# Patient Record
Sex: FEMALE | Race: WHITE | NOT HISPANIC OR LATINO | Employment: OTHER | URBAN - METROPOLITAN AREA
[De-identification: names, ages, dates, MRNs, and addresses within clinical notes are randomized per-mention and may not be internally consistent; named-entity substitution may affect disease eponyms.]

---

## 2017-01-30 ENCOUNTER — OFFICE VISIT (OUTPATIENT)
Dept: PAIN MEDICINE | Facility: CLINIC | Age: 52
End: 2017-01-30

## 2017-01-30 VITALS
TEMPERATURE: 97.3 F | WEIGHT: 167 LBS | OXYGEN SATURATION: 95 % | DIASTOLIC BLOOD PRESSURE: 74 MMHG | BODY MASS INDEX: 28.51 KG/M2 | SYSTOLIC BLOOD PRESSURE: 107 MMHG | HEIGHT: 64 IN | HEART RATE: 79 BPM | RESPIRATION RATE: 16 BRPM

## 2017-01-30 DIAGNOSIS — M93.1 KIENBOCK DISEASE, ADULT: ICD-10-CM

## 2017-01-30 DIAGNOSIS — G89.29 OTHER CHRONIC PAIN: Primary | ICD-10-CM

## 2017-01-30 DIAGNOSIS — M79.602 PAIN IN BOTH UPPER EXTREMITIES: ICD-10-CM

## 2017-01-30 DIAGNOSIS — M79.601 PAIN IN BOTH UPPER EXTREMITIES: ICD-10-CM

## 2017-01-30 DIAGNOSIS — Z79.899 ENCOUNTER FOR LONG-TERM (CURRENT) USE OF HIGH-RISK MEDICATION: ICD-10-CM

## 2017-01-30 PROCEDURE — 99213 OFFICE O/P EST LOW 20 MIN: CPT | Performed by: NURSE PRACTITIONER

## 2017-01-30 RX ORDER — OXYCODONE HYDROCHLORIDE 30 MG/1
30 TABLET, FILM COATED, EXTENDED RELEASE ORAL 2 TIMES DAILY
Qty: 60 TABLET | Refills: 0 | Status: SHIPPED | OUTPATIENT
Start: 2017-01-30 | End: 2017-02-23 | Stop reason: SDUPTHER

## 2017-01-30 RX ORDER — INSULIN GLARGINE 100 [IU]/ML
INJECTION, SOLUTION SUBCUTANEOUS
Refills: 0 | COMMUNITY
Start: 2017-01-25 | End: 2017-05-26

## 2017-01-30 RX ORDER — OXYCODONE AND ACETAMINOPHEN 7.5; 325 MG/1; MG/1
1 TABLET ORAL EVERY 6 HOURS PRN
Qty: 120 TABLET | Refills: 0 | Status: SHIPPED | OUTPATIENT
Start: 2017-01-30 | End: 2017-02-23 | Stop reason: SDUPTHER

## 2017-01-30 NOTE — PROGRESS NOTES
"CHIEF COMPLAINT    Since pt's last visit, she states her diffuse pain has decreased. The pain medicine is helping and she believes she has recovered from her time in the hospital.       Subjective   Crystal Cruz is a 51 y.o. female  who presents to the office for follow-up.She has a history of joint pain, mainly hand pain. Overall, her pain pattern has been fairly stable since her last office visit. At a recent office visit, she was changed to PErcocet 7.5/325 4/day in place of 3/day. This is per Dr. aLw plan of care during the winter-time due to patient's increased pain with cold weather.    Complains of pain in her hands and joints. Today her pain is 4/10VAs. Describes the pain as continuous. Continues with oxycontin 30 mg 2/day and percocet 7.5/325 4/day, Cymbalta 60 mg , gabapentin 300 mg 3/day. Denies any side effects from the regimen, including constipation and somnolence. The regimen helps decrease her pain by 60-70%. ADL's by self.     Has not had many recent \"flare-ups of fibromyalgia.\" Is taking Cymbalta.     Extremity Pain    The pain is present in the neck, back, left wrist, left hand, left foot, right foot, right hand, right wrist, right fingers and left fingers. This is a chronic problem. The current episode started more than 1 year ago. There has been no history of extremity trauma. The problem occurs constantly. The problem has been waxing and waning. The quality of the pain is described as aching, pounding and burning. The pain is at a severity of 7/10. The pain is moderate. Pertinent negatives include no fever or numbness. The symptoms are aggravated by cold (moist/rainy weather). She has tried acetaminophen, heat, movement, NSAIDS, OTC ointments, OTC pain meds, oral narcotics and rest (Oxycontin 30 mg Q12 hours, Oxycodone 7.5/325 3-4/day) for the symptoms. The treatment provided moderate relief.     PEG Assessment   What number best describes your pain on average in the past " "week?4-5  What number best describes how, during the past week, pain has interfered with your enjoyment of life?3  What number best describes how, during the past week, pain has interfered with your general activity?  4    The following portions of the patient's history were reviewed and updated as appropriate: allergies, current medications, past family history, past medical history, past social history, past surgical history and problem list.    Review of Systems   Constitutional: Positive for appetite change (increased). Negative for activity change, chills and fever.   HENT: Negative for ear pain.    Eyes: Negative for pain.   Respiratory: Negative for cough and shortness of breath.    Cardiovascular: Negative for chest pain.   Gastrointestinal: Negative for abdominal pain, constipation, diarrhea, nausea and vomiting.   Genitourinary: Negative for difficulty urinating, dyspareunia and dysuria.   Musculoskeletal:        Hands   Neurological: Positive for dizziness (occ). Negative for weakness, light-headedness, numbness and headaches.   Psychiatric/Behavioral: Negative for agitation, confusion, hallucinations, self-injury, sleep disturbance and suicidal ideas. The patient is nervous/anxious.        Vitals:    01/30/17 1200   BP: 107/74   Pulse: 79   Resp: 16   Temp: 97.3 °F (36.3 °C)   SpO2: 95%   Weight: 167 lb (75.8 kg)   Height: 64\" (162.6 cm)   PainSc:   4   PainLoc: Generalized       Objective   Physical Exam   Constitutional: She is oriented to person, place, and time. She appears well-developed and well-nourished. She is cooperative.   HENT:   Head: Normocephalic and atraumatic.   Nose: Nose normal.   Eyes: Conjunctivae and lids are normal.   Neck: Trachea normal.   Cardiovascular: Normal rate, regular rhythm and normal heart sounds.    Pulmonary/Chest: Effort normal and breath sounds normal.   Musculoskeletal:        Right hand: She exhibits tenderness and deformity.        Left hand: She exhibits " tenderness and deformity.   Arthritic changes bilateral hands and wrists with no acute synovitis    Guarding of bilateral hands.   Neurological: She is alert and oriented to person, place, and time. Gait normal.   Reflex Scores:       Bicep reflexes are 1+ on the right side and 1+ on the left side.       Brachioradialis reflexes are 1+ on the right side and 1+ on the left side.       Patellar reflexes are 1+ on the right side and 1+ on the left side.  Skin: Skin is warm, dry and intact.   Psychiatric: She has a normal mood and affect. Her speech is normal and behavior is normal. Judgment and thought content normal. Cognition and memory are normal.   Nursing note and vitals reviewed.    Assessment/Plan   Crystal was seen today for pain.    Diagnoses and all orders for this visit:    Other chronic pain    Kienbock disease, adult    Pain in both upper extremities    Encounter for long-term (current) use of high-risk medication    Other orders  -     OxyCODONE HCl ER (oxyCONTIN) 30 MG tablet extended-release 12 hour; Take 1 tablet by mouth 2 (Two) Times a Day.  -     oxyCODONE-acetaminophen (PERCOCET) 7.5-325 MG per tablet; Take 1 tablet by mouth Every 6 (Six) Hours As Needed for severe pain (7-10).      --- The urine drug screen confirmation from 8-17-16 has been reviewed and the result is appropriate based on patient history and STEVEN report  --- Refill Oxycontin and Percocet per Dr. Law previously outlined plan of care. Patient appears stable with current regimen. No adverse effects. Regarding continuation of opioids, there is no evidence of aberrant behavior or any red flags.  The patient continues with appropriate response to opioid therapy. ADL's remain intact by self.   --- Follow-up 1 month or sooner if needed.       STEVEN REPORT    As part of the patient's treatment plan, I am prescribing controlled substances. The patient has been made aware of appropriate use of such medications, including potential risk  of somnolence, limited ability to drive and/or work safely, and the potential for dependence or overdose. It has also bee made clear that these medications are for use by this patient only, without concomitant use of alcohol or other substances unless prescribed.     Patient has completed prescribing agreement detailing terms of continued prescribing of controlled substances, including monitoring STEVEN reports, urine drug screening, and pill counts if necessary. The patient is aware that inappropriate use will results in cessation of prescribing such medications.    STEVEN report has been reviewed and scanned into the patient's chart.    Date of last STEVEN : 1-27-17    History and physical exam exhibit continued safe and appropriate use of controlled substances.      EMR Dragon/Transcription disclaimer:   Much of this encounter note is an electronic transcription/translation of spoken language to printed text. The electronic translation of spoken language may permit erroneous, or at times, nonsensical words or phrases to be inadvertently transcribed; Although I have reviewed the note for such errors, some may still exist.

## 2017-02-23 RX ORDER — OXYCODONE AND ACETAMINOPHEN 7.5; 325 MG/1; MG/1
1 TABLET ORAL EVERY 6 HOURS PRN
Qty: 120 TABLET | Refills: 0 | Status: SHIPPED | OUTPATIENT
Start: 2017-02-23 | End: 2017-03-23 | Stop reason: SDUPTHER

## 2017-02-23 RX ORDER — OXYCODONE HYDROCHLORIDE 30 MG/1
30 TABLET, FILM COATED, EXTENDED RELEASE ORAL 2 TIMES DAILY
Qty: 60 TABLET | Refills: 0 | Status: SHIPPED | OUTPATIENT
Start: 2017-02-23 | End: 2017-03-23 | Stop reason: SDUPTHER

## 2017-02-27 ENCOUNTER — OFFICE VISIT (OUTPATIENT)
Dept: PAIN MEDICINE | Facility: CLINIC | Age: 52
End: 2017-02-27

## 2017-02-27 VITALS
BODY MASS INDEX: 28.51 KG/M2 | HEART RATE: 88 BPM | RESPIRATION RATE: 16 BRPM | TEMPERATURE: 97.1 F | SYSTOLIC BLOOD PRESSURE: 130 MMHG | OXYGEN SATURATION: 92 % | HEIGHT: 64 IN | DIASTOLIC BLOOD PRESSURE: 74 MMHG | WEIGHT: 167 LBS

## 2017-02-27 DIAGNOSIS — M79.601 PAIN IN BOTH UPPER EXTREMITIES: ICD-10-CM

## 2017-02-27 DIAGNOSIS — M79.602 PAIN IN BOTH UPPER EXTREMITIES: ICD-10-CM

## 2017-02-27 DIAGNOSIS — G89.4 CHRONIC PAIN SYNDROME: Primary | ICD-10-CM

## 2017-02-27 DIAGNOSIS — M93.1 KIENBOCK DISEASE, ADULT: ICD-10-CM

## 2017-02-27 DIAGNOSIS — Z79.899 ENCOUNTER FOR LONG-TERM (CURRENT) USE OF HIGH-RISK MEDICATION: ICD-10-CM

## 2017-02-27 PROCEDURE — 99213 OFFICE O/P EST LOW 20 MIN: CPT | Performed by: NURSE PRACTITIONER

## 2017-02-27 NOTE — PROGRESS NOTES
CHIEF COMPLAINT  Since 1/30/17 office visit, Pt states bilateral hands pain is basically unchanged,being moderately controlled overall with current medicine regimen    Subjective   Crystal Cruz is a 51 y.o. female  who presents to the office for follow-up.She has a history of hand pain(Keinbock's) and joint pain. Overall, her pain pattern has been fairly unchanged since her last office visit. The abnormal weather patterns do seem to influence her pain.    Since her last office visit, she was diagnosed with Taj's Syndrome. Seen by her eye doctor. Confirmed by another opthamologist.     Complains of pain in her hands and joints. Today her pain is 5/10VAS. Describes the pain as continuous.  Continues with OxyContin 30 mg BID, Percocet 7.5/325 3-4/day, cymbalta 60 mg daily and gabapentin 300 mg TID. Denies any side effects from the regimen. The regimen helps decrease her pain by 40-60%. ADL's by self.     Since her last office visit, she did have a procedure performed. She had a nasal/sinus balloon to open up her sinuses.     Extremity Pain    The pain is present in the neck, back, left wrist, left hand, left foot, right foot, right hand, right wrist, right fingers and left fingers. This is a chronic problem. The current episode started more than 1 year ago. There has been no history of extremity trauma. The problem occurs constantly. The problem has been waxing and waning. The quality of the pain is described as aching, pounding and burning. The pain is at a severity of 5/10. The pain is moderate. Pertinent negatives include no fever or numbness. The symptoms are aggravated by cold (moist/rainy weather). She has tried acetaminophen, heat, movement, NSAIDS, OTC ointments, OTC pain meds, oral narcotics and rest (Oxycontin 30 mg Q12 hours, Oxycodone 7.5/325 3-4/day) for the symptoms. The treatment provided moderate relief.      PEG Assessment   What number best describes your pain on average in the past  "week?6  What number best describes how, during the past week, pain has interfered with your enjoyment of life?5  What number best describes how, during the past week, pain has interfered with your general activity?  5    The following portions of the patient's history were reviewed and updated as appropriate: allergies, current medications, past family history, past medical history, past social history, past surgical history and problem list.    Review of Systems   Constitutional: Positive for appetite change (increased). Negative for activity change, chills and fever.   HENT: Negative for ear pain.    Eyes: Negative for pain.   Respiratory: Negative for apnea, cough and shortness of breath.    Cardiovascular: Negative for chest pain.   Gastrointestinal: Positive for constipation. Negative for abdominal pain, diarrhea, nausea and vomiting.   Genitourinary: Negative for difficulty urinating, dyspareunia and dysuria.   Musculoskeletal:        Hands   Neurological: Negative for dizziness (occ), weakness, light-headedness, numbness and headaches.   Psychiatric/Behavioral: Positive for sleep disturbance. Negative for agitation, confusion, hallucinations, self-injury and suicidal ideas. The patient is nervous/anxious.        Vitals:    02/27/17 1055   BP: 130/74   Pulse: 88   Resp: 16   Temp: 97.1 °F (36.2 °C)   SpO2: 92%   Weight: 167 lb (75.8 kg)   Height: 64\" (162.6 cm)   PainSc: 5  Comment: Bilateral hands pain ranges from 4-7/10   PainLoc: Hand       Objective   Physical Exam   Constitutional: She is oriented to person, place, and time. She appears well-developed and well-nourished. She is cooperative.   HENT:   Head: Normocephalic and atraumatic.   Nose: Nose normal.   Eyes: Conjunctivae and lids are normal.   Neck: Trachea normal.   Cardiovascular: Normal rate, regular rhythm and normal heart sounds.    Pulmonary/Chest: Effort normal and breath sounds normal.   Musculoskeletal:        Right hand: She exhibits " tenderness and deformity.        Left hand: She exhibits tenderness and deformity.   Arthritic changes bilateral hands and wrists with no acute synovitis    Guarding of bilateral hands.   Neurological: She is alert and oriented to person, place, and time. Gait normal.   Reflex Scores:       Bicep reflexes are 1+ on the right side and 1+ on the left side.       Brachioradialis reflexes are 1+ on the right side and 1+ on the left side.  Skin: Skin is warm, dry and intact.   Psychiatric: She has a normal mood and affect. Her speech is normal and behavior is normal. Judgment and thought content normal. Cognition and memory are normal.   Nursing note and vitals reviewed.          Assessment/Plan   Crystal was seen today for hand pain.    Diagnoses and all orders for this visit:    Chronic pain syndrome    Pain in both upper extremities    Kienbock disease, adult    Encounter for long-term (current) use of high-risk medication      --- The urine drug screen confirmation from 8-17-16 has been reviewed and the result is appropriate based on patient history and STEVEN report  --- Refill Oxycontin and Hydrocodone. Patient appears stable with current regimen. No adverse effects. Regarding continuation of opioids, there is no evidence of aberrant behavior or any red flags.  The patient continues with appropriate response to opioid therapy. ADL's remain intact by self.   --- Follow-up 1 month or sooner if needed.         STEVEN REPORT    As part of the patient's treatment plan, I am prescribing controlled substances. The patient has been made aware of appropriate use of such medications, including potential risk of somnolence, limited ability to drive and/or work safely, and the potential for dependence or overdose. It has also bee made clear that these medications are for use by this patient only, without concomitant use of alcohol or other substances unless prescribed.     Patient has completed prescribing agreement detailing  terms of continued prescribing of controlled substances, including monitoring STEVNE reports, urine drug screening, and pill counts if necessary. The patient is aware that inappropriate use will results in cessation of prescribing such medications.    STEVEN report has been reviewed and scanned into the patient's chart.    Date of last STEVEN : 2-23-17    History and physical exam exhibit continued safe and appropriate use of controlled substances.      EMR Dragon/Transcription disclaimer:   Much of this encounter note is an electronic transcription/translation of spoken language to printed text. The electronic translation of spoken language may permit erroneous, or at times, nonsensical words or phrases to be inadvertently transcribed; Although I have reviewed the note for such errors, some may still exist.

## 2017-03-23 RX ORDER — OXYCODONE AND ACETAMINOPHEN 7.5; 325 MG/1; MG/1
1 TABLET ORAL EVERY 6 HOURS PRN
Qty: 120 TABLET | Refills: 0 | Status: SHIPPED | OUTPATIENT
Start: 2017-03-23 | End: 2017-04-27 | Stop reason: SDUPTHER

## 2017-03-23 RX ORDER — OXYCODONE HYDROCHLORIDE 30 MG/1
30 TABLET, FILM COATED, EXTENDED RELEASE ORAL 2 TIMES DAILY
Qty: 60 TABLET | Refills: 0 | Status: SHIPPED | OUTPATIENT
Start: 2017-03-23 | End: 2017-04-27 | Stop reason: SDUPTHER

## 2017-03-27 ENCOUNTER — OFFICE VISIT (OUTPATIENT)
Dept: PAIN MEDICINE | Facility: CLINIC | Age: 52
End: 2017-03-27

## 2017-03-27 VITALS
RESPIRATION RATE: 18 BRPM | DIASTOLIC BLOOD PRESSURE: 78 MMHG | BODY MASS INDEX: 28.85 KG/M2 | HEIGHT: 64 IN | TEMPERATURE: 97.6 F | SYSTOLIC BLOOD PRESSURE: 118 MMHG | OXYGEN SATURATION: 96 % | WEIGHT: 169 LBS | HEART RATE: 92 BPM

## 2017-03-27 DIAGNOSIS — M79.601 PAIN IN BOTH UPPER EXTREMITIES: ICD-10-CM

## 2017-03-27 DIAGNOSIS — M79.602 PAIN IN BOTH UPPER EXTREMITIES: ICD-10-CM

## 2017-03-27 DIAGNOSIS — M92.219 JUVENILE OSTEOCHONDROSIS OF CARPAL LUNATE, UNSPECIFIED LATERALITY: ICD-10-CM

## 2017-03-27 DIAGNOSIS — G89.29 OTHER CHRONIC PAIN: Primary | ICD-10-CM

## 2017-03-27 DIAGNOSIS — Z79.899 ENCOUNTER FOR LONG-TERM (CURRENT) USE OF HIGH-RISK MEDICATION: ICD-10-CM

## 2017-03-27 DIAGNOSIS — M93.1 KIENBOCK DISEASE, ADULT: ICD-10-CM

## 2017-03-27 LAB
POC AMPHETAMINES: NEGATIVE
POC BARBITURATES: NEGATIVE
POC BENZODIAZEPHINES: POSITIVE
POC COCAINE: NEGATIVE
POC METHADONE: NEGATIVE
POC METHAMPHETAMINE SCREEN URINE: NEGATIVE
POC OPIATES: NEGATIVE
POC OXYCODONE: POSITIVE
POC PHENCYCLIDINE: NEGATIVE
POC PROPOXYPHENE: NEGATIVE
POC THC: NEGATIVE
POC TRICYCLIC ANTIDEPRESSANTS: NEGATIVE

## 2017-03-27 PROCEDURE — 99213 OFFICE O/P EST LOW 20 MIN: CPT | Performed by: NURSE PRACTITIONER

## 2017-03-27 PROCEDURE — 80305 DRUG TEST PRSMV DIR OPT OBS: CPT | Performed by: NURSE PRACTITIONER

## 2017-03-27 NOTE — PROGRESS NOTES
CHIEF COMPLAINT  Diffused body pain    Subjective   Crystal Cruz is a 51 y.o. female  who presents to the office for follow-up.She has a history of hand/joint pain (Keinbock's Disease). Overall, her pain pattern has been relatively unchanged since her last office visit.    Complains of pain in her hands and joints. Today her pain is 5/10VAS. Describes the pain as continuous. Continues with OxyContin 30 mg BID and Percocet 7.5/325 3-4/day, Cymbalta 60 mg daily and gabapentin 300 mg TID.  Denies any side effects from the regimen. The regimen helps decrease her pain moderately. ADL's by self.    There was an issue with her pharmacy last month not having an entire 30 day supply of OxyContin. She stretched out the OxyContin until today's appointment.     Extremity Pain    The pain is present in the neck, back, left wrist, left hand, left foot, right foot, right hand, right wrist, right fingers and left fingers. This is a chronic problem. The current episode started more than 1 year ago. There has been no history of extremity trauma. The problem occurs constantly. The problem has been waxing and waning. The quality of the pain is described as aching, pounding and burning. The pain is at a severity of 5/10. The pain is moderate. Pertinent negatives include no fever or numbness. The symptoms are aggravated by cold (moist/rainy weather). She has tried acetaminophen, heat, movement, NSAIDS, OTC ointments, OTC pain meds, oral narcotics and rest (Oxycontin 30 mg Q12 hours, Oxycodone 7.5/325 3-4/day) for the symptoms. The treatment provided moderate relief.      PEG Assessment   What number best describes your pain on average in the past week?6  What number best describes how, during the past week, pain has interfered with your enjoyment of life?8  What number best describes how, during the past week, pain has interfered with your general activity?  8    The following portions of the patient's history were reviewed and  "updated as appropriate: allergies, current medications, past family history, past medical history, past social history, past surgical history and problem list.    Review of Systems   Constitutional: Negative for chills and fever.   Respiratory: Negative for shortness of breath.    Cardiovascular: Negative for chest pain.   Gastrointestinal: Negative for constipation, diarrhea, nausea and vomiting.   Genitourinary: Negative for difficulty urinating, dyspareunia and dysuria.   Musculoskeletal:        Diffused body pain   Neurological: Negative for dizziness, weakness, light-headedness, numbness and headaches.   Psychiatric/Behavioral: Negative for confusion, hallucinations, self-injury, sleep disturbance and suicidal ideas. The patient is not nervous/anxious.        Vitals:    03/27/17 1235   BP: 118/78   Pulse: 92   Resp: 18   Temp: 97.6 °F (36.4 °C)   SpO2: 96%   Weight: 169 lb (76.7 kg)   Height: 64\" (162.6 cm)   PainSc:   5   PainLoc: Hand     Objective   Physical Exam   Constitutional: She is oriented to person, place, and time. She appears well-developed and well-nourished. She is cooperative.   HENT:   Head: Normocephalic and atraumatic.   Nose: Nose normal.   Eyes: Conjunctivae and lids are normal.   Neck: Trachea normal.   Cardiovascular: Normal rate, regular rhythm and normal heart sounds.    Pulmonary/Chest: Effort normal and breath sounds normal.   Musculoskeletal:        Right hand: She exhibits tenderness. She exhibits normal capillary refill.        Left hand: She exhibits tenderness. She exhibits normal capillary refill.   Arthritic changes bilateral hands and wrists with no acute synovitis    Guarding of bilateral hands.   Neurological: She is alert and oriented to person, place, and time. Gait normal.   Reflex Scores:       Bicep reflexes are 1+ on the right side and 1+ on the left side.       Brachioradialis reflexes are 1+ on the right side and 1+ on the left side.  Skin: Skin is warm, dry and " intact.   Psychiatric: She has a normal mood and affect. Her speech is normal and behavior is normal. Judgment and thought content normal. Cognition and memory are normal.   Nursing note and vitals reviewed.      Assessment/Plan   Crystal was seen today for hand pain.    Diagnoses and all orders for this visit:    Other chronic pain  -     Urine Drug Screen Confirmation  -     POC Urine Drug Screen, Triage    Kienbock disease, adult  -     Urine Drug Screen Confirmation  -     POC Urine Drug Screen, Triage    Juvenile osteochondrosis of carpal lunate, unspecified laterality  -     Urine Drug Screen Confirmation  -     POC Urine Drug Screen, Triage    Pain in both upper extremities  -     Urine Drug Screen Confirmation  -     POC Urine Drug Screen, Triage    Encounter for long-term (current) use of high-risk medication  -     Urine Drug Screen Confirmation  -     POC Urine Drug Screen, Triage        --- Routine UDS in office today as part of monitoring requirements for controlled substances.  The specimen was viewed and the immunoassay result reviewed and is +OXY, +BZD(appropriate).  This specimen will be sent to [facility] for confirmation.     --- Refill OxyContin and Percocet. Discussed working on decreasing Percocet back to previous regimen. Will plan to return to previous regimen for next refill. Patient verbalized understanding. Patient appears stable with current regimen. No adverse effects. Regarding continuation of opioids, there is no evidence of aberrant behavior or any red flags.  The patient continues with appropriate response to opioid therapy. ADL's remain intact by self.   --- Follow-up 1 month or sooner if needed.         STEVEN REPORT    As part of the patient's treatment plan, I am prescribing controlled substances. The patient has been made aware of appropriate use of such medications, including potential risk of somnolence, limited ability to drive and/or work safely, and the potential  for dependence or overdose. It has also bee made clear that these medications are for use by this patient only, without concomitant use of alcohol or other substances unless prescribed.     Patient has completed prescribing agreement detailing terms of continued prescribing of controlled substances, including monitoring STEVEN reports, urine drug screening, and pill counts if necessary. The patient is aware that inappropriate use will results in cessation of prescribing such medications.    STEVEN report has been reviewed and scanned into the patient's chart.    Date of last STEVEN : 3-22-17    History and physical exam exhibit continued safe and appropriate use of controlled substances.      EMR Dragon/Transcription disclaimer:   Much of this encounter note is an electronic transcription/translation of spoken language to printed text. The electronic translation of spoken language may permit erroneous, or at times, nonsensical words or phrases to be inadvertently transcribed; Although I have reviewed the note for such errors, some may still exist.

## 2017-04-06 ENCOUNTER — DOCUMENTATION (OUTPATIENT)
Dept: PAIN MEDICINE | Facility: CLINIC | Age: 52
End: 2017-04-06

## 2017-04-19 ENCOUNTER — OFFICE VISIT (OUTPATIENT)
Dept: GASTROENTEROLOGY | Facility: CLINIC | Age: 52
End: 2017-04-19

## 2017-04-19 VITALS
HEIGHT: 64 IN | WEIGHT: 173.2 LBS | OXYGEN SATURATION: 99 % | SYSTOLIC BLOOD PRESSURE: 120 MMHG | HEART RATE: 80 BPM | TEMPERATURE: 97.9 F | DIASTOLIC BLOOD PRESSURE: 78 MMHG | BODY MASS INDEX: 29.57 KG/M2

## 2017-04-19 DIAGNOSIS — K22.70 BARRETT'S ESOPHAGUS WITHOUT DYSPLASIA: ICD-10-CM

## 2017-04-19 DIAGNOSIS — K59.00 CONSTIPATION, UNSPECIFIED CONSTIPATION TYPE: ICD-10-CM

## 2017-04-19 DIAGNOSIS — K21.9 GASTROESOPHAGEAL REFLUX DISEASE WITHOUT ESOPHAGITIS: Primary | ICD-10-CM

## 2017-04-19 PROBLEM — E55.9 VITAMIN D DEFICIENCY: Status: ACTIVE | Noted: 2017-02-14

## 2017-04-19 PROCEDURE — 99213 OFFICE O/P EST LOW 20 MIN: CPT | Performed by: INTERNAL MEDICINE

## 2017-04-19 NOTE — PROGRESS NOTES
PATIENT INFORMATION  Crystal Cruz       - 1965    CHIEF COMPLAINT  Chief Complaint   Patient presents with   • Constipation     HOSPITAL FOLLOW UP   • Diarrhea   • Heartburn       HISTORY OF PRESENT ILLNESS  Constipation   Associated symptoms include back pain and diarrhea.   Diarrhea    Associated symptoms include arthralgias and myalgias.   Heartburn     She is here in follow up for her constipation and GERD. She is on linzess, 2 dulcolax and miralax daily. She achieves a bowel movement every 2-3 days.  GERD is controlled on Prevacid daily. Weight is steadily rising. Appetite is better.        REVIEW OF SYSTEMS  Review of Systems   Gastrointestinal: Positive for constipation and diarrhea.        REFLUX   Musculoskeletal: Positive for arthralgias, back pain and myalgias.   Allergic/Immunologic: Positive for environmental allergies.   Neurological: Positive for seizures.   Psychiatric/Behavioral: The patient is nervous/anxious.         ANXIETY   All other systems reviewed and are negative.        ACTIVE PROBLEMS  Patient Active Problem List    Diagnosis   • Vitamin D deficiency [E55.9]   • Encounter for long-term (current) use of high-risk medication [Z79.899]   • Acquired hypothyroidism [E03.9]   • PRES (posterior reversible encephalopathy syndrome) [I67.83]   • Aftercare [Z51.89]   • Seizure [R56.9]   • Elevated troponin [R79.89]   • Intractable vomiting with nausea [R11.2]   • Arthritis [M19.90]     Overview Note:     Overview:   BACK     • Muscle pain [M79.1]   • Controlled type 2 diabetes mellitus without complication [E11.9]   • Chronic pain [G89.29]   • Benign essential hypertension [I10]   • Acute pancreatitis [K85.90]   • Chronic pain disorder [G89.4]   • Kienbock disease, 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]   • Acute upper respiratory infection [J06.9]   • Anxiety disorder [F41.9]   • Huitron's esophagus [K22.70]   • Knee pain [M25.569]   • Chronic constipation [K59.09]   • Chronic pain syndrome [G89.4]   •  Chronic maxillary sinusitis [J32.0]   • Diabetes mellitus [E11.9]   • Arthropathy of lumbar facet joint [M12.88]   • Fatigue [R53.83]   • Gastroesophageal reflux disease without esophagitis [K21.9]   • Hyperlipidemia [E78.5]   • 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]   • Discharge from nipple [N64.52]   • Pneumonitis [J18.9]   • Systemic sclerosis [M34.9]   • Pain of upper extremity [M79.603]   • Stenosis of carotid artery [I65.29]   • Hypertension [I10]   • Obstruction of carotid artery [I65.29]   • Tobacco use [Z72.0]         PAST MEDICAL HISTORY  Past Medical History:   Diagnosis Date   • Acid reflux    • Anemia    • Anxiety    • Huitron's esophagus    • CAD (coronary artery disease)    • Chronic pain disorder    • CVA (cerebrovascular accident)    • Diabetes mellitus    • History of transfusion    • Hypertension    • Joint pain    • Kienbock's disease    • Low back pain    • Pulmonary fibrosis 2016   • Reflux gastritis    • Scleroderma    • Seizure          SURGICAL HISTORY  Past Surgical History:   Procedure Laterality Date   • CAROTID ENDARTERECTOMY Left     Neurological   • CATARACT EXTRACTION Bilateral    •  SECTION     • CHOLECYSTECTOMY      Laparoscopic   • COLONOSCOPY      Complete   • COLONOSCOPY N/A 2016    Procedure: COLONOSCOPY;  Surgeon: Gaviota Hagen MD;  Location: [facility] OR;  Service:    • ENDOSCOPY N/A 2016    Procedure: ESOPHAGOGASTRODUODENOSCOPY WITH BIOPSIES;  Surgeon: Gaviota Hagen MD;  Location: [facility] OR;  Service:    • TONSILLECTOMY AND ADENOIDECTOMY     • UPPER GASTROINTESTINAL ENDOSCOPY      Diagnostic   • WRIST SURGERY Left          FAMILY HISTORY  Family History   Problem Relation Age of Onset   • Other Mother      Cardiac Disorder   • Migraines Mother    • Heart disease Mother    • Sudden death Mother    • Other Father      Cardiac Disorder   • Hypertension Father    • Heart disease Father    • Cancer Father    • Hypertension Sister    • Cancer Sister   "  • Migraines Daughter    • Cancer Other      Uncle   • Migraines Maternal Aunt    • Stroke Maternal Grandmother    • Stroke Maternal Grandfather          SOCIAL HISTORY  Social History     Occupational History   • Not on file.     Social History Main Topics   • Smoking status: Current Every Day Smoker     Packs/day: 1.00   • Smokeless tobacco: Not on file   • Alcohol use No   • Drug use: No   • Sexual activity: Defer      Comment: EXERCISE - RARELY         CURRENT MEDICATIONS    Current Outpatient Prescriptions:   •  albuterol (PROVENTIL HFA;VENTOLIN HFA) 108 (90 BASE) MCG/ACT inhaler, Inhale 2 puffs Daily As Needed for shortness of air., Disp: 1 inhaler, Rfl: 0  •  ALPRAZolam (XANAX) 2 MG tablet, Take 2 mg by mouth 3 (Three) Times a Day As Needed for anxiety., Disp: , Rfl:   •  aspirin 81 MG EC tablet, Take 1 tablet by mouth daily., Disp: 30 tablet, Rfl: 6  •  docusate sodium (COLACE) 100 MG capsule, Take 1 capsule by mouth 2 (Two) Times a Day., Disp: 60 capsule, Rfl: 0  •  DULoxetine (CYMBALTA) 60 MG capsule, Take 1 capsule by mouth Daily., Disp: 30 capsule, Rfl: 0  •  DYMISTA 137-50 MCG/ACT suspension, INHALE 1 SPRAY IN EACH NOSTRIL BID, Disp: , Rfl: 3  •  gabapentin (NEURONTIN) 300 MG capsule, Take 600 mg by mouth 3 (Three) Times a Day., Disp: , Rfl:   •  Hydrocodone-Acetaminophen (DOLACET PO), Take  by mouth 2 (Two) Times a Day., Disp: , Rfl:   •  Insulin Syringe 31G X 5/16\" 1 ML misc, Use twice daily for shots, Disp: 100 each, Rfl: 3  •  LACTOBACILLUS EXTRA STRENGTH capsule, Take 1 capsule by mouth Daily., Disp: 30 each, Rfl: 6  •  lansoprazole (PREVACID SOLUTAB) 30 MG disintegrating tablet, Take 30 mg by mouth Daily. Takes morning and night, Disp: , Rfl:   •  LANTUS 100 UNIT/ML injection, INJECT 20 UNITS INTO THE SKIN D FOR 90 DAYS, Disp: , Rfl: 0  •  levETIRAcetam (KEPPRA) 500 MG tablet, Take 1 tablet by mouth Every 12 (Twelve) Hours., Disp: 60 tablet, Rfl: 0  •  Linaclotide (LINZESS) 145 MCG capsule, Take " "145 mcg by mouth Daily., Disp: 30 capsule, Rfl: 2  •  magnesium hydroxide (MILK OF MAGNESIA) 400 MG/5ML suspension, Take 15 mL by mouth Daily As Needed for constipation., Disp: 473 mL, Rfl: 0  •  metFORMIN XR (GLUCOPHAGE-XR) 500 MG 24 hr tablet, Take 1,000 mg by mouth., Disp: , Rfl:   •  ondansetron ODT (ZOFRAN-ODT) 4 MG disintegrating tablet, Take 1 tablet by mouth 4 (four) times a day as needed for nausea or vomiting for up to 15 doses., Disp: 15 tablet, Rfl: 0  •  OxyCODONE HCl ER (oxyCONTIN) 30 MG tablet extended-release 12 hour, Take 1 tablet by mouth 2 (Two) Times a Day., Disp: 60 tablet, Rfl: 0  •  oxyCODONE-acetaminophen (PERCOCET) 7.5-325 MG per tablet, Take 1 tablet by mouth Every 6 (Six) Hours As Needed for Severe Pain (7-10)., Disp: 120 tablet, Rfl: 0  •  polyethylene glycol (MIRALAX) powder, Take by mouth daily. Mix 1 capful (17gm) in 8 ounces of water, juice, or tea., Disp: , Rfl:   •  PRENATAL 28-0.8 MG tablet, Take one po daily, Disp: 30 each, Rfl: 11  •  raNITIdine (ZANTAC) 150 MG tablet, , Disp: , Rfl:   •  SAXagliptin-MetFORMIN ER (KOMBIGLYZE XR) 5-1000 MG tablet sustained-release 24 hour, Take 1,000 mg by mouth 2 (Two) Times a Day., Disp: , Rfl:   •  traZODone (DESYREL) 100 MG tablet, , Disp: , Rfl:   •  valsartan (DIOVAN) 80 MG tablet, Take 1 tablet by mouth Daily., Disp: 30 tablet, Rfl: 0    ALLERGIES  Atorvastatin; Levofloxacin; Nsaids; and Victoza  [liraglutide]    VITALS  Vitals:    04/19/17 1359   BP: 120/78   Pulse: 80   Temp: 97.9 °F (36.6 °C)   TempSrc: Oral   SpO2: 99%   Weight: 173 lb 3.2 oz (78.6 kg)   Height: 64\" (162.6 cm)       LAST RESULTS   Office Visit on 03/27/2017   Component Date Value Ref Range Status   • Methamphetaine Screen, Urine 03/27/2017 Negative  Negative Final   • POC Amphetamines 03/27/2017 Negative  Negative Final   • Barbiturates Screen 03/27/2017 Negative  Negative Final   • Benzodiazepine Screen 03/27/2017 Positive  Negative Final   • Cocaine Screen 03/27/2017 " Negative  Negative Final   • Methadone Screen 03/27/2017 Negative  Negative Final   • Opiate Screen 03/27/2017 Negative  Negative Final   • Oxycodone, Screen 03/27/2017 Positive  Negative Final   • Phencyclidine (PCP) Screen 03/27/2017 Negative  Negative Final   • Propoxyphene Screen 03/27/2017 Negative  Negative Final   • THC, Screen 03/27/2017 Negative  Negative Final   • Tricyclic Antidepressants Screen 03/27/2017 Negative  Negative Final     No results found.    PHYSICAL EXAM  Physical Exam   Constitutional: She is oriented to person, place, and time. She appears well-developed and well-nourished. No distress.   HENT:   Head: Normocephalic and atraumatic.   Mouth/Throat: Oropharynx is clear and moist.   Eyes: EOM are normal. Pupils are equal, round, and reactive to light.   Neck: Normal range of motion. No tracheal deviation present.   Cardiovascular: Normal rate, regular rhythm, normal heart sounds and intact distal pulses.  Exam reveals no gallop and no friction rub.    No murmur heard.  Pulmonary/Chest: Effort normal and breath sounds normal. No stridor. No respiratory distress. She has no wheezes. She has no rales. She exhibits no tenderness.   Abdominal: Soft. Bowel sounds are normal. She exhibits no distension. There is no tenderness. There is no rebound and no guarding.   Musculoskeletal: She exhibits no edema.   Lymphadenopathy:     She has no cervical adenopathy.   Neurological: She is alert and oriented to person, place, and time.   Skin: Skin is warm. She is not diaphoretic.   Psychiatric: She has a normal mood and affect. Her behavior is normal. Judgment and thought content normal.   Nursing note and vitals reviewed.      ASSESSMENT  Diagnoses and all orders for this visit:    Gastroesophageal reflux disease without esophagitis    Constipation, unspecified constipation type    Huitron's esophagus without dysplasia          PLAN  No Follow-up on file.      Continue on current bowel regimen,      Antireflux measures and dietary modifications reviewed. Low acid diet reviewed. Keep head of bed elevated. Stop eating/drinking at least 3 hours prior to bedtime. Eliminate caffeine and carbonated beverages.  Weight loss encouraged if BMI over 25.          5-6 small, low fiber, low fat meals daily.  Antireflux measures discussed.

## 2017-04-27 ENCOUNTER — OFFICE VISIT (OUTPATIENT)
Dept: PAIN MEDICINE | Facility: CLINIC | Age: 52
End: 2017-04-27

## 2017-04-27 VITALS
WEIGHT: 175 LBS | SYSTOLIC BLOOD PRESSURE: 120 MMHG | RESPIRATION RATE: 18 BRPM | TEMPERATURE: 98.2 F | HEART RATE: 79 BPM | BODY MASS INDEX: 29.88 KG/M2 | DIASTOLIC BLOOD PRESSURE: 84 MMHG | HEIGHT: 64 IN | OXYGEN SATURATION: 94 %

## 2017-04-27 DIAGNOSIS — Z79.899 ENCOUNTER FOR LONG-TERM (CURRENT) USE OF HIGH-RISK MEDICATION: ICD-10-CM

## 2017-04-27 DIAGNOSIS — M79.601 PAIN IN BOTH UPPER EXTREMITIES: ICD-10-CM

## 2017-04-27 DIAGNOSIS — M79.602 PAIN IN BOTH UPPER EXTREMITIES: ICD-10-CM

## 2017-04-27 DIAGNOSIS — M93.1 KIENBOCK DISEASE, ADULT: ICD-10-CM

## 2017-04-27 DIAGNOSIS — G89.29 OTHER CHRONIC PAIN: Primary | ICD-10-CM

## 2017-04-27 PROCEDURE — 99213 OFFICE O/P EST LOW 20 MIN: CPT | Performed by: NURSE PRACTITIONER

## 2017-04-27 RX ORDER — OXYCODONE AND ACETAMINOPHEN 7.5; 325 MG/1; MG/1
1 TABLET ORAL EVERY 8 HOURS PRN
Qty: 90 TABLET | Refills: 0 | Status: SHIPPED | OUTPATIENT
Start: 2017-04-27 | End: 2017-05-25 | Stop reason: SDUPTHER

## 2017-04-27 RX ORDER — OXYCODONE HYDROCHLORIDE 30 MG/1
30 TABLET, FILM COATED, EXTENDED RELEASE ORAL 2 TIMES DAILY
Qty: 60 TABLET | Refills: 0 | Status: SHIPPED | OUTPATIENT
Start: 2017-04-27 | End: 2017-05-25 | Stop reason: SDUPTHER

## 2017-04-27 RX ORDER — INSULIN GLARGINE 100 [IU]/ML
16 INJECTION, SOLUTION SUBCUTANEOUS
COMMUNITY
Start: 2017-04-24 | End: 2020-02-10 | Stop reason: HOSPADM

## 2017-04-27 NOTE — PROGRESS NOTES
CHIEF COMPLAINT  Follow-up for hand pain. Ms. rCuz states her hand pain has gotten better since her last appt.    Subjective   Crystal Cruz is a 51 y.o. female  who presents to the office for follow-up.She has a history of hand pain/OA. Overall, her hand pain has been fairly unchanged since her last office visit.     Complains of pain in her hands and upper extremities. Today her pain is 4/10VAS. Describes the pain as continuous.  Continues with OxyContin 30 mg BID and Percocet 7.5/325 3-4/day, Cymbalta 60 mg and gabapentin 300 mg 2 TID. Denies any side effects from the regimen regularly. However, she can have some constipation but does take OTC options with positive results. The regimen helps decrease her pain by 50%. ADL's by self.     Extremity Pain    The pain is present in the neck, back, left wrist, left hand, left foot, right foot, right hand, right wrist, right fingers and left fingers. This is a chronic problem. The current episode started more than 1 year ago. There has been no history of extremity trauma. The problem occurs constantly. The problem has been waxing and waning. The quality of the pain is described as aching, pounding and burning. The pain is at a severity of 4/10. The pain is moderate. Pertinent negatives include no fever or numbness. The symptoms are aggravated by cold (moist/rainy weather). She has tried acetaminophen, heat, movement, NSAIDS, OTC ointments, OTC pain meds, oral narcotics and rest (Oxycontin 30 mg Q12 hours, Oxycodone 7.5/325 3-4/day) for the symptoms. The treatment provided moderate relief.      PEG Assessment   What number best describes your pain on average in the past week?4  What number best describes how, during the past week, pain has interfered with your enjoyment of life?4  What number best describes how, during the past week, pain has interfered with your general activity?  5    The following portions of the patient's history were reviewed and updated as  "appropriate: allergies, current medications, past family history, past medical history, past social history, past surgical history and problem list.    Review of Systems   Constitutional: Negative for fatigue and fever.   HENT: Negative for congestion.    Eyes: Negative for visual disturbance.   Respiratory: Positive for cough. Negative for shortness of breath and wheezing.    Cardiovascular: Negative.    Gastrointestinal: Negative for constipation and diarrhea.   Genitourinary: Negative for difficulty urinating.   Musculoskeletal: Positive for arthralgias.   Neurological: Negative for weakness and numbness.   Psychiatric/Behavioral: Negative for sleep disturbance and suicidal ideas. The patient is nervous/anxious.        Vitals:    04/27/17 1308   BP: 120/84   Pulse: 79   Resp: 18   Temp: 98.2 °F (36.8 °C)   SpO2: 94%   Weight: 175 lb (79.4 kg)   Height: 64\" (162.6 cm)   PainSc:   4     Objective   Physical Exam   Constitutional: She is oriented to person, place, and time. She appears well-developed and well-nourished. She is cooperative.   HENT:   Head: Normocephalic and atraumatic.   Nose: Nose normal.   Eyes: Conjunctivae and lids are normal.   Neck: Trachea normal.   Cardiovascular: Normal rate, regular rhythm and normal heart sounds.    Pulmonary/Chest: Effort normal and breath sounds normal.   Musculoskeletal:        Right hand: She exhibits decreased range of motion and tenderness. She exhibits normal capillary refill.        Left hand: She exhibits decreased range of motion and tenderness. She exhibits normal capillary refill.   Arthritic changes bilateral hands and wrists with no acute synovitis    Guarding of bilateral hands.   Neurological: She is alert and oriented to person, place, and time. Gait normal.   Reflex Scores:       Bicep reflexes are 1+ on the right side and 1+ on the left side.       Brachioradialis reflexes are 1+ on the right side and 1+ on the left side.  Skin: Skin is warm, dry and " intact.   Psychiatric: She has a normal mood and affect. Her speech is normal and behavior is normal. Judgment and thought content normal. Cognition and memory are normal.   Nursing note and vitals reviewed.      Assessment/Plan   Crystal was seen today for hand pain.    Diagnoses and all orders for this visit:    Other chronic pain    Kienbock disease, adult    Pain in both upper extremities    Encounter for long-term (current) use of high-risk medication    Other orders  -     OxyCODONE HCl ER (oxyCONTIN) 30 MG tablet extended-release 12 hour; Take 1 tablet by mouth 2 (Two) Times a Day.  -     oxyCODONE-acetaminophen (PERCOCET) 7.5-325 MG per tablet; Take 1 tablet by mouth Every 8 (Eight) Hours As Needed for Severe Pain (7-10).      --- The urine drug screen confirmation from 3-27-17 has been reviewed and the result is appropriate based on patient history and STEVEN report  --- Refill Oxycontin and Percocet. Patient appears stable with current regimen. No adverse effects. Regarding continuation of opioids, there is no evidence of aberrant behavior or any red flags.  The patient continues with appropriate response to opioid therapy. ADL's remain intact by self.   --- Follow-up 1 month or sooner if needed.         STEVEN REPORT    As part of the patient's treatment plan, I am prescribing controlled substances. The patient has been made aware of appropriate use of such medications, including potential risk of somnolence, limited ability to drive and/or work safely, and the potential for dependence or overdose. It has also bee made clear that these medications are for use by this patient only, without concomitant use of alcohol or other substances unless prescribed.     Patient has completed prescribing agreement detailing terms of continued prescribing of controlled substances, including monitoring STEVEN reports, urine drug screening, and pill counts if necessary. The patient is aware that inappropriate use will  results in cessation of prescribing such medications.    STEVEN report has been reviewed and scanned into the patient's chart.    Date of last STEVEN : 4-24-17    History and physical exam exhibit continued safe and appropriate use of controlled substances.      EMR Dragon/Transcription disclaimer:   Much of this encounter note is an electronic transcription/translation of spoken language to printed text. The electronic translation of spoken language may permit erroneous, or at times, nonsensical words or phrases to be inadvertently transcribed; Although I have reviewed the note for such errors, some may still exist.

## 2017-05-16 ENCOUNTER — TRANSCRIBE ORDERS (OUTPATIENT)
Dept: ADMINISTRATIVE | Facility: HOSPITAL | Age: 52
End: 2017-05-16

## 2017-05-16 DIAGNOSIS — Z13.9 SCREENING: Primary | ICD-10-CM

## 2017-05-25 RX ORDER — OXYCODONE HYDROCHLORIDE 30 MG/1
30 TABLET, FILM COATED, EXTENDED RELEASE ORAL 2 TIMES DAILY
Qty: 60 TABLET | Refills: 0 | Status: SHIPPED | OUTPATIENT
Start: 2017-05-25 | End: 2017-06-21 | Stop reason: SDUPTHER

## 2017-05-25 RX ORDER — OXYCODONE AND ACETAMINOPHEN 7.5; 325 MG/1; MG/1
1 TABLET ORAL EVERY 8 HOURS PRN
Qty: 90 TABLET | Refills: 0 | Status: SHIPPED | OUTPATIENT
Start: 2017-05-25 | End: 2017-06-21 | Stop reason: SDUPTHER

## 2017-05-26 ENCOUNTER — OFFICE VISIT (OUTPATIENT)
Dept: PAIN MEDICINE | Facility: CLINIC | Age: 52
End: 2017-05-26

## 2017-05-26 VITALS
BODY MASS INDEX: 30.66 KG/M2 | DIASTOLIC BLOOD PRESSURE: 68 MMHG | RESPIRATION RATE: 18 BRPM | SYSTOLIC BLOOD PRESSURE: 109 MMHG | WEIGHT: 179.6 LBS | TEMPERATURE: 98.1 F | OXYGEN SATURATION: 95 % | HEART RATE: 79 BPM | HEIGHT: 64 IN

## 2017-05-26 DIAGNOSIS — M79.601 PAIN IN BOTH UPPER EXTREMITIES: ICD-10-CM

## 2017-05-26 DIAGNOSIS — Z79.899 ENCOUNTER FOR LONG-TERM (CURRENT) USE OF HIGH-RISK MEDICATION: ICD-10-CM

## 2017-05-26 DIAGNOSIS — M79.602 PAIN IN BOTH UPPER EXTREMITIES: ICD-10-CM

## 2017-05-26 DIAGNOSIS — M93.1 KIENBOCK DISEASE, ADULT: ICD-10-CM

## 2017-05-26 DIAGNOSIS — G89.29 OTHER CHRONIC PAIN: Primary | ICD-10-CM

## 2017-05-26 PROCEDURE — 99213 OFFICE O/P EST LOW 20 MIN: CPT | Performed by: NURSE PRACTITIONER

## 2017-05-26 RX ORDER — GABAPENTIN 600 MG/1
600 TABLET ORAL 3 TIMES DAILY
Refills: 0 | COMMUNITY
Start: 2017-04-27 | End: 2017-06-21 | Stop reason: SDUPTHER

## 2017-06-05 ENCOUNTER — APPOINTMENT (OUTPATIENT)
Dept: MAMMOGRAPHY | Facility: HOSPITAL | Age: 52
End: 2017-06-05

## 2017-06-21 ENCOUNTER — HOSPITAL ENCOUNTER (OUTPATIENT)
Dept: GENERAL RADIOLOGY | Facility: HOSPITAL | Age: 52
Discharge: HOME | End: 2017-06-21
Admitting: NURSE PRACTITIONER

## 2017-06-21 ENCOUNTER — OFFICE VISIT (OUTPATIENT)
Dept: PAIN MEDICINE | Facility: CLINIC | Age: 52
End: 2017-06-21

## 2017-06-21 VITALS
HEIGHT: 64 IN | BODY MASS INDEX: 31.58 KG/M2 | OXYGEN SATURATION: 93 % | HEART RATE: 81 BPM | SYSTOLIC BLOOD PRESSURE: 150 MMHG | TEMPERATURE: 99.1 F | DIASTOLIC BLOOD PRESSURE: 86 MMHG | RESPIRATION RATE: 16 BRPM | WEIGHT: 185 LBS

## 2017-06-21 DIAGNOSIS — G89.29 OTHER CHRONIC PAIN: Primary | ICD-10-CM

## 2017-06-21 DIAGNOSIS — M79.602 PAIN IN BOTH UPPER EXTREMITIES: ICD-10-CM

## 2017-06-21 DIAGNOSIS — M25.562 ACUTE PAIN OF LEFT KNEE: ICD-10-CM

## 2017-06-21 DIAGNOSIS — M79.601 PAIN IN BOTH UPPER EXTREMITIES: ICD-10-CM

## 2017-06-21 DIAGNOSIS — Z79.899 ENCOUNTER FOR LONG-TERM (CURRENT) USE OF HIGH-RISK MEDICATION: ICD-10-CM

## 2017-06-21 DIAGNOSIS — M93.1 KIENBOCK DISEASE, ADULT: ICD-10-CM

## 2017-06-21 PROCEDURE — 96372 THER/PROPH/DIAG INJ SC/IM: CPT | Performed by: NURSE PRACTITIONER

## 2017-06-21 PROCEDURE — 99214 OFFICE O/P EST MOD 30 MIN: CPT | Performed by: NURSE PRACTITIONER

## 2017-06-21 PROCEDURE — 73562 X-RAY EXAM OF KNEE 3: CPT

## 2017-06-21 RX ORDER — GABAPENTIN 600 MG/1
600 TABLET ORAL 3 TIMES DAILY
Qty: 90 TABLET | Refills: 1 | Status: SHIPPED | OUTPATIENT
Start: 2017-06-21 | End: 2017-08-14 | Stop reason: SDUPTHER

## 2017-06-21 RX ORDER — OXYCODONE HYDROCHLORIDE 30 MG/1
30 TABLET, FILM COATED, EXTENDED RELEASE ORAL 2 TIMES DAILY
Qty: 60 TABLET | Refills: 0 | Status: SHIPPED | OUTPATIENT
Start: 2017-06-21 | End: 2017-07-13 | Stop reason: SDUPTHER

## 2017-06-21 RX ORDER — OXYCODONE AND ACETAMINOPHEN 7.5; 325 MG/1; MG/1
1 TABLET ORAL EVERY 8 HOURS PRN
Qty: 90 TABLET | Refills: 0 | Status: SHIPPED | OUTPATIENT
Start: 2017-06-21 | End: 2017-07-13 | Stop reason: SDUPTHER

## 2017-06-21 NOTE — PROGRESS NOTES
CHIEF COMPLAINT  Pt states R hand pain has significantly increased over the last week; Pt has increased skin breakdown due to getting hands wet.    Subjective   Crystal Cruz is a 51 y.o. female  who presents to the office for follow-up.She has a history of chronic pain of bilateral upper extremities. Hand pain worse, also new left knee pain.      Continues with OxyContin 30 mg BID and Percocet 7.5/325 3/day, Cymbalta 60 mg and gabapentin 300 mg 1 TID. Denies any side effects from the regimen.  The regimen helps decrease her pain by 50%. ADL's by self.    Reporting left knee pain, worse over the previous 3 weeks. Aggravated by pressure and kneeling.  Feels like burning, sharp pain.      She has had a 10 pound weight gain over the past two months. Attributes this to overeating trail mix.      Extremity Pain    The pain is present in the neck, back, left wrist, left hand, left foot, right foot, right hand, right wrist, right fingers and left fingers. This is a chronic problem. The current episode started more than 1 year ago. There has been no history of extremity trauma. The problem occurs constantly. The problem has been waxing and waning. The quality of the pain is described as aching, pounding and burning. The pain is at a severity of 6/10. The pain is moderate. Pertinent negatives include no fever or numbness. The symptoms are aggravated by cold (moist/rainy weather). She has tried acetaminophen, heat, movement, NSAIDS, OTC ointments, OTC pain meds, oral narcotics and rest (Oxycontin 30 mg Q12 hours, Oxycodone 7.5/325 3/day) for the symptoms. The treatment provided moderate relief.      PEG Assessment   What number best describes your pain on average in the past week?6  What number best describes how, during the past week, pain has interfered with your enjoyment of life?7  What number best describes how, during the past week, pain has interfered with your general activity?  7    The following portions of the  "patient's history were reviewed and updated as appropriate: allergies, current medications, past family history, past medical history, past social history, past surgical history and problem list.    Review of Systems   Constitutional: Negative for activity change, appetite change, fatigue and fever.   HENT: Negative for congestion.    Eyes: Negative for visual disturbance.   Respiratory: Negative for apnea, cough, chest tightness, shortness of breath and wheezing.    Cardiovascular: Negative.  Negative for chest pain.   Gastrointestinal: Negative for constipation, diarrhea and nausea.   Genitourinary: Negative for difficulty urinating.   Musculoskeletal: Positive for myalgias. Negative for arthralgias.   Neurological: Negative for dizziness, weakness, light-headedness and numbness.   Psychiatric/Behavioral: Positive for sleep disturbance. Negative for suicidal ideas. The patient is not nervous/anxious.        Vitals:    06/21/17 1249   BP: 150/86   Pulse: 81   Resp: 16   Temp: 99.1 °F (37.3 °C)   SpO2: 93%   Weight: 185 lb (83.9 kg)   Height: 64\" (162.6 cm)   PainSc: 6  Comment: R hand pain ranges from 3-7/10   PainLoc: Hand     Objective   Physical Exam   Constitutional: She is oriented to person, place, and time. She appears well-developed and well-nourished. She is cooperative.   HENT:   Head: Normocephalic and atraumatic.   Nose: Nose normal.   Eyes: Conjunctivae and lids are normal.   Neck: Trachea normal.   Cardiovascular: Normal rate.    Pulmonary/Chest: Effort normal.   Musculoskeletal:        Left knee: She exhibits normal range of motion, no swelling, no effusion and no erythema. Tenderness found. Lateral joint line tenderness noted.        Right hand: She exhibits decreased range of motion and tenderness. She exhibits normal capillary refill.        Left hand: She exhibits decreased range of motion and tenderness. She exhibits normal capillary refill.   Arthritic changes bilateral hands and wrists with no " acute synovitis     Neurological: She is alert and oriented to person, place, and time. Gait normal.   Skin: Skin is warm, dry and intact.   Psychiatric: She has a normal mood and affect. Her speech is normal and behavior is normal. Judgment and thought content normal. Cognition and memory are normal.   Nursing note and vitals reviewed.    Assessment/Plan   Crystal was seen today for hand pain.    Diagnoses and all orders for this visit:    Other chronic pain  -     ketorolac (TORADOL) injection 30 mg; Inject 1 mL into the shoulder, thigh, or buttocks Every 6 (Six) Hours As Needed for Moderate Pain (4-6).    Pain in both upper extremities    Kienbock disease, adult    Encounter for long-term (current) use of high-risk medication    Acute pain of left knee  -     XR Knee 3 View Left; Future  -     ketorolac (TORADOL) injection 30 mg; Inject 1 mL into the shoulder, thigh, or buttocks Every 6 (Six) Hours As Needed for Moderate Pain (4-6).    Other orders  -     OxyCODONE HCl ER (oxyCONTIN) 30 MG tablet extended-release 12 hour; Take 1 tablet by mouth 2 (Two) Times a Day.  -     oxyCODONE-acetaminophen (PERCOCET) 7.5-325 MG per tablet; Take 1 tablet by mouth Every 8 (Eight) Hours As Needed for Severe Pain (7-10).  -     gabapentin (NEURONTIN) 600 MG tablet; Take 1 tablet by mouth 3 (Three) Times a Day.      --- xray left knee  --- The urine drug screen confirmation from 3-27-17 has been reviewed and the result is appropriate based on patient history and STEVEN report  --- Refill OxyContin and Percocet. Patient appears stable with current regimen. No adverse effects. Regarding continuation of opioids, there is no evidence of aberrant behavior or any red flags. The patient continues with appropriate response to opioid therapy. ADL's remain intact by self.   --- Toradol 30 mg IM   --- discussed weight gain an portion sizes   --- Follow-up 1 month          STEVEN REPORT  As part of the patient's treatment plan, I am  prescribing controlled substances. The patient has been made aware of appropriate use of such medications, including potential risk of somnolence, limited ability to drive and/or work safely, and the potential for dependence or overdose. It has also bee made clear that these medications are for use by this patient only, without concomitant use of alcohol or other substances unless prescribed.     Patient has completed prescribing agreement detailing terms of continued prescribing of controlled substances, including monitoring STEVEN reports, urine drug screening, and pill counts if necessary. The patient is aware that inappropriate use will results in cessation of prescribing such medications.    STEVEN report has been reviewed and scanned into the patient's chart.    Date of last STEVEN : 6-    History and physical exam exhibit continued safe and appropriate use of controlled substances.    EMR Dragon/Transcription disclaimer:   Much of this encounter note is an electronic transcription/translation of spoken language to printed text. The electronic translation of spoken language may permit erroneous, or at times, nonsensical words or phrases to be inadvertently transcribed; Although I have reviewed the note for such errors, some may still exist.

## 2017-06-27 ENCOUNTER — TELEPHONE (OUTPATIENT)
Dept: PAIN MEDICINE | Facility: CLINIC | Age: 52
End: 2017-06-27

## 2017-06-27 NOTE — TELEPHONE ENCOUNTER
3 views of the left knee, bilateral and sunrise views, show no evidence  of fracture, dislocation or of a suprapatellar effusion.

## 2017-07-13 RX ORDER — OXYCODONE HYDROCHLORIDE 30 MG/1
30 TABLET, FILM COATED, EXTENDED RELEASE ORAL 2 TIMES DAILY
Qty: 60 TABLET | Refills: 0 | Status: SHIPPED | OUTPATIENT
Start: 2017-07-13 | End: 2017-07-20 | Stop reason: SDUPTHER

## 2017-07-13 RX ORDER — OXYCODONE AND ACETAMINOPHEN 7.5; 325 MG/1; MG/1
1 TABLET ORAL EVERY 8 HOURS PRN
Qty: 90 TABLET | Refills: 0 | Status: SHIPPED | OUTPATIENT
Start: 2017-07-13 | End: 2017-07-20 | Stop reason: SDUPTHER

## 2017-07-20 ENCOUNTER — OFFICE VISIT (OUTPATIENT)
Dept: PAIN MEDICINE | Facility: CLINIC | Age: 52
End: 2017-07-20

## 2017-07-20 VITALS
OXYGEN SATURATION: 95 % | HEART RATE: 81 BPM | WEIGHT: 189 LBS | TEMPERATURE: 97.6 F | BODY MASS INDEX: 32.27 KG/M2 | DIASTOLIC BLOOD PRESSURE: 79 MMHG | RESPIRATION RATE: 18 BRPM | HEIGHT: 64 IN | SYSTOLIC BLOOD PRESSURE: 131 MMHG

## 2017-07-20 DIAGNOSIS — G89.29 OTHER CHRONIC PAIN: Primary | ICD-10-CM

## 2017-07-20 DIAGNOSIS — M93.1 KIENBOCK DISEASE, ADULT: ICD-10-CM

## 2017-07-20 DIAGNOSIS — M79.601 PAIN IN BOTH UPPER EXTREMITIES: ICD-10-CM

## 2017-07-20 DIAGNOSIS — Z79.899 ENCOUNTER FOR LONG-TERM (CURRENT) USE OF HIGH-RISK MEDICATION: ICD-10-CM

## 2017-07-20 DIAGNOSIS — M79.602 PAIN IN BOTH UPPER EXTREMITIES: ICD-10-CM

## 2017-07-20 PROCEDURE — 99213 OFFICE O/P EST LOW 20 MIN: CPT | Performed by: NURSE PRACTITIONER

## 2017-07-20 RX ORDER — OXYCODONE HYDROCHLORIDE 30 MG/1
30 TABLET, FILM COATED, EXTENDED RELEASE ORAL 2 TIMES DAILY
Qty: 60 TABLET | Refills: 0 | Status: SHIPPED | OUTPATIENT
Start: 2017-07-20 | End: 2017-08-08 | Stop reason: SDUPTHER

## 2017-07-20 RX ORDER — OXYCODONE AND ACETAMINOPHEN 7.5; 325 MG/1; MG/1
1 TABLET ORAL EVERY 8 HOURS PRN
Qty: 90 TABLET | Refills: 0 | Status: SHIPPED | OUTPATIENT
Start: 2017-07-20 | End: 2017-08-08 | Stop reason: SDUPTHER

## 2017-07-20 NOTE — PROGRESS NOTES
CHIEF COMPLAINT  Hand pain has decreased since last visit.    Subjective   Crystal Cruz is a 51 y.o. female  who presents to the office for follow-up.She has a history of bilateral hand pain.  Pain improved today. Reporting left knee pain last visit which has also improved.      Continues with OxyContin 30 mg BID and Percocet 7.5/325 3/day, Cymbalta 60 mg and gabapentin 300 mg 1 TID. Denies any side effects from the regimen.  The regimen helps decrease her pain by 50%. ADL's by self.    Extremity Pain    The pain is present in the neck, back, left wrist, left hand, left foot, right foot, right hand, right wrist, right fingers and left fingers. This is a chronic problem. The current episode started more than 1 year ago. There has been no history of extremity trauma. The problem occurs constantly. The problem has been waxing and waning. The quality of the pain is described as aching, pounding and burning. The pain is at a severity of 4/10. The pain is moderate. Pertinent negatives include no fever or numbness. The symptoms are aggravated by cold (moist/rainy weather). She has tried acetaminophen, heat, movement, NSAIDS, OTC ointments, OTC pain meds, oral narcotics and rest (Oxycontin 30 mg Q12 hours, Oxycodone 7.5/325 3/day) for the symptoms. The treatment provided moderate relief.      PEG Assessment   What number best describes your pain on average in the past week?4  What number best describes how, during the past week, pain has interfered with your enjoyment of life?4  What number best describes how, during the past week, pain has interfered with your general activity?  4    The following portions of the patient's history were reviewed and updated as appropriate: allergies, current medications, past family history, past medical history, past social history, past surgical history and problem list.    Review of Systems   Constitutional: Negative for chills and fever.   Respiratory: Positive for shortness of  "breath.    Cardiovascular: Negative for chest pain.   Gastrointestinal: Negative for constipation, diarrhea, nausea and vomiting.   Genitourinary: Negative for difficulty urinating, dyspareunia and dysuria.   Musculoskeletal:        Hand pain   Neurological: Negative for dizziness, weakness, light-headedness, numbness and headaches.   Psychiatric/Behavioral: Negative for confusion, hallucinations, self-injury, sleep disturbance and suicidal ideas. The patient is nervous/anxious.      Vitals:    07/20/17 1334   BP: 131/79   Pulse: 81   Resp: 18   Temp: 97.6 °F (36.4 °C)   SpO2: 95%   Weight: 189 lb (85.7 kg)   Height: 64\" (162.6 cm)   PainSc:   4   PainLoc: Hand         Objective   Physical Exam   Constitutional: She is oriented to person, place, and time. She appears well-developed and well-nourished. She is cooperative.   HENT:   Head: Normocephalic and atraumatic.   Nose: Nose normal.   Eyes: Conjunctivae and lids are normal.   Neck: Trachea normal.   Cardiovascular: Normal rate.    Pulses:       Radial pulses are 2+ on the right side, and 2+ on the left side.   Pulmonary/Chest: Effort normal.   Musculoskeletal:        Left knee: She exhibits normal range of motion, no swelling, no effusion and no erythema. Tenderness found. Lateral joint line tenderness noted.        Right hand: She exhibits decreased range of motion and tenderness. She exhibits normal capillary refill.        Left hand: She exhibits decreased range of motion and tenderness. She exhibits normal capillary refill.   Arthritic changes bilateral hands and wrists with no acute synovitis     Neurological: She is alert and oriented to person, place, and time. Gait normal.   Skin: Skin is warm, dry and intact.   Psychiatric: She has a normal mood and affect. Her speech is normal and behavior is normal. Judgment and thought content normal. Cognition and memory are normal.   Nursing note and vitals reviewed.      Assessment/Plan   Crystal was seen today for " hand pain.    Diagnoses and all orders for this visit:    Other chronic pain    Kienbock disease, adult    Pain in both upper extremities    Encounter for long-term (current) use of high-risk medication    Other orders  -     OxyCODONE HCl ER (oxyCONTIN) 30 MG tablet extended-release 12 hour; Take 1 tablet by mouth 2 (Two) Times a Day.  -     oxyCODONE-acetaminophen (PERCOCET) 7.5-325 MG per tablet; Take 1 tablet by mouth Every 8 (Eight) Hours As Needed for Severe Pain .      --- The urine drug screen confirmation from 3-27-17 has been reviewed and the result is appropriate based on patient history and STEVEN report  --- Refill OxyContin and Percocet. Patient appears stable with current regimen. No adverse effects. Regarding continuation of opioids, there is no evidence of aberrant behavior or any red flags. The patient continues with appropriate response to opioid therapy. ADL's remain intact by self.   --- Follow-up 1 month          STEVEN REPORT    As part of the patient's treatment plan, I am prescribing controlled substances. The patient has been made aware of appropriate use of such medications, including potential risk of somnolence, limited ability to drive and/or work safely, and the potential for dependence or overdose. It has also bee made clear that these medications are for use by this patient only, without concomitant use of alcohol or other substances unless prescribed.     Patient has completed prescribing agreement detailing terms of continued prescribing of controlled substances, including monitoring STEVEN reports, urine drug screening, and pill counts if necessary. The patient is aware that inappropriate use will results in cessation of prescribing such medications.    STEVEN report has been reviewed and scanned into the patient's chart.    Date of last STEVEN : 7/19/2017    History and physical exam exhibit continued safe and appropriate use of controlled substances.      EMR  Dragon/Transcription disclaimer:   Much of this encounter note is an electronic transcription/translation of spoken language to printed text. The electronic translation of spoken language may permit erroneous, or at times, nonsensical words or phrases to be inadvertently transcribed; Although I have reviewed the note for such errors, some may still exist.

## 2017-07-26 ENCOUNTER — TRANSCRIBE ORDERS (OUTPATIENT)
Dept: ADMINISTRATIVE | Facility: HOSPITAL | Age: 52
End: 2017-07-26

## 2017-07-26 DIAGNOSIS — J32.9 CHRONIC SINUSITIS, UNSPECIFIED LOCATION: Primary | ICD-10-CM

## 2017-07-28 ENCOUNTER — TELEPHONE (OUTPATIENT)
Dept: GASTROENTEROLOGY | Facility: CLINIC | Age: 52
End: 2017-07-28

## 2017-07-31 ENCOUNTER — HOSPITAL ENCOUNTER (OUTPATIENT)
Dept: CT IMAGING | Facility: HOSPITAL | Age: 52
Discharge: HOME | End: 2017-07-31
Admitting: OTOLARYNGOLOGY

## 2017-07-31 DIAGNOSIS — J32.9 CHRONIC SINUSITIS, UNSPECIFIED LOCATION: ICD-10-CM

## 2017-07-31 PROCEDURE — 70486 CT MAXILLOFACIAL W/O DYE: CPT

## 2017-08-07 ENCOUNTER — TELEPHONE (OUTPATIENT)
Dept: PAIN MEDICINE | Facility: CLINIC | Age: 52
End: 2017-08-07

## 2017-08-07 NOTE — TELEPHONE ENCOUNTER
Medication Refill Request    Date of phone call: 17    Medication being requested: Oxycodone-apap 7.5 sig: q8hrs  Qty: 90    Date of last visit: 17    Date of last refill: 17    STEVEN up to date?: 17    Next Follow up?: 17 w/ DB    Any new pertinent information? (i.e, new medication allergies, new use of medications, change in patient's health or condition, non-compliance or inconsistency with prescribing agreement?): patient rescheduled 17 appointment due to having sinus surgery on 8/15/17. Patient states her  can  her script on 17 while they will be in [facility]    Medication Refill Request    Date of phone call: 17    Medication being requested: Oxycontin 30 mg ER 12 hour sixday  Qty: 60    Date of last visit: 17    Date of last refill: 17    STEVEN up to date?: 17    Next Follow up?: 17 w/DB    Any new pertinent information? (i.e, new medication allergies, new use of medications, change in patient's health or condition, non-compliance or inconsistency with prescribing agreement?):  patient rescheduled 17 appointment due to having sinus surgery on 8/15/17.  Patient states her  can  her script on 17 while they will be in [facility]

## 2017-08-07 NOTE — TELEPHONE ENCOUNTER
Patient called and states that she was seen by her ENT and she is scheduled to have surgery on 8/15/17 at [facility] By Dr. Snow. They need the clearance to provide patient with a temporary supply of pain medications after surgery.

## 2017-08-08 RX ORDER — OXYCODONE AND ACETAMINOPHEN 7.5; 325 MG/1; MG/1
1 TABLET ORAL EVERY 8 HOURS PRN
Qty: 21 TABLET | Refills: 0 | Status: SHIPPED | OUTPATIENT
Start: 2017-08-08 | End: 2017-08-23

## 2017-08-08 RX ORDER — OXYCODONE HYDROCHLORIDE 30 MG/1
30 TABLET, FILM COATED, EXTENDED RELEASE ORAL 2 TIMES DAILY
Qty: 14 TABLET | Refills: 0 | Status: SHIPPED | OUTPATIENT
Start: 2017-08-08 | End: 2017-08-23 | Stop reason: SDUPTHER

## 2017-08-08 NOTE — TELEPHONE ENCOUNTER
Medication Refill Request     Date of phone call: 17     Medication being requested: Oxycodone-apap 7.5 sig: q8hrs  Qty: 90     Date of last visit: 17     Date of last refill: 17     STEVEN up to date?: 17     Next Follow up?: 17 w/ DB     Any new pertinent information? (i.e, new medication allergies, new use of medications, change in patient's health or condition, non-compliance or inconsistency with prescribing agreement?): patient rescheduled 17 appointment due to having sinus surgery on 8/15/17. Patient states her  can  her script on 17 while they will be in [facility]     Medication Refill Request     Date of phone call: 17     Medication being requested: Oxycontin 30 mg ER 12 hour sixday  Qty: 60     Date of last visit: 17     Date of last refill: 17     STEVEN up to date?: 17     Next Follow up?: 17 w/DB     Any new pertinent information? (i.e, new medication allergies, new use of medications, change in patient's health or condition, non-compliance or inconsistency with prescribing agreement?):  patient rescheduled 17 appointment due to having sinus surgery on 8/15/17.  Patient states her  can  her script on 17 while they will be in [facility]

## 2017-08-10 ENCOUNTER — TELEPHONE (OUTPATIENT)
Dept: PAIN MEDICINE | Facility: CLINIC | Age: 52
End: 2017-08-10

## 2017-08-10 NOTE — TELEPHONE ENCOUNTER
This is fine. She should be advised not to take concurrently with her current breakthrough medication. She can continue the long acting (OxyContin) but should not take breakthrough medication from surgeon concurrently with her Percocet.

## 2017-08-10 NOTE — TELEPHONE ENCOUNTER
Patient called and states that she was seen by her ENT and she is scheduled to have surgery on 8/15/17 at [facility] By Dr. Snow. They need the clearance to provide patient with a temporary supply of pain medications after surgery.

## 2017-08-15 RX ORDER — GABAPENTIN 600 MG/1
TABLET ORAL
Qty: 90 TABLET | Refills: 2 | OUTPATIENT
Start: 2017-08-15 | End: 2017-11-10 | Stop reason: SDUPTHER

## 2017-08-23 ENCOUNTER — OFFICE VISIT (OUTPATIENT)
Dept: PAIN MEDICINE | Facility: CLINIC | Age: 52
End: 2017-08-23

## 2017-08-23 VITALS
HEIGHT: 64 IN | WEIGHT: 188.8 LBS | OXYGEN SATURATION: 94 % | DIASTOLIC BLOOD PRESSURE: 99 MMHG | TEMPERATURE: 98.5 F | BODY MASS INDEX: 32.23 KG/M2 | HEART RATE: 95 BPM | RESPIRATION RATE: 16 BRPM | SYSTOLIC BLOOD PRESSURE: 155 MMHG

## 2017-08-23 DIAGNOSIS — G89.29 OTHER CHRONIC PAIN: ICD-10-CM

## 2017-08-23 DIAGNOSIS — M93.1 KIENBOCK DISEASE, ADULT: ICD-10-CM

## 2017-08-23 DIAGNOSIS — Z79.899 ENCOUNTER FOR LONG-TERM (CURRENT) USE OF HIGH-RISK MEDICATION: ICD-10-CM

## 2017-08-23 DIAGNOSIS — M34.9 SYSTEMIC SCLEROSIS (HCC): Primary | ICD-10-CM

## 2017-08-23 PROCEDURE — 99213 OFFICE O/P EST LOW 20 MIN: CPT | Performed by: ANESTHESIOLOGY

## 2017-08-23 RX ORDER — PROMETHAZINE HYDROCHLORIDE 25 MG/1
TABLET ORAL
Refills: 0 | Status: ON HOLD | COMMUNITY
Start: 2017-08-15 | End: 2020-02-10

## 2017-08-23 RX ORDER — POLYETHYLENE GLYCOL 3350 17 G/17G
POWDER, FOR SOLUTION ORAL
Refills: 0 | COMMUNITY
Start: 2017-08-12 | End: 2017-11-14

## 2017-08-23 RX ORDER — PRENATAL VIT/IRON FUM/FOLIC AC 27MG-0.8MG
TABLET ORAL
Refills: 0 | COMMUNITY
Start: 2017-08-14 | End: 2019-09-12

## 2017-08-23 RX ORDER — AMLODIPINE BESYLATE 5 MG/1
TABLET ORAL
Refills: 0 | COMMUNITY
Start: 2017-08-14 | End: 2018-06-08

## 2017-08-23 RX ORDER — CEFUROXIME AXETIL 500 MG/1
TABLET ORAL
Refills: 0 | COMMUNITY
Start: 2017-08-15 | End: 2018-04-10

## 2017-08-23 RX ORDER — OXYCODONE HYDROCHLORIDE 30 MG/1
30 TABLET, FILM COATED, EXTENDED RELEASE ORAL EVERY 12 HOURS SCHEDULED
Qty: 60 TABLET | Refills: 0 | Status: SHIPPED | OUTPATIENT
Start: 2017-08-23 | End: 2017-09-20 | Stop reason: SDUPTHER

## 2017-08-23 NOTE — PROGRESS NOTES
CHIEF COMPLAINT    Since last visit, pt states her fibromyalgia pain has increased a little, as well as wrist pain on her right hand. Pt just had nasal surgery. She states they scraped a bunch of polyps out.     Subjective   Crystal Cruz is a 51 y.o. female  who presents to the office for follow-up.She has a history of Kienbock Disease, systemic sclerosis.      Extremity Pain    The pain is present in the neck, back, left wrist, left hand, left foot, right foot, right hand, right wrist, right fingers and left fingers. This is a chronic problem. The current episode started more than 1 year ago. There has been no history of extremity trauma. The problem occurs constantly. The problem has been gradually worsening. The quality of the pain is described as aching, pounding and burning. The pain is moderate. Pertinent negatives include no fever or numbness. The symptoms are aggravated by cold (moist/rainy weather). She has tried acetaminophen, heat, movement, NSAIDS, OTC ointments, OTC pain meds, oral narcotics and rest (Oxycontin 30 mg Q12 hours, Oxycodone 7.5/325 3/day) for the symptoms. The treatment provided moderate relief. kienbock, systemic sclerosis        PEG Assessment   What number best describes your pain on average in the past week?8  What number best describes how, during the past week, pain has interfered with your enjoyment of life?9  What number best describes how, during the past week, pain has interfered with your general activity?  9      The following portions of the patient's history were reviewed and updated as appropriate: allergies, current medications, past family history, past medical history, past social history, past surgical history and problem list.    -------    The following portions of the patient's history were reviewed and updated as appropriate: allergies, current medications, past family history, past medical history, past social history, past surgical history and problem  "list.    Allergies   Allergen Reactions   • Atorvastatin    • Levofloxacin    • Nsaids Nausea Only   • Victoza  [Liraglutide]          Current Outpatient Prescriptions:   •  ALPRAZolam (XANAX) 2 MG tablet, Take 2 mg by mouth 3 (Three) Times a Day As Needed for anxiety., Disp: , Rfl:   •  amLODIPine (NORVASC) 5 MG tablet, , Disp: , Rfl: 0  •  aspirin 81 MG EC tablet, Take 1 tablet by mouth daily., Disp: 30 tablet, Rfl: 6  •  cefuroxime (CEFTIN) 500 MG tablet, take 1 tablet by mouth twice a day for 10 days, Disp: , Rfl: 0  •  docusate sodium (COLACE) 100 MG capsule, Take 1 capsule by mouth 2 (Two) Times a Day., Disp: 60 capsule, Rfl: 0  •  DULoxetine (CYMBALTA) 60 MG capsule, Take 1 capsule by mouth Daily., Disp: 30 capsule, Rfl: 0  •  gabapentin (NEURONTIN) 600 MG tablet, take 1 tablet by mouth three times a day, Disp: 90 tablet, Rfl: 2  •  insulin glargine (LANTUS) 100 UNIT/ML injection, INJECT 20 UNITS INTO THE SKIN DAILY FOR 90 DAYS, Disp: , Rfl:   •  Insulin Syringe 31G X 5/16\" 1 ML misc, Use twice daily for shots, Disp: 100 each, Rfl: 3  •  LACTOBACILLUS EXTRA STRENGTH capsule, Take 1 capsule by mouth Daily., Disp: 30 each, Rfl: 6  •  lansoprazole (PREVACID SOLUTAB) 30 MG disintegrating tablet, Take 30 mg by mouth Daily. Takes morning and night, Disp: , Rfl:   •  levETIRAcetam (KEPPRA) 500 MG tablet, Take 1 tablet by mouth Every 12 (Twelve) Hours., Disp: 60 tablet, Rfl: 0  •  Linaclotide (LINZESS) 145 MCG capsule, Take 145 mcg by mouth Daily., Disp: 30 capsule, Rfl: 2  •  metFORMIN XR (GLUCOPHAGE-XR) 500 MG 24 hr tablet, Take 1,000 mg by mouth., Disp: , Rfl:   •  OxyCODONE HCl ER (oxyCONTIN) 30 MG tablet extended-release 12 hour, Take 1 tablet by mouth Every 12 (Twelve) Hours., Disp: 60 tablet, Rfl: 0  •  polyethylene glycol (MIRALAX) powder, , Disp: , Rfl: 0  •  PRENATAL 28-0.8 MG tablet, Take one po daily, Disp: 30 each, Rfl: 11  •  Prenatal Vit-Fe Fumarate-FA (PRENATAL VITAMIN 27-0.8) 27-0.8 MG tablet tablet, " "take 1 tablet by mouth once daily, Disp: , Rfl: 0  •  PROAIR  (90 Base) MCG/ACT inhaler, , Disp: , Rfl: 0  •  promethazine (PHENERGAN) 25 MG tablet, take 1 tablet by mouth every 4 hours if needed for nausea, Disp: , Rfl: 0  •  traZODone (DESYREL) 100 MG tablet, , Disp: , Rfl:   •  valsartan (DIOVAN) 80 MG tablet, Take 1 tablet by mouth Daily., Disp: 30 tablet, Rfl: 0  •  oxyCODONE-acetaminophen (PERCOCET) 7.5-325 MG per tablet, Take 1 tablet by mouth Every 6 (Six) Hours As Needed for Severe Pain ., Disp: 120 tablet, Rfl: 0    Current Outpatient Prescriptions on File Prior to Visit   Medication Sig Dispense Refill   • ALPRAZolam (XANAX) 2 MG tablet Take 2 mg by mouth 3 (Three) Times a Day As Needed for anxiety.     • aspirin 81 MG EC tablet Take 1 tablet by mouth daily. 30 tablet 6   • docusate sodium (COLACE) 100 MG capsule Take 1 capsule by mouth 2 (Two) Times a Day. 60 capsule 0   • DULoxetine (CYMBALTA) 60 MG capsule Take 1 capsule by mouth Daily. 30 capsule 0   • gabapentin (NEURONTIN) 600 MG tablet take 1 tablet by mouth three times a day 90 tablet 2   • insulin glargine (LANTUS) 100 UNIT/ML injection INJECT 20 UNITS INTO THE SKIN DAILY FOR 90 DAYS     • Insulin Syringe 31G X 5/16\" 1 ML misc Use twice daily for shots 100 each 3   • LACTOBACILLUS EXTRA STRENGTH capsule Take 1 capsule by mouth Daily. 30 each 6   • lansoprazole (PREVACID SOLUTAB) 30 MG disintegrating tablet Take 30 mg by mouth Daily. Takes morning and night     • levETIRAcetam (KEPPRA) 500 MG tablet Take 1 tablet by mouth Every 12 (Twelve) Hours. 60 tablet 0   • Linaclotide (LINZESS) 145 MCG capsule Take 145 mcg by mouth Daily. 30 capsule 2   • metFORMIN XR (GLUCOPHAGE-XR) 500 MG 24 hr tablet Take 1,000 mg by mouth.     • PRENATAL 28-0.8 MG tablet Take one po daily 30 each 11   • traZODone (DESYREL) 100 MG tablet      • valsartan (DIOVAN) 80 MG tablet Take 1 tablet by mouth Daily. 30 tablet 0     No current facility-administered medications " on file prior to visit.        Patient Active Problem List   Diagnosis   • Acute upper respiratory infection   • Anxiety disorder   • Huitron's esophagus   • Knee pain   • Chronic constipation   • Chronic maxillary sinusitis   • Diabetes mellitus   • Arthropathy of lumbar facet joint   • Fatigue   • Gastroesophageal reflux disease without esophagitis   • Hyperlipidemia   • Juvenile osteochondrosis of carpal lunate   • Discharge from nipple   • Pneumonitis   • Systemic sclerosis   • Pain of upper extremity   • Kienbock disease, adult   • Acute pancreatitis   • Benign essential hypertension   • Chronic pain   • Intractable vomiting with nausea   • Seizure   • Elevated troponin   • PRES (posterior reversible encephalopathy syndrome)   • Aftercare   • Acquired hypothyroidism   • Encounter for long-term (current) use of high-risk medication   • Arthritis   • Stenosis of carotid artery   • Hypertension   • Muscle pain   • Obstruction of carotid artery   • Tobacco use   • Controlled type 2 diabetes mellitus without complication   • Vitamin D deficiency       Past Medical History:   Diagnosis Date   • Acid reflux    • Anemia    • Anxiety    • Huitron's esophagus    • CAD (coronary artery disease)    • Chronic pain disorder    • CVA (cerebrovascular accident)    • Diabetes mellitus    • History of transfusion    • Hypertension    • Joint pain    • Kienbock's disease    • Low back pain    • Pulmonary fibrosis 2016   • Reflux gastritis    • Scleroderma    • Seizure        Past Surgical History:   Procedure Laterality Date   • CAROTID ENDARTERECTOMY Left     Neurological   • CATARACT EXTRACTION Bilateral    •  SECTION     • CHOLECYSTECTOMY      Laparoscopic   • COLONOSCOPY      Complete   • COLONOSCOPY N/A 2016    Procedure: COLONOSCOPY;  Surgeon: Gaviota Hagen MD;  Location: [facility];  Service:    • ENDOSCOPY N/A 2016    Procedure: ESOPHAGOGASTRODUODENOSCOPY WITH BIOPSIES;  Surgeon: Gaviota Haegn MD;   Location: [facility] OR;  Service:    • TONSILLECTOMY AND ADENOIDECTOMY     • UPPER GASTROINTESTINAL ENDOSCOPY      Diagnostic   • WRIST SURGERY Left        Family History   Problem Relation Age of Onset   • Other Mother      Cardiac Disorder   • Migraines Mother    • Heart disease Mother    • Sudden death Mother    • Other Father      Cardiac Disorder   • Hypertension Father    • Heart disease Father    • Cancer Father    • Hypertension Sister    • Cancer Sister    • Migraines Daughter    • Cancer Other      Uncle   • Migraines Maternal Aunt    • Stroke Maternal Grandmother    • Stroke Maternal Grandfather        Social History     Social History   • Marital status:      Spouse name: N/A   • Number of children: N/A   • Years of education: N/A     Occupational History   • Not on file.     Social History Main Topics   • Smoking status: Current Every Day Smoker     Packs/day: 1.00   • Smokeless tobacco: Not on file   • Alcohol use No   • Drug use: No   • Sexual activity: Defer      Comment: EXERCISE - RARELY     Other Topics Concern   • Not on file     Social History Narrative       -------        Review of Systems   Constitutional: Negative for chills and fever.   HENT: Positive for postnasal drip (some drainage and blood from surgery). Negative for nosebleeds.    Respiratory: Positive for shortness of breath.    Cardiovascular: Negative for chest pain.   Gastrointestinal: Positive for constipation (occ) and diarrhea (occ). Negative for nausea and vomiting.   Genitourinary: Negative for difficulty urinating, dyspareunia and dysuria.   Musculoskeletal:        Hand pain   Skin: Negative for rash and wound.   Allergic/Immunologic: Negative for immunocompromised state.   Neurological: Positive for headaches. Negative for dizziness, weakness, light-headedness and numbness.   Hematological: Does not bruise/bleed easily.   Psychiatric/Behavioral: Negative for agitation, confusion, hallucinations, self-injury, sleep  "disturbance and suicidal ideas. The patient is nervous/anxious.        Vitals:    08/23/17 1310   BP: 155/99   Pulse: 95   Resp: 16   Temp: 98.5 °F (36.9 °C)   SpO2: 94%   Weight: 188 lb 12.8 oz (85.6 kg)   Height: 64\" (162.6 cm)   PainSc:   5   PainLoc: Hand         Objective   Physical Exam   Constitutional: She is oriented to person, place, and time. She appears well-developed and well-nourished. She is cooperative.   HENT:   Head: Normocephalic and atraumatic.   Eyes: Conjunctivae and lids are normal. Pupils are equal, round, and reactive to light.   Neck: Trachea normal.   Cardiovascular:   Pulses:       Radial pulses are 2+ on the right side, and 2+ on the left side.   Pulmonary/Chest: Effort normal.   Musculoskeletal:        Left knee: She exhibits normal range of motion, no swelling, no effusion and no erythema. Tenderness found. Lateral joint line tenderness noted.        Right hand: She exhibits decreased range of motion and tenderness. She exhibits normal capillary refill.        Left hand: She exhibits decreased range of motion and tenderness. She exhibits normal capillary refill.   Arthritic changes bilateral hands and wrists with no acute synovitis     Neurological: She is alert and oriented to person, place, and time. Gait normal.   Skin: Skin is intact.   Sclerosing changes patricia noted in hands, some in face   Psychiatric: Her speech is normal. Cognition and memory are normal.   Nursing note and vitals reviewed.          Assessment/Plan   Crystal was seen today for pain.    Diagnoses and all orders for this visit:    Systemic sclerosis    Kienbock disease, adult    Other chronic pain    Encounter for long-term (current) use of high-risk medication    Other orders  -     Discontinue: tapentadol (NUCYNTA) 75 MG tablet; Take 1 tablet by mouth Every 6 (Six) Hours As Needed for Severe Pain .  -     OxyCODONE HCl ER (oxyCONTIN) 30 MG tablet extended-release 12 hour; Take 1 tablet by mouth Every 12 (Twelve) " Hours.  -     Discontinue: oxyCODONE-acetaminophen (PERCOCET) 7.5-325 MG per tablet; Take 1 tablet by mouth Every 6 (Six) Hours As Needed for Severe Pain .        --- Follow-up 1 month    -- we discussed a trial of Nucynta and Discontinue Percocet.  Long discussion about potential options, as she does not feel that Percocet is lasting long enough, as it helps with BTP but only for 2 hours.      The concern with Nucynta is that it could potentially decrease seizure threshold.  Whether this would be clinically significant or not is unknown.      Addendum:  She will continue Percocet use for short-acting / BTP at this time,  And we will attempt a dose modulation to see if she might be better able to manage flareups.    Patient appears stable with current regimen. No adverse effects. Regarding continuation of opioids, there is no evidence of aberrant behavior or any red flags.  The patient continues with moderate appropriate response to opioid therapy. ADL's remain intact by self.     she does have a significant history of sclerotic disease and demonstrates moderate improvement with multimodal therapy including opioid therapy, which allows her to engage in ADLs and family activities. The continuation of opioid therapy is therefore not contraindicated. We will however respect the March 2016 CDC Guidelines and will plan to avoid overexposure to opioids and avoid dose escalation.  Unfortunately, her pain therapy is experiencing lapses, and a modulation of the therapy is not unreasonable.               STEVEN REPORT    As part of the patient's treatment plan, I am prescribing controlled substances. The patient has been made aware of appropriate use of such medications, including potential risk of somnolence, limited ability to drive and/or work safely, and the potential for dependence or overdose. It has also bee made clear that these medications are for use by this patient only, without concomitant use of alcohol or other  substances unless prescribed.     Patient has completed prescribing agreement detailing terms of continued prescribing of controlled substances, including monitoring STEVEN reports, urine drug screening, and pill counts if necessary. The patient is aware that inappropriate use will results in cessation of prescribing such medications.    STEVEN report has been reviewed and scanned into the patient's chart.    Date of last STEVEN : as above    History and physical exam exhibit continued safe and appropriate use of controlled substances.      EMR Dragon/Transcription disclaimer:   Much of this encounter note is an electronic transcription/translation of spoken language to printed text. The electronic translation of spoken language may permit erroneous, or at times, nonsensical words or phrases to be inadvertently transcribed; Although I have reviewed the note for such errors, some may still exist.

## 2017-08-24 ENCOUNTER — TELEPHONE (OUTPATIENT)
Dept: PAIN MEDICINE | Facility: CLINIC | Age: 52
End: 2017-08-24

## 2017-08-24 RX ORDER — OXYCODONE AND ACETAMINOPHEN 7.5; 325 MG/1; MG/1
1 TABLET ORAL EVERY 6 HOURS PRN
Qty: 120 TABLET | Refills: 0 | Status: SHIPPED | OUTPATIENT
Start: 2017-08-24 | End: 2017-08-25 | Stop reason: SDUPTHER

## 2017-08-24 NOTE — TELEPHONE ENCOUNTER
Patient called and LM on Vm stating that she did some research on the Nucynta and she states that she wont be able to take that for many reasons (which she did not state). She is requesting to go back on the Percocet q6hrs.

## 2017-08-24 NOTE — TELEPHONE ENCOUNTER
Dr. Law, please see above message. I am unsure why she was switched from the Percocet to Nucynta, please let me know how you would like to respond to this message. Thanks.

## 2017-08-25 RX ORDER — OXYCODONE AND ACETAMINOPHEN 7.5; 325 MG/1; MG/1
1 TABLET ORAL EVERY 6 HOURS PRN
Qty: 120 TABLET | Refills: 0 | Status: SHIPPED | OUTPATIENT
Start: 2017-08-25 | End: 2017-09-20 | Stop reason: SDUPTHER

## 2017-09-18 ENCOUNTER — TRANSCRIBE ORDERS (OUTPATIENT)
Dept: ADMINISTRATIVE | Facility: HOSPITAL | Age: 52
End: 2017-09-18

## 2017-09-18 DIAGNOSIS — Z12.31 SCREENING MAMMOGRAM, ENCOUNTER FOR: Primary | ICD-10-CM

## 2017-09-20 ENCOUNTER — OFFICE VISIT (OUTPATIENT)
Dept: PAIN MEDICINE | Facility: CLINIC | Age: 52
End: 2017-09-20

## 2017-09-20 VITALS
WEIGHT: 183 LBS | BODY MASS INDEX: 31.24 KG/M2 | HEIGHT: 64 IN | RESPIRATION RATE: 16 BRPM | HEART RATE: 84 BPM | DIASTOLIC BLOOD PRESSURE: 84 MMHG | SYSTOLIC BLOOD PRESSURE: 135 MMHG | OXYGEN SATURATION: 95 % | TEMPERATURE: 98 F

## 2017-09-20 DIAGNOSIS — M93.1 KIENBOCK DISEASE, ADULT: ICD-10-CM

## 2017-09-20 DIAGNOSIS — Z79.899 ENCOUNTER FOR LONG-TERM (CURRENT) USE OF HIGH-RISK MEDICATION: ICD-10-CM

## 2017-09-20 DIAGNOSIS — M47.816 ARTHROPATHY OF LUMBAR FACET JOINT: ICD-10-CM

## 2017-09-20 DIAGNOSIS — G89.29 OTHER CHRONIC PAIN: Primary | ICD-10-CM

## 2017-09-20 PROCEDURE — 99214 OFFICE O/P EST MOD 30 MIN: CPT | Performed by: ANESTHESIOLOGY

## 2017-09-20 RX ORDER — OXYCODONE HYDROCHLORIDE 30 MG/1
30 TABLET, FILM COATED, EXTENDED RELEASE ORAL EVERY 12 HOURS SCHEDULED
Qty: 60 TABLET | Refills: 0 | Status: SHIPPED | OUTPATIENT
Start: 2017-09-20 | End: 2017-10-18 | Stop reason: SDUPTHER

## 2017-09-20 RX ORDER — OXYCODONE AND ACETAMINOPHEN 7.5; 325 MG/1; MG/1
1 TABLET ORAL EVERY 6 HOURS PRN
Qty: 120 TABLET | Refills: 0 | Status: SHIPPED | OUTPATIENT
Start: 2017-09-20 | End: 2017-10-18 | Stop reason: SDUPTHER

## 2017-09-27 ENCOUNTER — APPOINTMENT (OUTPATIENT)
Dept: MAMMOGRAPHY | Facility: HOSPITAL | Age: 52
End: 2017-09-27

## 2017-10-11 ENCOUNTER — APPOINTMENT (OUTPATIENT)
Dept: MAMMOGRAPHY | Facility: HOSPITAL | Age: 52
End: 2017-10-11

## 2017-10-18 ENCOUNTER — OFFICE VISIT (OUTPATIENT)
Dept: PAIN MEDICINE | Facility: CLINIC | Age: 52
End: 2017-10-18

## 2017-10-18 VITALS
OXYGEN SATURATION: 95 % | RESPIRATION RATE: 16 BRPM | DIASTOLIC BLOOD PRESSURE: 65 MMHG | TEMPERATURE: 98.2 F | BODY MASS INDEX: 31.76 KG/M2 | HEIGHT: 64 IN | SYSTOLIC BLOOD PRESSURE: 117 MMHG | WEIGHT: 186 LBS | HEART RATE: 86 BPM

## 2017-10-18 DIAGNOSIS — M93.1 KIENBOCK DISEASE, ADULT: ICD-10-CM

## 2017-10-18 DIAGNOSIS — M79.601 PAIN IN BOTH UPPER EXTREMITIES: ICD-10-CM

## 2017-10-18 DIAGNOSIS — M79.602 PAIN IN BOTH UPPER EXTREMITIES: ICD-10-CM

## 2017-10-18 DIAGNOSIS — G89.29 OTHER CHRONIC PAIN: Primary | ICD-10-CM

## 2017-10-18 DIAGNOSIS — Z79.899 ENCOUNTER FOR LONG-TERM (CURRENT) USE OF HIGH-RISK MEDICATION: ICD-10-CM

## 2017-10-18 PROCEDURE — 80305 DRUG TEST PRSMV DIR OPT OBS: CPT | Performed by: NURSE PRACTITIONER

## 2017-10-18 PROCEDURE — 99213 OFFICE O/P EST LOW 20 MIN: CPT | Performed by: NURSE PRACTITIONER

## 2017-10-18 RX ORDER — OXYCODONE HYDROCHLORIDE 30 MG/1
30 TABLET, FILM COATED, EXTENDED RELEASE ORAL EVERY 12 HOURS SCHEDULED
Qty: 60 TABLET | Refills: 0 | Status: SHIPPED | OUTPATIENT
Start: 2017-10-18 | End: 2017-11-14 | Stop reason: SDUPTHER

## 2017-10-18 RX ORDER — OXYCODONE AND ACETAMINOPHEN 7.5; 325 MG/1; MG/1
1 TABLET ORAL EVERY 6 HOURS PRN
Qty: 120 TABLET | Refills: 0 | Status: SHIPPED | OUTPATIENT
Start: 2017-10-18 | End: 2017-11-14 | Stop reason: SDUPTHER

## 2017-10-18 NOTE — PROGRESS NOTES
"CHIEF COMPLAINT  Since 9/20/17 office visit, Pt states L knee is less painful.  However, Bilat. Hands,wrists and leg pain bilat. Have slightly increased due to cold weather but better controlled with medication.    Subjective   Crystal Cruz is a 51 y.o. female  who presents to the office for follow-up.She has a history of chronic hand pain/OA and chronic joint pain/OA. Her pain level is generally unchanged, though she admits \"i feel winter is coming.\"    At her last office visit, Percocet was increased to 4/day by Dr. Law.    Complains of pain in her hands and joints. Today her pain is 5/10VAS. Describes the pain as continuous and slightly worse due to weather. Continues with OxyContin 30 mg BID and Percocet 7.5/325 3-4/day, Cymbalta 60 mg and gabapentin 600 mg TID. Denies any side effects from the regimen.  The regimen helps decrease her pain by 50%. ADL's by self.    Extremity Pain    The pain is present in the neck, back, left wrist, left hand, left foot, right foot, right hand, right wrist, right fingers and left fingers. This is a chronic problem. The current episode started more than 1 year ago. There has been no history of extremity trauma. The problem occurs constantly. The quality of the pain is described as aching, pounding and burning. The pain is at a severity of 5/10. The pain is moderate. Pertinent negatives include no fever or numbness. The symptoms are aggravated by cold (moist/rainy weather). She has tried acetaminophen, heat, movement, NSAIDS, OTC ointments, OTC pain meds, oral narcotics and rest (Oxycontin 30 mg Q12 hours, Oxycodone 7.5/325 3/day) for the symptoms. The treatment provided moderate relief. mitchell, systemic sclerosis      PEG Assessment   What number best describes your pain on average in the past week?6  What number best describes how, during the past week, pain has interfered with your enjoyment of life?6  What number best describes how, during the past week, pain has " "interfered with your general activity?  7    The following portions of the patient's history were reviewed and updated as appropriate: allergies, current medications, past family history, past medical history, past social history, past surgical history and problem list.    Review of Systems   Constitutional: Negative for activity change, appetite change, chills and fever.   HENT: Negative for nosebleeds and postnasal drip (some drainage and blood from surgery).    Respiratory: Negative for apnea, chest tightness and shortness of breath.    Cardiovascular: Negative for chest pain.   Gastrointestinal: Negative for constipation (occ), diarrhea (occ), nausea and vomiting.   Genitourinary: Negative for difficulty urinating, dyspareunia and dysuria.   Musculoskeletal:        Hand pain   Skin: Negative for rash and wound.   Allergic/Immunologic: Negative for immunocompromised state.   Neurological: Negative for dizziness, weakness, light-headedness, numbness and headaches.   Hematological: Does not bruise/bleed easily.   Psychiatric/Behavioral: Positive for sleep disturbance. Negative for agitation, confusion, hallucinations, self-injury and suicidal ideas. The patient is nervous/anxious.        Vitals:    10/18/17 1311   BP: 117/65   Pulse: 86   Resp: 16   Temp: 98.2 °F (36.8 °C)   SpO2: 95%   Weight: 186 lb (84.4 kg)   Height: 64\" (162.6 cm)   PainSc: 5  Comment: generalized pain ranges from 2-8/10   PainLoc: Generalized     Objective   Physical Exam   Constitutional: She is oriented to person, place, and time. She appears well-developed and well-nourished. She is cooperative.   HENT:   Head: Normocephalic and atraumatic.   Nose: Nose normal.   Eyes: Conjunctivae and lids are normal.   Neck: Trachea normal.   Cardiovascular: Normal rate.    Pulmonary/Chest: Effort normal.   Musculoskeletal:        Left knee: She exhibits normal range of motion, no swelling, no effusion and no erythema. Tenderness found. Lateral joint " line tenderness noted.        Right hand: She exhibits decreased range of motion and tenderness. She exhibits normal capillary refill.        Left hand: She exhibits decreased range of motion and tenderness. She exhibits normal capillary refill.   Arthritic changes bilateral hands and wrists with no acute synovitis    Cap refill  < 3 seconds bilaterally     Neurological: She is alert and oriented to person, place, and time. Gait normal.   Skin: Skin is warm, dry and intact.   Psychiatric: She has a normal mood and affect. Her speech is normal and behavior is normal. Judgment and thought content normal. Cognition and memory are normal.   Nursing note and vitals reviewed.    Assessment/Plan   Crystal was seen today for pain.    Diagnoses and all orders for this visit:    Other chronic pain  -     Urine Drug Screen Confirmation - Urine, Urine, Clean Catch  -     POC Urine Drug Screen, Triage    Kienbock disease, adult  -     Urine Drug Screen Confirmation - Urine, Urine, Clean Catch  -     POC Urine Drug Screen, Triage    Pain in both upper extremities  -     Urine Drug Screen Confirmation - Urine, Urine, Clean Catch  -     POC Urine Drug Screen, Triage    Encounter for long-term (current) use of high-risk medication  -     Urine Drug Screen Confirmation - Urine, Urine, Clean Catch  -     POC Urine Drug Screen, Triage    Other orders  -     oxyCODONE-acetaminophen (PERCOCET) 7.5-325 MG per tablet; Take 1 tablet by mouth Every 6 (Six) Hours As Needed for Severe Pain .  -     OxyCODONE HCl ER (oxyCONTIN) 30 MG tablet extended-release 12 hour; Take 1 tablet by mouth Every 12 (Twelve) Hours.      --- Routine UDS in office today as part of monitoring requirements for controlled substances.  The specimen was viewed and the immunoassay result reviewed and is +OXY, +BZD(Appropriate).  This specimen will be sent to [facility] laboratory for confirmation.     --- refill oxycontin and oxycodone. Patient appears stable with current  regimen. No adverse effects. Regarding continuation of opioids, there is no evidence of aberrant behavior or any red flags.  The patient continues with appropriate response to opioid therapy. ADL's remain intact by self.   --- Reviewed hyperanalgesia.  --- Follow-up 1 months or sooner if needed.       STEVEN REPORT    As part of the patient's treatment plan, I am prescribing controlled substances. The patient has been made aware of appropriate use of such medications, including potential risk of somnolence, limited ability to drive and/or work safely, and the potential for dependence or overdose. It has also bee made clear that these medications are for use by this patient only, without concomitant use of alcohol or other substances unless prescribed.     Patient has completed prescribing agreement detailing terms of continued prescribing of controlled substances, including monitoring STEVEN reports, urine drug screening, and pill counts if necessary. The patient is aware that inappropriate use will results in cessation of prescribing such medications.    STEVEN report has been reviewed and scanned into the patient's chart.    Date of last STEVEN : 10-17-17    History and physical exam exhibit continued safe and appropriate use of controlled substances.      EMR Dragon/Transcription disclaimer:   Much of this encounter note is an electronic transcription/translation of spoken language to printed text. The electronic translation of spoken language may permit erroneous, or at times, nonsensical words or phrases to be inadvertently transcribed; Although I have reviewed the note for such errors, some may still exist.

## 2017-11-10 RX ORDER — GABAPENTIN 600 MG/1
TABLET ORAL
Qty: 90 TABLET | Refills: 3 | OUTPATIENT
Start: 2017-11-10 | End: 2017-11-14 | Stop reason: SDUPTHER

## 2017-11-14 ENCOUNTER — OFFICE VISIT (OUTPATIENT)
Dept: PAIN MEDICINE | Facility: CLINIC | Age: 52
End: 2017-11-14

## 2017-11-14 VITALS
HEART RATE: 80 BPM | RESPIRATION RATE: 16 BRPM | OXYGEN SATURATION: 95 % | BODY MASS INDEX: 31.48 KG/M2 | HEIGHT: 64 IN | SYSTOLIC BLOOD PRESSURE: 136 MMHG | DIASTOLIC BLOOD PRESSURE: 80 MMHG | TEMPERATURE: 97.9 F | WEIGHT: 184.4 LBS

## 2017-11-14 DIAGNOSIS — G89.29 OTHER CHRONIC PAIN: Primary | ICD-10-CM

## 2017-11-14 DIAGNOSIS — Z79.899 ENCOUNTER FOR LONG-TERM (CURRENT) USE OF HIGH-RISK MEDICATION: ICD-10-CM

## 2017-11-14 DIAGNOSIS — M79.601 PAIN IN BOTH UPPER EXTREMITIES: ICD-10-CM

## 2017-11-14 DIAGNOSIS — M79.602 PAIN IN BOTH UPPER EXTREMITIES: ICD-10-CM

## 2017-11-14 DIAGNOSIS — M93.1 KIENBOCK DISEASE, ADULT: ICD-10-CM

## 2017-11-14 DIAGNOSIS — M19.90 ARTHRITIS: ICD-10-CM

## 2017-11-14 PROCEDURE — 99213 OFFICE O/P EST LOW 20 MIN: CPT | Performed by: NURSE PRACTITIONER

## 2017-11-14 RX ORDER — OXYCODONE HYDROCHLORIDE 30 MG/1
30 TABLET, FILM COATED, EXTENDED RELEASE ORAL EVERY 12 HOURS SCHEDULED
Qty: 60 TABLET | Refills: 0 | Status: SHIPPED | OUTPATIENT
Start: 2017-11-14 | End: 2017-12-13 | Stop reason: SDUPTHER

## 2017-11-14 RX ORDER — GABAPENTIN 600 MG/1
600 TABLET ORAL 3 TIMES DAILY
Qty: 270 TABLET | Refills: 1 | Status: SHIPPED | OUTPATIENT
Start: 2017-11-14 | End: 2018-06-03 | Stop reason: SDUPTHER

## 2017-11-14 RX ORDER — OXYCODONE AND ACETAMINOPHEN 7.5; 325 MG/1; MG/1
1 TABLET ORAL EVERY 6 HOURS PRN
Qty: 120 TABLET | Refills: 0 | Status: SHIPPED | OUTPATIENT
Start: 2017-11-14 | End: 2017-12-13 | Stop reason: SDUPTHER

## 2017-11-14 RX ORDER — FUROSEMIDE 20 MG/1
20 TABLET ORAL DAILY PRN
Refills: 0 | Status: ON HOLD | COMMUNITY
Start: 2017-11-01 | End: 2020-02-10

## 2017-11-14 NOTE — PROGRESS NOTES
CHIEF COMPLAINT    F/U joint and fibromyalgia pain. Pt states pain is unchanged.     Subjective   Crystal Cruz is a 51 y.o. female  who presents to the office for follow-up.She has a history of chronic joint pain and fibromyalgia, which is unchanged since last office visit.    Complains of pain in her hands, joints. Today her pain is 3/10VAS. Describes the pain as continuous and unchanged. Continues with OxyContin 30 mg BID and Percocet 7.5/325 3-4/day, Cymbalta 60 mg and gabapentin 600 mg TID. Denies any side effects from the regimen.  No longer having to take anything for constipation. Stopped Linzess.  The regimen helps decrease her pain by 50%. ADL's by self.    No recent hospitalizations.     Extremity Pain    The pain is present in the neck, back, left wrist, left hand, left foot, right foot, right hand, right wrist, right fingers and left fingers. This is a chronic problem. The current episode started more than 1 year ago. There has been no history of extremity trauma. The problem occurs constantly. Progression since onset: unchanged since last office visit. The quality of the pain is described as aching, pounding and burning. The pain is at a severity of 3/10. The pain is moderate. Pertinent negatives include no fever or numbness. The symptoms are aggravated by cold (moist/rainy weather). She has tried acetaminophen, heat, movement, NSAIDS, OTC ointments, OTC pain meds, oral narcotics and rest (Oxycontin 30 mg Q12 hours, Oxycodone 7.5/325 3/day) for the symptoms. The treatment provided moderate relief. mitchell, systemic sclerosis      PEG Assessment   What number best describes your pain on average in the past week?3  What number best describes how, during the past week, pain has interfered with your enjoyment of life?3  What number best describes how, during the past week, pain has interfered with your general activity?  3    The following portions of the patient's history were reviewed and updated as  "appropriate: allergies, current medications, past family history, past medical history, past social history, past surgical history and problem list.    Review of Systems   Constitutional: Negative for activity change, appetite change, chills and fever.   HENT: Negative for nosebleeds and postnasal drip.    Respiratory: Negative for apnea, chest tightness and shortness of breath.    Cardiovascular: Negative for chest pain.   Gastrointestinal: Negative for constipation (occ), diarrhea (occ), nausea and vomiting.   Genitourinary: Negative for difficulty urinating, dyspareunia and dysuria.   Musculoskeletal: Positive for arthralgias.        Hand pain   Skin: Negative for rash and wound.   Allergic/Immunologic: Negative for immunocompromised state.   Neurological: Negative for dizziness, weakness, light-headedness, numbness and headaches.   Hematological: Does not bruise/bleed easily.   Psychiatric/Behavioral: Positive for sleep disturbance. Negative for agitation, confusion, hallucinations, self-injury and suicidal ideas. The patient is nervous/anxious.        Vitals:    11/14/17 1300   BP: 136/80   Pulse: 80   Resp: 16   Temp: 97.9 °F (36.6 °C)   SpO2: 95%   Weight: 184 lb 6.4 oz (83.6 kg)   Height: 64\" (162.6 cm)   PainSc:   3   PainLoc: Generalized     Objective   Physical Exam   Constitutional: She is oriented to person, place, and time. She appears well-developed and well-nourished. She is cooperative.   HENT:   Head: Normocephalic and atraumatic.   Nose: Nose normal.   Eyes: Conjunctivae and lids are normal.   Neck: Trachea normal.   Cardiovascular: Normal rate.    Pulmonary/Chest: Effort normal.   Musculoskeletal:        Right hand: She exhibits decreased range of motion and tenderness. She exhibits normal capillary refill.        Left hand: She exhibits decreased range of motion and tenderness. She exhibits normal capillary refill.   Arthritic changes bilateral hands and wrists with no acute synovitis    Cap " refill  < 3 seconds bilaterally     Neurological: She is alert and oriented to person, place, and time. Gait normal.   Skin: Skin is warm, dry and intact.   Psychiatric: She has a normal mood and affect. Her speech is normal and behavior is normal. Judgment and thought content normal. Cognition and memory are normal.   Nursing note and vitals reviewed.      Assessment/Plan   Crystal was seen today for pain.    Diagnoses and all orders for this visit:    Other chronic pain    Kienbock disease, adult    Arthritis    Pain in both upper extremities    Encounter for long-term (current) use of high-risk medication    Other orders  -     gabapentin (NEURONTIN) 600 MG tablet; Take 1 tablet by mouth 3 (Three) Times a Day.      --- The urine drug screen confirmation from 10-18-17 has been reviewed and the result is appropriate based on patient history and STEVEN report  --- Refill OxyContin and Percocet. Patient appears stable with current regimen. No adverse effects. Regarding continuation of opioids, there is no evidence of aberrant behavior or any red flags.  The patient continues with appropriate response to opioid therapy. ADL's remain intact by self.   --- Patient requests 90 day supply of gabapentin.  --- Follow-up 1 month or sooner if needed.       STEVEN REPORT    As part of the patient's treatment plan, I am prescribing controlled substances. The patient has been made aware of appropriate use of such medications, including potential risk of somnolence, limited ability to drive and/or work safely, and the potential for dependence or overdose. It has also bee made clear that these medications are for use by this patient only, without concomitant use of alcohol or other substances unless prescribed.     Patient has completed prescribing agreement detailing terms of continued prescribing of controlled substances, including monitoring STEVEN reports, urine drug screening, and pill counts if necessary. The patient is aware  that inappropriate use will results in cessation of prescribing such medications.    STEVEN report has been reviewed and scanned into the patient's chart.    Date of last STEVEN : 11-13-17    History and physical exam exhibit continued safe and appropriate use of controlled substances.      EMR Dragon/Transcription disclaimer:   Much of this encounter note is an electronic transcription/translation of spoken language to printed text. The electronic translation of spoken language may permit erroneous, or at times, nonsensical words or phrases to be inadvertently transcribed; Although I have reviewed the note for such errors, some may still exist.

## 2017-12-13 ENCOUNTER — OFFICE VISIT (OUTPATIENT)
Dept: PAIN MEDICINE | Facility: CLINIC | Age: 52
End: 2017-12-13

## 2017-12-13 VITALS
RESPIRATION RATE: 16 BRPM | BODY MASS INDEX: 30.56 KG/M2 | OXYGEN SATURATION: 95 % | TEMPERATURE: 98.4 F | SYSTOLIC BLOOD PRESSURE: 150 MMHG | HEART RATE: 100 BPM | WEIGHT: 179 LBS | HEIGHT: 64 IN | DIASTOLIC BLOOD PRESSURE: 86 MMHG

## 2017-12-13 DIAGNOSIS — G89.29 OTHER CHRONIC PAIN: Primary | ICD-10-CM

## 2017-12-13 DIAGNOSIS — M93.1 KIENBOCK DISEASE, ADULT: ICD-10-CM

## 2017-12-13 DIAGNOSIS — Z79.899 ENCOUNTER FOR LONG-TERM (CURRENT) USE OF HIGH-RISK MEDICATION: ICD-10-CM

## 2017-12-13 DIAGNOSIS — M47.816 ARTHROPATHY OF LUMBAR FACET JOINT: ICD-10-CM

## 2017-12-13 PROCEDURE — 99213 OFFICE O/P EST LOW 20 MIN: CPT | Performed by: ANESTHESIOLOGY

## 2017-12-13 RX ORDER — OMEPRAZOLE 40 MG/1
CAPSULE, DELAYED RELEASE ORAL
Refills: 0 | COMMUNITY
Start: 2017-12-04 | End: 2018-06-08

## 2017-12-13 RX ORDER — OXYCODONE AND ACETAMINOPHEN 7.5; 325 MG/1; MG/1
1 TABLET ORAL EVERY 6 HOURS PRN
Qty: 120 TABLET | Refills: 0 | Status: SHIPPED | OUTPATIENT
Start: 2017-12-13 | End: 2018-01-09 | Stop reason: SDUPTHER

## 2017-12-13 RX ORDER — OXYCODONE HYDROCHLORIDE 30 MG/1
30 TABLET, FILM COATED, EXTENDED RELEASE ORAL EVERY 12 HOURS SCHEDULED
Qty: 60 TABLET | Refills: 0 | Status: SHIPPED | OUTPATIENT
Start: 2017-12-13 | End: 2018-01-09 | Stop reason: SDUPTHER

## 2017-12-13 NOTE — PROGRESS NOTES
"CHIEF COMPLAINT    F/U generalized joint and fibromyalgia pain. States her pain is unchanged.    Subjective   Crystal Cruz is a 52 y.o. female  who presents to the office for follow-up.She has a history of diffuse pain with painful deformity.      History of Present Illness     PEG Assessment   What number best describes your pain on average in the past week?4-5  What number best describes how, during the past week, pain has interfered with your enjoyment of life?4  What number best describes how, during the past week, pain has interfered with your general activity?  4      The following portions of the patient's history were reviewed and updated as appropriate: allergies, current medications, past family history, past medical history, past social history, past surgical history and problem list.    Review of Systems   Constitutional: Negative for activity change, appetite change, chills and fever.   HENT: Negative for nosebleeds and postnasal drip.    Respiratory: Negative for apnea, chest tightness and shortness of breath.    Cardiovascular: Negative for chest pain.   Gastrointestinal: Negative for constipation (occ), diarrhea (occ), nausea and vomiting.   Genitourinary: Negative for difficulty urinating, dyspareunia and dysuria.   Musculoskeletal: Positive for arthralgias.        Hand pain   Skin: Negative for rash and wound.   Allergic/Immunologic: Negative for immunocompromised state.   Neurological: Negative for dizziness, weakness, light-headedness, numbness and headaches.   Hematological: Does not bruise/bleed easily.   Psychiatric/Behavioral: Positive for sleep disturbance. Negative for agitation, confusion, hallucinations, self-injury and suicidal ideas. The patient is nervous/anxious.            Vitals:    12/13/17 1403   BP: 150/86   Pulse: 100   Resp: 16   Temp: 98.4 °F (36.9 °C)   SpO2: 95%   Weight: 81.2 kg (179 lb)   Height: 162.6 cm (64.02\")   PainSc:   4   PainLoc: Generalized         Objective "   Physical Exam   Constitutional: She is oriented to person, place, and time. She appears well-developed and well-nourished. She is cooperative.   HENT:   Head: Normocephalic and atraumatic.   Eyes: Lids are normal.   Neck: Trachea normal.   Pulmonary/Chest: Effort normal.   Musculoskeletal:        Right hand: She exhibits decreased range of motion and tenderness. She exhibits normal capillary refill.        Left hand: She exhibits decreased range of motion and tenderness. She exhibits normal capillary refill.   Arthritic changes bilateral hands and wrists     Neurological: She is alert and oriented to person, place, and time. Gait normal.   Skin: Skin is warm, dry and intact.   Sclerous appearance is unchanged   Psychiatric: She has a normal mood and affect. Her speech is normal and behavior is normal. Cognition and memory are normal.   Nursing note and vitals reviewed.          Assessment/Plan   Crystal was seen today for pain.    Diagnoses and all orders for this visit:    Other chronic pain    Encounter for long-term (current) use of high-risk medication    Kienbock disease, adult    Arthropathy of lumbar facet joint    Other orders  -     OxyCODONE HCl ER (oxyCONTIN) 30 MG tablet extended-release 12 hour; Take 1 tablet by mouth Every 12 (Twelve) Hours.  -     oxyCODONE-acetaminophen (PERCOCET) 7.5-325 MG per tablet; Take 1 tablet by mouth Every 6 (Six) Hours As Needed for Severe Pain .        --- Follow-up 1 month    Patient appears stable with current regimen. No adverse effects. Regarding continuation of opioids, there is no evidence of aberrant behavior or any red flags.  The patient continues with appropriate response to opioid therapy. ADL's remain intact by self.                STEVEN REPORT    As part of the patient's treatment plan, I am prescribing controlled substances. The patient has been made aware of appropriate use of such medications, including potential risk of somnolence, limited ability to  drive and/or work safely, and the potential for dependence or overdose. It has also bee made clear that these medications are for use by this patient only, without concomitant use of alcohol or other substances unless prescribed.     Patient has completed prescribing agreement detailing terms of continued prescribing of controlled substances, including monitoring STEVEN reports, urine drug screening, and pill counts if necessary. The patient is aware that inappropriate use will results in cessation of prescribing such medications.    STEVEN report has been reviewed and scanned into the patient's chart.    Date of last STEVEN : as above    History and physical exam exhibit continued safe and appropriate use of controlled substances.      EMR Dragon/Transcription disclaimer:   Much of this encounter note is an electronic transcription/translation of spoken language to printed text. The electronic translation of spoken language may permit erroneous, or at times, nonsensical words or phrases to be inadvertently transcribed; Although I have reviewed the note for such errors, some may still exist.

## 2017-12-19 ENCOUNTER — TELEPHONE (OUTPATIENT)
Dept: CARDIOLOGY | Facility: CLINIC | Age: 52
End: 2017-12-19

## 2017-12-19 DIAGNOSIS — R00.2 PALPITATIONS: Primary | ICD-10-CM

## 2017-12-19 PROBLEM — I70.0 ATHEROSCLEROSIS OF AORTA (HCC): Chronic | Status: ACTIVE | Noted: 2017-12-19

## 2017-12-28 ENCOUNTER — HOSPITAL ENCOUNTER (OUTPATIENT)
Dept: CARDIOLOGY | Facility: HOSPITAL | Age: 52
Discharge: HOME | End: 2017-12-28
Attending: INTERNAL MEDICINE | Admitting: INTERNAL MEDICINE

## 2017-12-28 DIAGNOSIS — R00.2 PALPITATIONS: ICD-10-CM

## 2017-12-28 PROCEDURE — 93226 XTRNL ECG REC<48 HR SCAN A/R: CPT

## 2017-12-28 PROCEDURE — 93225 XTRNL ECG REC<48 HRS REC: CPT

## 2017-12-31 PROCEDURE — 93227 XTRNL ECG REC<48 HR R&I: CPT | Performed by: INTERNAL MEDICINE

## 2018-01-03 ENCOUNTER — TELEPHONE (OUTPATIENT)
Dept: PAIN MEDICINE | Facility: CLINIC | Age: 53
End: 2018-01-03

## 2018-01-03 NOTE — TELEPHONE ENCOUNTER
She said that she cancelled because she wanted to see Dr. Law so that he can order more injections for her. She said she didn't know that it would take so long to get an appointment with him and that she was wondering if she can just  the scripts and not be seen since she is scheduled with him in February

## 2018-01-03 NOTE — TELEPHONE ENCOUNTER
Patient called and LM wanting to know if she needs to be seen to get her refills or can she just call it in and pick them up. She she states she has an appointment with Dr. Law in February 2018

## 2018-01-03 NOTE — TELEPHONE ENCOUNTER
I don't understand. What was her plan with cancelling the appointment? How was she going to get a refill? What did she say to this? Let me know. Thanks. sha

## 2018-01-03 NOTE — TELEPHONE ENCOUNTER
She needs to be seen because based on the two opioids and benzo, she is high risk. Why did she cancel her appointment?

## 2018-01-03 NOTE — TELEPHONE ENCOUNTER
Your next available appointment is 1/30/17 and she will be out of medication before then and her appointment with jm is 2/12/18

## 2018-01-09 ENCOUNTER — OFFICE VISIT (OUTPATIENT)
Dept: PAIN MEDICINE | Facility: CLINIC | Age: 53
End: 2018-01-09

## 2018-01-09 VITALS
WEIGHT: 187 LBS | RESPIRATION RATE: 18 BRPM | TEMPERATURE: 98.4 F | BODY MASS INDEX: 31.92 KG/M2 | OXYGEN SATURATION: 96 % | DIASTOLIC BLOOD PRESSURE: 73 MMHG | SYSTOLIC BLOOD PRESSURE: 134 MMHG | HEIGHT: 64 IN | HEART RATE: 78 BPM

## 2018-01-09 DIAGNOSIS — M79.601 PAIN IN BOTH UPPER EXTREMITIES: ICD-10-CM

## 2018-01-09 DIAGNOSIS — M47.816 LUMBAR FACET ARTHROPATHY: ICD-10-CM

## 2018-01-09 DIAGNOSIS — G89.29 OTHER CHRONIC PAIN: Primary | ICD-10-CM

## 2018-01-09 DIAGNOSIS — M92.219 JUVENILE OSTEOCHONDROSIS OF CARPAL LUNATE, UNSPECIFIED LATERALITY: ICD-10-CM

## 2018-01-09 DIAGNOSIS — M79.602 PAIN IN BOTH UPPER EXTREMITIES: ICD-10-CM

## 2018-01-09 DIAGNOSIS — Z79.899 ENCOUNTER FOR LONG-TERM (CURRENT) USE OF HIGH-RISK MEDICATION: ICD-10-CM

## 2018-01-09 DIAGNOSIS — M47.816 ARTHROPATHY OF LUMBAR FACET JOINT: ICD-10-CM

## 2018-01-09 PROCEDURE — 99214 OFFICE O/P EST MOD 30 MIN: CPT | Performed by: NURSE PRACTITIONER

## 2018-01-09 RX ORDER — OXYCODONE HYDROCHLORIDE 30 MG/1
30 TABLET, FILM COATED, EXTENDED RELEASE ORAL EVERY 12 HOURS SCHEDULED
Qty: 60 TABLET | Refills: 0 | Status: SHIPPED | OUTPATIENT
Start: 2018-01-09 | End: 2018-02-12 | Stop reason: SDUPTHER

## 2018-01-09 RX ORDER — OXYCODONE AND ACETAMINOPHEN 7.5; 325 MG/1; MG/1
1 TABLET ORAL EVERY 6 HOURS PRN
Qty: 120 TABLET | Refills: 0 | Status: SHIPPED | OUTPATIENT
Start: 2018-01-09 | End: 2018-02-12 | Stop reason: SDUPTHER

## 2018-01-09 NOTE — PROGRESS NOTES
CHIEF COMPLAINT  Back pain has increased since last visit.    Subjective   Crystal Cruz is a 52 y.o. female  who presents to the office for follow-up.She has a history of increased chronic back pain, as well as BUE pain.    Is having increased low back pain. No known injury. Started in right low back, but now has spread across all of low back.  Shows the pain as being a band across her back. Pain increases with walking and prolonged standing.    Complains of pain in her low back(primary) and hands. Today her pain is 7/10VAS. Describes the pain as continuous and the back pain is worse. Continues with OxyContin 30 mg BID and Percocet 7.5/325 3-4/day, Cymbalta 60 mg and gabapentin 600 mg TID. Denies any side effects from the regimen.  No longer having to take anything for constipation. Stopped Linzess.  The regimen helps decrease her pain by 50%. ADL's by self.    She had previously had lumbar RFL with Dr. Russell with positive results. Had significant relief. Last performed in 2013.     Extremity Pain    The pain is present in the neck, back, left wrist, left hand, left foot, right foot, right hand, right wrist, right fingers and left fingers. This is a chronic problem. The current episode started more than 1 year ago. There has been no history of extremity trauma. The problem occurs constantly. Progression since onset: unchanged since last office visit. The quality of the pain is described as aching, pounding and burning. The pain is at a severity of 3/10. The pain is moderate. Pertinent negatives include no fever or numbness. The symptoms are aggravated by cold (moist/rainy weather). She has tried acetaminophen, heat, movement, NSAIDS, OTC ointments, OTC pain meds, oral narcotics and rest (Oxycontin 30 mg Q12 hours, Oxycodone 7.5/325 3/day) for the symptoms. The treatment provided moderate relief. mitchell, systemic sclerosis   Back Pain   This is a chronic problem. The current episode started 1 to 4 weeks ago.  "The problem occurs constantly. The problem has been gradually worsening since onset. The pain is present in the lumbar spine. The quality of the pain is described as aching. The pain does not radiate. The pain is at a severity of 7/10. The pain is severe. The symptoms are aggravated by bending, position, lying down, standing and twisting. Pertinent negatives include no bladder incontinence, bowel incontinence, chest pain, dysuria, fever, headaches, numbness or weakness. Treatments tried: lumbar FJN/RFL--- significant long-term relief.      PEG Assessment   What number best describes your pain on average in the past week?7  What number best describes how, during the past week, pain has interfered with your enjoyment of life?8  What number best describes how, during the past week, pain has interfered with your general activity?  8    The following portions of the patient's history were reviewed and updated as appropriate: allergies, current medications, past family history, past medical history, past social history, past surgical history and problem list.    Review of Systems   Constitutional: Negative for chills and fever.   Respiratory: Negative for shortness of breath.    Cardiovascular: Negative for chest pain.   Gastrointestinal: Negative for bowel incontinence, constipation, diarrhea, nausea and vomiting.   Genitourinary: Negative for bladder incontinence, difficulty urinating, dyspareunia and dysuria.   Musculoskeletal: Positive for back pain.   Neurological: Negative for dizziness, weakness, light-headedness, numbness and headaches.   Psychiatric/Behavioral: Positive for sleep disturbance. Negative for confusion, hallucinations, self-injury and suicidal ideas. The patient is nervous/anxious.        Vitals:    01/09/18 0932   BP: 134/73   Pulse: 78   Resp: 18   Temp: 98.4 °F (36.9 °C)   SpO2: 96%   Weight: 84.8 kg (187 lb)   Height: 162.6 cm (64\")   PainSc:   7   PainLoc: Back     Objective   Physical Exam "   Constitutional: She is oriented to person, place, and time. She appears well-developed and well-nourished. She is cooperative.   Appears uncomfortable but not acutely distressed   HENT:   Head: Normocephalic and atraumatic.   Eyes: Lids are normal.   Neck: Trachea normal.   Cardiovascular: Normal rate, regular rhythm and normal heart sounds.    Pulmonary/Chest: Effort normal and breath sounds normal. No respiratory distress.   Musculoskeletal:        Lumbar back: She exhibits tenderness and bony tenderness (moderate tenderness to palpation of bilateral L2-L5 facets; +loading manuever).        Right hand: She exhibits decreased range of motion and tenderness. She exhibits normal capillary refill.        Left hand: She exhibits decreased range of motion and tenderness. She exhibits normal capillary refill.   Arthritic changes bilateral hands and wrists  Negative SLR bilaterally   Neurological: She is alert and oriented to person, place, and time. Gait normal.   Reflex Scores:       Patellar reflexes are 1+ on the right side and 1+ on the left side.  Skin: Skin is warm, dry and intact.   Sclerous appearance is unchanged   Psychiatric: She has a normal mood and affect. Her speech is normal and behavior is normal. Cognition and memory are normal.   Nursing note and vitals reviewed.    Assessment/Plan   Crystal was seen today for back pain.    Diagnoses and all orders for this visit:    Other chronic pain    Juvenile osteochondrosis of carpal lunate, unspecified laterality    Pain in both upper extremities    Arthropathy of lumbar facet joint    Encounter for long-term (current) use of high-risk medication    Lumbar facet arthropathy  -     Case Request      --- The urine drug screen confirmation from 10-18-17 has been reviewed and the result is appropriate based on patient history and STEVEN report  --- Refill OxyContin and Percocet. Patient appears stable with current regimen. No adverse effects. Regarding continuation  "of opioids, there is no evidence of aberrant behavior or any red flags.  The patient continues with appropriate response to opioid therapy. ADL's remain intact by self.   --- Bilateral L2-L5 FJN. No blood thinners. Reviewed the procedure at length with the patient.  Included in the review was expectations, complications, risk and benefits.The procedure was described in detail and the risks, benefits and alternatives were discussed with the patient (including but not limited to: bleeding, infection, nerve damage, worsening of pain, inability to perform injection, paralysis, seizures, and death) who agreed to proceed.  Discussed the potential for sedation if warranted/wanted.  Questions were answered and in a way the patient could understand.  Patient verbalized understanding and wishes to proceed.  This intervention will be ordered. Discussed proceeding with RFL in future PRN.  --- Follow-up 1 month or sooner if needed.    -------  Education about Medial Branch Blockade and RF Therapy:    This medial branch blockade (MBB) suggested is intended for diagnostic purposes, with the intent of offering the patient Radiofrequency thermal rhizotomy (RF) if the MBB is diagnostically effective.  The diagnostic blockade is necessary to determine the likelihood that RF therapy could be efficacious in providing long term relief to the patient.    Medial branches are sensory nerve branches that connect to a facet joint and transmit sensations & pain signals from that joint.  Facet is a term for the type of joints found in the spine.  Medial branches are the nerves that go to a facet, and therefore are also sometimes called \"facet joint nerves\" (FJNs).      In a medial branch blockade procedure, xray fluoroscopy is used to verify the locations of the outside of the joint lines which are being targeted.  Under xray guidance, needles are placed to these areas.  Contrast dye is injected to confirm proper placement, with dye flowing " over the joint area, and to ensure that the dye does not flow into unintended areas such as a vein.  When this is confirmed, local anesthetic is injected to block the medial branch at that joint level.      If MBBs are diagnostically successful in blocking pain, then the patient is most likely a great candidate for Radiofrequency of those facet joint nerves.  In the RF procedure, needles are placed to the joint lines in the same fashion, and after testing, the needle tips are heated to thermally treat the nerves, blocking the nerves by in essence damaging the nerves with the heat treatment.       Medically, a successful RF procedure should provide a patient with 50% pain relief or more for at least 6 months.  Clinical experience suggests that successful patients receive relief more in the range of 12 months on average.  We also discussed that a fortunate minority of patients receive therapeutic success from the MBB, and may not require RF ablation.  If a patient receives more than 8 weeks of relief from MBB, then occasional repeat MBB for therapeutic purposes is a very reasonable alternative therapy.  This course of therapy is consistent with our LCDs according to our CMS  in the area, and therefore other insurance providers should follow accordingly.  We will monitor our patients to screen for these therapeutic responders and will offer RF therapy only when necessary.        We discussed that MBB & RF are not without risks.  Guidelines regarding anticoagulant use & neuraxial procedures will be respected.  Patients that are ill or otherwise may be at risk for sepsis will not have their spines accessed by neuraxial injections of any type.  This patient will not be offered these therapies if there is an increased risk.   We discussed that there is a risk of postprocedural pain and also a risk of worsening of clinical picture with these procedures as with any neuraxial procedure.    -------              STEVEN REPORT    As part of the patient's treatment plan, I am prescribing controlled substances. The patient has been made aware of appropriate use of such medications, including potential risk of somnolence, limited ability to drive and/or work safely, and the potential for dependence or overdose. It has also bee made clear that these medications are for use by this patient only, without concomitant use of alcohol or other substances unless prescribed.     Patient has completed prescribing agreement detailing terms of continued prescribing of controlled substances, including monitoring STEVEN reports, urine drug screening, and pill counts if necessary. The patient is aware that inappropriate use will results in cessation of prescribing such medications.    STEVEN report has been reviewed and scanned into the patient's chart.    Date of last STEVEN : 1-8-18    History and physical exam exhibit continued safe and appropriate use of controlled substances.      EMR Dragon/Transcription disclaimer:   Much of this encounter note is an electronic transcription/translation of spoken language to printed text. The electronic translation of spoken language may permit erroneous, or at times, nonsensical words or phrases to be inadvertently transcribed; Although I have reviewed the note for such errors, some may still exist.

## 2018-01-18 ENCOUNTER — OUTSIDE FACILITY SERVICE (OUTPATIENT)
Dept: PAIN MEDICINE | Facility: CLINIC | Age: 53
End: 2018-01-18

## 2018-01-18 ENCOUNTER — DOCUMENTATION (OUTPATIENT)
Dept: PAIN MEDICINE | Facility: CLINIC | Age: 53
End: 2018-01-18

## 2018-01-18 PROCEDURE — 64495 INJ PARAVERT F JNT L/S 3 LEV: CPT | Performed by: ANESTHESIOLOGY

## 2018-01-18 PROCEDURE — 64494 INJ PARAVERT F JNT L/S 2 LEV: CPT | Performed by: ANESTHESIOLOGY

## 2018-01-18 PROCEDURE — 64493 INJ PARAVERT F JNT L/S 1 LEV: CPT | Performed by: ANESTHESIOLOGY

## 2018-01-23 NOTE — PROGRESS NOTES
Bilateral L1-4 Lumbar Medial Branch Blockade  [facility]    PREOPERATIVE DIAGNOSIS:  Lumbar spondylosis without myelopathy    POSTOPERATIVE DIAGNOSIS:  Lumbar spondylosis without myelopathy    PROCEDURE:   Diagnostic Bilateral Lumbar Medial Branch Nerve Blockades, with fluoroscopy:  L1, L2, L3, and L4 nerves (at the L2, L3, L4, L5 transverse processes) to block facet joints L2-3, L3-4, L4-5  1. 20372-89 -- Bilateral Lumbar Facet blocks, 1st Level  2. 66487-85 -- Bilateral Lumbar Facet blocks, 2nd  Level  3. 87566-46 -- Bilateral Lumbar Facet blocks, 3rd Level    PRE-PROCEDURE DISCUSSION WITH PATIENT:    Risks and complications were discussed with the patient prior to starting the procedure and informed consent was obtained.      SURGEON:  Reji Law MD    REASON FOR PROCEDURE:    Diagnostic injection at this level is needed, Tenderness to palpation of these affected joints is noted on exam under fluoroscopy.   and of note, the previous plan was for Bilateral L2-5 MBB, but these were the painful levels on this complete exam under fluoro.    SEDATION:  Versed 6mg & Fentanyl 100 mcg IV  ANESTHETIC:  Marcaine 0.5%  STEROID:  Methylprednisolone (DEPO MEDROL) 80mg/ml  TOTAL VOLUME OF SOLUTION:  8ml    DESCRIPTON OF PROCEDURE:  After obtaining informed consent, IV access was obtained in the preoperative area.   The patient was taken to the operating room.  The patient was placed in the prone position with a pillow under the abdomen. All pressure points were well padded.  EKG, blood pressure, and pulse oximeter were monitored.  The patient was monitored and sedated by the RN under my direction. The lumbosacral area was prepped with Chloraprep and draped in a sterile fashion. Under fluoroscopic guidance the transverse processes of the L2, L3, and L4 vertebrae at the junctions of the superior articular processes were identified on the right. Also identified was the groove between the ala and the  superior articular process of the sacrum on the ipsilateral side.  Skin and subcutaneous tissue were anesthetized with 1% lidocaine above each of these points. A 22-gauge spinal needle was introduced under fluoroscopic guidance at the above junctions. Aspiration was negative for blood and CSF.  After confirming the position of the needle with fluoroscope in all views, 0.25 mL of Omnipaque was injected, and after seeing the proper spread a total of 1 mL of the anesthetic solution noted above was injected at each of these points.  Needles were removed intact from each of the areas.  A similar procedure was repeated to block the same nerves on the contralateral side.   Onset of analgesia was noted.  Vital signs remained stable throughout.      ESTIMATED BLOOD LOSS:  <5 mL  SPECIMENS:  none    COMPLICATIONS:   No complications were noted. and There was no indication of vascular uptake on live injection of contrast dye.    TOLERANCE & DISCHARGE CONDITION:    The patient tolerated the procedure well.  The patient was transported to the recovery area without difficulties.  The patient was discharged to home under the care of family in stable and satisfactory condition.    PLAN OF CARE:  1. The patient was given our standard instruction sheet.  2. We discussed that Lumbar Medial Branch Blockade is a diagnostic procedure in consideration for radiofrequency ablation if two diagnostic procedures prove to be positive for significant benefit.  If sustained relief of 6 to eight weeks is obtained, then an alternative plan could be therapeutic lumbar branch blockades.  3. The patient is asked to keep a pain log each hour for 8 hours after the procedure today.  4. The patient will  Repeat injection 2 wks.  5. The patient will resume all medications as per the medication reconciliation sheet.

## 2018-01-29 ENCOUNTER — APPOINTMENT (OUTPATIENT)
Dept: CARDIOLOGY | Facility: HOSPITAL | Age: 53
End: 2018-01-29
Attending: INTERNAL MEDICINE

## 2018-02-01 ENCOUNTER — OUTSIDE FACILITY SERVICE (OUTPATIENT)
Dept: PAIN MEDICINE | Facility: CLINIC | Age: 53
End: 2018-02-01

## 2018-02-01 ENCOUNTER — DOCUMENTATION (OUTPATIENT)
Dept: PAIN MEDICINE | Facility: CLINIC | Age: 53
End: 2018-02-01

## 2018-02-01 PROCEDURE — 64495 INJ PARAVERT F JNT L/S 3 LEV: CPT | Performed by: ANESTHESIOLOGY

## 2018-02-01 PROCEDURE — 64493 INJ PARAVERT F JNT L/S 1 LEV: CPT | Performed by: ANESTHESIOLOGY

## 2018-02-01 PROCEDURE — 64494 INJ PARAVERT F JNT L/S 2 LEV: CPT | Performed by: ANESTHESIOLOGY

## 2018-02-01 NOTE — PROGRESS NOTES
Bilateral L1-4 Lumbar Medial Branch Blockade  [facility]    PREOPERATIVE DIAGNOSIS:  Lumbar spondylosis without myelopathy    POSTOPERATIVE DIAGNOSIS:  Lumbar spondylosis without myelopathy    PROCEDURE:   Diagnostic Bilateral Lumbar Medial Branch Nerve Blockades, with fluoroscopy:  L1, L2, L3, and L4 nerves (at the L2, L3, L4, L5 transverse processes) to block facet joints L2-3, L3-4, L4-5  1. 06119-08 -- Bilateral Lumbar Facet blocks, 1st Level  2. 71056-86 -- Bilateral Lumbar Facet blocks, 2nd  Level  3. 98577-65 -- Bilateral Lumbar Facet blocks, 3rd Level    PRE-PROCEDURE DISCUSSION WITH PATIENT:    Risks and complications were discussed with the patient prior to starting the procedure and informed consent was obtained.      SURGEON:  Reji Law MD    REASON FOR PROCEDURE:    Diagnostic injection at this level is needed, Previous diagnostic positivity from injection at same location and Bandlike distribution of pain in this area across the lumbar area.    SEDATION:  Versed 8mg & Fentanyl 100 mcg IV  ANESTHETIC:  Marcaine 0.5%  STEROID:  Methylprednisolone (DEPO MEDROL) 80mg/ml  TOTAL VOLUME OF SOLUTION:  8ml    DESCRIPTON OF PROCEDURE:  After obtaining informed consent, IV access was obtained in the preoperative area.   The patient was taken to the operating room.  The patient was placed in the prone position with a pillow under the abdomen. All pressure points were well padded.  EKG, blood pressure, and pulse oximeter were monitored.  The patient was monitored and sedated by the RN under my direction. The lumbosacral area was prepped with Chloraprep and draped in a sterile fashion. Under fluoroscopic guidance the transverse processes of the L2, L3, and L4 vertebrae at the junctions of the superior articular processes were identified on the right. Also identified was the groove between the ala and the superior articular process of the sacrum on the ipsilateral side.  Skin and  subcutaneous tissue were anesthetized with 1% lidocaine above each of these points. A 22-gauge spinal needle was introduced under fluoroscopic guidance at the above junctions. Aspiration was negative for blood and CSF.  After confirming the position of the needle with fluoroscope in all views, 0.25 mL of Omnipaque was injected, and after seeing the proper spread a total of 1 mL of the anesthetic solution noted above was injected at each of these points.  Needles were removed intact from each of the areas.  A similar procedure was repeated to block the same nerves on the contralateral side.   Onset of analgesia was noted.  Vital signs remained stable throughout.      ESTIMATED BLOOD LOSS:  <5 mL  SPECIMENS:  none    COMPLICATIONS:   No complications were noted. and There was no indication of vascular uptake on live injection of contrast dye.    TOLERANCE & DISCHARGE CONDITION:    The patient tolerated the procedure well.  The patient was transported to the recovery area without difficulties.  The patient was discharged to home under the care of family in stable and satisfactory condition.    PLAN OF CARE:  1. The patient was given our standard instruction sheet.  2. We discussed that Lumbar Medial Branch Blockade is a diagnostic procedure in consideration for radiofrequency ablation if two diagnostic procedures prove to be positive for significant benefit.  If sustained relief of 6 to eight weeks is obtained, then an alternative plan could be therapeutic lumbar branch blockades.  3. The patient is asked to keep a pain log each hour for 8 hours after the procedure today.  4. The patient will  Return to clinic 2 wks.  5. The patient will resume all medications as per the medication reconciliation sheet.

## 2018-02-12 ENCOUNTER — OFFICE VISIT (OUTPATIENT)
Dept: PAIN MEDICINE | Facility: CLINIC | Age: 53
End: 2018-02-12

## 2018-02-12 VITALS
HEIGHT: 64 IN | HEART RATE: 81 BPM | OXYGEN SATURATION: 97 % | SYSTOLIC BLOOD PRESSURE: 131 MMHG | RESPIRATION RATE: 16 BRPM | WEIGHT: 174 LBS | DIASTOLIC BLOOD PRESSURE: 78 MMHG | BODY MASS INDEX: 29.71 KG/M2 | TEMPERATURE: 98.4 F

## 2018-02-12 DIAGNOSIS — M93.1 KIENBOCK DISEASE, ADULT: ICD-10-CM

## 2018-02-12 DIAGNOSIS — M79.601 PAIN IN BOTH UPPER EXTREMITIES: ICD-10-CM

## 2018-02-12 DIAGNOSIS — M79.602 PAIN IN BOTH UPPER EXTREMITIES: ICD-10-CM

## 2018-02-12 DIAGNOSIS — G89.29 OTHER CHRONIC PAIN: Primary | ICD-10-CM

## 2018-02-12 DIAGNOSIS — Z79.899 ENCOUNTER FOR LONG-TERM (CURRENT) USE OF HIGH-RISK MEDICATION: ICD-10-CM

## 2018-02-12 DIAGNOSIS — M47.816 ARTHROPATHY OF LUMBAR FACET JOINT: ICD-10-CM

## 2018-02-12 PROCEDURE — 99214 OFFICE O/P EST MOD 30 MIN: CPT | Performed by: NURSE PRACTITIONER

## 2018-02-12 RX ORDER — OXYCODONE AND ACETAMINOPHEN 7.5; 325 MG/1; MG/1
1 TABLET ORAL EVERY 6 HOURS PRN
Qty: 120 TABLET | Refills: 0 | Status: SHIPPED | OUTPATIENT
Start: 2018-02-12 | End: 2018-03-12 | Stop reason: SDUPTHER

## 2018-02-12 RX ORDER — OXYCODONE HYDROCHLORIDE 30 MG/1
30 TABLET, FILM COATED, EXTENDED RELEASE ORAL EVERY 12 HOURS SCHEDULED
Qty: 60 TABLET | Refills: 0 | Status: SHIPPED | OUTPATIENT
Start: 2018-02-12 | End: 2018-03-12 | Stop reason: SDUPTHER

## 2018-02-12 NOTE — PROGRESS NOTES
CHIEF COMPLAINT  Pt continues with at least 50% pain reduction following 2/1/18 Bilat. L1-L4 MBB    Subjective   Crystal Cruz is a 52 y.o. female  who presents to the office for follow-up of procedure.  She completed a bilateral L2-L5 FJN   on  2-1-18 and 1-18-18 performed by Dr. Law for management of low back pain. Patient reports 50% ongoing relief from the procedure.  Has noticed increased activity. The relief is starting to wane. Wants to proceed with RFL.    Complains of pain in her upper extremities and back. Today her pain is 4/10VAS. Describes the pain as continuous and variable.  Continues with OxyContin 30 mg BID and Percocet 7.5/325 3-4/day, Cymbalta 60 mg and gabapentin 600 mg TID. Denies any side effects from the regimen.   The regimen helps decrease her pain by 50%. ADL's by self.    She had previously had lumbar RFL with Dr. Rsusell with positive results. Had significant relief. Last performed in 2013.     Is on Augmentin for ear infection. Last dose is later tonight. Was prescribed by Dr. Celeste Dill PCP at [place].  Extremity Pain    The pain is present in the neck, back, left wrist, left hand, left foot, right foot, right hand, right wrist, right fingers and left fingers. This is a chronic problem. The current episode started more than 1 year ago. There has been no history of extremity trauma. The problem occurs constantly. Progression since onset: unchanged since last office visit. The quality of the pain is described as aching, pounding and burning. The pain is at a severity of 3/10. The pain is moderate. Pertinent negatives include no fever or numbness. The symptoms are aggravated by cold (moist/rainy weather). She has tried acetaminophen, heat, movement, NSAIDS, OTC ointments, OTC pain meds, oral narcotics and rest (Oxycontin 30 mg Q12 hours, Oxycodone 7.5/325 3/day) for the symptoms. The treatment provided moderate relief. mitchell, systemic sclerosis   Back Pain   This is a chronic  problem. The current episode started 1 to 4 weeks ago. The problem occurs constantly. The problem has been waxing and waning (improved sinct last office visit/lumbar MBB) since onset. The pain is present in the lumbar spine. The quality of the pain is described as aching. The pain does not radiate. The pain is at a severity of 4/10 (ranges from 3-6/10VAS). The pain is severe. The symptoms are aggravated by bending, position, lying down, standing and twisting. Pertinent negatives include no bladder incontinence, bowel incontinence, chest pain, dysuria, fever, headaches, numbness or weakness. Treatments tried: lumbar FJN/RFL--- significant long-term relief.      PEG Assessment   What number best describes your pain on average in the past week?4  What number best describes how, during the past week, pain has interfered with your enjoyment of life?5  What number best describes how, during the past week, pain has interfered with your general activity?  4    The following portions of the patient's history were reviewed and updated as appropriate: allergies, current medications, past family history, past medical history, past social history, past surgical history and problem list.    Review of Systems   Constitutional: Negative for activity change, chills and fever.   Respiratory: Negative for shortness of breath.    Cardiovascular: Negative for chest pain.   Gastrointestinal: Negative for bowel incontinence, constipation, diarrhea, nausea and vomiting.   Genitourinary: Negative for bladder incontinence, difficulty urinating, dyspareunia and dysuria.   Musculoskeletal: Positive for back pain.   Neurological: Negative for dizziness, weakness, light-headedness, numbness and headaches.   Psychiatric/Behavioral: Positive for sleep disturbance. Negative for confusion, hallucinations, self-injury and suicidal ideas. The patient is nervous/anxious.        Vitals:    02/12/18 1341   BP: 131/78   Pulse: 81   Resp: 16   Temp: 98.4  "°F (36.9 °C)   SpO2: 97%   Weight: 78.9 kg (174 lb)   Height: 162.6 cm (64.02\")   PainSc: 4  Comment: LBP ranges from 3-6/10   PainLoc: Back     Objective   Physical Exam   Constitutional: She is oriented to person, place, and time. She appears well-developed and well-nourished. She is cooperative.   HENT:   Head: Normocephalic and atraumatic.   Eyes: Lids are normal.   Neck: Trachea normal.   Cardiovascular: Normal rate.    Pulmonary/Chest: Effort normal.   Musculoskeletal:        Lumbar back: She exhibits tenderness and bony tenderness (moderate tenderness to palpation of bilateral L2-L5 facets; +loading manuever).        Right hand: She exhibits decreased range of motion and tenderness. She exhibits normal capillary refill.        Left hand: She exhibits decreased range of motion and tenderness. She exhibits normal capillary refill.   Arthritic changes bilateral hands and wrists  Guarded of BUE's   Neurological: She is alert and oriented to person, place, and time. Gait normal.   Reflex Scores:       Patellar reflexes are 1+ on the right side and 1+ on the left side.  Skin: Skin is warm, dry and intact.   Sclerous appearance is unchanged   Psychiatric: She has a normal mood and affect. Her speech is normal and behavior is normal. Cognition and memory are normal.   Nursing note and vitals reviewed.      Assessment/Plan   Crystal was seen today for back pain.    Diagnoses and all orders for this visit:    Other chronic pain    Arthropathy of lumbar facet joint  -     Case Request    Kienbock disease, adult    Pain in both upper extremities    Encounter for long-term (current) use of high-risk medication    Other orders  -     oxyCODONE-acetaminophen (PERCOCET) 7.5-325 MG per tablet; Take 1 tablet by mouth Every 6 (Six) Hours As Needed for Severe Pain .  -     OxyCODONE HCl ER (oxyCONTIN) 30 MG tablet extended-release 12 hour; Take 1 tablet by mouth Every 12 (Twelve) Hours.      --- The urine drug screen confirmation " from 10-18-17 has been reviewed and the result is appropriate based on patient history and STEVEN report  --- Refill OxyContin and Percocet. Patient appears stable with current regimen. No adverse effects. Regarding continuation of opioids, there is no evidence of aberrant behavior or any red flags.  The patient continues with appropriate response to opioid therapy. ADL's remain intact by self. she does have a significant history of chronic upper extremity and back pain and demonstrates moderate improvement with multimodal therapy including opioid therapy, which allows her to engage in ADLs and family activities. The continuation of opioid therapy is therefore not contraindicated. We will however respect the March 2016 CDC Guidelines and will plan to avoid overexposure to opioids and avoid dose escalation.  --- Bilateral L1-L4 RFL. No blood thinners. Reviewed the procedure at length with the patient.  Included in the review was expectations, complications, risk and benefits.The procedure was described in detail and the risks, benefits and alternatives were discussed with the patient (including but not limited to: bleeding, infection, nerve damage, worsening of pain, inability to perform injection, paralysis, seizures, and death) who agreed to proceed.  Discussed the potential for sedation if warranted/wanted.  Questions were answered and in a way the patient could understand.  Patient verbalized understanding and wishes to proceed.  This intervention will be ordered.  --- Follow-up 1 month or sooner if needed.       STEVEN REPORT    As part of the patient's treatment plan, I am prescribing controlled substances. The patient has been made aware of appropriate use of such medications, including potential risk of somnolence, limited ability to drive and/or work safely, and the potential for dependence or overdose. It has also bee made clear that these medications are for use by this patient only, without concomitant  use of alcohol or other substances unless prescribed.     Patient has completed prescribing agreement detailing terms of continued prescribing of controlled substances, including monitoring STEVEN reports, urine drug screening, and pill counts if necessary. The patient is aware that inappropriate use will results in cessation of prescribing such medications.    STEVEN report has been reviewed and scanned into the patient's chart.    Date of last STEVEN : 2-9-18    History and physical exam exhibit continued safe and appropriate use of controlled substances.       EMR Dragon/Transcription disclaimer:   Much of this encounter note is an electronic transcription/translation of spoken language to printed text. The electronic translation of spoken language may permit erroneous, or at times, nonsensical words or phrases to be inadvertently transcribed; Although I have reviewed the note for such errors, some may still exist.    Pt continues with at least 50% relief following 2/12/18 Bilat. L1-L4

## 2018-02-20 ENCOUNTER — OUTSIDE FACILITY SERVICE (OUTPATIENT)
Dept: PAIN MEDICINE | Facility: CLINIC | Age: 53
End: 2018-02-20

## 2018-02-20 ENCOUNTER — DOCUMENTATION (OUTPATIENT)
Dept: PAIN MEDICINE | Facility: CLINIC | Age: 53
End: 2018-02-20

## 2018-02-20 PROCEDURE — 64636 DESTROY L/S FACET JNT ADDL: CPT | Performed by: ANESTHESIOLOGY

## 2018-02-20 PROCEDURE — 99152 MOD SED SAME PHYS/QHP 5/>YRS: CPT | Performed by: ANESTHESIOLOGY

## 2018-02-20 PROCEDURE — 64635 DESTROY LUMB/SAC FACET JNT: CPT | Performed by: ANESTHESIOLOGY

## 2018-02-20 NOTE — PROGRESS NOTES
Bilateral L1-4 Lumbar Medial Branch RADIOFREQUENCY  [facility]      PREOPERATIVE DIAGNOSIS:  Lumbar spondylosis without myelopathy    POSTOPERATIVE DIAGNOSIS:  Lumbar spondylosis without myelopathy    PROCEDURE:   Diagnostic Bilateral Lumbar Medial Branch Nerve thermal radiofrequency lesioning, with fluoroscopy:  L1, L2, L3, L4 nerves (at the L2, L3, L4, and L5 transverse processes) to thermally treat the innervation to facet joints L2-3, L3-4, L4-5.  1. 41631-33 -- Bilateral L/S facet neuro destr., 1st Level  2. 78754-91 -- Bilateral L/S facet neuro destr., 2nd  Level  3. 92610-26 -- Bilateral  L/S facet neuro destr., 3rd Level    PRE-PROCEDURE DISCUSSION WITH PATIENT:    Risks and complications were discussed with the patient prior to starting the procedure and informed consent was obtained.      SURGEON:  Reji Law MD    REASON FOR PROCEDURE:    The patient complains of pain that seems to have a significant axial component and Previous diagnostic positivity of two Lumbar Medial Branch Blockades at the same levels    SEDATION:  Versed 6mg and Fentanyl 200 mcg IV  TIME OF PROCEDURE:   The intraoperative procedure time after administration of the sedative was 22 minutes.       ANESTHETIC:  Lidocaine 2%  STEROID:  NONE      DESCRIPTON OF PROCEDURE:  After obtaining informed consent, IV access was obtained in the preoperative area.   The patient was taken to the operating room.  The patient was placed in the prone position with a pillow under the abdomen. All pressure points were well padded.  EKG, blood pressure, and pulse oximeter were monitored.  The patient was monitored and sedated by the RN under my direction. The lumbosacral area was prepped with Chloraprep and draped in a sterile fashion.     Under fluoroscopic guidance the transverse processes of the L2, L3, L4, and L5 vertebrae at the junctions of the superior articular processes were identified on the right. Skin and subcutaneous  tissue were anesthetized with 1ml of 1% lidocaine above each of these points. Then, radiofrequency probe needles were advanced in this fluoro view to the above junctions.  Aspiration was negative for blood and CSF.  After confirming the position of the needle with fluoroscope in all views, testing was initiated.  First, sensory testing was started on each needle a 1V and 50Hz and slowly decreased until painful pressure stimulation diminished at 0.5V.  Next, motor testing was confirmed to be negative at 3V and 2Hz for any radicular stimulation.  Then 1mL of the local anesthetic was instilled in each needle.  Two minutes elapsed, and during this time a lateral fluoroscopic view was confirmed again to ensure the needles had not advanced nor retracted.  Then, Radiofrequency Lesioning was initiated for 1.5 minutes at 85 degrees Celsius.  Needles were removed intact from each of the areas.     A similar procedure was repeated to address the same nerves on the contralateral side.   Onset of analgesia was noted.  Vital signs remained stable throughout.      ESTIMATED BLOOD LOSS:  <5 mL  SPECIMENS:  none    COMPLICATIONS:   No complications were noted.    TOLERANCE & DISCHARGE CONDITION:    The patient tolerated the procedure well.  The patient was transported to the recovery area without difficulties.  The patient was discharged to home under the care of family in stable and satisfactory condition.    PLAN OF CARE:  1. The patient was given our standard instruction sheet.  2. The patient will  Return to clinic 3-4 wks.  3. The patient will resume all medications as per the medication reconciliation sheet.

## 2018-03-12 ENCOUNTER — OFFICE VISIT (OUTPATIENT)
Dept: PAIN MEDICINE | Facility: CLINIC | Age: 53
End: 2018-03-12

## 2018-03-12 VITALS
OXYGEN SATURATION: 96 % | BODY MASS INDEX: 30.39 KG/M2 | DIASTOLIC BLOOD PRESSURE: 75 MMHG | SYSTOLIC BLOOD PRESSURE: 149 MMHG | HEIGHT: 64 IN | TEMPERATURE: 98.1 F | HEART RATE: 76 BPM | RESPIRATION RATE: 18 BRPM | WEIGHT: 178 LBS

## 2018-03-12 DIAGNOSIS — M79.602 PAIN IN BOTH UPPER EXTREMITIES: ICD-10-CM

## 2018-03-12 DIAGNOSIS — M93.1 KIENBOCK DISEASE, ADULT: ICD-10-CM

## 2018-03-12 DIAGNOSIS — Z79.899 ENCOUNTER FOR LONG-TERM (CURRENT) USE OF HIGH-RISK MEDICATION: ICD-10-CM

## 2018-03-12 DIAGNOSIS — G89.29 OTHER CHRONIC PAIN: Primary | ICD-10-CM

## 2018-03-12 DIAGNOSIS — M47.816 ARTHROPATHY OF LUMBAR FACET JOINT: ICD-10-CM

## 2018-03-12 DIAGNOSIS — M79.601 PAIN IN BOTH UPPER EXTREMITIES: ICD-10-CM

## 2018-03-12 PROCEDURE — 99213 OFFICE O/P EST LOW 20 MIN: CPT | Performed by: NURSE PRACTITIONER

## 2018-03-12 RX ORDER — OXYCODONE HYDROCHLORIDE 30 MG/1
30 TABLET, FILM COATED, EXTENDED RELEASE ORAL EVERY 12 HOURS SCHEDULED
Qty: 60 TABLET | Refills: 0 | Status: SHIPPED | OUTPATIENT
Start: 2018-03-12 | End: 2018-04-10 | Stop reason: SDUPTHER

## 2018-03-12 RX ORDER — METHYLPREDNISOLONE 4 MG/1
TABLET ORAL
Qty: 21 TABLET | Refills: 0 | Status: SHIPPED | OUTPATIENT
Start: 2018-03-12 | End: 2018-05-09

## 2018-03-12 RX ORDER — OXYCODONE AND ACETAMINOPHEN 7.5; 325 MG/1; MG/1
1 TABLET ORAL EVERY 6 HOURS PRN
Qty: 120 TABLET | Refills: 0 | Status: SHIPPED | OUTPATIENT
Start: 2018-03-12 | End: 2018-04-10 | Stop reason: SDUPTHER

## 2018-04-10 ENCOUNTER — OFFICE VISIT (OUTPATIENT)
Dept: PAIN MEDICINE | Facility: CLINIC | Age: 53
End: 2018-04-10

## 2018-04-10 VITALS
SYSTOLIC BLOOD PRESSURE: 131 MMHG | RESPIRATION RATE: 16 BRPM | HEART RATE: 73 BPM | HEIGHT: 64 IN | TEMPERATURE: 98 F | OXYGEN SATURATION: 95 % | WEIGHT: 180.8 LBS | BODY MASS INDEX: 30.87 KG/M2 | DIASTOLIC BLOOD PRESSURE: 79 MMHG

## 2018-04-10 DIAGNOSIS — G89.29 OTHER CHRONIC PAIN: Primary | ICD-10-CM

## 2018-04-10 DIAGNOSIS — M93.1 KIENBOCK DISEASE, ADULT: ICD-10-CM

## 2018-04-10 DIAGNOSIS — M79.602 PAIN IN BOTH UPPER EXTREMITIES: ICD-10-CM

## 2018-04-10 DIAGNOSIS — M79.601 PAIN IN BOTH UPPER EXTREMITIES: ICD-10-CM

## 2018-04-10 DIAGNOSIS — M47.816 ARTHROPATHY OF LUMBAR FACET JOINT: ICD-10-CM

## 2018-04-10 DIAGNOSIS — Z79.899 ENCOUNTER FOR LONG-TERM (CURRENT) USE OF HIGH-RISK MEDICATION: ICD-10-CM

## 2018-04-10 PROCEDURE — 99214 OFFICE O/P EST MOD 30 MIN: CPT | Performed by: NURSE PRACTITIONER

## 2018-04-10 PROCEDURE — 96372 THER/PROPH/DIAG INJ SC/IM: CPT | Performed by: NURSE PRACTITIONER

## 2018-04-10 RX ORDER — AMOXICILLIN AND CLAVULANATE POTASSIUM 875; 125 MG/1; MG/1
1 TABLET, FILM COATED ORAL
COMMUNITY
Start: 2018-04-03 | End: 2018-04-13

## 2018-04-10 RX ORDER — OXYCODONE HYDROCHLORIDE 30 MG/1
30 TABLET, FILM COATED, EXTENDED RELEASE ORAL EVERY 12 HOURS SCHEDULED
Qty: 60 TABLET | Refills: 0 | Status: SHIPPED | OUTPATIENT
Start: 2018-04-10 | End: 2018-05-09 | Stop reason: SDUPTHER

## 2018-04-10 RX ORDER — OXYCODONE AND ACETAMINOPHEN 7.5; 325 MG/1; MG/1
1 TABLET ORAL EVERY 6 HOURS PRN
Qty: 120 TABLET | Refills: 0 | Status: SHIPPED | OUTPATIENT
Start: 2018-04-10 | End: 2018-05-09 | Stop reason: SDUPTHER

## 2018-04-10 NOTE — PROGRESS NOTES
CHIEF COMPLAINT  F/U back pain. Pt states after receiving the steroids last visit her back pain decreased. But when she was finished, her pain increased again. States she has a new area of pain-right hip area, and this has been bothering her for 2.5 weeks and she only gets relief when in bed or sitting around cushions.     Subjective   Crystal Cruz is a 52 y.o. female  who presents to the office for follow-up.She has a history of chronic joint/hand pain and low back pain.    LUmbar RFL (bilateral L1-L4) 2/20/2018.  She reports no relief.  Pain is significant at this time.      Complains of pain in her back and upper extremities. Today her pain is 7/10VAS. Describes the pain as continuous and reports her back pain is slightly worse since last office visit.   Continues with OxyContin 30 mg BID and Percocet 7.5/325 3-4/day, Cymbalta 60 mg and gabapentin 600 mg TID. Denies any side effects from the regimen.   The regimen helps decrease her pain by 50%. ADL's by self.    Cannot tolerate oral NSAID's due to GI intolerance.      Extremity Pain    The pain is present in the neck, back, left wrist, left hand, left foot, right foot, right hand, right wrist, right fingers and left fingers. This is a chronic problem. The current episode started more than 1 year ago. There has been no history of extremity trauma. The problem occurs constantly. Progression since onset: unchanged since last office visit. The quality of the pain is described as aching, pounding and burning. The pain is at a severity of 7/10. The pain is moderate. Associated symptoms include numbness. Pertinent negatives include no fever. The symptoms are aggravated by cold (moist/rainy weather). She has tried acetaminophen, heat, movement, NSAIDS, OTC ointments, OTC pain meds, oral narcotics and rest (Oxycontin 30 mg Q12 hours, Oxycodone 7.5/325 4/day) for the symptoms. The treatment provided moderate relief. mitchell, systemic sclerosis   Back Pain   This is  a chronic problem. The current episode started 1 to 4 weeks ago. The problem occurs constantly. Progression since onset: per patient- worse since last office visit. The pain is present in the lumbar spine. The quality of the pain is described as aching. The pain does not radiate. The pain is at a severity of 8/10 (ranges from 3-6/10VAS). The pain is severe. The symptoms are aggravated by bending, position, lying down, standing and twisting. Associated symptoms include numbness. Pertinent negatives include no bladder incontinence, bowel incontinence, chest pain, dysuria, fever, headaches or weakness. She has tried analgesics, heat and bed rest (lumbar FJN/RFL--- significant long-term relief) for the symptoms.      PEG Assessment   What number best describes your pain on average in the past week?7  What number best describes how, during the past week, pain has interfered with your enjoyment of life?8  What number best describes how, during the past week, pain has interfered with your general activity?  9    The following portions of the patient's history were reviewed and updated as appropriate: allergies, current medications, past family history, past medical history, past social history, past surgical history and problem list.    Review of Systems   Constitutional: Negative for chills and fever.   Respiratory: Negative for shortness of breath.    Cardiovascular: Negative for chest pain.   Gastrointestinal: Positive for nausea (off and on). Negative for bowel incontinence, constipation, diarrhea and vomiting.   Genitourinary: Negative for bladder incontinence, difficulty urinating, dyspareunia and dysuria.   Musculoskeletal: Positive for back pain.   Neurological: Positive for numbness. Negative for dizziness, weakness, light-headedness and headaches.   Psychiatric/Behavioral: Positive for sleep disturbance. Negative for confusion, hallucinations, self-injury and suicidal ideas. The patient is nervous/anxious.   "    Vitals:    04/10/18 1250   BP: 131/79   Pulse: 73   Resp: 16   Temp: 98 °F (36.7 °C)   SpO2: 95%   Weight: 82 kg (180 lb 12.8 oz)   Height: 162.6 cm (64.02\")   PainSc:   7   PainLoc: Back     Objective   Physical Exam   Constitutional: She is oriented to person, place, and time. She appears well-developed and well-nourished. She is cooperative.   HENT:   Head: Normocephalic and atraumatic.   Eyes: Lids are normal.   Neck: Trachea normal.   Cardiovascular: Normal rate.    Pulmonary/Chest: Effort normal.   Musculoskeletal:        Right hip: She exhibits tenderness.        Lumbar back: She exhibits decreased range of motion, tenderness and bony tenderness.        Right hand: She exhibits decreased range of motion and tenderness. She exhibits normal capillary refill.        Left hand: She exhibits decreased range of motion and tenderness. She exhibits normal capillary refill.   Arthritic changes bilateral hands and wrists   Neurological: She is alert and oriented to person, place, and time. Gait normal.   Reflex Scores:       Patellar reflexes are 1+ on the right side and 1+ on the left side.  Skin: Skin is warm, dry and intact.   Psychiatric: She has a normal mood and affect. Her speech is normal and behavior is normal. Cognition and memory are normal.   Nursing note and vitals reviewed.      Assessment/Plan   Crystal was seen today for back pain.    Diagnoses and all orders for this visit:    Other chronic pain  -     ketorolac (TORADOL) injection 30 mg; Inject 1 mL into the shoulder, thigh, or buttocks 1 (One) Time.    Kienbock disease, adult    Pain in both upper extremities    Arthropathy of lumbar facet joint  -     MRI Lumbar Spine Without Contrast; Future    Encounter for long-term (current) use of high-risk medication    Other orders  -     oxyCODONE-acetaminophen (PERCOCET) 7.5-325 MG per tablet; Take 1 tablet by mouth Every 6 (Six) Hours As Needed for Severe Pain .  -     OxyCODONE HCl ER (oxyCONTIN) 30 MG " tablet extended-release 12 hour; Take 1 tablet by mouth Every 12 (Twelve) Hours.      --- The urine drug screen confirmation from 10-18-17 has been reviewed and the result is appropriate based on patient history and STEVEN report  --- refill OxyContin and Percocet. Patient appears stable with current regimen. No adverse effects. Regarding continuation of opioids, there is no evidence of aberrant behavior or any red flags.  The patient continues with appropriate response to opioid therapy. ADL's remain intact by self.   --- MRI lumbar spine  --- Toradol 30 mg IM   --- Follow-up 1 month         STEVEN REPORT    As part of the patient's treatment plan, I am prescribing controlled substances. The patient has been made aware of appropriate use of such medications, including potential risk of somnolence, limited ability to drive and/or work safely, and the potential for dependence or overdose. It has also bee made clear that these medications are for use by this patient only, without concomitant use of alcohol or other substances unless prescribed.     Patient has completed prescribing agreement detailing terms of continued prescribing of controlled substances, including monitoring STEVEN reports, urine drug screening, and pill counts if necessary. The patient is aware that inappropriate use will results in cessation of prescribing such medications.    STEVEN report has been reviewed and scanned into the patient's chart.    Date of last STEVEN : 4/9/2018    History and physical exam exhibit continued safe and appropriate use of controlled substances.      EMR Dragon/Transcription disclaimer:   Much of this encounter note is an electronic transcription/translation of spoken language to printed text. The electronic translation of spoken language may permit erroneous, or at times, nonsensical words or phrases to be inadvertently transcribed; Although I have reviewed the note for such errors, some may still exist.

## 2018-04-10 NOTE — PATIENT INSTRUCTIONS
Bursitis  Bursitis is inflammation and irritation of a bursa, which is one of the small, fluid-filled sacs that cushion and protect the moving parts of your body. These sacs are located between bones and muscles, muscle attachments, or skin areas next to bones. A bursa protects these structures from the wear and tear that results from frequent movement.  An inflamed bursa causes pain and swelling. Fluid may build up inside the sac. Bursitis is most common near joints, especially the knees, elbows, hips, and shoulders.  What are the causes?  Bursitis can be caused by:  · Injury from:  ¨ A direct blow, like falling on your knee or elbow.  ¨ Overuse of a joint (repetitive stress).  · Infection. This can happen if bacteria gets into a bursa through a cut or scrape near a joint.  · Diseases that cause joint inflammation, such as gout and rheumatoid arthritis.  What increases the risk?  You may be at risk for bursitis if you:  · Have a job or hobby that involves a lot of repetitive stress on your joints.  · Have a condition that weakens your body's defense system (immune system), such as diabetes, cancer, or HIV.  · Lift and reach overhead often.  · Kneel or lean on hard surfaces often.  · Run or walk often.  What are the signs or symptoms?  The most common signs and symptoms of bursitis are:  · Pain that gets worse when you move the affected body part or put weight on it.  · Inflammation.  · Stiffness.  Other signs and symptoms may include:  · Redness.  · Tenderness.  · Warmth.  · Pain that continues after rest.  · Fever and chills. This may occur in bursitis caused by infection.  How is this diagnosed?  Bursitis may be diagnosed by:  · Medical history and physical exam.  · MRI.  · A procedure to drain fluid from the bursa with a needle (aspiration). The fluid may be checked for signs of infection or gout.  · Blood tests to rule out other causes of inflammation.  How is this treated?  Bursitis can usually be treated at  home with rest, ice, compression, and elevation (RICE). For mild bursitis, RICE treatment may be all you need. Other treatments may include:  · Nonsteroidal anti-inflammatory drugs (NSAIDs) to treat pain and inflammation.  · Corticosteroids to fight inflammation. You may have these drugs injected into and around the area of bursitis.  · Aspiration of bursitis fluid to relieve pain and improve movement.  · Antibiotic medicine to treat an infected bursa.  · A splint, brace, or walking aid.  · Physical therapy if you continue to have pain or limited movement.  · Surgery to remove a damaged or infected bursa. This may be needed if you have a very bad case of bursitis or if other treatments have not worked.  Follow these instructions at home:  · Take medicines only as directed by your health care provider.  · If you were prescribed an antibiotic medicine, finish it all even if you start to feel better.  · Rest the affected area as directed by your health care provider.  ¨ Keep the area elevated.  ¨ Avoid activities that make pain worse.  · Apply ice to the injured area:  ¨ Place ice in a plastic bag.  ¨ Place a towel between your skin and the bag.  ¨ Leave the ice on for 20 minutes, 2-3 times a day.  · Use splints, braces, pads, or walking aids as directed by your health care provider.  · Keep all follow-up visits as directed by your health care provider. This is important.  How is this prevented?  · Wear knee pads if you kneel often.  · Wear sturdy running or walking shoes that fit you well.  · Take regular breaks from repetitive activity.  · Warm up by stretching before doing any strenuous activity.  · Maintain a healthy weight or lose weight as recommended by your health care provider. Ask your health care provider if you need help.  · Exercise regularly. Start any new physical activity gradually.  Contact a health care provider if:  · Your bursitis is not responding to treatment or home care.  · You have a  fever.  · You have chills.  This information is not intended to replace advice given to you by your health care provider. Make sure you discuss any questions you have with your health care provider.  Document Released: 12/15/2001 Document Revised: 05/25/2017 Document Reviewed: 03/09/2015  Elsevier Interactive Patient Education © 2017 [employer]

## 2018-04-11 ENCOUNTER — OFFICE VISIT (OUTPATIENT)
Dept: CARDIOLOGY | Facility: CLINIC | Age: 53
End: 2018-04-11

## 2018-04-11 VITALS
HEART RATE: 82 BPM | HEIGHT: 64 IN | DIASTOLIC BLOOD PRESSURE: 80 MMHG | BODY MASS INDEX: 31.41 KG/M2 | SYSTOLIC BLOOD PRESSURE: 120 MMHG | WEIGHT: 184 LBS

## 2018-04-11 DIAGNOSIS — I70.0 ATHEROSCLEROSIS OF AORTA (HCC): Primary | Chronic | ICD-10-CM

## 2018-04-11 DIAGNOSIS — Z98.890 H/O CAROTID ENDARTERECTOMY: ICD-10-CM

## 2018-04-11 DIAGNOSIS — I65.23 OBSTRUCTION OF CAROTID ARTERY ON BOTH SIDES: ICD-10-CM

## 2018-04-11 PROCEDURE — 93000 ELECTROCARDIOGRAM COMPLETE: CPT | Performed by: INTERNAL MEDICINE

## 2018-04-11 PROCEDURE — 99214 OFFICE O/P EST MOD 30 MIN: CPT | Performed by: INTERNAL MEDICINE

## 2018-04-11 NOTE — PROGRESS NOTES
Subjective:     Encounter Date: 4/11/2018      Patient ID: Crystal Cruz is a 52 y.o. female.    Chief Complaint: heart murmur  History of Present Illness      This patient came in today for follow-up of her cardiac status.  They also were concerned about her carotids.    We saw her about a year and a quarter ago.  She had been seen for heart murmur.  Echocardiogram demonstrated aortic sclerosis with minimal aortic insufficiency.  Minimal mitral regurgitation was also seen.  Left upper size and function was normal.    She comes in and states that her main problem is been due to her Huitron's esophagus.  She intermittently will have pain due to that.  At times has difficulty swallowing because of that.    She has a history of cerebrovascular disease with left carotid endarterectomy.  She states that surgery was done at [facility] but she has not seen anybody for routine follow-up.  I find that she had an MRI performed when she was hospitalized at [facility] in 2016 (November) there was felt to indicate an approximately 50% stenosis in the right carotid.  He did recommend subsequent follow-up evaluation-she does not believe that there is been any evaluation on her carotids since.  She denies any neurologic symptoms.    She has not had any cardiac problems.  No angina pectoris.  She does get short of breath at times but there is no change from her baseline.  She cannot do much exercise because of back pain.    The following portions of the patient's history were reviewed and updated as appropriate: allergies, current medications, past family history, past medical history, past social history, past surgical history and problem list.    Past Medical History:   Diagnosis Date   • Acid reflux    • Anemia    • Anxiety    • Atherosclerosis of aorta 12/19/2017   • Huitron's esophagus    • Bursitis    • CAD (coronary artery disease)    • Chronic pain disorder    • CVA (cerebrovascular accident)    • Diabetes mellitus    •  History of transfusion    • Hypertension    • Joint pain    • Kienbock's disease    • Low back pain    • Pulmonary fibrosis 2016   • Reflux gastritis    • Scleroderma    • Seizure        Past Surgical History:   Procedure Laterality Date   • CAROTID ENDARTERECTOMY Left     Neurological   • CATARACT EXTRACTION Bilateral    •  SECTION     • CHOLECYSTECTOMY      Laparoscopic   • COLONOSCOPY      Complete   • COLONOSCOPY N/A 2016    Procedure: COLONOSCOPY;  Surgeon: Gaviota Hagen MD;  Location: [facility] OR;  Service:    • ENDOSCOPY N/A 2016    Procedure: ESOPHAGOGASTRODUODENOSCOPY WITH BIOPSIES;  Surgeon: Gaviota Hagen MD;  Location: [facility] OR;  Service:    • TONSILLECTOMY AND ADENOIDECTOMY     • UPPER GASTROINTESTINAL ENDOSCOPY      Diagnostic   • WRIST SURGERY Left        Social History     Social History   • Marital status:      Spouse name: N/A   • Number of children: N/A   • Years of education: N/A     Occupational History   • Not on file.     Social History Main Topics   • Smoking status: Current Every Day Smoker     Packs/day: 1.00   • Smokeless tobacco: Never Used   • Alcohol use No   • Drug use: No   • Sexual activity: Defer      Comment: EXERCISE - RARELY     Other Topics Concern   • Not on file     Social History Narrative   • No narrative on file       Review of Systems   Constitution: Positive for malaise/fatigue. Negative for chills, decreased appetite, fever and night sweats.   HENT: Negative for ear discharge, ear pain, hearing loss, nosebleeds and sore throat.    Eyes: Negative for blurred vision, double vision and pain.   Cardiovascular: Negative for cyanosis.   Respiratory: Negative for hemoptysis and sputum production.    Endocrine: Negative for cold intolerance and heat intolerance.   Hematologic/Lymphatic: Negative for adenopathy.   Skin: Negative for dry skin, itching, nail changes, rash and suspicious lesions.   Musculoskeletal: Negative for arthritis, gout, muscle  "cramps, muscle weakness, myalgias and neck pain.   Gastrointestinal: Negative for anorexia, bowel incontinence, constipation, diarrhea, dysphagia, hematemesis and jaundice.   Genitourinary: Negative for bladder incontinence, dysuria, flank pain, frequency, hematuria and nocturia.   Neurological: Negative for focal weakness, numbness, paresthesias and seizures.   Psychiatric/Behavioral: Negative for altered mental status, hallucinations, hypervigilance, suicidal ideas and thoughts of violence.   Allergic/Immunologic: Negative for persistent infections.         ECG 12 Lead  Date/Time: 4/11/2018 3:13 PM  Performed by: TIGRE FRENCH III.  Authorized by: TIGRE FRENCH III   Comparison: compared with previous ECG   Similar to previous ECG  Rhythm: sinus rhythm  Rate: normal  Conduction: conduction normal  ST Segments: ST segments normal  T Waves: T waves normal  QRS axis: normal  Other: no other findings  Clinical impression: normal ECG               Objective:     Vitals:    04/11/18 1424   BP: 120/80   Pulse: 82   Weight: 83.5 kg (184 lb)   Height: 162.6 cm (64\")         Physical Exam   Constitutional: She is oriented to person, place, and time. She appears well-developed and well-nourished. No distress.   HENT:   Head: Normocephalic and atraumatic.   Nose: Nose normal.   Mouth/Throat: Oropharynx is clear and moist.   Eyes: Conjunctivae and EOM are normal. Pupils are equal, round, and reactive to light. Right eye exhibits no discharge. Left eye exhibits no discharge.   Neck: Normal range of motion. Neck supple. Carotid bruit is present. No tracheal deviation present. No thyromegaly present.   Patient has bilateral carotid bruits.   Cardiovascular: Normal rate, regular rhythm, S1 normal, S2 normal, normal heart sounds and normal pulses.  Exam reveals no S3.    Pulmonary/Chest: Effort normal and breath sounds normal. No stridor. No respiratory distress. She exhibits no tenderness.   Abdominal: Soft. Bowel sounds are " normal. She exhibits no distension and no mass. There is no tenderness. There is no rebound and no guarding.   Musculoskeletal: Normal range of motion. She exhibits no tenderness or deformity.   Lymphadenopathy:     She has no cervical adenopathy.   Neurological: She is alert and oriented to person, place, and time. She has normal reflexes.   Skin: Skin is warm and dry. No rash noted. She is not diaphoretic. No erythema.   Psychiatric: She has a normal mood and affect. Thought content normal.       Lab Review:             Performed        Assessment:          Diagnosis Plan   1. Atherosclerosis of aorta  US Carotid Bilateral LAG    ECG 12 Lead   2. 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 H/O carotid endarterectomy  US Carotid Bilateral LAG          Plan:       1.  Cerebrovascular disease-we will order carotid Dopplers for evaluation area did I recommended that we get her enrolled with vascular surgery.  2.  Essential hypertension-continue current medical regimen  3.  Patient is uncertain of her lipid levels-she thinks they were recently checked in your office but is not sure the results, we'll call and get those results  4.  Diabetes mellitus  Thank you very much for allowing us to participate in the care of this pleasant patient.  Please don't hesitate to call if I can be of assistance in any way.      Current Outpatient Prescriptions:   •  ALPRAZolam (XANAX) 2 MG tablet, Take 2 mg by mouth 3 (Three) Times a Day As Needed for anxiety., Disp: , Rfl:   •  amLODIPine (NORVASC) 5 MG tablet, , Disp: , Rfl: 0  •  amoxicillin-clavulanate (AUGMENTIN) 875-125 MG per tablet, Take 1 tablet by mouth., Disp: , Rfl:   •  aspirin 81 MG EC tablet, Take 1 tablet by mouth daily., Disp: 30 tablet, Rfl: 6  •  docusate sodium (COLACE) 100 MG capsule, Take 1 capsule by mouth 2 (Two) Times a Day., Disp: 60 capsule, Rfl: 0  •  DULoxetine (CYMBALTA) 60 MG capsule, Take 1 capsule by mouth  "Daily., Disp: 30 capsule, Rfl: 0  •  furosemide (LASIX) 20 MG tablet, , Disp: , Rfl: 0  •  gabapentin (NEURONTIN) 600 MG tablet, Take 1 tablet by mouth 3 (Three) Times a Day., Disp: 270 tablet, Rfl: 1  •  insulin glargine (LANTUS) 100 UNIT/ML injection, INJECT 20 UNITS INTO THE SKIN DAILY FOR 90 DAYS, Disp: , Rfl:   •  Insulin Syringe 31G X 5/16\" 1 ML misc, Use twice daily for shots, Disp: 100 each, Rfl: 3  •  LACTOBACILLUS EXTRA STRENGTH capsule, Take 1 capsule by mouth Daily., Disp: 30 each, Rfl: 6  •  levETIRAcetam (KEPPRA) 500 MG tablet, Take 1 tablet by mouth Every 12 (Twelve) Hours., Disp: 60 tablet, Rfl: 0  •  MethylPREDNISolone (MEDROL, DONELL,) 4 MG tablet, Take as directed on package instructions., Disp: 21 tablet, Rfl: 0  •  omeprazole (priLOSEC) 40 MG capsule, take 1 capsule by mouth at bedtime, Disp: , Rfl: 0  •  OxyCODONE HCl ER (oxyCONTIN) 30 MG tablet extended-release 12 hour, Take 1 tablet by mouth Every 12 (Twelve) Hours., Disp: 60 tablet, Rfl: 0  •  oxyCODONE-acetaminophen (PERCOCET) 7.5-325 MG per tablet, Take 1 tablet by mouth Every 6 (Six) Hours As Needed for Severe Pain ., Disp: 120 tablet, Rfl: 0  •  PRENATAL 28-0.8 MG tablet, Take one po daily, Disp: 30 each, Rfl: 11  •  Prenatal Vit-Fe Fumarate-FA (PRENATAL VITAMIN 27-0.8) 27-0.8 MG tablet tablet, take 1 tablet by mouth once daily, Disp: , Rfl: 0  •  PROAIR  (90 Base) MCG/ACT inhaler, , Disp: , Rfl: 0  •  promethazine (PHENERGAN) 25 MG tablet, take 1 tablet by mouth every 4 hours if needed for nausea, Disp: , Rfl: 0  •  traZODone (DESYREL) 100 MG tablet, , Disp: , Rfl:   •  valsartan (DIOVAN) 80 MG tablet, Take 1 tablet by mouth Daily., Disp: 30 tablet, Rfl: 0         EMR Dragon/Transcription disclaimer:    Much of this encounter note is an electronic transcription/translation of spoken language to printed text. The electronic translation of spoken language may permit erroneous, or at times, nonsensical words or phrases to be " inadvertently transcribed; Although I have reviewed the note for such errors, some may still exist.

## 2018-04-13 ENCOUNTER — APPOINTMENT (OUTPATIENT)
Dept: ULTRASOUND IMAGING | Facility: HOSPITAL | Age: 53
End: 2018-04-13
Attending: INTERNAL MEDICINE

## 2018-04-20 ENCOUNTER — HOSPITAL ENCOUNTER (OUTPATIENT)
Dept: ULTRASOUND IMAGING | Facility: HOSPITAL | Age: 53
Discharge: HOME | End: 2018-04-20
Attending: INTERNAL MEDICINE

## 2018-04-20 ENCOUNTER — HOSPITAL ENCOUNTER (OUTPATIENT)
Dept: MRI IMAGING | Facility: HOSPITAL | Age: 53
Discharge: HOME | End: 2018-04-20
Admitting: NURSE PRACTITIONER

## 2018-04-20 DIAGNOSIS — I65.23 OBSTRUCTION OF CAROTID ARTERY ON BOTH SIDES: ICD-10-CM

## 2018-04-20 DIAGNOSIS — Z98.890 H/O CAROTID ENDARTERECTOMY: ICD-10-CM

## 2018-04-20 DIAGNOSIS — M47.816 ARTHROPATHY OF LUMBAR FACET JOINT: ICD-10-CM

## 2018-04-20 DIAGNOSIS — I70.0 ATHEROSCLEROSIS OF AORTA (HCC): Chronic | ICD-10-CM

## 2018-04-20 PROCEDURE — 72148 MRI LUMBAR SPINE W/O DYE: CPT

## 2018-04-20 PROCEDURE — 93880 EXTRACRANIAL BILAT STUDY: CPT

## 2018-04-24 ENCOUNTER — TELEPHONE (OUTPATIENT)
Dept: PAIN MEDICINE | Facility: CLINIC | Age: 53
End: 2018-04-24

## 2018-04-24 ENCOUNTER — TELEPHONE (OUTPATIENT)
Dept: CARDIOLOGY | Facility: CLINIC | Age: 53
End: 2018-04-24

## 2018-04-24 NOTE — TELEPHONE ENCOUNTER
Pt called and asked for her results of MRI to be relayed to her. Please read and I will get back with her.

## 2018-04-25 DIAGNOSIS — I67.9 CEREBROVASCULAR DISEASE: Primary | ICD-10-CM

## 2018-05-09 ENCOUNTER — OFFICE VISIT (OUTPATIENT)
Dept: PAIN MEDICINE | Facility: CLINIC | Age: 53
End: 2018-05-09

## 2018-05-09 VITALS
RESPIRATION RATE: 18 BRPM | TEMPERATURE: 97.7 F | SYSTOLIC BLOOD PRESSURE: 136 MMHG | HEIGHT: 64 IN | DIASTOLIC BLOOD PRESSURE: 72 MMHG | OXYGEN SATURATION: 98 % | HEART RATE: 79 BPM

## 2018-05-09 DIAGNOSIS — M54.16 LUMBAR RADICULOPATHY: ICD-10-CM

## 2018-05-09 DIAGNOSIS — M79.601 PAIN IN BOTH UPPER EXTREMITIES: ICD-10-CM

## 2018-05-09 DIAGNOSIS — Z79.899 ENCOUNTER FOR LONG-TERM (CURRENT) USE OF HIGH-RISK MEDICATION: ICD-10-CM

## 2018-05-09 DIAGNOSIS — M93.1 KIENBOCK DISEASE, ADULT: ICD-10-CM

## 2018-05-09 DIAGNOSIS — G89.29 OTHER CHRONIC PAIN: Primary | ICD-10-CM

## 2018-05-09 DIAGNOSIS — M47.816 ARTHROPATHY OF LUMBAR FACET JOINT: ICD-10-CM

## 2018-05-09 DIAGNOSIS — M51.36 DDD (DEGENERATIVE DISC DISEASE), LUMBAR: ICD-10-CM

## 2018-05-09 DIAGNOSIS — M79.602 PAIN IN BOTH UPPER EXTREMITIES: ICD-10-CM

## 2018-05-09 LAB
POC AMPHETAMINES: NEGATIVE
POC BARBITURATES: NEGATIVE
POC BENZODIAZEPHINES: NEGATIVE
POC COCAINE: NEGATIVE
POC METHADONE: NEGATIVE
POC METHAMPHETAMINE SCREEN URINE: NEGATIVE
POC OPIATES: NEGATIVE
POC OXYCODONE: POSITIVE
POC PHENCYCLIDINE: NEGATIVE
POC PROPOXYPHENE: NEGATIVE
POC THC: NEGATIVE
POC TRICYCLIC ANTIDEPRESSANTS: NEGATIVE

## 2018-05-09 PROCEDURE — 80305 DRUG TEST PRSMV DIR OPT OBS: CPT | Performed by: NURSE PRACTITIONER

## 2018-05-09 PROCEDURE — 99214 OFFICE O/P EST MOD 30 MIN: CPT | Performed by: NURSE PRACTITIONER

## 2018-05-09 RX ORDER — OXYCODONE AND ACETAMINOPHEN 7.5; 325 MG/1; MG/1
1 TABLET ORAL EVERY 6 HOURS PRN
Qty: 120 TABLET | Refills: 0 | Status: SHIPPED | OUTPATIENT
Start: 2018-05-09 | End: 2018-06-07 | Stop reason: SDUPTHER

## 2018-05-09 RX ORDER — OXYCODONE HYDROCHLORIDE 30 MG/1
30 TABLET, FILM COATED, EXTENDED RELEASE ORAL EVERY 12 HOURS SCHEDULED
Qty: 60 TABLET | Refills: 0 | Status: SHIPPED | OUTPATIENT
Start: 2018-05-09 | End: 2018-06-07 | Stop reason: SDUPTHER

## 2018-05-09 NOTE — PROGRESS NOTES
"CHIEF COMPLAINT  Follow-up for back pain. Ms. Cruz states that in the last few weeks she has had increased low back pain that radiates to her right hip and leg.    Subjective   Crystal Cruz is a 52 y.o. female  who presents to the office for follow-up.She has a history of chronic joint/hand pain and back pain.    Lumbar RFL (bilateral L1-L4) 2/20/2018.  She reports no relief.  Pain is significant today.  MRI updated since last office visit.  She presents in a wheelchair today.  She reports pain across the low back, it radiates down the posterolateral right leg.  She reports that the pain is severe, nothing is aggravating it.  It is improved with nothing.  She states \"my quality of life is in the toilet\".      Complains of pain in her back and upper extremities. Today her pain is 7/10VAS. Describes the pain as continuous and reports her back pain is slightly worse since last office visit.   Continues with OxyContin 30 mg BID and Percocet 7.5/325 3-4/day, Cymbalta 60 mg and gabapentin 600 mg TID. Denies any side effects from the regimen.   The regimen helps decrease her pain by 50%. ADL's by self.    Extremity Pain    The pain is present in the neck, back, left wrist, left hand, left foot, right foot, right hand, right wrist, right fingers and left fingers. This is a chronic problem. The current episode started more than 1 year ago. There has been no history of extremity trauma. The problem occurs constantly. Progression since onset: unchanged since last office visit. The quality of the pain is described as aching, pounding and burning. The pain is at a severity of 8/10. The pain is moderate. Pertinent negatives include no fever or numbness. The symptoms are aggravated by cold (moist/rainy weather). She has tried acetaminophen, heat, movement, NSAIDS, OTC ointments, OTC pain meds, oral narcotics and rest (Oxycontin 30 mg Q12 hours, Oxycodone 7.5/325 4/day) for the symptoms. The treatment provided moderate " relief. mitchell systemic sclerosis   Back Pain   This is a chronic problem. The current episode started more than 1 year ago. The problem occurs constantly. Progression since onset: per patient- worse since last office visit. The pain is present in the lumbar spine. The quality of the pain is described as aching. The pain does not radiate. The pain is at a severity of 8/10 (ranges from 3-6/10VAS). The pain is severe. The symptoms are aggravated by bending, position, lying down, standing and twisting. Associated symptoms include weakness. Pertinent negatives include no bladder incontinence, bowel incontinence, chest pain, dysuria, fever, headaches or numbness. She has tried analgesics, heat and bed rest (lumbar FJN/RFL--- significant long-term relief) for the symptoms.      PEG Assessment   What number best describes your pain on average in the past week?8  What number best describes how, during the past week, pain has interfered with your enjoyment of life?10  What number best describes how, during the past week, pain has interfered with your general activity?  10        The following portions of the patient's history were reviewed and updated as appropriate: allergies, current medications, past family history, past medical history, past social history, past surgical history and problem list.    Review of Systems   Constitutional: Positive for fatigue. Negative for fever.   HENT: Positive for postnasal drip. Negative for congestion.    Eyes: Negative for visual disturbance.   Respiratory: Positive for cough. Negative for shortness of breath and wheezing.    Cardiovascular: Negative.  Negative for chest pain.   Gastrointestinal: Negative for bowel incontinence, constipation and diarrhea.   Genitourinary: Negative for bladder incontinence, difficulty urinating and dysuria.   Musculoskeletal: Positive for back pain.   Neurological: Positive for weakness. Negative for numbness and headaches.   Psychiatric/Behavioral:  "Positive for sleep disturbance. Negative for suicidal ideas. The patient is nervous/anxious.      Vitals:    05/09/18 1230   BP: 136/72   Pulse: 79   Resp: 18   Temp: 97.7 °F (36.5 °C)   SpO2: 98%   Weight: Comment: patient in a wheelchair   Height: 162.6 cm (64\")   PainSc:   8   PainLoc: Back     Objective   Physical Exam   Constitutional: She is oriented to person, place, and time. She appears well-developed and well-nourished. She is cooperative.   HENT:   Head: Normocephalic and atraumatic.   Eyes: Conjunctivae and lids are normal.   Neck: Trachea normal.   Cardiovascular: Normal rate.    Pulmonary/Chest: Effort normal. No respiratory distress.   Musculoskeletal:        Lumbar back: She exhibits decreased range of motion, tenderness, bony tenderness and pain.        Right hand: She exhibits decreased range of motion and tenderness. She exhibits normal capillary refill.        Left hand: She exhibits decreased range of motion and tenderness. She exhibits normal capillary refill.   Arthritic changes bilateral hands and wrists   Neurological: She is alert and oriented to person, place, and time. Gait (wheelchair) abnormal.   Reflex Scores:       Patellar reflexes are 1+ on the right side and 1+ on the left side.  +SLR bilaterally (right > left)   Skin: Skin is warm, dry and intact.   Psychiatric: She has a normal mood and affect. Her speech is normal and behavior is normal. Cognition and memory are normal.   Nursing note and vitals reviewed.      Assessment/Plan   Crystal was seen today for back pain.    Diagnoses and all orders for this visit:    Other chronic pain    Kienbock disease, adult    Pain in both upper extremities    Arthropathy of lumbar facet joint    Encounter for long-term (current) use of high-risk medication    Lumbar radiculopathy  -     Case Request    DDD (degenerative disc disease), lumbar  -     Case Request    Other orders  -     oxyCODONE-acetaminophen (PERCOCET) 7.5-325 MG per tablet; Take " 1 tablet by mouth Every 6 (Six) Hours As Needed for Severe Pain .  -     OxyCODONE HCl ER (oxyCONTIN) 30 MG tablet extended-release 12 hour; Take 1 tablet by mouth Every 12 (Twelve) Hours.      --- Refill Percocet and OxyContin. Patient appears stable with current regimen. No adverse effects. Regarding continuation of opioids, there is no evidence of aberrant behavior or any red flags.  The patient continues with appropriate response to opioid therapy. ADL's remain intact by self.   --- L4/5 LESI X 2, 1-2 weeks apart - first tomorrow with Dr. Law  --- Routine UDS in office today as part of monitoring requirements for controlled substances.  The specimen was viewed and the immunoassay result reviewed and is +OXY.  This specimen will be sent to [facility] laboratory for confirmation.     --- Follow-up 1 month        STEVEN REPORT  As part of the patient's treatment plan, I am prescribing controlled substances. The patient has been made aware of appropriate use of such medications, including potential risk of somnolence, limited ability to drive and/or work safely, and the potential for dependence or overdose. It has also bee made clear that these medications are for use by this patient only, without concomitant use of alcohol or other substances unless prescribed.     Patient has completed prescribing agreement detailing terms of continued prescribing of controlled substances, including monitoring STEVEN reports, urine drug screening, and pill counts if necessary. The patient is aware that inappropriate use will results in cessation of prescribing such medications.    STEVEN report has been reviewed and scanned into the patient's chart.    Date of last STEVEN : 5/8/2018    History and physical exam exhibit continued safe and appropriate use of controlled substances.    EMR Dragon/Transcription disclaimer:   Much of this encounter note is an electronic transcription/translation of spoken language to printed text.  The electronic translation of spoken language may permit erroneous, or at times, nonsensical words or phrases to be inadvertently transcribed; Although I have reviewed the note for such errors, some may still exist.

## 2018-05-10 ENCOUNTER — OUTSIDE FACILITY SERVICE (OUTPATIENT)
Dept: PAIN MEDICINE | Facility: CLINIC | Age: 53
End: 2018-05-10

## 2018-05-10 ENCOUNTER — DOCUMENTATION (OUTPATIENT)
Dept: PAIN MEDICINE | Facility: CLINIC | Age: 53
End: 2018-05-10

## 2018-05-10 PROCEDURE — 62323 NJX INTERLAMINAR LMBR/SAC: CPT | Performed by: ANESTHESIOLOGY

## 2018-05-11 NOTE — PROGRESS NOTES
Lumbar Epidural Steroid Injection  [facility]    PREOPERATIVE DIAGNOSIS:   Lumbar Degenerative Disc Disease and right Lumbar Radiculopathy  POSTOPERATIVE DIAGNOSIS:  Same as preop diagnosis    PROCEDURE:   Lumbar Epidural Steroid Injection, Therapeutic Translaminar Injection, with epidurogram, at  L4/L5 level    PRE-PROCEDURE DISCUSSION WITH PATIENT:    Risks and complications were discussed with the patient prior to starting the procedure and informed consent was obtained.  We discussed various topics including but not limited to bleeding, infection, injury, paralysis, nerve injury, dural puncture, coma, death, worsening of clinical picture, lack of pain relief, and postprocedural soreness.    SURGEON:  Reji aLw MD    REASON FOR PROCEDURE:    Degenerative changes are noted in the area., Tenderness to palpation in the lumbar area and Acute pain flareup is noted & problematic, and the injection is used to attempt to break the flareup.      SEDATION:  Versed 6mg & Fentanyl 50 mcg IV  ANESTHETIC:  Marcaine 0.25%  STEROID:   Methylprednisolone (DEPO MEDROL) 80mg/ml    DESCRIPTON OF PROCEDURE:    After obtaining informed consent, I.V. was started in the preop area.   The patient was taken to the operating room and placed in the prone position.  EKG, blood pressure, and pulse oximeter were monitored throughout, and sedation was provided as needed by the RN under my guidance. All pressure points were well padded.  The lumbar spine area was prepped with Chloraprep and draped in a sterile fashion.  Under fluoroscopic guidance, the above mentioned interlaminar space was identified. Skin and subcutaneous tissues were anesthetized with 1% lidocaine in the middle of the space. A Tuohy needle was introduced through the skin and advanced to this interlaminar space and into the epidural space under fluoroscopic guidance and verified with loss-of-resistance technique to air.  After confirming the position  of the needle with the fluoroscope with all the views, and after aspiration was confirmed negative for blood and CSF, 1.5 mL of Omnipaque was injected.  After seeing appropriate epidurogram with lateral and PA views, a total of 3 cc solution was injected, consisting of 1cc of local anesthetic as above, with normal saline and injectable steroid as above.       ESTIMATED BLOOD LOSS:  <5 mL  SPECIMENS:  None    COMPLICATIONS:     No complications were noted., There was no indication of vascular uptake on live injection of contrast dye. and There was no indication of intrathecal uptake on live injection of contrast dye.    TOLERANCE & DISCHARGE CONDITION:    The patient tolerated the procedure well.  The patient was transported to the recovery area without difficulties.  The patient was discharged to home under the care of family in stable and satisfactory condition.    PLAN OF CARE:  1. The patient was given our standard instruction sheet.  2. The patient will Repeat injection 2 wks  3. The patient will resume all medications as per the medication reconciliation sheet.

## 2018-05-14 ENCOUNTER — RESULTS ENCOUNTER (OUTPATIENT)
Dept: PAIN MEDICINE | Facility: CLINIC | Age: 53
End: 2018-05-14

## 2018-05-14 DIAGNOSIS — M54.16 LUMBAR RADICULOPATHY: ICD-10-CM

## 2018-05-14 DIAGNOSIS — M47.816 ARTHROPATHY OF LUMBAR FACET JOINT: ICD-10-CM

## 2018-05-14 DIAGNOSIS — M79.602 PAIN IN BOTH UPPER EXTREMITIES: ICD-10-CM

## 2018-05-14 DIAGNOSIS — M93.1 KIENBOCK DISEASE, ADULT: ICD-10-CM

## 2018-05-14 DIAGNOSIS — M79.601 PAIN IN BOTH UPPER EXTREMITIES: ICD-10-CM

## 2018-05-14 DIAGNOSIS — M51.36 DDD (DEGENERATIVE DISC DISEASE), LUMBAR: ICD-10-CM

## 2018-05-14 DIAGNOSIS — Z79.899 ENCOUNTER FOR LONG-TERM (CURRENT) USE OF HIGH-RISK MEDICATION: ICD-10-CM

## 2018-05-14 DIAGNOSIS — G89.29 OTHER CHRONIC PAIN: ICD-10-CM

## 2018-05-15 ENCOUNTER — TELEPHONE (OUTPATIENT)
Dept: PAIN MEDICINE | Facility: CLINIC | Age: 53
End: 2018-05-15

## 2018-05-15 NOTE — TELEPHONE ENCOUNTER
Patient LM on nurse line stating that she had an injection on Thursday 5/10/18 and that the injection only lasted until Friday afternoon/Evening. States the pain has returned and is just as bad as before she had the injection and she wasn't sure what to do now.

## 2018-05-22 ENCOUNTER — DOCUMENTATION (OUTPATIENT)
Dept: PAIN MEDICINE | Facility: CLINIC | Age: 53
End: 2018-05-22

## 2018-05-22 ENCOUNTER — OUTSIDE FACILITY SERVICE (OUTPATIENT)
Dept: PAIN MEDICINE | Facility: CLINIC | Age: 53
End: 2018-05-22

## 2018-05-22 PROCEDURE — 62323 NJX INTERLAMINAR LMBR/SAC: CPT | Performed by: ANESTHESIOLOGY

## 2018-05-23 NOTE — PROGRESS NOTES
Lumbar Epidural Steroid Injection  [facility]    PREOPERATIVE DIAGNOSIS:   Lumbar Degenerative Disc Disease and right Lumbar Radiculopathy  POSTOPERATIVE DIAGNOSIS:  Same as preop diagnosis    PROCEDURE:   Lumbar Epidural Steroid Injection, Therapeutic Translaminar Injection, with epidurogram, at  L4/L5 level    PRE-PROCEDURE DISCUSSION WITH PATIENT:    Risks and complications were discussed with the patient prior to starting the procedure and informed consent was obtained.  We discussed various topics including but not limited to bleeding, infection, injury, paralysis, nerve injury, dural puncture, coma, death, worsening of clinical picture, lack of pain relief, and postprocedural soreness.    SURGEON:  Reji Law MD    REASON FOR PROCEDURE:    Radicular pain pattern seems consistent with this dermatome. and Acute pain flareup is noted & problematic, and the injection is used to attempt to break the flareup.      SEDATION:  Versed 4mg & Fentanyl 100 mcg IV  ANESTHETIC:  Marcaine 0.25%  STEROID:   Methylprednisolone (DEPO MEDROL) 80mg/ml    DESCRIPTON OF PROCEDURE:    After obtaining informed consent, I.V. was started in the preop area.   The patient was taken to the operating room and placed in the prone position.  EKG, blood pressure, and pulse oximeter were monitored throughout, and sedation was provided as needed by the RN under my guidance. All pressure points were well padded.  The lumbar spine area was prepped with Chloraprep and draped in a sterile fashion.  Under fluoroscopic guidance, the above mentioned interlaminar space was identified. Skin and subcutaneous tissues were anesthetized with 1% lidocaine in the middle of the space. A Tuohy needle was introduced through the skin and advanced to this interlaminar space and into the epidural space under fluoroscopic guidance and verified with loss-of-resistance technique to air.  After confirming the position of the needle with the  fluoroscope with all the views, and after aspiration was confirmed negative for blood and CSF, 1.5 mL of Omnipaque was injected.  After seeing appropriate epidurogram with lateral and PA views, a total of 3 cc solution was injected, consisting of 1cc of local anesthetic as above, with normal saline and injectable steroid as above.       ESTIMATED BLOOD LOSS:  <5 mL  SPECIMENS:  None    COMPLICATIONS:     No complications were noted., There was no indication of vascular uptake on live injection of contrast dye. and There was no indication of intrathecal uptake on live injection of contrast dye.    TOLERANCE & DISCHARGE CONDITION:    The patient tolerated the procedure well.  The patient was transported to the recovery area without difficulties.  The patient was discharged to home under the care of family in stable and satisfactory condition.    PLAN OF CARE:  1. The patient was given our standard instruction sheet.  2. The patient will Return to clinic 2 wks  3. The patient will resume all medications as per the medication reconciliation sheet.

## 2018-06-04 RX ORDER — GABAPENTIN 600 MG/1
TABLET ORAL
Qty: 90 TABLET | Refills: 3 | Status: SHIPPED | OUTPATIENT
Start: 2018-06-04 | End: 2018-09-23 | Stop reason: SDUPTHER

## 2018-06-07 ENCOUNTER — OFFICE VISIT (OUTPATIENT)
Dept: PAIN MEDICINE | Facility: CLINIC | Age: 53
End: 2018-06-07

## 2018-06-07 VITALS
WEIGHT: 175.2 LBS | BODY MASS INDEX: 29.91 KG/M2 | SYSTOLIC BLOOD PRESSURE: 152 MMHG | TEMPERATURE: 98 F | HEIGHT: 64 IN | RESPIRATION RATE: 18 BRPM | OXYGEN SATURATION: 90 % | HEART RATE: 86 BPM | DIASTOLIC BLOOD PRESSURE: 72 MMHG

## 2018-06-07 DIAGNOSIS — Z79.899 ENCOUNTER FOR LONG-TERM (CURRENT) USE OF HIGH-RISK MEDICATION: ICD-10-CM

## 2018-06-07 DIAGNOSIS — G89.29 CHRONIC BACK PAIN, UNSPECIFIED BACK LOCATION, UNSPECIFIED BACK PAIN LATERALITY: ICD-10-CM

## 2018-06-07 DIAGNOSIS — M54.9 CHRONIC BACK PAIN, UNSPECIFIED BACK LOCATION, UNSPECIFIED BACK PAIN LATERALITY: ICD-10-CM

## 2018-06-07 DIAGNOSIS — M93.1 KIENBOCK DISEASE, ADULT: ICD-10-CM

## 2018-06-07 DIAGNOSIS — M79.602 PAIN IN BOTH UPPER EXTREMITIES: ICD-10-CM

## 2018-06-07 DIAGNOSIS — M79.601 PAIN IN BOTH UPPER EXTREMITIES: ICD-10-CM

## 2018-06-07 DIAGNOSIS — G89.29 OTHER CHRONIC PAIN: Primary | ICD-10-CM

## 2018-06-07 PROCEDURE — 99214 OFFICE O/P EST MOD 30 MIN: CPT | Performed by: NURSE PRACTITIONER

## 2018-06-07 RX ORDER — OXYCODONE HYDROCHLORIDE 30 MG/1
30 TABLET, FILM COATED, EXTENDED RELEASE ORAL EVERY 12 HOURS SCHEDULED
Qty: 60 TABLET | Refills: 0 | Status: SHIPPED | OUTPATIENT
Start: 2018-06-07 | End: 2018-07-09 | Stop reason: SDUPTHER

## 2018-06-07 RX ORDER — OXYCODONE AND ACETAMINOPHEN 7.5; 325 MG/1; MG/1
1 TABLET ORAL EVERY 6 HOURS PRN
Qty: 120 TABLET | Refills: 0 | Status: SHIPPED | OUTPATIENT
Start: 2018-06-07 | End: 2018-07-09 | Stop reason: SDUPTHER

## 2018-06-07 NOTE — PROGRESS NOTES
CHIEF COMPLAINT  Follow-up for back pain.    Subjective   Crystal Cruz is a 52 y.o. female  who presents to the office for follow-up of procedure.  She completed a Lumbar Epidural Steroid Injection @ L4/L5   on  5/10/18 and 5/25/18 performed by Dr Law for management of low back pain. Patient reports no relief from the 1st procedure and 95% ongoing relief from the 2nd procedure.     Lumbar RFL (bilateral L1-L4) 2/20/2018 - No relief.     Complains of pain in her back and upper extremities. Today her pain is 1/10VAS (back pain). Describes the pain as continuous and reports her back pain is slightly worse since last office visit.  Continues with OxyContin 30 mg BID and Percocet 7.5/325 3-4/day, Cymbalta 60 mg and gabapentin 600 mg TID. Denies any side effects from the regimen.   The regimen helps decrease her pain moderately. ADL's by self.    Reporting abdominal pain for the last 5 days. Present in the epigastric area.  It is burning and radiates around to the back. Coffee seems to make it worse.  She reports that she cannot get into the PCP. She went to the ENT on Monday, the called in some sort of medication for GERD, she has not started this.      Extremity Pain    The pain is present in the neck, back, left wrist, left hand, left foot, right foot, right hand, right wrist, right fingers and left fingers. This is a chronic problem. The current episode started more than 1 year ago. There has been no history of extremity trauma. The problem occurs constantly. The problem has been unchanged (unchanged since last office visit). The quality of the pain is described as aching, pounding and burning. Pertinent negatives include no fever or numbness. The symptoms are aggravated by cold (moist/rainy weather). She has tried acetaminophen, heat, movement, NSAIDS, OTC ointments, OTC pain meds, oral narcotics and rest (Oxycontin 30 mg Q12 hours, Oxycodone 7.5/325 4/day) for the symptoms. The treatment provided moderate  relief. mitchell, systemic sclerosis   Back Pain   This is a chronic problem. The current episode started more than 1 year ago. The problem occurs constantly. The problem has been rapidly improving (per patient- worse since last office visit) since onset. The pain is present in the lumbar spine. The quality of the pain is described as aching. The pain does not radiate. The pain is at a severity of 1/10. The symptoms are aggravated by bending, position, lying down, standing and twisting. Associated symptoms include abdominal pain. Pertinent negatives include no bladder incontinence, bowel incontinence, chest pain, dysuria, fever, headaches, numbness or weakness. She has tried analgesics, heat and bed rest (Medication, LESI) for the symptoms. The treatment provided significant relief.      PEG Assessment   What number best describes your pain on average in the past week?2  What number best describes how, during the past week, pain has interfered with your enjoyment of life?1  What number best describes how, during the past week, pain has interfered with your general activity?  1    The following portions of the patient's history were reviewed and updated as appropriate: allergies, current medications, past family history, past medical history, past social history, past surgical history and problem list.    Review of Systems   Constitutional: Negative for fatigue and fever.   HENT: Positive for congestion.    Eyes: Negative for visual disturbance.   Respiratory: Negative for cough, shortness of breath and wheezing.    Cardiovascular: Positive for palpitations. Negative for chest pain and leg swelling.   Gastrointestinal: Positive for abdominal pain and constipation. Negative for bowel incontinence and diarrhea.   Genitourinary: Negative for bladder incontinence, difficulty urinating and dysuria.   Musculoskeletal: Positive for arthralgias, back pain and myalgias.   Neurological: Negative for weakness, numbness and  "headaches.   Psychiatric/Behavioral: Positive for sleep disturbance. Negative for suicidal ideas. The patient is nervous/anxious.        Vitals:    06/07/18 1216   BP: 152/72   Pulse: 86   Resp: 18   Temp: 98 °F (36.7 °C)   SpO2: 90%   Weight: 79.5 kg (175 lb 3.2 oz)   Height: 162.6 cm (64\")   PainSc:   1   PainLoc: Back         Objective   Physical Exam   Constitutional: She is oriented to person, place, and time. She appears well-developed and well-nourished. She is cooperative.   HENT:   Head: Normocephalic and atraumatic.   Eyes: Conjunctivae and lids are normal.   Neck: Trachea normal.   Cardiovascular: Normal rate.    Pulmonary/Chest: Effort normal. No respiratory distress.   Abdominal: There is tenderness in the epigastric area. There is no rigidity, no rebound and no guarding.   Musculoskeletal:        Lumbar back: She exhibits tenderness (minimal). She exhibits no pain.        Right hand: She exhibits decreased range of motion and tenderness. She exhibits normal capillary refill.        Left hand: She exhibits decreased range of motion and tenderness. She exhibits normal capillary refill.   Arthritic changes bilateral hands and wrists   Neurological: She is alert and oriented to person, place, and time. Gait (antalgia) abnormal.   Skin: Skin is warm, dry and intact.   Psychiatric: She has a normal mood and affect. Her speech is normal and behavior is normal. Cognition and memory are normal.   Nursing note and vitals reviewed.    Assessment/Plan   Crystal was seen today for back pain.    Diagnoses and all orders for this visit:    Other chronic pain    Chronic back pain, unspecified back location, unspecified back pain laterality    Kienbock disease, adult    Pain in both upper extremities    Encounter for long-term (current) use of high-risk medication      --- Refill Percocet and Oxycontin. Patient appears stable with current regimen. No adverse effects. Regarding continuation of opioids, there is no " evidence of aberrant behavior or any red flags.  The patient continues with appropriate response to opioid therapy. ADL's remain intact by self.   --- The urine drug screen confirmation from 5/9/18 has been reviewed and the result is appropriate based on patient history and STEVEN report  --- Follow-up 1 month          STEVEN REPORT  As part of the patient's treatment plan, I am prescribing controlled substances. The patient has been made aware of appropriate use of such medications, including potential risk of somnolence, limited ability to drive and/or work safely, and the potential for dependence or overdose. It has also bee made clear that these medications are for use by this patient only, without concomitant use of alcohol or other substances unless prescribed.     Patient has completed prescribing agreement detailing terms of continued prescribing of controlled substances, including monitoring STEVEN reports, urine drug screening, and pill counts if necessary. The patient is aware that inappropriate use will results in cessation of prescribing such medications.    STEVEN report has been reviewed and scanned into the patient's chart.    As the clinician, I personally reviewed the STEVEN from 6/6/2018 while the patient was in the office today.    History and physical exam exhibit continued safe and appropriate use of controlled substances.     EMR Dragon/Transcription disclaimer:   Much of this encounter note is an electronic transcription/translation of spoken language to printed text. The electronic translation of spoken language may permit erroneous, or at times, nonsensical words or phrases to be inadvertently transcribed; Although I have reviewed the note for such errors, some may still exist.

## 2018-06-08 ENCOUNTER — APPOINTMENT (OUTPATIENT)
Dept: CT IMAGING | Facility: HOSPITAL | Age: 53
End: 2018-06-08

## 2018-06-08 ENCOUNTER — HOSPITAL ENCOUNTER (EMERGENCY)
Facility: HOSPITAL | Age: 53
Discharge: HOME | End: 2018-06-09
Attending: EMERGENCY MEDICINE | Admitting: EMERGENCY MEDICINE

## 2018-06-08 DIAGNOSIS — K85.90 ACUTE PANCREATITIS, UNSPECIFIED COMPLICATION STATUS, UNSPECIFIED PANCREATITIS TYPE: Primary | ICD-10-CM

## 2018-06-08 DIAGNOSIS — E87.1 HYPONATREMIA: ICD-10-CM

## 2018-06-08 DIAGNOSIS — R10.13 EPIGASTRIC ABDOMINAL PAIN: ICD-10-CM

## 2018-06-08 LAB
ALBUMIN SERPL-MCNC: 4.3 G/DL (ref 3.5–5.2)
ALBUMIN/GLOB SERPL: 1.3 G/DL
ALP SERPL-CCNC: 58 U/L (ref 40–129)
ALT SERPL W P-5'-P-CCNC: 15 U/L (ref 5–33)
ANION GAP SERPL CALCULATED.3IONS-SCNC: 13.4 MMOL/L
AST SERPL-CCNC: 14 U/L (ref 5–32)
BASOPHILS # BLD AUTO: 0.09 10*3/MM3 (ref 0–0.2)
BASOPHILS NFR BLD AUTO: 0.7 % (ref 0–2)
BILIRUB SERPL-MCNC: 0.2 MG/DL (ref 0.2–1.2)
BILIRUB UR QL STRIP: NEGATIVE
BUN BLD-MCNC: 10 MG/DL (ref 6–20)
BUN/CREAT SERPL: 12.8 (ref 7–25)
CALCIUM SPEC-SCNC: 9.3 MG/DL (ref 8.6–10.5)
CHLORIDE SERPL-SCNC: 92 MMOL/L (ref 98–107)
CLARITY UR: CLEAR
CO2 SERPL-SCNC: 23.6 MMOL/L (ref 22–29)
COLOR UR: YELLOW
CREAT BLD-MCNC: 0.78 MG/DL (ref 0.57–1)
DEPRECATED RDW RBC AUTO: 40 FL (ref 37–54)
EOSINOPHIL # BLD AUTO: 0.39 10*3/MM3 (ref 0.1–0.3)
EOSINOPHIL NFR BLD AUTO: 2.9 % (ref 0–4)
ERYTHROCYTE [DISTWIDTH] IN BLOOD BY AUTOMATED COUNT: 12 % (ref 11.5–14.5)
GFR SERPL CREATININE-BSD FRML MDRD: 77 ML/MIN/1.73
GLOBULIN UR ELPH-MCNC: 3.2 GM/DL
GLUCOSE BLD-MCNC: 134 MG/DL (ref 65–99)
GLUCOSE UR STRIP-MCNC: NEGATIVE MG/DL
HCT VFR BLD AUTO: 44.2 % (ref 37–47)
HGB BLD-MCNC: 15.1 G/DL (ref 12–16)
HGB UR QL STRIP.AUTO: NEGATIVE
IMM GRANULOCYTES # BLD: 0.04 10*3/MM3 (ref 0–0.03)
IMM GRANULOCYTES NFR BLD: 0.3 % (ref 0–0.5)
KETONES UR QL STRIP: NEGATIVE
LEUKOCYTE ESTERASE UR QL STRIP.AUTO: NEGATIVE
LIPASE SERPL-CCNC: 139 U/L (ref 13–60)
LYMPHOCYTES # BLD AUTO: 3.34 10*3/MM3 (ref 0.6–4.8)
LYMPHOCYTES NFR BLD AUTO: 24.7 % (ref 20–45)
MCH RBC QN AUTO: 31.1 PG (ref 27–31)
MCHC RBC AUTO-ENTMCNC: 34.2 G/DL (ref 31–37)
MCV RBC AUTO: 91.1 FL (ref 81–99)
MONOCYTES # BLD AUTO: 0.99 10*3/MM3 (ref 0–1)
MONOCYTES NFR BLD AUTO: 7.3 % (ref 3–8)
NEUTROPHILS # BLD AUTO: 8.68 10*3/MM3 (ref 1.5–8.3)
NEUTROPHILS NFR BLD AUTO: 64.1 % (ref 45–70)
NITRITE UR QL STRIP: NEGATIVE
NRBC BLD MANUAL-RTO: 0 /100 WBC (ref 0–0)
PH UR STRIP.AUTO: 6.5 [PH] (ref 4.5–8)
PLATELET # BLD AUTO: 294 10*3/MM3 (ref 140–500)
PMV BLD AUTO: 10.3 FL (ref 7.4–10.4)
POTASSIUM BLD-SCNC: 4.5 MMOL/L (ref 3.5–5.2)
PROT SERPL-MCNC: 7.5 G/DL (ref 6–8.5)
PROT UR QL STRIP: NEGATIVE
RBC # BLD AUTO: 4.85 10*6/MM3 (ref 4.2–5.4)
SODIUM BLD-SCNC: 129 MMOL/L (ref 136–145)
SP GR UR STRIP: 1.01 (ref 1–1.03)
TROPONIN T SERPL-MCNC: <0.01 NG/ML (ref 0–0.03)
UROBILINOGEN UR QL STRIP: NORMAL
WBC NRBC COR # BLD: 13.53 10*3/MM3 (ref 4.8–10.8)

## 2018-06-08 PROCEDURE — 96361 HYDRATE IV INFUSION ADD-ON: CPT

## 2018-06-08 PROCEDURE — 93010 ELECTROCARDIOGRAM REPORT: CPT | Performed by: INTERNAL MEDICINE

## 2018-06-08 PROCEDURE — 84484 ASSAY OF TROPONIN QUANT: CPT | Performed by: EMERGENCY MEDICINE

## 2018-06-08 PROCEDURE — 93005 ELECTROCARDIOGRAM TRACING: CPT | Performed by: EMERGENCY MEDICINE

## 2018-06-08 PROCEDURE — 85025 COMPLETE CBC W/AUTO DIFF WBC: CPT | Performed by: EMERGENCY MEDICINE

## 2018-06-08 PROCEDURE — 74177 CT ABD & PELVIS W/CONTRAST: CPT

## 2018-06-08 PROCEDURE — 25010000002 HYDROMORPHONE PER 4 MG: Performed by: EMERGENCY MEDICINE

## 2018-06-08 PROCEDURE — 80053 COMPREHEN METABOLIC PANEL: CPT | Performed by: EMERGENCY MEDICINE

## 2018-06-08 PROCEDURE — 99284 EMERGENCY DEPT VISIT MOD MDM: CPT | Performed by: EMERGENCY MEDICINE

## 2018-06-08 PROCEDURE — 83690 ASSAY OF LIPASE: CPT | Performed by: EMERGENCY MEDICINE

## 2018-06-08 PROCEDURE — 81003 URINALYSIS AUTO W/O SCOPE: CPT | Performed by: EMERGENCY MEDICINE

## 2018-06-08 PROCEDURE — 96374 THER/PROPH/DIAG INJ IV PUSH: CPT

## 2018-06-08 PROCEDURE — 99284 EMERGENCY DEPT VISIT MOD MDM: CPT

## 2018-06-08 RX ORDER — HYDROMORPHONE HCL 110MG/55ML
0.5 PATIENT CONTROLLED ANALGESIA SYRINGE INTRAVENOUS ONCE
Status: COMPLETED | OUTPATIENT
Start: 2018-06-08 | End: 2018-06-08

## 2018-06-08 RX ORDER — SODIUM CHLORIDE 0.9 % (FLUSH) 0.9 %
10 SYRINGE (ML) INJECTION AS NEEDED
Status: DISCONTINUED | OUTPATIENT
Start: 2018-06-08 | End: 2018-06-09 | Stop reason: HOSPADM

## 2018-06-08 RX ORDER — SODIUM CHLORIDE 9 MG/ML
125 INJECTION, SOLUTION INTRAVENOUS CONTINUOUS
Status: DISCONTINUED | OUTPATIENT
Start: 2018-06-08 | End: 2018-06-09 | Stop reason: HOSPADM

## 2018-06-08 RX ADMIN — SODIUM CHLORIDE 125 ML/HR: 9 INJECTION, SOLUTION INTRAVENOUS at 23:45

## 2018-06-08 RX ADMIN — SODIUM CHLORIDE 500 ML: 9 INJECTION, SOLUTION INTRAVENOUS at 23:07

## 2018-06-08 RX ADMIN — HYDROMORPHONE HYDROCHLORIDE 0.5 MG: 2 INJECTION INTRAMUSCULAR; INTRAVENOUS; SUBCUTANEOUS at 22:59

## 2018-06-09 VITALS
SYSTOLIC BLOOD PRESSURE: 131 MMHG | DIASTOLIC BLOOD PRESSURE: 75 MMHG | BODY MASS INDEX: 30.83 KG/M2 | HEIGHT: 63 IN | OXYGEN SATURATION: 93 % | WEIGHT: 174 LBS | TEMPERATURE: 98.2 F | RESPIRATION RATE: 24 BRPM | HEART RATE: 92 BPM

## 2018-06-09 PROCEDURE — 96376 TX/PRO/DX INJ SAME DRUG ADON: CPT

## 2018-06-09 PROCEDURE — 25010000002 IOPAMIDOL 61 % SOLUTION: Performed by: EMERGENCY MEDICINE

## 2018-06-09 PROCEDURE — 25010000002 HYDROMORPHONE PER 4 MG

## 2018-06-09 RX ORDER — HYDROMORPHONE HCL 110MG/55ML
PATIENT CONTROLLED ANALGESIA SYRINGE INTRAVENOUS
Status: COMPLETED
Start: 2018-06-09 | End: 2018-06-09

## 2018-06-09 RX ORDER — OMEPRAZOLE 20 MG/1
20 CAPSULE, DELAYED RELEASE ORAL 2 TIMES DAILY
Qty: 60 CAPSULE | Refills: 0 | Status: SHIPPED | OUTPATIENT
Start: 2018-06-09 | End: 2018-07-09 | Stop reason: SDDI

## 2018-06-09 RX ORDER — HYDROMORPHONE HCL 110MG/55ML
0.5 PATIENT CONTROLLED ANALGESIA SYRINGE INTRAVENOUS ONCE
Status: COMPLETED | OUTPATIENT
Start: 2018-06-09 | End: 2018-06-09

## 2018-06-09 RX ADMIN — Medication 0.5 MG: at 00:51

## 2018-06-09 RX ADMIN — IOPAMIDOL 100 ML: 612 INJECTION, SOLUTION INTRAVENOUS at 01:25

## 2018-06-09 RX ADMIN — HYDROMORPHONE HYDROCHLORIDE 0.5 MG: 2 INJECTION INTRAMUSCULAR; INTRAVENOUS; SUBCUTANEOUS at 00:51

## 2018-06-09 NOTE — ED NOTES
"Pt states \"I feel so much better since I was given the pain medication.\"     Halina Ronquillo RN  06/09/18 0256    "

## 2018-06-09 NOTE — ED PROVIDER NOTES
EMERGENCY DEPARTMENT ENCOUNTER      Room Number: 5/05      HPI:    Chief complaint: Abdominal pain    Location: Epigastric and right upper quadrant    Quality/Severity:  Moderately severe    Timing/Duration: 4-5 days, gradually worsening    Modifying Factors: None clearly identified    Associated Symptoms: Denies nausea, denies diminished appetite.  Constipation improved with mag citrate.  No fever, chest pain or shortness of breath reported.    Narrative: Pt is a 52 y.o. female who presents complaining of epigastric and right upper quadrant abdominal pain.  Patient has a history of both pancreatitis and ileus.  Patient reports 12 pound weight loss in 2 months though she is eating normally.    PMD: Celeste Dill MD    REVIEW OF SYSTEMS  Review of Systems  All other systems reviewed are otherwise negative as related chief complaint.  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   • Discharge from nipple 02/05/2016   • Pneumonitis 02/05/2016   • Systemic sclerosis 02/05/2016   • Pain of upper extremity 02/05/2016   • Kienbock disease, 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   • Acute pancreatitis 05/03/2016   • Benign essential hypertension 05/09/2016   • Chronic pain 05/18/2016   • Intractable vomiting with nausea 11/12/2016   • Seizure 11/15/2016   • Elevated troponin 11/15/2016   • PRES (posterior reversible encephalopathy syndrome) 11/17/2016   • Aftercare 11/17/2016   • Acquired hypothyroidism 12/07/2016   • Encounter for long-term  (current) use of high-risk medication 2017   • Arthritis 10/04/2016   • Stenosis of carotid artery 2014   • Hypertension 2014   • Muscle pain 10/04/2016   • Obstruction of carotid artery 2014   • Tobacco use 2014   • Controlled type 2 diabetes mellitus without complication 10/04/2016   • Vitamin D deficiency 2017   • Atherosclerosis of aorta 2017     Resolved Ambulatory Problems     Diagnosis Date Noted   • Tympanites 2016   • Chronic pain syndrome 2016   • Candidiasis of vagina 2016   • Chronic pain disorder 2016     Past Medical History:   Diagnosis Date   • Acid reflux    • Anemia    • Anxiety    • Atherosclerosis of aorta 2017   • Huitron's esophagus    • Bursitis    • CAD (coronary artery disease)    • Chronic pain disorder    • CVA (cerebrovascular accident)    • Diabetes mellitus    • History of transfusion    • Hypertension    • Joint pain    • Kienbock's disease    • Low back pain    • Pulmonary fibrosis    • Reflux gastritis    • Scleroderma    • Seizure        PAST SURGICAL HISTORY  Past Surgical History:   Procedure Laterality Date   • CAROTID ENDARTERECTOMY Left     Neurological   • CATARACT EXTRACTION Bilateral    •  SECTION     • CHOLECYSTECTOMY      Laparoscopic   • COLONOSCOPY      Complete   • COLONOSCOPY N/A 2016    Procedure: COLONOSCOPY;  Surgeon: Gaviota Hagen MD;  Location: [facility] OR;  Service:    • ENDOSCOPY N/A 2016    Procedure: ESOPHAGOGASTRODUODENOSCOPY WITH BIOPSIES;  Surgeon: Gaviota Hagen MD;  Location: [facility] OR;  Service:    • TONSILLECTOMY AND ADENOIDECTOMY     • UPPER GASTROINTESTINAL ENDOSCOPY      Diagnostic   • WRIST SURGERY Left        FAMILY HISTORY  Family History   Problem Relation Age of Onset   • Other Mother         Cardiac Disorder   • Migraines Mother    • Heart disease Mother    • Sudden death Mother    • Other Father         Cardiac Disorder   • Hypertension Father    •  Heart disease Father    • Cancer Father    • Hypertension Sister    • Cancer Sister    • Migraines Daughter    • Cancer Other         Uncle   • Migraines Maternal Aunt    • Stroke Maternal Grandmother    • Stroke Maternal Grandfather        SOCIAL HISTORY  Social History     Social History   • Marital status:      Spouse name: N/A   • Number of children: N/A   • Years of education: N/A     Occupational History   • Not on file.     Social History Main Topics   • Smoking status: Current Every Day Smoker     Packs/day: 1.00   • Smokeless tobacco: Never Used   • Alcohol use No   • Drug use: No   • Sexual activity: Defer      Comment: EXERCISE - RARELY     Other Topics Concern   • Not on file     Social History Narrative   • No narrative on file       ALLERGIES  Atorvastatin; Levofloxacin; Nsaids; Toradol [ketorolac tromethamine]; and Victoza  [liraglutide]    PHYSICAL EXAM  ED Triage Vitals [06/08/18 2204]   Temp Heart Rate Resp BP SpO2   98.2 °F (36.8 °C) 92 24 (!) 181/106 97 %      Temp src Heart Rate Source Patient Position BP Location FiO2 (%)   Oral Monitor Sitting Right arm --       Physical Exam   Constitutional: She is oriented to person, place, and time and well-developed, well-nourished, and in no distress. No distress.   HENT:   Head: Normocephalic.   Mucous membranes moist   Eyes: No scleral icterus.   Neck:   Painless range of motion   Cardiovascular: Normal rate and regular rhythm.    Pulmonary/Chest: Effort normal and breath sounds normal. No respiratory distress.   Abdominal: Soft.   Epigastric and right upper quadrant tenderness without rebound or rigidity   Musculoskeletal: She exhibits no edema or tenderness.   Moves all extremities equally   Neurological: She is alert and oriented to person, place, and time.   Skin: Skin is warm and dry.   Psychiatric: Mood and affect normal.   Nursing note and vitals reviewed.      LAB RESULTS  Results for orders placed or performed during the hospital  encounter of 06/08/18   Comprehensive Metabolic Panel   Result Value Ref Range    Glucose 134 (H) 65 - 99 mg/dL    BUN 10 6 - 20 mg/dL    Creatinine 0.78 0.57 - 1.00 mg/dL    Sodium 129 (L) 136 - 145 mmol/L    Potassium 4.5 3.5 - 5.2 mmol/L    Chloride 92 (L) 98 - 107 mmol/L    CO2 23.6 22.0 - 29.0 mmol/L    Calcium 9.3 8.6 - 10.5 mg/dL    Total Protein 7.5 6.0 - 8.5 g/dL    Albumin 4.30 3.50 - 5.20 g/dL    ALT (SGPT) 15 5 - 33 U/L    AST (SGOT) 14 5 - 32 U/L    Alkaline Phosphatase 58 40 - 129 U/L    Total Bilirubin 0.2 0.2 - 1.2 mg/dL    eGFR Non African Amer 77 >60 mL/min/1.73    Globulin 3.2 gm/dL    A/G Ratio 1.3 g/dL    BUN/Creatinine Ratio 12.8 7.0 - 25.0    Anion Gap 13.4 mmol/L   Lipase   Result Value Ref Range    Lipase 139 (H) 13 - 60 U/L   Urinalysis With / Microscopic If Indicated (No Culture) - Urine, Clean Catch   Result Value Ref Range    Color, UA Yellow Yellow, Straw    Appearance, UA Clear Clear    pH, UA 6.5 4.5 - 8.0    Specific Gravity, UA 1.010 1.003 - 1.030    Glucose, UA Negative Negative    Ketones, UA Negative Negative, 80 mg/dL (3+), >=160 mg/dL (4+)    Bilirubin, UA Negative Negative    Blood, UA Negative Negative    Protein, UA Negative Negative    Leuk Esterase, UA Negative Negative    Nitrite, UA Negative Negative    Urobilinogen, UA 0.2 E.U./dL 0.2 - 1.0 E.U./dL   Troponin   Result Value Ref Range    Troponin T <0.010 0.000 - 0.030 ng/mL   CBC Auto Differential   Result Value Ref Range    WBC 13.53 (H) 4.80 - 10.80 10*3/mm3    RBC 4.85 4.20 - 5.40 10*6/mm3    Hemoglobin 15.1 12.0 - 16.0 g/dL    Hematocrit 44.2 37.0 - 47.0 %    MCV 91.1 81.0 - 99.0 fL    MCH 31.1 (H) 27.0 - 31.0 pg    MCHC 34.2 31.0 - 37.0 g/dL    RDW 12.0 11.5 - 14.5 %    RDW-SD 40.0 37.0 - 54.0 fl    MPV 10.3 7.4 - 10.4 fL    Platelets 294 140 - 500 10*3/mm3    Neutrophil % 64.1 45.0 - 70.0 %    Lymphocyte % 24.7 20.0 - 45.0 %    Monocyte % 7.3 3.0 - 8.0 %    Eosinophil % 2.9 0.0 - 4.0 %    Basophil % 0.7 0.0 - 2.0  %    Immature Grans % 0.3 0.0 - 0.5 %    Neutrophils, Absolute 8.68 (H) 1.50 - 8.30 10*3/mm3    Lymphocytes, Absolute 3.34 0.60 - 4.80 10*3/mm3    Monocytes, Absolute 0.99 0.00 - 1.00 10*3/mm3    Eosinophils, Absolute 0.39 (H) 0.10 - 0.30 10*3/mm3    Basophils, Absolute 0.09 0.00 - 0.20 10*3/mm3    Immature Grans, Absolute 0.04 (H) 0.00 - 0.03 10*3/mm3    nRBC 0.0 0.0 - 0.0 /100 WBC         I ordered the above labs and reviewed the results    RADIOLOGY  RADIOLOGY        Study: CT abdomen and pelvis with IV and oral contrast    Findings: Minimal inflammation near pancreatic head, correlate for early acute pancreatitis.  Otherwise nothing acute    Interpreted contemporaneously with treatment by Dr. Lozano- radiologist, independently viewed by me      I ordered the above radiologic testing and reviewed the results    PROCEDURES  Procedures  EKG           EKG time: 2253  Rhythm/Rate: Sinus, 80  P waves and WA: CHAD within normal limits  QRS, axis: Narrow QRS complex with slow R-wave progression   ST and T waves: No STEMI; QTc within normal limits     Interpreted contemporaneously with treatment by me, independently viewed  Comparison dated 4/11/18 reviewed-no acute changes identified    PROGRESS AND CONSULTS  ED Course as of Jun 09 0243   Sat Jun 09, 2018   0228 Review of electronic medical record reveals that the patient has some possible history of chronic pancreatitis noted by Dr. Hagen in the past.  Patient does admit that her home dose of Percocet does help the pain and that certain foods seem to agitate the discomfort.  She admits that she has been able to hydrate well without any nausea or vomiting prior to arrival in the emergency department tonight.  Given the patient is established with gastroenterology, I will consult with them this evening to discuss disposition planning for this patient.  [RS]   0233 CONSULT        Provider: Dr. Hagen - GI    Discussion: Reviewed patient history, she is female with this  "patient.  We reviewed ED workup and disposition planning.  She is agreeable c treatment and planned disposition.          [RS]   0242 Patient does report that she stopped her Nexium quite a long time ago after she saw some  advertisements for lawsuits related to Nexium causing kidney dysfunction.  She was started on a medication that begins with a \"C\" by her ENT recently.  At my encouragement, she is agreeable to resume omeprazole until follow-up with gastroenterology.  [RS]      ED Course User Index  [RS] Bhaskar Matthews MD           MEDICAL DECISION MAKING  Results were reviewed/discussed with the patient and they were also made aware of online access. Pt also made aware that some labs, such as cultures, will not be resulted during ER visit and follow up with PMD is necessary.     MDM  Number of Diagnoses or Management Options     Amount and/or Complexity of Data Reviewed  Clinical lab tests: ordered and reviewed  Tests in the radiology section of CPT®: ordered and reviewed  Decide to obtain previous medical records or to obtain history from someone other than the patient: yes  Review and summarize past medical records: yes  Independent visualization of images, tracings, or specimens: yes           DIAGNOSIS  Final diagnoses:   Acute pancreatitis, unspecified complication status, unspecified pancreatitis type   Epigastric abdominal pain   Hyponatremia       Latest Documented Vital Signs:  As of 2:43 AM  BP- 132/67 HR- 92 Temp- 98.2 °F (36.8 °C) (Oral) O2 sat- 91%    DISPOSITION  Discharged home in stable condition       Medication List      New Prescriptions    omeprazole 20 MG capsule  Commonly known as:  priLOSEC  Take 1 capsule by mouth 2 (Two) Times a Day.          Follow-up Information     Schedule an appointment as soon as possible for a visit  with Celeste Dill MD.    Specialty:  Family Medicine  Why:  As needed  Contact information:  [address] [place] [id number] [phone]             Gaviota " MD Xiao. Call in 2 days.    Specialty:  Gastroenterology  Contact information:  [address] [facility] [address]  [place] [address] [phone]                        Bhaskar Matthews MD  06/09/18 8603

## 2018-06-09 NOTE — ED NOTES
Pt states that her pain came down to a 5/10 after the dilaudid; after drinking the oral contrast, pt's pain came back up to a 8/10     Halina Ronquillo RN  06/08/18 [id number]

## 2018-06-12 ENCOUNTER — OFFICE VISIT (OUTPATIENT)
Dept: GASTROENTEROLOGY | Facility: CLINIC | Age: 53
End: 2018-06-12

## 2018-06-12 ENCOUNTER — HOSPITAL ENCOUNTER (OUTPATIENT)
Facility: HOSPITAL | Age: 53
Setting detail: OBSERVATION
LOS: 1 days | Discharge: HOME | End: 2018-06-14
Attending: INTERNAL MEDICINE | Admitting: INTERNAL MEDICINE

## 2018-06-12 ENCOUNTER — APPOINTMENT (OUTPATIENT)
Dept: CT IMAGING | Facility: HOSPITAL | Age: 53
End: 2018-06-12

## 2018-06-12 VITALS
WEIGHT: 178.8 LBS | SYSTOLIC BLOOD PRESSURE: 134 MMHG | DIASTOLIC BLOOD PRESSURE: 78 MMHG | HEIGHT: 63 IN | BODY MASS INDEX: 31.68 KG/M2

## 2018-06-12 DIAGNOSIS — K59.09 CHRONIC CONSTIPATION: Primary | ICD-10-CM

## 2018-06-12 DIAGNOSIS — K85.90 ACUTE PANCREATITIS, UNSPECIFIED COMPLICATION STATUS, UNSPECIFIED PANCREATITIS TYPE: ICD-10-CM

## 2018-06-12 PROBLEM — R10.9 ABDOMINAL PAIN: Status: ACTIVE | Noted: 2018-06-12

## 2018-06-12 LAB
ALBUMIN SERPL-MCNC: 4 G/DL (ref 3.5–5.2)
ALBUMIN/GLOB SERPL: 1.5 G/DL
ALP SERPL-CCNC: 50 U/L (ref 40–129)
ALT SERPL W P-5'-P-CCNC: 16 U/L (ref 5–33)
ANION GAP SERPL CALCULATED.3IONS-SCNC: 11.4 MMOL/L
AST SERPL-CCNC: 15 U/L (ref 5–32)
BILIRUB SERPL-MCNC: <0.2 MG/DL (ref 0.2–1.2)
BUN BLD-MCNC: 7 MG/DL (ref 6–20)
BUN/CREAT SERPL: 12.1 (ref 7–25)
CALCIUM SPEC-SCNC: 9.4 MG/DL (ref 8.6–10.5)
CHLORIDE SERPL-SCNC: 92 MMOL/L (ref 98–107)
CO2 SERPL-SCNC: 24.6 MMOL/L (ref 22–29)
CREAT BLD-MCNC: 0.58 MG/DL (ref 0.57–1)
DEPRECATED RDW RBC AUTO: 39.7 FL (ref 37–54)
ERYTHROCYTE [DISTWIDTH] IN BLOOD BY AUTOMATED COUNT: 11.9 % (ref 11.5–14.5)
GFR SERPL CREATININE-BSD FRML MDRD: 108 ML/MIN/1.73
GLOBULIN UR ELPH-MCNC: 2.7 GM/DL
GLUCOSE BLD-MCNC: 74 MG/DL (ref 65–99)
GLUCOSE BLDC GLUCOMTR-MCNC: 76 MG/DL (ref 70–130)
HCT VFR BLD AUTO: 39.1 % (ref 37–47)
HGB BLD-MCNC: 13.5 G/DL (ref 12–16)
LIPASE SERPL-CCNC: 31 U/L (ref 13–60)
MCH RBC QN AUTO: 31.4 PG (ref 27–31)
MCHC RBC AUTO-ENTMCNC: 34.5 G/DL (ref 31–37)
MCV RBC AUTO: 90.9 FL (ref 81–99)
PLATELET # BLD AUTO: 223 10*3/MM3 (ref 140–500)
PMV BLD AUTO: 9.7 FL (ref 7.4–10.4)
POTASSIUM BLD-SCNC: 4.4 MMOL/L (ref 3.5–5.2)
PROT SERPL-MCNC: 6.7 G/DL (ref 6–8.5)
RBC # BLD AUTO: 4.3 10*6/MM3 (ref 4.2–5.4)
SODIUM BLD-SCNC: 128 MMOL/L (ref 136–145)
WBC NRBC COR # BLD: 13.38 10*3/MM3 (ref 4.8–10.8)

## 2018-06-12 PROCEDURE — 99219 PR INITIAL OBSERVATION CARE/DAY 50 MINUTES: CPT | Performed by: INTERNAL MEDICINE

## 2018-06-12 PROCEDURE — 96376 TX/PRO/DX INJ SAME DRUG ADON: CPT

## 2018-06-12 PROCEDURE — 96375 TX/PRO/DX INJ NEW DRUG ADDON: CPT

## 2018-06-12 PROCEDURE — 99214 OFFICE O/P EST MOD 30 MIN: CPT | Performed by: INTERNAL MEDICINE

## 2018-06-12 PROCEDURE — 94799 UNLISTED PULMONARY SVC/PX: CPT

## 2018-06-12 PROCEDURE — 80053 COMPREHEN METABOLIC PANEL: CPT | Performed by: INTERNAL MEDICINE

## 2018-06-12 PROCEDURE — 0 DIATRIZOATE MEGLUMINE & SODIUM PER 1 ML: Performed by: INTERNAL MEDICINE

## 2018-06-12 PROCEDURE — 25010000002 HYDROMORPHONE PER 4 MG: Performed by: INTERNAL MEDICINE

## 2018-06-12 PROCEDURE — 85027 COMPLETE CBC AUTOMATED: CPT | Performed by: INTERNAL MEDICINE

## 2018-06-12 PROCEDURE — G0378 HOSPITAL OBSERVATION PER HR: HCPCS

## 2018-06-12 PROCEDURE — 25010000002 ONDANSETRON PER 1 MG: Performed by: INTERNAL MEDICINE

## 2018-06-12 PROCEDURE — 96372 THER/PROPH/DIAG INJ SC/IM: CPT

## 2018-06-12 PROCEDURE — 0 IOPAMIDOL PER 1 ML: Performed by: INTERNAL MEDICINE

## 2018-06-12 PROCEDURE — G0379 DIRECT REFER HOSPITAL OBSERV: HCPCS

## 2018-06-12 PROCEDURE — 74177 CT ABD & PELVIS W/CONTRAST: CPT

## 2018-06-12 PROCEDURE — 96374 THER/PROPH/DIAG INJ IV PUSH: CPT

## 2018-06-12 PROCEDURE — 96361 HYDRATE IV INFUSION ADD-ON: CPT

## 2018-06-12 PROCEDURE — 25010000002 ENOXAPARIN PER 10 MG: Performed by: INTERNAL MEDICINE

## 2018-06-12 PROCEDURE — 82962 GLUCOSE BLOOD TEST: CPT

## 2018-06-12 PROCEDURE — 83690 ASSAY OF LIPASE: CPT | Performed by: INTERNAL MEDICINE

## 2018-06-12 RX ORDER — ALPRAZOLAM 0.25 MG/1
2 TABLET ORAL 3 TIMES DAILY PRN
Status: DISCONTINUED | OUTPATIENT
Start: 2018-06-12 | End: 2018-06-14 | Stop reason: HOSPADM

## 2018-06-12 RX ORDER — SODIUM CHLORIDE 9 MG/ML
100 INJECTION, SOLUTION INTRAVENOUS CONTINUOUS
Status: DISCONTINUED | OUTPATIENT
Start: 2018-06-12 | End: 2018-06-13

## 2018-06-12 RX ORDER — ALBUTEROL SULFATE 2.5 MG/3ML
2.5 SOLUTION RESPIRATORY (INHALATION) EVERY 6 HOURS PRN
Status: DISCONTINUED | OUTPATIENT
Start: 2018-06-12 | End: 2018-06-14 | Stop reason: HOSPADM

## 2018-06-12 RX ORDER — NALOXONE HCL 0.4 MG/ML
0.4 VIAL (ML) INJECTION
Status: DISCONTINUED | OUTPATIENT
Start: 2018-06-12 | End: 2018-06-12

## 2018-06-12 RX ORDER — ONDANSETRON 2 MG/ML
4 INJECTION INTRAMUSCULAR; INTRAVENOUS EVERY 6 HOURS PRN
Status: DISCONTINUED | OUTPATIENT
Start: 2018-06-12 | End: 2018-06-14 | Stop reason: HOSPADM

## 2018-06-12 RX ORDER — SODIUM CHLORIDE 0.9 % (FLUSH) 0.9 %
1-10 SYRINGE (ML) INJECTION AS NEEDED
Status: DISCONTINUED | OUTPATIENT
Start: 2018-06-12 | End: 2018-06-14 | Stop reason: HOSPADM

## 2018-06-12 RX ORDER — DEXTROSE MONOHYDRATE 25 G/50ML
25 INJECTION, SOLUTION INTRAVENOUS
Status: DISCONTINUED | OUTPATIENT
Start: 2018-06-12 | End: 2018-06-14 | Stop reason: HOSPADM

## 2018-06-12 RX ORDER — LEVETIRACETAM 500 MG/1
500 TABLET ORAL EVERY 12 HOURS SCHEDULED
Status: DISCONTINUED | OUTPATIENT
Start: 2018-06-12 | End: 2018-06-14 | Stop reason: HOSPADM

## 2018-06-12 RX ORDER — NICOTINE POLACRILEX 4 MG
15 LOZENGE BUCCAL
Status: DISCONTINUED | OUTPATIENT
Start: 2018-06-12 | End: 2018-06-14 | Stop reason: HOSPADM

## 2018-06-12 RX ORDER — SODIUM CHLORIDE 9 MG/ML
40 INJECTION, SOLUTION INTRAVENOUS AS NEEDED
Status: DISCONTINUED | OUTPATIENT
Start: 2018-06-12 | End: 2018-06-14 | Stop reason: HOSPADM

## 2018-06-12 RX ORDER — NALOXONE HCL 0.4 MG/ML
0.4 VIAL (ML) INJECTION
Status: DISCONTINUED | OUTPATIENT
Start: 2018-06-12 | End: 2018-06-14 | Stop reason: HOSPADM

## 2018-06-12 RX ADMIN — ENOXAPARIN SODIUM 40 MG: 100 INJECTION SUBCUTANEOUS at 17:41

## 2018-06-12 RX ADMIN — HYDROMORPHONE HYDROCHLORIDE 0.5 MG: 1 INJECTION, SOLUTION INTRAMUSCULAR; INTRAVENOUS; SUBCUTANEOUS at 16:35

## 2018-06-12 RX ADMIN — ALPRAZOLAM 2 MG: 0.25 TABLET ORAL at 15:53

## 2018-06-12 RX ADMIN — IOPAMIDOL 100 ML: 755 INJECTION, SOLUTION INTRAVENOUS at 18:30

## 2018-06-12 RX ADMIN — ALPRAZOLAM 2 MG: 0.25 TABLET ORAL at 23:33

## 2018-06-12 RX ADMIN — SODIUM CHLORIDE 100 ML/HR: 9 INJECTION, SOLUTION INTRAVENOUS at 15:55

## 2018-06-12 RX ADMIN — ONDANSETRON 4 MG: 2 INJECTION, SOLUTION INTRAMUSCULAR; INTRAVENOUS at 15:52

## 2018-06-12 RX ADMIN — HYDROMORPHONE HYDROCHLORIDE 1 MG: 1 INJECTION, SOLUTION INTRAMUSCULAR; INTRAVENOUS; SUBCUTANEOUS at 19:57

## 2018-06-12 RX ADMIN — LEVETIRACETAM 500 MG: 500 TABLET ORAL at 22:07

## 2018-06-12 RX ADMIN — HYDROMORPHONE HYDROCHLORIDE 0.5 MG: 1 INJECTION, SOLUTION INTRAMUSCULAR; INTRAVENOUS; SUBCUTANEOUS at 15:52

## 2018-06-12 RX ADMIN — HYDROMORPHONE HYDROCHLORIDE 1 MG: 1 INJECTION, SOLUTION INTRAMUSCULAR; INTRAVENOUS; SUBCUTANEOUS at 23:29

## 2018-06-12 RX ADMIN — DIATRIZOATE MEGLUMINE AND DIATRIZOATE SODIUM 30 ML: 600; 100 SOLUTION ORAL; RECTAL at 18:30

## 2018-06-12 NOTE — H&P
[facility] HOSPITALIST     Celeste Dill MD    CHIEF COMPLAINT: Abd pain    HISTORY OF PRESENT ILLNESS:    Pt is a 51 yo female PMH chronic pain sees pain management multiple other issues listed below presented to GI office today complaining of increased abd pain. She was see in ER over the weekend and dx with poss early mild pancreatitis but pain was controlled then and she was sent home on clear liquids. She went home but has not tolerated clears and had some continued abd pain and went for GI follow up were admission was requested for continued pain. Currently states her pain in abd is severe. She is nauseated but has not vomited. No fevers or chills.       Past Medical History:   Diagnosis Date   • Acid reflux    • Anemia    • Anxiety    • Atherosclerosis of aorta 2017   • Huitron's esophagus    • Bursitis    • CAD (coronary artery disease)    • Chronic pain disorder    • CVA (cerebrovascular accident)    • DDD (degenerative disc disease), cervical    • Diabetes mellitus    • History of transfusion    • Hypertension    • Joint pain    • Kienbock's disease    • Low back pain    • Pancreatitis    • Pulmonary fibrosis    • Reflux gastritis    • Scleroderma    • Seizure      Past Surgical History:   Procedure Laterality Date   • CAROTID ENDARTERECTOMY Left     Neurological   • CATARACT EXTRACTION Bilateral    •  SECTION     • CHOLECYSTECTOMY      Laparoscopic   • COLONOSCOPY      Complete   • COLONOSCOPY N/A 2016    Procedure: COLONOSCOPY;  Surgeon: Gaviota Hagen MD;  Location: [facility] OR;  Service:    • ENDOSCOPY N/A 2016    Procedure: ESOPHAGOGASTRODUODENOSCOPY WITH BIOPSIES;  Surgeon: Gaviota Hagen MD;  Location: [facility] OR;  Service:    • TONSILLECTOMY AND ADENOIDECTOMY     • UPPER GASTROINTESTINAL ENDOSCOPY      Diagnostic   • WRIST SURGERY Left      Family History   Problem Relation Age of Onset   • Other Mother         Cardiac Disorder   • Migraines Mother    •  Heart disease Mother    • Sudden death Mother    • Other Father         Cardiac Disorder   • Hypertension Father    • Heart disease Father    • Cancer Father    • Hypertension Sister    • Cancer Sister    • Migraines Daughter    • Cancer Other         Uncle   • Migraines Maternal Aunt    • Stroke Maternal Grandmother    • Stroke Maternal Grandfather      Social History   Substance Use Topics   • Smoking status: Current Every Day Smoker     Packs/day: 1.00   • Smokeless tobacco: Never Used   • Alcohol use No     Prescriptions Prior to Admission   Medication Sig Dispense Refill Last Dose   • ALPRAZolam (XANAX) 2 MG tablet Take 2 mg by mouth 3 (Three) Times a Day As Needed for anxiety.   6/12/2018 at 0930   • aspirin 81 MG EC tablet Take 1 tablet by mouth daily. 30 tablet 6 6/12/2018 at 0100   • docusate sodium (COLACE) 100 MG capsule Take 1 capsule by mouth 2 (Two) Times a Day. 60 capsule 0 6/12/2018 at 0930   • DULoxetine (CYMBALTA) 60 MG capsule Take 1 capsule by mouth Daily. 30 capsule 0 6/12/2018 at 0930   • gabapentin (NEURONTIN) 600 MG tablet take 1 tablet by mouth three times a day 90 tablet 3 6/12/2018 at 0930   • insulin glargine (LANTUS) 100 UNIT/ML injection INJECT 35 UNITS INTO THE SKIN DAILY FOR 90 DAYS   6/11/2018 at 2000   • LACTOBACILLUS EXTRA STRENGTH capsule Take 1 capsule by mouth Daily. 30 each 6 6/11/2018 at 2000   • levETIRAcetam (KEPPRA) 500 MG tablet Take 1 tablet by mouth Every 12 (Twelve) Hours. 60 tablet 0 6/12/2018 at 0930   • omeprazole (priLOSEC) 20 MG capsule Take 1 capsule by mouth 2 (Two) Times a Day. 60 capsule 0 6/12/2018 at 0930   • OxyCODONE HCl ER (oxyCONTIN) 30 MG tablet extended-release 12 hour Take 1 tablet by mouth Every 12 (Twelve) Hours. 60 tablet 0 6/11/2018 at 2200   • oxyCODONE-acetaminophen (PERCOCET) 7.5-325 MG per tablet Take 1 tablet by mouth Every 6 (Six) Hours As Needed for Severe Pain . 120 tablet 0 6/12/2018 at 0930   • Prenatal Vit-Fe Fumarate-FA (PRENATAL  "VITAMIN 27-0.8) 27-0.8 MG tablet tablet take 1 tablet by mouth once daily  0 6/12/2018 at 0000   • traZODone (DESYREL) 100 MG tablet    6/12/2018 at 0000   • furosemide (LASIX) 20 MG tablet Take 20 mg by mouth Daily As Needed.  0 More than a month at Unknown time   • Insulin Syringe 31G X 5/16\" 1 ML misc Use twice daily for shots 100 each 3 6/8/2018 at Unknown time   • PROAIR  (90 Base) MCG/ACT inhaler Inhale 2 puffs 2 (Two) Times a Day As Needed.  0 More than a month at Unknown time   • promethazine (PHENERGAN) 25 MG tablet take 1 tablet by mouth every 4 hours if needed for nausea  0 More than a month at Unknown time     Allergies:  Atorvastatin; Levofloxacin; Nsaids; Toradol [ketorolac tromethamine]; and Victoza  [liraglutide]    REVIEW OF SYSTEMS:  Please see the above history of present illness for pertinent positives and negatives.  The remainder of the patient's systems have been reviewed and are negative.     Vital Signs  Temp:  [97.5 °F (36.4 °C)-97.8 °F (36.6 °C)] 97.5 °F (36.4 °C)  Heart Rate:  [62-71] 62  Resp:  [16] 16  BP: (118-134)/(58-78) 118/58    Flowsheet Rows      First Filed Value   Admission Height  162.6 cm (64\") Documented at 06/12/2018 1416   Admission Weight  80.7 kg (178 lb) Documented at 06/12/2018 1416           Physical Exam:  Physical Exam   Constitutional: Patient appears well-developed and well-nourished and in no acute distress   HEENT:   Head: Normocephalic and atraumatic.   Eyes:  Pupils are equal, round, and reactive to light. EOM are intact. Sclera are anicteric and non-injected.  Mouth and Throat: Patient has moist mucous membranes. Oropharynx is clear of any erythema or exudate.     Neck: Neck supple. No JVD present. No thyromegaly present. No lymphadenopathy present.  Cardiovascular: Regular rate, regular rhythm, S1 normal and S2 normal.  Exam reveals no gallop and no friction rub.  No murmur heard.  Pulmonary/Chest: Lungs are clear to auscultation bilaterally. No " respiratory distress. No wheezes. No rhonchi. No rales.   Abdominal: Tender to palpate in the epigastric area  Musculoskeletal: Normal Muscle tone  Extremities: No edema. Pulses are palpable in all 4 extremities.  Neurological: Patient is alert and oriented to person, place, and time. Cranial nerves II-XII are grossly intact with no focal deficits.  Skin: Skin is warm. No rash noted. Nails show no clubbing.  No cyanosis or erythema.     Results Review:    I reviewed the patient's new clinical results.  Lab Results (most recent)     Procedure Component Value Units Date/Time    Lipase [[id number]]  (Normal) Collected:  06/12/18 1550    Specimen:  Blood Updated:  06/12/18 1613     Lipase 31 U/L     Comprehensive Metabolic Panel [[id number]]  (Abnormal) Collected:  06/12/18 1550    Specimen:  Blood Updated:  06/12/18 1613     Glucose 74 mg/dL      BUN 7 mg/dL      Creatinine 0.58 mg/dL      Sodium 128 (L) mmol/L      Potassium 4.4 mmol/L      Chloride 92 (L) mmol/L      CO2 24.6 mmol/L      Calcium 9.4 mg/dL      Total Protein 6.7 g/dL      Albumin 4.00 g/dL      ALT (SGPT) 16 U/L      AST (SGOT) 15 U/L      Alkaline Phosphatase 50 U/L      Total Bilirubin <0.2 (L) mg/dL      eGFR Non African Amer 108 mL/min/1.73      Globulin 2.7 gm/dL      A/G Ratio 1.5 g/dL      BUN/Creatinine Ratio 12.1     Anion Gap 11.4 mmol/L     CBC (No Diff) [[id number]]  (Abnormal) Collected:  06/12/18 1550    Specimen:  Blood Updated:  06/12/18 1555     WBC 13.38 (H) 10*3/mm3      RBC 4.30 10*6/mm3      Hemoglobin 13.5 g/dL      Hematocrit 39.1 %      MCV 90.9 fL      MCH 31.4 (H) pg      MCHC 34.5 g/dL      RDW 11.9 %      RDW-SD 39.7 fl      MPV 9.7 fL      Platelets 223 10*3/mm3           Imaging Results (most recent)     None            ECG/EMG Results (most recent)     None            Assessment/Plan     Abd pain poss d/t pancreatitis  -CT from 6/9/18 reviewed with mild acute pancreatitis of the pancreatic head  -GI consulted  -NPO w/ice  chips  -repeat CMP, lipase and CT abd  -pain control     Type II DM  -SSI q 6 while NPO    Chronic Anxiety  -pt reports hx of withdrawal seizures  -will give xanax and keppra po as long as she can tolerate, if vomits switch to IV    Chronic pain  -sees pain mngmt  -hold po while on IV meds     DVT ppx-Lovenox    I discussed the patients findings and my recommendations with patient.     Eugene Ojeda DO  06/12/18  4:32 PM

## 2018-06-12 NOTE — PROGRESS NOTES
PATIENT INFORMATION  Crystal Cruz       - 1965    CHIEF COMPLAINT  Chief Complaint   Patient presents with   • Constipation   • Diarrhea   • Abdominal Pain       HISTORY OF PRESENT ILLNESS  Constipation   Associated symptoms include abdominal pain, back pain and diarrhea.   Diarrhea    Associated symptoms include abdominal pain, arthralgias, coughing and myalgias.   Abdominal Pain   Associated symptoms include arthralgias, constipation, diarrhea and myalgias.     She is here in follow up from recent visit to ED. She presented with 4-5 day history of epigastric pain, radiating to the back.Pain is described as at times sharp and mostly a dull ache.  Some nausea but no vomiting. She had some constipation and took Mgcitrate which helped.   CT done :    TECHNIQUE:  Axial images were obtained through the abdomen and pelvis following oral  and IV contrast administration. Multiplanar reformats were obtained.  Radiation dose reduction techniques were utilized, including automated  exposure control and exposure modulation based on body size. 1 prior CT  scan and 0 cardiac nuclear medicine scans in the last 12 months.     ABDOMEN FINDINGS:  There is emphysema with fibrosis in the lung bases. Gallbladder  surgically absent. No biliary obstruction. Mild fat stranding noted  adjacent to the head of the pancreas suggests mild acute pancreatitis.  No pancreatic ductal dilatation. No fluid collection. Solid organs are  otherwise normal. No adenopathy or free fluid. There is atherosclerotic  disease. GI tract is normal.     PELVIS FINDINGS:  The GI tract, including the appendix, is within normal limits. Urinary  bladder is normal. Solid pelvic organs are normal. No free fluid.     IMPRESSION:  1. Mild acute pancreatitis involving the pancreatic head. No fluid  collection. Normal pancreas enhancement.  2. No other acute findings.    She is on oxycodone and percocet for chronic pain managed by pain doctor.    She has  been on clears for several days. She is drinking very little. Pain not controlled on her regimen.  She is very tearful in the office today in regards her requesting to be admitted for IV fluids and better pain management.  REVIEW OF SYSTEMS  Review of Systems   Constitutional: Positive for diaphoresis and unexpected weight change.   Eyes: Positive for photophobia.   Respiratory: Positive for cough.    Gastrointestinal: Positive for abdominal distention, abdominal pain, constipation and diarrhea.   Musculoskeletal: Positive for arthralgias, back pain and myalgias.   Neurological: Positive for dizziness, weakness and light-headedness.   Hematological: Bruises/bleeds easily.   Psychiatric/Behavioral: Positive for confusion. The patient is nervous/anxious.    All other systems reviewed and are negative.        ACTIVE PROBLEMS  Patient Active Problem List    Diagnosis   • Abdominal pain [R10.9]   • Atherosclerosis of aorta [I70.0]   • Vitamin D deficiency [E55.9]   • Encounter for long-term (current) use of high-risk medication [Z79.899]   • Acquired hypothyroidism [E03.9]   • PRES (posterior reversible encephalopathy syndrome) [I67.83]   • Aftercare [Z51.89]   • Seizure [R56.9]   • Elevated troponin [R74.8]   • Intractable vomiting with nausea [R11.2]   • Arthritis [M19.90]     Overview Note:     Overview:   BACK     • Muscle pain [M79.1]   • Controlled type 2 diabetes mellitus without complication [E11.9]   • Chronic pain [G89.29]   • Benign essential hypertension [I10]   • Acute pancreatitis [K85.90]   • Kienbock disease, 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]   • Acute upper respiratory infection [J06.9]   • Anxiety disorder [F41.9]   • Huitron's esophagus [K22.70]   • Knee pain [M25.569]   • Chronic constipation [K59.09]   • Chronic maxillary sinusitis [J32.0]   • Diabetes mellitus [E11.9]   • Arthropathy of lumbar facet joint [M12.88]   • Fatigue [R53.83]   • Gastroesophageal reflux disease without esophagitis [K21.9]   • Hyperlipidemia  [E78.5]   • 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]   • Discharge from nipple [N64.52]   • Pneumonitis [J18.9]   • Systemic sclerosis [M34.9]   • Pain of upper extremity [M79.603]   • Stenosis of carotid artery [I65.29]   • Hypertension [I10]   • Obstruction of carotid artery [I65.29]   • Tobacco use [Z72.0]         PAST MEDICAL HISTORY  Past Medical History:   Diagnosis Date   • Acid reflux    • Anemia    • Anxiety    • Atherosclerosis of aorta 2017   • Huitron's esophagus    • Bursitis    • CAD (coronary artery disease)    • Chronic pain disorder    • CVA (cerebrovascular accident)    • DDD (degenerative disc disease), cervical    • Diabetes mellitus    • History of transfusion    • Hypertension    • Joint pain    • Kienbock's disease    • Low back pain    • Pancreatitis    • Pulmonary fibrosis    • Reflux gastritis    • Scleroderma    • Seizure          SURGICAL HISTORY  Past Surgical History:   Procedure Laterality Date   • CAROTID ENDARTERECTOMY Left     Neurological   • CATARACT EXTRACTION Bilateral    •  SECTION     • CHOLECYSTECTOMY      Laparoscopic   • COLONOSCOPY      Complete   • COLONOSCOPY N/A 2016    Procedure: COLONOSCOPY;  Surgeon: Gaviota Hagen MD;  Location: [facility];  Service:    • ENDOSCOPY N/A 2016    Procedure: ESOPHAGOGASTRODUODENOSCOPY WITH BIOPSIES;  Surgeon: Gaviota Hagen MD;  Location: [facility];  Service:    • TONSILLECTOMY AND ADENOIDECTOMY     • UPPER GASTROINTESTINAL ENDOSCOPY      Diagnostic   • WRIST SURGERY Left          FAMILY HISTORY  Family History   Problem Relation Age of Onset   • Other Mother         Cardiac Disorder   • Migraines Mother    • Heart disease Mother    • Sudden death Mother    • Other Father         Cardiac Disorder   • Hypertension Father    • Heart disease Father    • Cancer Father    • Hypertension Sister    • Cancer Sister    • Migraines Daughter    • Cancer Other         Uncle   • Migraines Maternal Aunt    •  "Stroke Maternal Grandmother    • Stroke Maternal Grandfather          SOCIAL HISTORY  Social History     Occupational History   • Not on file.     Social History Main Topics   • Smoking status: Current Every Day Smoker     Packs/day: 1.00   • Smokeless tobacco: Never Used   • Alcohol use No   • Drug use: No   • Sexual activity: Defer      Comment: EXERCISE - RARELY         CURRENT MEDICATIONS    Current Outpatient Prescriptions:   •  ALPRAZolam (XANAX) 2 MG tablet, Take 2 mg by mouth 3 (Three) Times a Day As Needed for anxiety., Disp: , Rfl:   •  aspirin 81 MG EC tablet, Take 1 tablet by mouth daily., Disp: 30 tablet, Rfl: 6  •  docusate sodium (COLACE) 100 MG capsule, Take 1 capsule by mouth 2 (Two) Times a Day., Disp: 60 capsule, Rfl: 0  •  DULoxetine (CYMBALTA) 60 MG capsule, Take 1 capsule by mouth Daily., Disp: 30 capsule, Rfl: 0  •  furosemide (LASIX) 20 MG tablet, Take 20 mg by mouth Daily As Needed., Disp: , Rfl: 0  •  gabapentin (NEURONTIN) 600 MG tablet, take 1 tablet by mouth three times a day, Disp: 90 tablet, Rfl: 3  •  insulin glargine (LANTUS) 100 UNIT/ML injection, INJECT 35 UNITS INTO THE SKIN DAILY FOR 90 DAYS, Disp: , Rfl:   •  Insulin Syringe 31G X 5/16\" 1 ML misc, Use twice daily for shots, Disp: 100 each, Rfl: 3  •  LACTOBACILLUS EXTRA STRENGTH capsule, Take 1 capsule by mouth Daily., Disp: 30 each, Rfl: 6  •  levETIRAcetam (KEPPRA) 500 MG tablet, Take 1 tablet by mouth Every 12 (Twelve) Hours., Disp: 60 tablet, Rfl: 0  •  nicotine (NICODERM CQ) 21 MG/24HR patch, Place 1 patch on the skin Daily., Disp: 30 patch, Rfl: 0  •  omeprazole (priLOSEC) 20 MG capsule, Take 1 capsule by mouth 2 (Two) Times a Day., Disp: 60 capsule, Rfl: 0  •  OxyCODONE HCl ER (oxyCONTIN) 30 MG tablet extended-release 12 hour, Take 1 tablet by mouth Every 12 (Twelve) Hours., Disp: 60 tablet, Rfl: 0  •  oxyCODONE-acetaminophen (PERCOCET) 7.5-325 MG per tablet, Take 1 tablet by mouth Every 6 (Six) Hours As Needed for Severe " "Pain ., Disp: 120 tablet, Rfl: 0  •  Prenatal Vit-Fe Fumarate-FA (PRENATAL VITAMIN 27-0.8) 27-0.8 MG tablet tablet, take 1 tablet by mouth once daily, Disp: , Rfl: 0  •  PROAIR  (90 Base) MCG/ACT inhaler, Inhale 2 puffs 2 (Two) Times a Day As Needed., Disp: , Rfl: 0  •  promethazine (PHENERGAN) 25 MG tablet, take 1 tablet by mouth every 4 hours if needed for nausea, Disp: , Rfl: 0  •  traZODone (DESYREL) 100 MG tablet, , Disp: , Rfl:     ALLERGIES  Atorvastatin; Levofloxacin; Nsaids; Toradol [ketorolac tromethamine]; and Victoza  [liraglutide]    VITALS  Vitals:    06/12/18 1302   BP: 134/78   Weight: 81.1 kg (178 lb 12.8 oz)   Height: 160 cm (62.99\")       LAST RESULTS   Admission on 06/08/2018, Discharged on 06/09/2018   Component Date Value Ref Range Status   • Glucose 06/08/2018 134* 65 - 99 mg/dL Final   • BUN 06/08/2018 10  6 - 20 mg/dL Final   • Creatinine 06/08/2018 0.78  0.57 - 1.00 mg/dL Final   • Sodium 06/08/2018 129* 136 - 145 mmol/L Final   • Potassium 06/08/2018 4.5  3.5 - 5.2 mmol/L Final   • Chloride 06/08/2018 92* 98 - 107 mmol/L Final   • CO2 06/08/2018 23.6  22.0 - 29.0 mmol/L Final   • Calcium 06/08/2018 9.3  8.6 - 10.5 mg/dL Final   • Total Protein 06/08/2018 7.5  6.0 - 8.5 g/dL Final   • Albumin 06/08/2018 4.30  3.50 - 5.20 g/dL Final   • ALT (SGPT) 06/08/2018 15  5 - 33 U/L Final   • AST (SGOT) 06/08/2018 14  5 - 32 U/L Final   • Alkaline Phosphatase 06/08/2018 58  40 - 129 U/L Final   • Total Bilirubin 06/08/2018 0.2  0.2 - 1.2 mg/dL Final   • eGFR Non  Amer 06/08/2018 77  >60 mL/min/1.73 Final   • Globulin 06/08/2018 3.2  gm/dL Final   • A/G Ratio 06/08/2018 1.3  g/dL Final   • BUN/Creatinine Ratio 06/08/2018 12.8  7.0 - 25.0 Final   • Anion Gap 06/08/2018 13.4  mmol/L Final   • Lipase 06/08/2018 139* 13 - 60 U/L Final   • Color, UA 06/08/2018 Yellow  Yellow, Straw Final   • Appearance, UA 06/08/2018 Clear  Clear Final   • pH, UA 06/08/2018 6.5  4.5 - 8.0 Final   • Specific " Gravity, UA 06/08/2018 1.010  1.003 - 1.030 Final   • Glucose, UA 06/08/2018 Negative  Negative Final   • Ketones, UA 06/08/2018 Negative  Negative, 80 mg/dL (3+), >=160 mg/dL (4+) Final   • Bilirubin, UA 06/08/2018 Negative  Negative Final   • Blood, UA 06/08/2018 Negative  Negative Final   • Protein, UA 06/08/2018 Negative  Negative Final   • Leuk Esterase, UA 06/08/2018 Negative  Negative Final   • Nitrite, UA 06/08/2018 Negative  Negative Final   • Urobilinogen, UA 06/08/2018 0.2 E.U./dL  0.2 - 1.0 E.U./dL Final   • Troponin T 06/08/2018 <0.010  0.000 - 0.030 ng/mL Final   • WBC 06/08/2018 13.53* 4.80 - 10.80 10*3/mm3 Final   • RBC 06/08/2018 4.85  4.20 - 5.40 10*6/mm3 Final   • Hemoglobin 06/08/2018 15.1  12.0 - 16.0 g/dL Final   • Hematocrit 06/08/2018 44.2  37.0 - 47.0 % Final   • MCV 06/08/2018 91.1  81.0 - 99.0 fL Final   • MCH 06/08/2018 31.1* 27.0 - 31.0 pg Final   • MCHC 06/08/2018 34.2  31.0 - 37.0 g/dL Final   • RDW 06/08/2018 12.0  11.5 - 14.5 % Final   • RDW-SD 06/08/2018 40.0  37.0 - 54.0 fl Final   • MPV 06/08/2018 10.3  7.4 - 10.4 fL Final   • Platelets 06/08/2018 294  140 - 500 10*3/mm3 Final   • Neutrophil % 06/08/2018 64.1  45.0 - 70.0 % Final   • Lymphocyte % 06/08/2018 24.7  20.0 - 45.0 % Final   • Monocyte % 06/08/2018 7.3  3.0 - 8.0 % Final   • Eosinophil % 06/08/2018 2.9  0.0 - 4.0 % Final   • Basophil % 06/08/2018 0.7  0.0 - 2.0 % Final   • Immature Grans % 06/08/2018 0.3  0.0 - 0.5 % Final   • Neutrophils, Absolute 06/08/2018 8.68* 1.50 - 8.30 10*3/mm3 Final   • Lymphocytes, Absolute 06/08/2018 3.34  0.60 - 4.80 10*3/mm3 Final   • Monocytes, Absolute 06/08/2018 0.99  0.00 - 1.00 10*3/mm3 Final   • Eosinophils, Absolute 06/08/2018 0.39* 0.10 - 0.30 10*3/mm3 Final   • Basophils, Absolute 06/08/2018 0.09  0.00 - 0.20 10*3/mm3 Final   • Immature Grans, Absolute 06/08/2018 0.04* 0.00 - 0.03 10*3/mm3 Final   • nRBC 06/08/2018 0.0  0.0 - 0.0 /100 WBC Final     Ct Abdomen Pelvis With  Contrast    Result Date: 6/13/2018  Narrative: CT ABDOMEN PELVIS  HISTORY: Epigastric pain for several days with history of pancreatitis. Nausea.  COMPARISON: 6/9/2018  TECHNIQUE: Axial images were obtained through the abdomen and pelvis following oral and IV contrast administration. Multiplanar reformats were obtained. Radiation dose reduction techniques were utilized, including automated exposure control and exposure modulation based on body size. 2 prior CT scans and 0 cardiac nuclear medicine scans in the last 12 months.  ABDOMEN FINDINGS: Emphysema and fibrosis again noted in the lung bases. Gallbladder surgically absent. No biliary obstruction. Fat stranding adjacent to the head of the pancreas is slightly improved suggesting improving pancreatitis. Pancreas enhances normally. No fluid collections. Left adrenal gland enlargement is stable since 5/3/2016. Solid organs are otherwise normal. No free fluid or adenopathy. GI tract is normal.  PELVIS FINDINGS: The appendix is normal, as is the remainder of the GI tract. Urinary bladder is normal. Solid pelvic organs are normal. No free fluid.      Impression: 1. Improved but persistent mild pancreatitis involving the head. 2. Otherwise no change.  This report was finalized on 6/13/2018 9:33 AM by Dr. Angel Duffy MD.      Ct Abdomen Pelvis With Contrast    Result Date: 6/9/2018  Narrative: CT ABDOMEN PELVIS  HISTORY: Epigastric pain for the last 6 days. Prior history of pancreatitis and ileus. Leukocytosis.  COMPARISON: 5/3/2016  TECHNIQUE: Axial images were obtained through the abdomen and pelvis following oral and IV contrast administration. Multiplanar reformats were obtained. Radiation dose reduction techniques were utilized, including automated exposure control and exposure modulation based on body size. 1 prior CT scan and 0 cardiac nuclear medicine scans in the last 12 months.  ABDOMEN FINDINGS: There is emphysema with fibrosis in the lung bases.  Gallbladder surgically absent. No biliary obstruction. Mild fat stranding noted adjacent to the head of the pancreas suggests mild acute pancreatitis. No pancreatic ductal dilatation. No fluid collection. Solid organs are otherwise normal. No adenopathy or free fluid. There is atherosclerotic disease. GI tract is normal.  PELVIS FINDINGS: The GI tract, including the appendix, is within normal limits. Urinary bladder is normal. Solid pelvic organs are normal. No free fluid.      Impression: 1. Mild acute pancreatitis involving the pancreatic head. No fluid collection. Normal pancreas enhancement. 2. No other acute findings.  A preliminary report was provided by Dr. Lozano at 0140 hours on 6/9/2018.  This report was finalized on 6/9/2018 7:11 AM by Dr. Angel Duffy MD.        PHYSICAL EXAM  Physical Exam   Constitutional: She is oriented to person, place, and time. She appears well-developed and well-nourished. No distress.   HENT:   Head: Normocephalic and atraumatic.   Mouth/Throat: Oropharynx is clear and moist.   Eyes: EOM are normal. Pupils are equal, round, and reactive to light.   Neck: Normal range of motion. No tracheal deviation present.   Cardiovascular: Normal rate, regular rhythm, normal heart sounds and intact distal pulses.  Exam reveals no gallop and no friction rub.    No murmur heard.  Pulmonary/Chest: Effort normal and breath sounds normal. No stridor. No respiratory distress. She has no wheezes. She has no rales. She exhibits no tenderness.   Abdominal: Soft. Bowel sounds are normal. She exhibits no distension. There is tenderness. There is no rebound and no guarding.   Epigastric and luq pain, no rebound or guarding   Musculoskeletal: She exhibits no edema.   Lymphadenopathy:     She has no cervical adenopathy.   Neurological: She is alert and oriented to person, place, and time.   Skin: Skin is warm. She is not diaphoretic.   Psychiatric: She has a normal mood and affect. Her behavior is  normal. Judgment and thought content normal.   Nursing note and vitals reviewed.      ASSESSMENT  Diagnoses and all orders for this visit:    Chronic constipation    Acute pancreatitis, unspecified complication status, unspecified pancreatitis type          PLAN  No Follow-up on file.    Spoke with Dr. Ojeda the hospitalist on today.  He is agreeable to admit the patient to the hospital.  Instructions were provided to MsYassine Aarti and she is headed down to ER admissions.  We'll follow her in the hospital.

## 2018-06-13 LAB
ALBUMIN SERPL-MCNC: 3.9 G/DL (ref 3.5–5.2)
ALBUMIN/GLOB SERPL: 1.4 G/DL
ALP SERPL-CCNC: 44 U/L (ref 40–129)
ALT SERPL W P-5'-P-CCNC: 16 U/L (ref 5–33)
ANION GAP SERPL CALCULATED.3IONS-SCNC: 11.2 MMOL/L
AST SERPL-CCNC: 21 U/L (ref 5–32)
BILIRUB SERPL-MCNC: 0.2 MG/DL (ref 0.2–1.2)
BUN BLD-MCNC: 4 MG/DL (ref 6–20)
BUN/CREAT SERPL: 7.1 (ref 7–25)
CALCIUM SPEC-SCNC: 9 MG/DL (ref 8.6–10.5)
CHLORIDE SERPL-SCNC: 99 MMOL/L (ref 98–107)
CO2 SERPL-SCNC: 24.8 MMOL/L (ref 22–29)
CREAT BLD-MCNC: 0.56 MG/DL (ref 0.57–1)
DEPRECATED RDW RBC AUTO: 41.3 FL (ref 37–54)
ERYTHROCYTE [DISTWIDTH] IN BLOOD BY AUTOMATED COUNT: 12 % (ref 11.5–14.5)
GFR SERPL CREATININE-BSD FRML MDRD: 113 ML/MIN/1.73
GLOBULIN UR ELPH-MCNC: 2.8 GM/DL
GLUCOSE BLD-MCNC: 107 MG/DL (ref 65–99)
GLUCOSE BLDC GLUCOMTR-MCNC: 145 MG/DL (ref 70–130)
GLUCOSE BLDC GLUCOMTR-MCNC: 155 MG/DL (ref 70–130)
GLUCOSE BLDC GLUCOMTR-MCNC: 155 MG/DL (ref 70–130)
GLUCOSE BLDC GLUCOMTR-MCNC: 182 MG/DL (ref 70–130)
GLUCOSE BLDC GLUCOMTR-MCNC: 46 MG/DL (ref 70–130)
GLUCOSE BLDC GLUCOMTR-MCNC: 52 MG/DL (ref 70–130)
GLUCOSE BLDC GLUCOMTR-MCNC: 56 MG/DL (ref 70–130)
GLUCOSE BLDC GLUCOMTR-MCNC: 95 MG/DL (ref 70–130)
GLUCOSE BLDC GLUCOMTR-MCNC: 97 MG/DL (ref 70–130)
HCT VFR BLD AUTO: 41.3 % (ref 37–47)
HGB BLD-MCNC: 13.9 G/DL (ref 12–16)
MCH RBC QN AUTO: 31.3 PG (ref 27–31)
MCHC RBC AUTO-ENTMCNC: 33.7 G/DL (ref 31–37)
MCV RBC AUTO: 93 FL (ref 81–99)
PLATELET # BLD AUTO: 237 10*3/MM3 (ref 140–500)
PMV BLD AUTO: 9.8 FL (ref 7.4–10.4)
POTASSIUM BLD-SCNC: 4.2 MMOL/L (ref 3.5–5.2)
PROT SERPL-MCNC: 6.7 G/DL (ref 6–8.5)
RBC # BLD AUTO: 4.44 10*6/MM3 (ref 4.2–5.4)
SODIUM BLD-SCNC: 135 MMOL/L (ref 136–145)
WBC NRBC COR # BLD: 9.08 10*3/MM3 (ref 4.8–10.8)

## 2018-06-13 PROCEDURE — 96361 HYDRATE IV INFUSION ADD-ON: CPT

## 2018-06-13 PROCEDURE — 85027 COMPLETE CBC AUTOMATED: CPT | Performed by: INTERNAL MEDICINE

## 2018-06-13 PROCEDURE — 96376 TX/PRO/DX INJ SAME DRUG ADON: CPT

## 2018-06-13 PROCEDURE — 25010000002 HYDROMORPHONE PER 4 MG

## 2018-06-13 PROCEDURE — G0378 HOSPITAL OBSERVATION PER HR: HCPCS

## 2018-06-13 PROCEDURE — 80053 COMPREHEN METABOLIC PANEL: CPT | Performed by: INTERNAL MEDICINE

## 2018-06-13 PROCEDURE — 25010000002 ENOXAPARIN PER 10 MG: Performed by: INTERNAL MEDICINE

## 2018-06-13 PROCEDURE — 96372 THER/PROPH/DIAG INJ SC/IM: CPT

## 2018-06-13 PROCEDURE — 99224 PR SBSQ OBSERVATION CARE/DAY 15 MINUTES: CPT | Performed by: HOSPITALIST

## 2018-06-13 PROCEDURE — 82962 GLUCOSE BLOOD TEST: CPT

## 2018-06-13 PROCEDURE — 25010000002 HYDROMORPHONE PER 4 MG: Performed by: INTERNAL MEDICINE

## 2018-06-13 PROCEDURE — 99213 OFFICE O/P EST LOW 20 MIN: CPT | Performed by: INTERNAL MEDICINE

## 2018-06-13 PROCEDURE — 25010000002 ONDANSETRON PER 1 MG: Performed by: INTERNAL MEDICINE

## 2018-06-13 RX ORDER — DEXTROSE MONOHYDRATE 50 MG/ML
INJECTION, SOLUTION INTRAVENOUS
Status: COMPLETED
Start: 2018-06-13 | End: 2018-06-13

## 2018-06-13 RX ORDER — DEXTROSE MONOHYDRATE 50 MG/ML
100 INJECTION, SOLUTION INTRAVENOUS CONTINUOUS
Status: DISCONTINUED | OUTPATIENT
Start: 2018-06-13 | End: 2018-06-14 | Stop reason: HOSPADM

## 2018-06-13 RX ORDER — NICOTINE 21 MG/24HR
1 PATCH, TRANSDERMAL 24 HOURS TRANSDERMAL EVERY 24 HOURS
Status: DISCONTINUED | OUTPATIENT
Start: 2018-06-13 | End: 2018-06-14 | Stop reason: HOSPADM

## 2018-06-13 RX ADMIN — ALPRAZOLAM 2 MG: 0.25 TABLET ORAL at 09:13

## 2018-06-13 RX ADMIN — ENOXAPARIN SODIUM 40 MG: 100 INJECTION SUBCUTANEOUS at 16:07

## 2018-06-13 RX ADMIN — DEXTROSE MONOHYDRATE 25 G: 25 INJECTION, SOLUTION INTRAVENOUS at 05:18

## 2018-06-13 RX ADMIN — LEVETIRACETAM 500 MG: 500 TABLET ORAL at 09:13

## 2018-06-13 RX ADMIN — HYDROMORPHONE HYDROCHLORIDE 1 MG: 1 INJECTION, SOLUTION INTRAMUSCULAR; INTRAVENOUS; SUBCUTANEOUS at 09:33

## 2018-06-13 RX ADMIN — HYDROMORPHONE HYDROCHLORIDE 1 MG: 1 INJECTION, SOLUTION INTRAMUSCULAR; INTRAVENOUS; SUBCUTANEOUS at 22:31

## 2018-06-13 RX ADMIN — HYDROMORPHONE HYDROCHLORIDE 1 MG: 1 INJECTION, SOLUTION INTRAMUSCULAR; INTRAVENOUS; SUBCUTANEOUS at 06:23

## 2018-06-13 RX ADMIN — LEVETIRACETAM 500 MG: 500 TABLET ORAL at 21:28

## 2018-06-13 RX ADMIN — HYDROMORPHONE HYDROCHLORIDE 1 MG: 1 INJECTION, SOLUTION INTRAMUSCULAR; INTRAVENOUS; SUBCUTANEOUS at 19:18

## 2018-06-13 RX ADMIN — NICOTINE 1 PATCH: 21 PATCH TRANSDERMAL at 19:20

## 2018-06-13 RX ADMIN — DEXTROSE MONOHYDRATE 100 ML/HR: 50 INJECTION, SOLUTION INTRAVENOUS at 16:03

## 2018-06-13 RX ADMIN — ALPRAZOLAM 2 MG: 0.25 TABLET ORAL at 21:28

## 2018-06-13 RX ADMIN — SODIUM CHLORIDE 100 ML/HR: 9 INJECTION, SOLUTION INTRAVENOUS at 02:31

## 2018-06-13 RX ADMIN — HYDROMORPHONE HYDROCHLORIDE 1 MG: 1 INJECTION, SOLUTION INTRAMUSCULAR; INTRAVENOUS; SUBCUTANEOUS at 12:52

## 2018-06-13 RX ADMIN — HYDROMORPHONE HYDROCHLORIDE 1 MG: 1 INJECTION, SOLUTION INTRAMUSCULAR; INTRAVENOUS; SUBCUTANEOUS at 02:30

## 2018-06-13 RX ADMIN — ONDANSETRON 4 MG: 2 INJECTION, SOLUTION INTRAMUSCULAR; INTRAVENOUS at 05:38

## 2018-06-13 RX ADMIN — DEXTROSE MONOHYDRATE 25 G: 25 INJECTION, SOLUTION INTRAVENOUS at 00:36

## 2018-06-13 RX ADMIN — HYDROMORPHONE HYDROCHLORIDE 1 MG: 1 INJECTION, SOLUTION INTRAMUSCULAR; INTRAVENOUS; SUBCUTANEOUS at 16:01

## 2018-06-13 RX ADMIN — DEXTROSE MONOHYDRATE 100 ML/HR: 50 INJECTION, SOLUTION INTRAVENOUS at 06:00

## 2018-06-13 NOTE — PLAN OF CARE
Problem: Patient Care Overview  Goal: Plan of Care Review  Outcome: Ongoing (interventions implemented as appropriate)   06/13/18 1536   Coping/Psychosocial   Plan of Care Reviewed With patient   OTHER   Outcome Summary diet advanced to clears/denies nausea,continues with some abdominal cramping , small liquid green stools,   Plan of Care Review   Progress improving     Goal: Individualization and Mutuality  Outcome: Ongoing (interventions implemented as appropriate)    Goal: Discharge Needs Assessment  Outcome: Ongoing (interventions implemented as appropriate)      Problem: Fall Risk (Adult)  Goal: Identify Related Risk Factors and Signs and Symptoms  Outcome: Outcome(s) achieved Date Met: 06/13/18    Goal: Absence of Fall  Outcome: Ongoing (interventions implemented as appropriate)      Problem: Skin Injury Risk (Adult)  Goal: Identify Related Risk Factors and Signs and Symptoms  Outcome: Outcome(s) achieved Date Met: 06/13/18    Goal: Skin Health and Integrity  Outcome: Ongoing (interventions implemented as appropriate)      Problem: Pancreatitis, Acute/Chronic (Adult)  Goal: Signs and Symptoms of Listed Potential Problems Will be Absent, Minimized or Managed (Pancreatitis, Acute/Chronic)  Outcome: Ongoing (interventions implemented as appropriate)      Problem: Pain, Acute (Adult)  Goal: Identify Related Risk Factors and Signs and Symptoms  Outcome: Outcome(s) achieved Date Met: 06/13/18    Goal: Acceptable Pain Control/Comfort Level  Outcome: Ongoing (interventions implemented as appropriate)

## 2018-06-13 NOTE — NURSING NOTE
Discharge Planning Assessment  [facility]     Patient Name: Crystal Cruz  MRN: [id number]  Today's Date: 6/13/2018    Admit Date: 6/12/2018          Discharge Needs Assessment     Row Name 06/13/18 0945       Living Environment    Lives With spouse    Name(s) of Who Lives With Patient Juan Cruz    Current Living Arrangements home/apartment/condo    Primary Care Provided by spouse/significant other    Provides Primary Care For no one, unable/limited ability to care for self    Family Caregiver if Needed spouse    Quality of Family Relationships supportive;involved;helpful    Able to Return to Prior Arrangements yes       Resource/Environmental Concerns    Resource/Environmental Concerns none       Transition Planning    Patient/Family Anticipates Transition to home    Patient/Family Anticipated Services at Transition none    Transportation Anticipated family or friend will provide       Discharge Needs Assessment    Readmission Within the Last 30 Days no previous admission in last 30 days    Concerns to be Addressed no discharge needs identified    Equipment Currently Used at Home walker, standard;glucometer    Anticipated Changes Related to Illness none    Equipment Needed After Discharge none            Discharge Plan     Row Name 06/13/18 0958       Plan    Plan Home when stable    Patient/Family in Agreement with Plan yes    Plan Comments Spoke with Mrs Cruz at bedside.  She and her  live in an apartment in [place].  Facesheet verified.  She has not had home health in the past.  Her equipment is a glucometer and a walker that she states she doesn't use. She is independent with her ADL's but her  does all of the driving and caring for the household.  [facility] [place].  She states that she has good insurance for her prescriptions.   She is on disability and receives $720 per month after her deductions.  They have section 8 housing and their rent is $128/month now.   She says the finances are ok but her  does not work or have any income.  She does not have an advanced directive and has no interest in such.  Plan is home when stable.  Will continue to follow        Destination     No service coordination in this encounter.      Durable Medical Equipment     No service coordination in this encounter.      Dialysis/Infusion     No service coordination in this encounter.      Home Medical Care     No service coordination in this encounter.      Social Care     No service coordination in this encounter.                Demographic Summary     Row Name 06/13/18 0944       General Information    Arrived From home    Referral Source admission list    Reason for Consult discharge planning    Preferred Language English     Used During This Interaction no       Contact Information    Permission Granted to Share Info With             Functional Status    No documentation.           Psychosocial    No documentation.           Abuse/Neglect    No documentation.           Legal    No documentation.           Substance Abuse    No documentation.           Patient Forms    No documentation.         Leonor Szymanski RN

## 2018-06-13 NOTE — PROGRESS NOTES
"Hospitalist Team      Patient Care Team:  Celeste Dill MD as PCP - General  Celeste Dill MD as PCP - Family Medicine  MELVI Cook as PCP - Claims Attributed  Adam Rangel III, MD as Consulting Physician (Otolaryngology)        Chief Complaint:  Abdominal pain/ Acute pancreatitis    Subjective    Interval History and ROS:     Patient States pain somewhat better but severe at night when she tries to go to sleep  Patient Complaints: Nocturnal pain/ worsens/ wants to know if I will increase her pain medication at night  Patient Denies:  Nausea and vomiting today  History taken from: patient      Objective    Vital Signs  Temp:  [97.5 °F (36.4 °C)-98.2 °F (36.8 °C)] 98.2 °F (36.8 °C)  Heart Rate:  [69-87] 69  Resp:  [16-18] 18  BP: (117-144)/(63-69) 144/68  Oxygen Therapy  SpO2: 95 %  Device (Oxygen Therapy): room air}  Flowsheet Rows      First Filed Value   Admission Height  162.6 cm (64\") Documented at 06/12/2018 1416   Admission Weight  80.7 kg (178 lb) Documented at 06/12/2018 1416        Body mass index is 30.54 kg/m².      Physical Exam:  Physical Exam   Constitutional: Patient appears well-developed and well-nourished and in no acute distress   HEENT:   Head: Normocephalic and atraumatic.   Eyes:  Pupils are equal, round, and reactive to light. EOM are intact. Sclera are anicteric and non-injected.  Mouth and Throat: Patient has moist mucous membranes. Oropharynx is clear of any erythema or exudate.     Neck: Neck supple. No JVD present. No thyromegaly present. No lymphadenopathy present.  Cardiovascular: Regular rate, regular rhythm, S1 normal and S2 normal.  Exam reveals no gallop and no friction rub.  No murmur heard.  Pulmonary/Chest: Lungs are clear to auscultation bilaterally. No respiratory distress. No wheezes. No rhonchi. No rales.   Abdominal: Soft. Bowel sounds are normal. No distension and no mass. There is no hepatosplenomegaly. There is no tenderness.   Musculoskeletal: Normal Muscle " tone  Extremities: No edema. Pulses are palpable in all 4 extremities.  Neurological: Patient is alert and oriented to person, place, and time. Cranial nerves II-XII are grossly intact with no focal deficits.  Skin: Skin is warm. No rash noted. Nails show no clubbing.  No cyanosis or erythema.         Results Review:     I reviewed the patient's new clinical results.    Lab Results (last 24 hours)     Procedure Component Value Units Date/Time    POC Glucose Once [[id number]]  (Normal) Collected:  06/13/18 1206    Specimen:  Blood Updated:  06/13/18 1212     Glucose 97 mg/dL     Narrative:       Meter: [id number] : [id number] Cathie Lockett Staff RN Validator    POC Glucose Once [[id number]]  (Normal) Collected:  06/13/18 0931    Specimen:  Blood Updated:  06/13/18 0944     Glucose 95 mg/dL     Narrative:       Meter: [id number] : [id number] Cathie Lockett Staff RN Validator    Comprehensive Metabolic Panel [[id number]]  (Abnormal) Collected:  06/13/18 0519    Specimen:  Blood Updated:  06/13/18 0548     Glucose 107 (H) mg/dL      BUN 4 (L) mg/dL      Creatinine 0.56 (L) mg/dL      Sodium 135 (L) mmol/L      Potassium 4.2 mmol/L      Chloride 99 mmol/L      CO2 24.8 mmol/L      Calcium 9.0 mg/dL      Total Protein 6.7 g/dL      Albumin 3.90 g/dL      ALT (SGPT) 16 U/L      AST (SGOT) 21 U/L      Alkaline Phosphatase 44 U/L      Total Bilirubin 0.2 mg/dL      eGFR Non African Amer 113 mL/min/1.73      Globulin 2.8 gm/dL      A/G Ratio 1.4 g/dL      BUN/Creatinine Ratio 7.1     Anion Gap 11.2 mmol/L     POC Glucose Once [[id number]]  (Abnormal) Collected:  06/13/18 0537    Specimen:  Blood Updated:  06/13/18 0548     Glucose 182 (H) mg/dL     Narrative:       Meter: [id number] : [id number] Felipe Gusman RN    CBC (No Diff) [[id number]]  (Abnormal) Collected:  06/13/18 0519    Specimen:  Blood Updated:  06/13/18 0531     WBC 9.08 10*3/mm3      RBC 4.44 10*6/mm3      Hemoglobin 13.9 g/dL      Hematocrit 41.3 %       MCV 93.0 fL      MCH 31.3 (H) pg      MCHC 33.7 g/dL      RDW 12.0 %      RDW-SD 41.3 fl      MPV 9.8 fL      Platelets 237 10*3/mm3     POC Glucose Once [[id number]]  (Abnormal) Collected:  06/13/18 0513    Specimen:  Blood Updated:  06/13/18 0519     Glucose 52 (L) mg/dL     Narrative:       Meter: [id number] : [id number] Felipe Gusman RN    POC Glucose Once [[id number]]  (Abnormal) Collected:  06/13/18 0506    Specimen:  Blood Updated:  06/13/18 0514     Glucose 46 (C) mg/dL     Narrative:       Critical repeat  Verify with Lab Treated Patient Meter: [id number] : [id number]    POC Glucose Once [[id number]]  (Abnormal) Collected:  06/13/18 0108    Specimen:  Blood Updated:  06/13/18 0116     Glucose 155 (H) mg/dL     Narrative:       Meter: [id number] : [id number] Marcio Delgado CNA-KRUNAL    POC Glucose Once [[id number]]  (Abnormal) Collected:  06/13/18 0029    Specimen:  Blood Updated:  06/13/18 0037     Glucose 56 (L) mg/dL     Narrative:       Meter: [id number] : [id number] Tucker Lama CNA    POC Glucose Once [[id number]]  (Normal) Collected:  06/12/18 1812    Specimen:  Blood Updated:  06/12/18 1820     Glucose 76 mg/dL     Narrative:       Meter: [id number] : [id number] Lindsye Kramer [facility]          Imaging Results (last 24 hours)     Procedure Component Value Units Date/Time    CT Abdomen Pelvis With Contrast [[id number]] Collected:  06/13/18 0928     Updated:  06/13/18 0935    Narrative:       CT ABDOMEN PELVIS     HISTORY:  Epigastric pain for several days with history of pancreatitis. Nausea.     COMPARISON:  6/9/2018     TECHNIQUE:  Axial images were obtained through the abdomen and pelvis following oral  and IV contrast administration. Multiplanar reformats were obtained.  Radiation dose reduction techniques were utilized, including automated  exposure control and exposure modulation based on body size. 2 prior CT  scans and 0 cardiac nuclear medicine scans in the last 12 months.      ABDOMEN FINDINGS:  Emphysema and fibrosis again noted in the lung bases. Gallbladder  surgically absent. No biliary obstruction. Fat stranding adjacent to the  head of the pancreas is slightly improved suggesting improving  pancreatitis. Pancreas enhances normally. No fluid collections. Left  adrenal gland enlargement is stable since 5/3/2016. Solid organs are  otherwise normal. No free fluid or adenopathy. GI tract is normal.     PELVIS FINDINGS:  The appendix is normal, as is the remainder of the GI tract. Urinary  bladder is normal. Solid pelvic organs are normal. No free fluid.       Impression:       1. Improved but persistent mild pancreatitis involving the head.  2. Otherwise no change.     This report was finalized on 6/13/2018 9:33 AM by Dr. Angel Duffy MD.             Xray not reviewed personally by physician.      ECG not reviewed personally by physician  ECG/EMG Results (most recent)     None          Medication Review:   I have reviewed the patient's current medication list    Current Facility-Administered Medications:   •  albuterol (PROVENTIL) nebulizer solution 0.083% 2.5 mg/3mL, 2.5 mg, Nebulization, Q6H PRN, Eugene Ojeda DO  •  ALPRAZolam (XANAX) tablet 2 mg, 2 mg, Oral, TID PRN, Eugene Ojeda DO, 2 mg at 06/13/18 0913  •  dextrose (D50W) solution 25 g, 25 g, Intravenous, Q15 Min PRN, Eugene Ojeda DO, 25 g at 06/13/18 0518  •  dextrose (D5W) 5 % infusion, 100 mL/hr, Intravenous, Continuous, Eugene Ojeda DO, Last Rate: 100 mL/hr at 06/13/18 1603, 100 mL/hr at 06/13/18 1603  •  dextrose (GLUTOSE) oral gel 15 g, 15 g, Oral, Q15 Min PRN, Eugene Ojeda DO  •  enoxaparin (LOVENOX) syringe 40 mg, 40 mg, Subcutaneous, Q24H, Eugene Ojeda DO, 40 mg at 06/13/18 1607  •  glucagon (GLUCAGEN) injection 1 mg, 1 mg, Subcutaneous, PRN, Eugene Ojeda, DO  •  HYDROmorphone (DILAUDID) injection 1 mg, 1 mg, Intravenous, Q3H PRN, 1 mg at 06/13/18 1601 **AND** naloxone (NARCAN) injection 0.4 mg, 0.4 mg,  Intravenous, Q5 Min PRN, Eugene Ojeda DO  •  insulin aspart (novoLOG) injection 0-9 Units, 0-9 Units, Subcutaneous, Q6H, Eugene Ojeda DO  •  levETIRAcetam (KEPPRA) tablet 500 mg, 500 mg, Oral, Q12H, Eugene Ojeda DO, 500 mg at 06/13/18 0913  •  ondansetron (ZOFRAN) injection 4 mg, 4 mg, Intravenous, Q6H PRN, Eugene Ojeda DO, 4 mg at 06/13/18 0538  •  sodium chloride 0.9 % flush 1-10 mL, 1-10 mL, Intravenous, PRN, Eugene Ojeda DO  •  sodium chloride 0.9 % infusion 40 mL, 40 mL, Intravenous, PRN, Eugene Ojeda DO      Assessment/Plan     Tobacco abuse/ withdrawal/ nicotine patch ordered 21 mg.  She smokes 1 and 1/2 packs per day    Acute Pancreatitis/ wants mrcp as outpatient.  LFT's are normal/ Lipid panel ordered.   Lab Results   Component Value Date    CHOL 233 (H) 11/14/2016     Lab Results   Component Value Date    TRIG 167 (H) 11/14/2016    TRIG 142 07/06/2016    TRIG 124 05/04/2016     Lab Results   Component Value Date    HDL 48 11/14/2016     No components found for: LDLCALC  Lab Results   Component Value Date    VLDL 33.4 (H) 11/14/2016     Lab Results   Component Value Date    LDLHDL 3.16 11/14/2016     Doubt this is the cause of the pancreatitis.    Diabetes mellitus type 2    Obesity Body mass index is 30.54 kg/m².    Chronic Anxiety    Chronic Pain                      Plan for disposition:  Anticipated discharge in     Inge Monique DO  06/13/18  5:09 PM      Time: 20 min

## 2018-06-13 NOTE — CONSULTS
[facility]              Initial Inpatient Consult Note  CC adominal pain  Referring Provider: Dr. Ojeda  Reason for Consultation:pancreatitis    Subjective     History of present illness:  Pain still present. Tolerating sips of clears    Past Medical History:  Past Medical History:   Diagnosis Date   • Acid reflux    • Anemia    • Anxiety    • Atherosclerosis of aorta 2017   • Huitron's esophagus    • Bursitis    • CAD (coronary artery disease)    • Chronic pain disorder    • CVA (cerebrovascular accident)    • DDD (degenerative disc disease), cervical    • Diabetes mellitus    • History of transfusion    • Hypertension    • Joint pain    • Kienbock's disease    • Low back pain    • Pancreatitis    • Pulmonary fibrosis    • Reflux gastritis    • Scleroderma    • Seizure        Past Surgical History:  Past Surgical History:   Procedure Laterality Date   • CAROTID ENDARTERECTOMY Left     Neurological   • CATARACT EXTRACTION Bilateral    •  SECTION     • CHOLECYSTECTOMY      Laparoscopic   • COLONOSCOPY      Complete   • COLONOSCOPY N/A 2016    Procedure: COLONOSCOPY;  Surgeon: Gaviota Hagen MD;  Location: [facility] OR;  Service:    • ENDOSCOPY N/A 2016    Procedure: ESOPHAGOGASTRODUODENOSCOPY WITH BIOPSIES;  Surgeon: Gaviota Hagen MD;  Location: [facility] OR;  Service:    • TONSILLECTOMY AND ADENOIDECTOMY     • UPPER GASTROINTESTINAL ENDOSCOPY      Diagnostic   • WRIST SURGERY Left         Social History:   Social History   Substance Use Topics   • Smoking status: Current Every Day Smoker     Packs/day: 1.00   • Smokeless tobacco: Never Used   • Alcohol use No        Family History:  Family History   Problem Relation Age of Onset   • Other Mother         Cardiac Disorder   • Migraines Mother    • Heart disease Mother    • Sudden death Mother    • Other Father         Cardiac Disorder   • Hypertension Father    • Heart disease Father    • Cancer Father    •  "Hypertension Sister    • Cancer Sister    • Migraines Daughter    • Cancer Other         Uncle   • Migraines Maternal Aunt    • Stroke Maternal Grandmother    • Stroke Maternal Grandfather        Home Meds:  Prescriptions Prior to Admission   Medication Sig Dispense Refill Last Dose   • ALPRAZolam (XANAX) 2 MG tablet Take 2 mg by mouth 3 (Three) Times a Day As Needed for anxiety.   6/12/2018 at 0930   • aspirin 81 MG EC tablet Take 1 tablet by mouth daily. 30 tablet 6 6/12/2018 at 0100   • docusate sodium (COLACE) 100 MG capsule Take 1 capsule by mouth 2 (Two) Times a Day. 60 capsule 0 6/12/2018 at 0930   • DULoxetine (CYMBALTA) 60 MG capsule Take 1 capsule by mouth Daily. 30 capsule 0 6/12/2018 at 0930   • gabapentin (NEURONTIN) 600 MG tablet take 1 tablet by mouth three times a day 90 tablet 3 6/12/2018 at 0930   • insulin glargine (LANTUS) 100 UNIT/ML injection INJECT 35 UNITS INTO THE SKIN DAILY FOR 90 DAYS   6/11/2018 at 2000   • LACTOBACILLUS EXTRA STRENGTH capsule Take 1 capsule by mouth Daily. 30 each 6 6/11/2018 at 2000   • levETIRAcetam (KEPPRA) 500 MG tablet Take 1 tablet by mouth Every 12 (Twelve) Hours. 60 tablet 0 6/12/2018 at 0930   • omeprazole (priLOSEC) 20 MG capsule Take 1 capsule by mouth 2 (Two) Times a Day. 60 capsule 0 6/12/2018 at 0930   • OxyCODONE HCl ER (oxyCONTIN) 30 MG tablet extended-release 12 hour Take 1 tablet by mouth Every 12 (Twelve) Hours. 60 tablet 0 6/11/2018 at 2200   • oxyCODONE-acetaminophen (PERCOCET) 7.5-325 MG per tablet Take 1 tablet by mouth Every 6 (Six) Hours As Needed for Severe Pain . 120 tablet 0 6/12/2018 at 0930   • Prenatal Vit-Fe Fumarate-FA (PRENATAL VITAMIN 27-0.8) 27-0.8 MG tablet tablet take 1 tablet by mouth once daily  0 6/12/2018 at 0000   • traZODone (DESYREL) 100 MG tablet    6/12/2018 at 0000   • furosemide (LASIX) 20 MG tablet Take 20 mg by mouth Daily As Needed.  0 More than a month at Unknown time   • Insulin Syringe 31G X 5/16\" 1 ML misc Use twice " daily for shots 100 each 3 6/8/2018 at Unknown time   • PROAIR  (90 Base) MCG/ACT inhaler Inhale 2 puffs 2 (Two) Times a Day As Needed.  0 More than a month at Unknown time   • promethazine (PHENERGAN) 25 MG tablet take 1 tablet by mouth every 4 hours if needed for nausea  0 More than a month at Unknown time       Current Meds:     enoxaparin 40 mg Subcutaneous Q24H   insulin aspart 0-9 Units Subcutaneous Q6H   levETIRAcetam 500 mg Oral Q12H       Allergies:  Allergies   Allergen Reactions   • Atorvastatin    • Levofloxacin    • Nsaids Nausea Only   • Toradol [Ketorolac Tromethamine] GI Intolerance   • Victoza  [Liraglutide]        Review of Systems  Pertinent items are noted in HPI     Objective     Vital Signs  Temp:  [97.5 °F (36.4 °C)-98.2 °F (36.8 °C)] 98.2 °F (36.8 °C)  Heart Rate:  [69-87] 69  Resp:  [16-18] 18  BP: (117-144)/(63-69) 144/68    Physical Exam:  General Appearance:    Alert, cooperative, in no acute distress   Head:    Normocephalic, without obvious abnormality, atraumatic   Eyes:            Lids and lashes normal, conjunctivae and sclerae normal, no   icterus   Throat:   No oral lesions, no thrush, oral mucosa moist   Neck:   No adenopathy, supple, trachea midline, no thyromegaly, no   carotid bruit, no JVD   Lungs:     Clear to auscultation,respirations regular, even and                   unlabored    Heart:    Regular rhythm and normal rate, normal S1 and S2, no            murmur, no gallop, no rub, no click   Chest Wall:    No abnormalities observed   Abdomen:     Normal bowel sounds, no masses, no organomegaly, soft        Epigastric tenderness, non-distended, no guarding, no rebound tenderness   Rectal:     Deferred   Extremities:   no edema, no cyanosis, no redness   Skin:   No bleeding, bruising or rash   Lymph nodes:   No palpable adenopathy   Psychiatric:  Judgement and insight: normal   Orientation to person place and time: normal   Mood and affect: normal     Results  Review:   I reviewed the patient's new clinical results.    WBC No results found for: WBCS   HGB Hemoglobin   Date Value Ref Range Status   06/13/2018 13.9 12.0 - 16.0 g/dL Final   06/12/2018 13.5 12.0 - 16.0 g/dL Final      HCT Hematocrit   Date Value Ref Range Status   06/13/2018 41.3 37.0 - 47.0 % Final   06/12/2018 39.1 37.0 - 47.0 % Final      Platlets No results found for: LABPLAT   MCV MCV   Date Value Ref Range Status   06/13/2018 93.0 81.0 - 99.0 fL Final   06/12/2018 90.9 81.0 - 99.0 fL Final          Sodium Sodium   Date Value Ref Range Status   06/13/2018 135 (L) 136 - 145 mmol/L Final   06/12/2018 128 (L) 136 - 145 mmol/L Final      Potassium Potassium   Date Value Ref Range Status   06/13/2018 4.2 3.5 - 5.2 mmol/L Final   06/12/2018 4.4 3.5 - 5.2 mmol/L Final      Chloride Chloride   Date Value Ref Range Status   06/13/2018 99 98 - 107 mmol/L Final   06/12/2018 92 (L) 98 - 107 mmol/L Final      CO2 CO2   Date Value Ref Range Status   06/13/2018 24.8 22.0 - 29.0 mmol/L Final   06/12/2018 24.6 22.0 - 29.0 mmol/L Final      BUN BUN   Date Value Ref Range Status   06/13/2018 4 (L) 6 - 20 mg/dL Final   06/12/2018 7 6 - 20 mg/dL Final      Creatinine Creatinine   Date Value Ref Range Status   06/13/2018 0.56 (L) 0.57 - 1.00 mg/dL Final   06/12/2018 0.58 0.57 - 1.00 mg/dL Final      Calcium Calcium   Date Value Ref Range Status   06/13/2018 9.0 8.6 - 10.5 mg/dL Final   06/12/2018 9.4 8.6 - 10.5 mg/dL Final      Albumin Albumin   Date Value Ref Range Status   06/13/2018 3.90 3.50 - 5.20 g/dL Final   06/12/2018 4.00 3.50 - 5.20 g/dL Final      AST  ALT  PT/INR:   AST (SGOT)   Date Value Ref Range Status   06/13/2018 21 5 - 32 U/L Final   06/12/2018 15 5 - 32 U/L Final     ALT (SGPT)   Date Value Ref Range Status   06/13/2018 16 5 - 33 U/L Final   06/12/2018 16 5 - 33 U/L Final     No results found for: PROTIME/No results found for: INR         Imaging Results (last 72 hours)     Procedure Component Value Units  Date/Time    CT Abdomen Pelvis With Contrast [[id number]] Collected:  06/13/18 0928     Updated:  06/13/18 0935    Narrative:       CT ABDOMEN PELVIS     HISTORY:  Epigastric pain for several days with history of pancreatitis. Nausea.     COMPARISON:  6/9/2018     TECHNIQUE:  Axial images were obtained through the abdomen and pelvis following oral  and IV contrast administration. Multiplanar reformats were obtained.  Radiation dose reduction techniques were utilized, including automated  exposure control and exposure modulation based on body size. 2 prior CT  scans and 0 cardiac nuclear medicine scans in the last 12 months.     ABDOMEN FINDINGS:  Emphysema and fibrosis again noted in the lung bases. Gallbladder  surgically absent. No biliary obstruction. Fat stranding adjacent to the  head of the pancreas is slightly improved suggesting improving  pancreatitis. Pancreas enhances normally. No fluid collections. Left  adrenal gland enlargement is stable since 5/3/2016. Solid organs are  otherwise normal. No free fluid or adenopathy. GI tract is normal.     PELVIS FINDINGS:  The appendix is normal, as is the remainder of the GI tract. Urinary  bladder is normal. Solid pelvic organs are normal. No free fluid.       Impression:       1. Improved but persistent mild pancreatitis involving the head.  2. Otherwise no change.     This report was finalized on 6/13/2018 9:33 AM by Dr. Angel Duffy MD.             Assessment/Plan     Active Problems:    Abdominal pain  Pancreatitis-trial of clears  Will need mrcp as outpatient  lfts normal.  Check lipid panel      I discussed the patients findings and my recommendations with patient and nursing staff    Gaviota Hagen MD  [facility]  06/13/18  @NOW      Time:

## 2018-06-13 NOTE — CONSULTS
Adult Nutrition  Assessment/PES    Patient Name:  Crystal Cruz  YOB: 1965  MRN: [id number]  Admit Date:  6/12/2018    Assessment Date:  6/13/2018    Comments:  Advance diet as indicated, if pancreatitis will need to have low fat consistent CHO diet.  Will cont to follow and monitor.           Reason for Assessment     Row Name 06/13/18 1136          Reason for Assessment    Reason For Assessment nurse/nurse practitioner consult     Diagnosis other (see comments)   abd pain, r/o pancreatitis hx DM, anxiety, chronic pain     Identified At Risk by Screening Criteria MST SCORE 2+             Nutrition/Diet History     Row Name 06/13/18 1136          Nutrition/Diet History    Typical Food/Fluid Intake Pt reports watches sugar/CHO at home. Avoids salt. NKFA. Denies issue chew/swallowing. Pt is interested to know when MD will let her eat, directed her to ask RN/MD. Reports 12# wt loss PTA, but denies decreased appetite during that time.              Anthropometrics     Row Name 06/13/18 1138          Anthropometrics    Weight 80.7 kg (177 lb 14.6 oz)        Usual Body Weight (UBW)    Weight Loss Time Frame 4/2018 184# which is loss of 6#, pt reports 12 # loss in 2 weeks        Body Mass Index (BMI)    BMI Assessment BMI 30-34.9: obesity grade I             Labs/Tests/Procedures/Meds     Row Name 06/13/18 1139          Labs/Procedures/Meds    Lab Results Reviewed reviewed     Lab Results Comments glu  L to elevated        Diagnostic Tests/Procedures    Diagnostic Test/Procedure Reviewed reviewed        Medications    Pertinent Medications Reviewed reviewed     Pertinent Medications Comments insulin               Estimated/Assessed Needs     Row Name 06/13/18 1140          Calculation Measurements    Weight Used For Calculations 80.7 kg (177 lb 14.6 oz)        Estimated/Assessed Needs    Additional Documentation Calorie Requirements (Group);Fluid Requirements (Group);Protein Requirements (Group)         Calorie Requirements    Estimated Calorie Need Method [place]-St Jeor     Estimated Calorie Requirement Comment 6447-4931 kcal (mifflin 1.2-1.3)    188-204 gm CHO, 45% kcal                                                                            [place]-St. Jeor Equation    RMR ([place]-St. Jeor Equation) 1392        Protein Requirements    Est Protein Requirement Amount (gms/kg) 1.0 gm protein     Estimated Protein Requirements (gms/day) 80.7        Fluid Requirements    Estimated Fluid Requirements (mL/day) --   8652-6302 ml     Estimated Fluid Requirement Method RDA Method                   Nutrition Prescription Ordered     Row Name 06/13/18 1141          Nutrition Prescription PO    Current PO Diet NPO             Evaluation of Received Nutrient/Fluid Intake     Row Name 06/13/18 1141 06/13/18 1140       Calculation Measurements    Weight Used For Calculations  -- 80.7 kg (177 lb 14.6 oz)       Intake Assessment    Energy/Calorie Requirement Assessment not meeting needs  --    Protein Requirement Assessment not meeting needs  --       Fluid Intake Evaluation    IV Fluid (mL) 100   insufficient data  --       PO Evaluation    Number of Days PO Intake Evaluated Insufficient Data  --            Evaluation of Prescribed Nutrient/Fluid Intake     Row Name 06/13/18 1140          Calculation Measurements    Weight Used For Calculations 80.7 kg (177 lb 14.6 oz)           Problem/Interventions:        Problem 1     Row Name 06/13/18 1142          Nutrition Diagnoses Problem 1    Problem 1 Altered GI Function     Etiology (related to) Medical Diagnosis     Signs/Symptoms (evidenced by) NPO                     Intervention Goal     Row Name 06/13/18 1143          Intervention Goal    PO Establish PO     Weight No significant weight loss             Nutrition Intervention     Row Name 06/13/18 1143          Nutrition Intervention    RD/Tech Action Follow Tx progress             Nutrition Prescription     Row Name  06/13/18 1143          Other Orders    Other Advance diet as indicated, will need low fat CHO with pancreatitis if confirmed             Education/Evaluation     Row Name 06/13/18 1144          Education    Education Other (comment)   Pt wanting to eat, declined edu at this time. Will follow.         Monitor/Evaluation    Monitor Per protocol;I&O;PO intake;Pertinent labs;Weight;Symptoms         Electronically signed by:  Sabrina Capone RD  06/13/18 11:44 AM

## 2018-06-13 NOTE — NURSING NOTE
"Pt stated \"I feel like my sugar is low. I think it should be checked.  RN checked BGS 95. Reassured Pt will continue to monitor  "

## 2018-06-13 NOTE — PLAN OF CARE
Problem: Patient Care Overview  Goal: Plan of Care Review  Outcome: Ongoing (interventions implemented as appropriate)   06/13/18 0440   Coping/Psychosocial   Plan of Care Reviewed With patient   OTHER   Outcome Summary Pain managed w/IV medication; CT results pending; no new skin issues; blood sugar at 53, IV dextrose given, came up to 155; no falls noted this shift       Problem: Fall Risk (Adult)  Goal: Identify Related Risk Factors and Signs and Symptoms  Outcome: Ongoing (interventions implemented as appropriate)    Goal: Absence of Fall  Outcome: Ongoing (interventions implemented as appropriate)   06/13/18 0440   Fall Risk (Adult)   Absence of Fall making progress toward outcome       Problem: Pancreatitis, Acute/Chronic (Adult)  Goal: Signs and Symptoms of Listed Potential Problems Will be Absent, Minimized or Managed (Pancreatitis, Acute/Chronic)  Outcome: Ongoing (interventions implemented as appropriate)      Problem: Pain, Acute (Adult)  Goal: Identify Related Risk Factors and Signs and Symptoms  Outcome: Ongoing (interventions implemented as appropriate)   06/13/18 0440   Pain, Acute (Adult)   Related Risk Factors (Acute Pain) patient perception;persistent pain   Signs and Symptoms (Acute Pain) fatigue/weakness     Goal: Acceptable Pain Control/Comfort Level  Outcome: Ongoing (interventions implemented as appropriate)   06/13/18 0440   Pain, Acute (Adult)   Acceptable Pain Control/Comfort Level making progress toward outcome

## 2018-06-13 NOTE — PLAN OF CARE
Problem: Patient Care Overview  Goal: Discharge Needs Assessment  Outcome: Ongoing (interventions implemented as appropriate)   06/13/18 6695   Discharge Needs Assessment   Readmission Within the Last 30 Days no previous admission in last 30 days   Concerns to be Addressed no discharge needs identified   Patient/Family Anticipates Transition to home   Patient/Family Anticipated Services at Transition none   Transportation Anticipated family or friend will provide   Anticipated Changes Related to Illness none   Equipment Needed After Discharge none   Disability   Equipment Currently Used at Home walker, standard;glucometer

## 2018-06-14 VITALS
OXYGEN SATURATION: 95 % | TEMPERATURE: 98.7 F | BODY MASS INDEX: 30.37 KG/M2 | RESPIRATION RATE: 16 BRPM | HEART RATE: 69 BPM | SYSTOLIC BLOOD PRESSURE: 110 MMHG | HEIGHT: 64 IN | DIASTOLIC BLOOD PRESSURE: 66 MMHG | WEIGHT: 177.91 LBS

## 2018-06-14 LAB
CHOLEST SERPL-MCNC: 159 MG/DL (ref 0–200)
GLUCOSE BLDC GLUCOMTR-MCNC: 101 MG/DL (ref 70–130)
GLUCOSE BLDC GLUCOMTR-MCNC: 137 MG/DL (ref 70–130)
GLUCOSE BLDC GLUCOMTR-MCNC: 139 MG/DL (ref 70–130)
HDLC SERPL-MCNC: 41 MG/DL (ref 40–60)
LDLC SERPL CALC-MCNC: 90 MG/DL (ref 0–100)
LDLC/HDLC SERPL: 2.2 {RATIO}
TRIGL SERPL-MCNC: 138 MG/DL (ref 0–150)
VLDLC SERPL-MCNC: 27.6 MG/DL (ref 7–27)

## 2018-06-14 PROCEDURE — 96361 HYDRATE IV INFUSION ADD-ON: CPT

## 2018-06-14 PROCEDURE — 80061 LIPID PANEL: CPT | Performed by: INTERNAL MEDICINE

## 2018-06-14 PROCEDURE — 99217 PR OBSERVATION CARE DISCHARGE MANAGEMENT: CPT | Performed by: HOSPITALIST

## 2018-06-14 PROCEDURE — G0378 HOSPITAL OBSERVATION PER HR: HCPCS

## 2018-06-14 PROCEDURE — 96372 THER/PROPH/DIAG INJ SC/IM: CPT

## 2018-06-14 PROCEDURE — 63710000001 DIPHENHYDRAMINE PER 50 MG

## 2018-06-14 PROCEDURE — 63710000001 DIPHENHYDRAMINE PER 50 MG: Performed by: FAMILY MEDICINE

## 2018-06-14 PROCEDURE — 96376 TX/PRO/DX INJ SAME DRUG ADON: CPT

## 2018-06-14 PROCEDURE — 25010000002 ONDANSETRON PER 1 MG: Performed by: INTERNAL MEDICINE

## 2018-06-14 PROCEDURE — 25010000002 HYDROMORPHONE PER 4 MG: Performed by: INTERNAL MEDICINE

## 2018-06-14 PROCEDURE — 25010000002 HYDROMORPHONE PER 4 MG

## 2018-06-14 PROCEDURE — 82962 GLUCOSE BLOOD TEST: CPT

## 2018-06-14 RX ORDER — DIPHENHYDRAMINE HCL 25 MG
25 CAPSULE ORAL EVERY 4 HOURS PRN
Status: DISCONTINUED | OUTPATIENT
Start: 2018-06-14 | End: 2018-06-14 | Stop reason: HOSPADM

## 2018-06-14 RX ORDER — DIPHENHYDRAMINE HCL 25 MG
CAPSULE ORAL
Status: COMPLETED
Start: 2018-06-14 | End: 2018-06-14

## 2018-06-14 RX ORDER — NICOTINE 21 MG/24HR
1 PATCH, TRANSDERMAL 24 HOURS TRANSDERMAL EVERY 24 HOURS
Qty: 30 PATCH | Refills: 0 | Status: SHIPPED | OUTPATIENT
Start: 2018-06-14 | End: 2018-07-09

## 2018-06-14 RX ADMIN — LEVETIRACETAM 500 MG: 500 TABLET ORAL at 08:43

## 2018-06-14 RX ADMIN — HYDROMORPHONE HYDROCHLORIDE 1 MG: 1 INJECTION, SOLUTION INTRAMUSCULAR; INTRAVENOUS; SUBCUTANEOUS at 11:12

## 2018-06-14 RX ADMIN — DIPHENHYDRAMINE HYDROCHLORIDE 25 MG: 25 CAPSULE ORAL at 04:48

## 2018-06-14 RX ADMIN — HYDROMORPHONE HYDROCHLORIDE 1 MG: 1 INJECTION, SOLUTION INTRAMUSCULAR; INTRAVENOUS; SUBCUTANEOUS at 08:44

## 2018-06-14 RX ADMIN — DIPHENHYDRAMINE HYDROCHLORIDE 25 MG: 25 CAPSULE ORAL at 12:25

## 2018-06-14 RX ADMIN — HYDROMORPHONE HYDROCHLORIDE 1 MG: 1 INJECTION, SOLUTION INTRAMUSCULAR; INTRAVENOUS; SUBCUTANEOUS at 01:25

## 2018-06-14 RX ADMIN — ALPRAZOLAM 2 MG: 0.25 TABLET ORAL at 05:16

## 2018-06-14 RX ADMIN — HYDROMORPHONE HYDROCHLORIDE 1 MG: 1 INJECTION, SOLUTION INTRAMUSCULAR; INTRAVENOUS; SUBCUTANEOUS at 04:54

## 2018-06-14 RX ADMIN — ONDANSETRON 4 MG: 2 INJECTION, SOLUTION INTRAMUSCULAR; INTRAVENOUS at 03:42

## 2018-06-14 RX ADMIN — DEXTROSE MONOHYDRATE 100 ML/HR: 50 INJECTION, SOLUTION INTRAVENOUS at 02:30

## 2018-06-14 NOTE — PLAN OF CARE
Problem: Patient Care Overview  Goal: Plan of Care Review  Outcome: Ongoing (interventions implemented as appropriate)   06/14/18 0520   Coping/Psychosocial   Plan of Care Reviewed With patient   OTHER   Outcome Summary Pain managed w/IV medication; continued diarrhea; pt. complains of generalized itching - Benadryl given       Problem: Fall Risk (Adult)  Goal: Absence of Fall  Outcome: Ongoing (interventions implemented as appropriate)      Problem: Skin Injury Risk (Adult)  Goal: Skin Health and Integrity  Outcome: Ongoing (interventions implemented as appropriate)      Problem: Pancreatitis, Acute/Chronic (Adult)  Goal: Signs and Symptoms of Listed Potential Problems Will be Absent, Minimized or Managed (Pancreatitis, Acute/Chronic)  Outcome: Ongoing (interventions implemented as appropriate)   06/14/18 0520   Goal/Outcome Evaluation   Problems Assessed (Pancreatitis) all   Problems Present (Pancreatitis) diarrhea;pain;nausea and vomiting       Problem: Pain, Acute (Adult)  Goal: Acceptable Pain Control/Comfort Level  Outcome: Ongoing (interventions implemented as appropriate)

## 2018-06-14 NOTE — NURSING NOTE
Continued Stay Note  STEVE Jane     Patient Name: Crystal Cruz  MRN: [id number]  Today's Date: 6/14/2018    Admit Date: 6/12/2018          Discharge Plan     Row Name 06/14/18 1114       Plan    Plan Comments Mrs Cruz complains of a lot of nausea.  Plan is still home              Discharge Codes    No documentation.           Leonor Szymanski RN

## 2018-06-14 NOTE — DISCHARGE SUMMARY
Crystal K Carltreyluiz  1965  [id number]        Hospitalists Discharge Summary    Date of Admission: 6/12/2018  Date of Discharge:  6/14/2018    Primary Discharge Diagnoses & Secondary Discharge Diagnoses:     Acute pancreatitis/ MRCP to be done as an outpatient/ LFT's are now normal    Tobacco Abuse/ on nicotine patch/ smoking cessation packet ordered    Diabetes Mellitus type 2 with obesity    Obesity/ Body mass index is 30.54 kg/m².    Chronic Anxiety and depression    Chronic Pain syndrome    Huitron's Esophagus/ GERD     HLD    Essential hypertension    Seizure disorder    Acquired hypothyroidism    Vitamin D deficiency            PCP  Patient Care Team:  Celeste Dill MD as PCP - General  Celeste Dill MD as PCP - Family Medicine  MELVI Cook as PCP - Claims Attributed  Adam Rangel III, MD as Consulting Physician (Otolaryngology)    Consults:   Consults     Date and Time Order Name Status Description    6/12/2018 1535 Inpatient Gastroenterology Consult Completed           Operations and Procedures Performed:       Ct Abdomen Pelvis With Contrast    Result Date: 6/13/2018  Narrative: CT ABDOMEN PELVIS  HISTORY: Epigastric pain for several days with history of pancreatitis. Nausea.  COMPARISON: 6/9/2018  TECHNIQUE: Axial images were obtained through the abdomen and pelvis following oral and IV contrast administration. Multiplanar reformats were obtained. Radiation dose reduction techniques were utilized, including automated exposure control and exposure modulation based on body size. 2 prior CT scans and 0 cardiac nuclear medicine scans in the last 12 months.  ABDOMEN FINDINGS: Emphysema and fibrosis again noted in the lung bases. Gallbladder surgically absent. No biliary obstruction. Fat stranding adjacent to the head of the pancreas is slightly improved suggesting improving pancreatitis. Pancreas enhances normally. No fluid collections. Left adrenal gland enlargement is stable since 5/3/2016.  Solid organs are otherwise normal. No free fluid or adenopathy. GI tract is normal.  PELVIS FINDINGS: The appendix is normal, as is the remainder of the GI tract. Urinary bladder is normal. Solid pelvic organs are normal. No free fluid.      Impression: 1. Improved but persistent mild pancreatitis involving the head. 2. Otherwise no change.  This report was finalized on 6/13/2018 9:33 AM by Dr. Angel Duffy MD.      Ct Abdomen Pelvis With Contrast    Result Date: 6/9/2018  Narrative: CT ABDOMEN PELVIS  HISTORY: Epigastric pain for the last 6 days. Prior history of pancreatitis and ileus. Leukocytosis.  COMPARISON: 5/3/2016  TECHNIQUE: Axial images were obtained through the abdomen and pelvis following oral and IV contrast administration. Multiplanar reformats were obtained. Radiation dose reduction techniques were utilized, including automated exposure control and exposure modulation based on body size. 1 prior CT scan and 0 cardiac nuclear medicine scans in the last 12 months.  ABDOMEN FINDINGS: There is emphysema with fibrosis in the lung bases. Gallbladder surgically absent. No biliary obstruction. Mild fat stranding noted adjacent to the head of the pancreas suggests mild acute pancreatitis. No pancreatic ductal dilatation. No fluid collection. Solid organs are otherwise normal. No adenopathy or free fluid. There is atherosclerotic disease. GI tract is normal.  PELVIS FINDINGS: The GI tract, including the appendix, is within normal limits. Urinary bladder is normal. Solid pelvic organs are normal. No free fluid.      Impression: 1. Mild acute pancreatitis involving the pancreatic head. No fluid collection. Normal pancreas enhancement. 2. No other acute findings.  A preliminary report was provided by Dr. Lozano at 0140 hours on 6/9/2018.  This report was finalized on 6/9/2018 7:11 AM by Dr. Angel Duffy MD.        Allergies:  is allergic to atorvastatin; levofloxacin; nsaids; toradol [ketorolac  "tromethamine]; and victoza  [liraglutide].    Ladarius!!!!!!!!  Reviewed and patient is seeing more than one physician for narcotics and getting rx for oxycontin and oxycodone, aprazalam, and gabapentin. This needs to be monitored carefully.    Discharge Medications:     Discharge Medications      New Medications      Instructions Start Date   nicotine 21 MG/24HR patch  Commonly known as:  NICODERM CQ   1 patch, Transdermal, Every 24 Hours         Continue These Medications      Instructions Start Date   ALPRAZolam 2 MG tablet  Commonly known as:  XANAX   2 mg, Oral, 3 Times Daily PRN      aspirin 81 MG EC tablet   81 mg, Oral, Daily      docusate sodium 100 MG capsule  Commonly known as:  COLACE   100 mg, Oral, 2 Times Daily      DULoxetine 60 MG capsule  Commonly known as:  CYMBALTA   60 mg, Oral, Daily      furosemide 20 MG tablet  Commonly known as:  LASIX   20 mg, Oral, Daily PRN      gabapentin 600 MG tablet  Commonly known as:  NEURONTIN   take 1 tablet by mouth three times a day      Insulin Syringe 31G X 5/16\" 1 ML misc   Use twice daily for shots      LACTOBACILLUS EXTRA STRENGTH capsule   1 capsule, Oral, Daily      LANTUS 100 UNIT/ML injection  Generic drug:  insulin glargine   INJECT 35 UNITS INTO THE SKIN DAILY FOR 90 DAYS      levETIRAcetam 500 MG tablet  Commonly known as:  KEPPRA   500 mg, Oral, Every 12 Hours Scheduled      omeprazole 20 MG capsule  Commonly known as:  priLOSEC   20 mg, Oral, 2 Times Daily      OxyCODONE HCl ER 30 MG tablet extended-release 12 hour  Commonly known as:  oxyCONTIN   30 mg, Oral, Every 12 Hours Scheduled      oxyCODONE-acetaminophen 7.5-325 MG per tablet  Commonly known as:  PERCOCET   1 tablet, Oral, Every 6 Hours PRN      prenatal vitamin 27-0.8 27-0.8 MG tablet tablet   take 1 tablet by mouth once daily      PROAIR  (90 Base) MCG/ACT inhaler  Generic drug:  albuterol   2 puffs, Inhalation, 2 Times Daily PRN      promethazine 25 MG tablet  Commonly known as:  " PHENERGAN   take 1 tablet by mouth every 4 hours if needed for nausea      traZODone 100 MG tablet  Commonly known as:  DESYREL   No dose, route, or frequency recorded.             History of Present Illness & Hospital Course    Pt is a 51 yo female PMH chronic pain sees pain management multiple other issues listed below presented to GI office today complaining of increased abd pain. She was see in ER over the weekend and dx with poss early mild pancreatitis but pain was controlled then and she was sent home on clear liquids. She went home but has not tolerated clears and had some continued abd pain and went for GI follow up were admission was requested for continued pain. Currently states her pain in abd is severe. She is nauseated but has not vomited. No fevers or chills.     She was made NPO and treated conservatively.  GI was consulted.  Enzymes returned to normal  Dr. Hagen notes that patient will need an MRCP as an outpatient  Lipid panel was checked and it was not significantly elevated.  Discussed with dr. Hagen and we will send home to follow up with pcp and Dr. Hagen.           Last Lab Results:   Lab Results (most recent)     Procedure Component Value Units Date/Time    POC Glucose Once [[id number]]  (Abnormal) Collected:  06/14/18 1136    Specimen:  Blood Updated:  06/14/18 1151     Glucose 137 (H) mg/dL     Narrative:       Meter: [id number] : [id number] Henry Wilder NURSING ASSISTANT    POC Glucose Once [[id number]]  (Abnormal) Collected:  06/14/18 0631    Specimen:  Blood Updated:  06/14/18 0638     Glucose 139 (H) mg/dL     Narrative:       Meter: [id number] : [id number] Jacek Goetz CNA    Lipid Panel [[id number]]  (Abnormal) Collected:  06/14/18 0605    Specimen:  Blood Updated:  06/14/18 0629     Total Cholesterol 159 mg/dL      Triglycerides 138 mg/dL      HDL Cholesterol 41 mg/dL      LDL Cholesterol  90 mg/dL      VLDL Cholesterol 27.6 (H) mg/dL      LDL/HDL Ratio 2.20    POC  Glucose Once [[id number]]  (Normal) Collected:  06/14/18 0055    Specimen:  Blood Updated:  06/14/18 0101     Glucose 101 mg/dL     Narrative:       Meter: [id number] : [id number] Jacek Goetz CNA    POC Glucose Once [[id number]]  (Abnormal) Collected:  06/13/18 2040    Specimen:  Blood Updated:  06/13/18 2046     Glucose 155 (H) mg/dL     Narrative:       Meter: [id number] : [id number] Jacek Sofy CNA    POC Glucose Once [[id number]]  (Abnormal) Collected:  06/13/18 1744    Specimen:  Blood Updated:  06/13/18 1751     Glucose 145 (H) mg/dL     Narrative:       Meter: [id number] : [id number] Henry Wilder NURSING ASSISTANT    POC Glucose Once [[id number]]  (Normal) Collected:  06/13/18 1206    Specimen:  Blood Updated:  06/13/18 1212     Glucose 97 mg/dL     Narrative:       Meter: [id number] : [id number] Cathie Lockett Staff RN Validator    POC Glucose Once [[id number]]  (Normal) Collected:  06/13/18 0931    Specimen:  Blood Updated:  06/13/18 0944     Glucose 95 mg/dL     Narrative:       Meter: [id number] : [id number] Cathie Lockett Staff RN Validator    Comprehensive Metabolic Panel [[id number]]  (Abnormal) Collected:  06/13/18 0519    Specimen:  Blood Updated:  06/13/18 0548     Glucose 107 (H) mg/dL      BUN 4 (L) mg/dL      Creatinine 0.56 (L) mg/dL      Sodium 135 (L) mmol/L      Potassium 4.2 mmol/L      Chloride 99 mmol/L      CO2 24.8 mmol/L      Calcium 9.0 mg/dL      Total Protein 6.7 g/dL      Albumin 3.90 g/dL      ALT (SGPT) 16 U/L      AST (SGOT) 21 U/L      Alkaline Phosphatase 44 U/L      Total Bilirubin 0.2 mg/dL      eGFR Non African Amer 113 mL/min/1.73      Globulin 2.8 gm/dL      A/G Ratio 1.4 g/dL      BUN/Creatinine Ratio 7.1     Anion Gap 11.2 mmol/L     POC Glucose Once [[id number]]  (Abnormal) Collected:  06/13/18 0537    Specimen:  Blood Updated:  06/13/18 0548     Glucose 182 (H) mg/dL     Narrative:       Meter: [id number] : [id number] Felipe Gusman RN     CBC (No Diff) [[id number]]  (Abnormal) Collected:  06/13/18 0519    Specimen:  Blood Updated:  06/13/18 0531     WBC 9.08 10*3/mm3      RBC 4.44 10*6/mm3      Hemoglobin 13.9 g/dL      Hematocrit 41.3 %      MCV 93.0 fL      MCH 31.3 (H) pg      MCHC 33.7 g/dL      RDW 12.0 %      RDW-SD 41.3 fl      MPV 9.8 fL      Platelets 237 10*3/mm3     POC Glucose Once [[id number]]  (Abnormal) Collected:  06/13/18 0513    Specimen:  Blood Updated:  06/13/18 0519     Glucose 52 (L) mg/dL     Narrative:       Meter: [id number] : [id number] Felipe Gusman RN    POC Glucose Once [[id number]]  (Abnormal) Collected:  06/13/18 0506    Specimen:  Blood Updated:  06/13/18 0514     Glucose 46 (C) mg/dL     Narrative:       Critical repeat  Verify with Lab Treated Patient Meter: [id number] : [id number]    POC Glucose Once [[id number]]  (Abnormal) Collected:  06/13/18 0108    Specimen:  Blood Updated:  06/13/18 0116     Glucose 155 (H) mg/dL     Narrative:       Meter: [id number] : [id number] Marcio Delgado CNA-KRUNAL    POC Glucose Once [[id number]]  (Abnormal) Collected:  06/13/18 0029    Specimen:  Blood Updated:  06/13/18 0037     Glucose 56 (L) mg/dL     Narrative:       Meter: [id number] : [id number] Tucker Lama CNA    POC Glucose Once [[id number]]  (Normal) Collected:  06/12/18 1812    Specimen:  Blood Updated:  06/12/18 1820     Glucose 76 mg/dL     Narrative:       Meter: [id number] : [id number] Lindsey Bass    Lipase [[id number]]  (Normal) Collected:  06/12/18 1550    Specimen:  Blood Updated:  06/12/18 1613     Lipase 31 U/L     Comprehensive Metabolic Panel [[id number]]  (Abnormal) Collected:  06/12/18 1550    Specimen:  Blood Updated:  06/12/18 1613     Glucose 74 mg/dL      BUN 7 mg/dL      Creatinine 0.58 mg/dL      Sodium 128 (L) mmol/L      Potassium 4.4 mmol/L      Chloride 92 (L) mmol/L      CO2 24.6 mmol/L      Calcium 9.4 mg/dL      Total Protein 6.7 g/dL      Albumin 4.00 g/dL      ALT (SGPT) 16  U/L      AST (SGOT) 15 U/L      Alkaline Phosphatase 50 U/L      Total Bilirubin <0.2 (L) mg/dL      eGFR Non African Amer 108 mL/min/1.73      Globulin 2.7 gm/dL      A/G Ratio 1.5 g/dL      BUN/Creatinine Ratio 12.1     Anion Gap 11.4 mmol/L     CBC (No Diff) [[id number]]  (Abnormal) Collected:  06/12/18 1550    Specimen:  Blood Updated:  06/12/18 1555     WBC 13.38 (H) 10*3/mm3      RBC 4.30 10*6/mm3      Hemoglobin 13.5 g/dL      Hematocrit 39.1 %      MCV 90.9 fL      MCH 31.4 (H) pg      MCHC 34.5 g/dL      RDW 11.9 %      RDW-SD 39.7 fl      MPV 9.7 fL      Platelets 223 10*3/mm3         Imaging Results (most recent)     Procedure Component Value Units Date/Time    CT Abdomen Pelvis With Contrast [[id number]] Collected:  06/13/18 0928     Updated:  06/13/18 0935    Narrative:       CT ABDOMEN PELVIS     HISTORY:  Epigastric pain for several days with history of pancreatitis. Nausea.     COMPARISON:  6/9/2018     TECHNIQUE:  Axial images were obtained through the abdomen and pelvis following oral  and IV contrast administration. Multiplanar reformats were obtained.  Radiation dose reduction techniques were utilized, including automated  exposure control and exposure modulation based on body size. 2 prior CT  scans and 0 cardiac nuclear medicine scans in the last 12 months.     ABDOMEN FINDINGS:  Emphysema and fibrosis again noted in the lung bases. Gallbladder  surgically absent. No biliary obstruction. Fat stranding adjacent to the  head of the pancreas is slightly improved suggesting improving  pancreatitis. Pancreas enhances normally. No fluid collections. Left  adrenal gland enlargement is stable since 5/3/2016. Solid organs are  otherwise normal. No free fluid or adenopathy. GI tract is normal.     PELVIS FINDINGS:  The appendix is normal, as is the remainder of the GI tract. Urinary  bladder is normal. Solid pelvic organs are normal. No free fluid.       Impression:       1. Improved but persistent mild  pancreatitis involving the head.  2. Otherwise no change.     This report was finalized on 6/13/2018 9:33 AM by Dr. Angel Duffy MD.             PROCEDURES      Condition on Discharge:  Stable and feeling well.  Wants to go home    Physical Exam at Discharge  Vital Signs  Temp:  [97.6 °F (36.4 °C)-98.7 °F (37.1 °C)] 98.7 °F (37.1 °C)  Heart Rate:  [66-75] 69  Resp:  [16-18] 16  BP: (110-153)/(66-75) 110/66     Body mass index is 30.54 kg/m².      Physical Exam:  Physical Exam   Constitutional: Patient appears well-developed and well-nourished and in no acute distress .  She is obese  HEENT:   Head: Normocephalic and atraumatic.   Eyes:  Pupils are equal, round, and reactive to light. EOM are intact. Sclera are anicteric and non-injected.  Mouth and Throat: Patient has moist mucous membranes. Oropharynx is clear of any erythema or exudate.     Neck: Neck supple. No JVD present. No thyromegaly present. No lymphadenopathy present.  Cardiovascular: Regular rate, regular rhythm, S1 normal and S2 normal.  Exam reveals no gallop and no friction rub.  No murmur heard.  Pulmonary/Chest: Lungs are clear to auscultation bilaterally. No respiratory distress. No wheezes. No rhonchi. No rales.   Abdominal: Soft. Bowel sounds are normal. No distension and no mass. There is no hepatosplenomegaly. There is no tenderness.   Musculoskeletal: Normal Muscle tone  Extremities: No edema. Pulses are palpable in all 4 extremities.  Neurological: Patient is alert and oriented to person, place, and time. Cranial nerves II-XII are grossly intact with no focal deficits.  Skin: Skin is warm. No rash noted. Nails show no clubbing.  No cyanosis or erythema.    Discharge Disposition  Home    Visiting Nurse:    No     Home PT/OT:  No     Home Safety Evaluation:  No     DME  None    Discharge Diet:    Soft cardiac consistent carbohydrate diet/ Advance to regular cardiac carbohydrate diet if no return of pain in the next 24 hours.    Activity at  Discharge:  As tolerated    Pre-discharge education  Medications  Appointments      Follow-up Appointments  Future Appointments  Date Time Provider Department Center   7/9/2018 2:20 PM Nicolasa Moyer, APRN [facility] None     Additional Instructions for the Follow-ups that You Need to Schedule     Discharge Follow-up with PCP    As directed      Follow Up Details:  Celeste Dill MD in 1 week         Discharge Follow-up with Specialty: Dr Hagen in 2 weeks and they will set up a MRCP at that time.; 2 Weeks    As directed      Specialty:  Dr Hagen in 2 weeks and they will set up a MRCP at that time.    Follow Up:  2 Weeks               Test Results Pending at Discharge       Inge Monique DO  06/14/18  1:41 PM    Time: 20 min (if over 30 minutes give explanation as to why it took greater than 30 minutes)

## 2018-07-09 ENCOUNTER — OFFICE VISIT (OUTPATIENT)
Dept: PAIN MEDICINE | Facility: CLINIC | Age: 53
End: 2018-07-09

## 2018-07-09 VITALS
TEMPERATURE: 98.9 F | HEART RATE: 80 BPM | HEIGHT: 64 IN | WEIGHT: 172.8 LBS | SYSTOLIC BLOOD PRESSURE: 137 MMHG | OXYGEN SATURATION: 98 % | BODY MASS INDEX: 29.5 KG/M2 | DIASTOLIC BLOOD PRESSURE: 76 MMHG

## 2018-07-09 DIAGNOSIS — M70.61 TROCHANTERIC BURSITIS OF RIGHT HIP: ICD-10-CM

## 2018-07-09 DIAGNOSIS — Z79.899 ENCOUNTER FOR LONG-TERM (CURRENT) USE OF HIGH-RISK MEDICATION: ICD-10-CM

## 2018-07-09 DIAGNOSIS — M47.816 ARTHROPATHY OF LUMBAR FACET JOINT: ICD-10-CM

## 2018-07-09 DIAGNOSIS — M46.1 SACROILIITIS (HCC): ICD-10-CM

## 2018-07-09 DIAGNOSIS — G89.29 OTHER CHRONIC PAIN: Primary | ICD-10-CM

## 2018-07-09 DIAGNOSIS — M93.1 KIENBOCK DISEASE, ADULT: ICD-10-CM

## 2018-07-09 DIAGNOSIS — M53.3 SI (SACROILIAC) JOINT DYSFUNCTION: ICD-10-CM

## 2018-07-09 PROCEDURE — 99214 OFFICE O/P EST MOD 30 MIN: CPT | Performed by: NURSE PRACTITIONER

## 2018-07-09 RX ORDER — LIDOCAINE 50 MG/G
1 PATCH TOPICAL EVERY 24 HOURS
COMMUNITY
End: 2018-07-17 | Stop reason: SDUPTHER

## 2018-07-09 RX ORDER — OXYCODONE AND ACETAMINOPHEN 7.5; 325 MG/1; MG/1
1 TABLET ORAL EVERY 6 HOURS PRN
Qty: 120 TABLET | Refills: 0 | Status: SHIPPED | OUTPATIENT
Start: 2018-07-09 | End: 2018-08-02 | Stop reason: SDUPTHER

## 2018-07-09 RX ORDER — OXYCODONE HYDROCHLORIDE 30 MG/1
30 TABLET, FILM COATED, EXTENDED RELEASE ORAL EVERY 12 HOURS SCHEDULED
Qty: 60 TABLET | Refills: 0 | Status: SHIPPED | OUTPATIENT
Start: 2018-07-09 | End: 2018-08-02 | Stop reason: SDUPTHER

## 2018-07-09 NOTE — PROGRESS NOTES
CHIEF COMPLAINT  Follow up back pain, generalized (wrist, leg). Patient states increased pain in right leg. Patient has been trying Lidocaine patches.    Subjective   Crystal Cruz is a 52 y.o. female  who presents to the office for follow-up.She has a history of chronic hand pain, back pain and leg pain. Reports her right leg pain is worse since last office visit.    Also had recent hospitalization due to pancreatitis. This was her 3rd or 4th bout of pancreatitis. Initial bout was due to alcohol. Does not drink and has not since then.     Complains of pain in her hands and back. Today her pain is 7/10VAS. Describes the pain as continuous and worse.  Pain increases with walking, standing, household chores; pain decreases with medication, rest and injections.  Continues with OxyContin 30 mg BID and Percocet 7.5/325 3-4/day, Cymbalta 60 mg and gabapentin 600 mg TID. Denies any side effects from the regimen.   The regimen helps decrease her pain by 50%. ADL's by self.    Extremity Pain    The pain is present in the neck, back, left wrist, left hand, left foot, right foot, right hand, right wrist, right fingers and left fingers. This is a chronic problem. The current episode started more than 1 year ago. There has been no history of extremity trauma. The problem occurs constantly. Progression since onset: unchanged since last office visit. The quality of the pain is described as aching, pounding and burning. The pain is at a severity of 7/10. The pain is moderate. Pertinent negatives include no fever or numbness. The symptoms are aggravated by cold (moist/rainy weather). She has tried acetaminophen, heat, movement, NSAIDS, OTC ointments, OTC pain meds, oral narcotics and rest (Oxycontin 30 mg Q12 hours, Oxycodone 7.5/325 4/day) for the symptoms. The treatment provided moderate relief. mitchell systemic sclerosis   Back Pain   This is a chronic problem. The current episode started 1 to 4 weeks ago. The problem  occurs constantly. Progression since onset: worse since last office visit. The pain is present in the lumbar spine. The quality of the pain is described as aching. The pain does not radiate. The pain is at a severity of 7/10 (ranges from 3-6/10VAS). The pain is moderate. The symptoms are aggravated by bending, position, lying down, standing and twisting. Associated symptoms include abdominal pain. Pertinent negatives include no bladder incontinence, bowel incontinence, chest pain, dysuria, fever, headaches, numbness or weakness. She has tried analgesics, heat and bed rest (lumbar FJN/RFL--- significant long-term relief) for the symptoms.      Procedure List  -- 5-22-18-- LESI L4-5-- 60-90% relief until 6-19-18.   -- 5-10-18-- LESI L4-5  -- 2-20-18-- bilateral L1-L4 RFL.    PEG Assessment   What number best describes your pain on average in the past week?7  What number best describes how, during the past week, pain has interfered with your enjoyment of life?8  What number best describes how, during the past week, pain has interfered with your general activity?  9    The following portions of the patient's history were reviewed and updated as appropriate: allergies, current medications, past family history, past medical history, past social history, past surgical history and problem list.    Review of Systems   Constitutional: Positive for activity change. Negative for fatigue and fever.   HENT: Positive for congestion.    Eyes: Negative for visual disturbance.   Respiratory: Negative for cough, shortness of breath and wheezing.    Cardiovascular: Positive for palpitations. Negative for chest pain and leg swelling.   Gastrointestinal: Positive for abdominal pain and constipation. Negative for bowel incontinence and diarrhea.   Genitourinary: Negative for bladder incontinence, difficulty urinating and dysuria.   Musculoskeletal: Positive for arthralgias, back pain and myalgias.   Neurological: Negative for dizziness,  "weakness, numbness and headaches.   Psychiatric/Behavioral: Positive for sleep disturbance. Negative for suicidal ideas. The patient is nervous/anxious.        Vitals:    07/09/18 1420   BP: 137/76   Pulse: 80   Temp: 98.9 °F (37.2 °C)   SpO2: 98%   Weight: 78.4 kg (172 lb 12.8 oz)   Height: 162.6 cm (64.02\")   PainSc:   7   PainLoc: Back  Comment: Generalized pain     Objective   Physical Exam   Constitutional: She is oriented to person, place, and time. She appears well-developed and well-nourished. She is cooperative.   HENT:   Head: Normocephalic and atraumatic.   Eyes: Lids are normal.   Neck: Trachea normal.   Cardiovascular: Normal rate.    Pulmonary/Chest: Effort normal.   Musculoskeletal:        Lumbar back: She exhibits tenderness and bony tenderness (minimal tenderness to palpation of bilateral L3-L5, moderate of T12-L3 tenderness).        Right hand: She exhibits decreased range of motion and tenderness. She exhibits normal capillary refill.        Left hand: She exhibits decreased range of motion and tenderness. She exhibits normal capillary refill.   Arthritic changes bilateral hands and wrists  Guarded of BUE's    MODERATE TO EXQUISITE tenderness to right SI joint   Moderate tenderness to right greater trochanteric bursa  Negative SLR bilaterally   Neurological: She is alert and oriented to person, place, and time. Gait normal.   Reflex Scores:       Patellar reflexes are 1+ on the right side and 1+ on the left side.  Skin: Skin is warm, dry and intact.   Sclerous appearance is unchanged   Psychiatric: She has a normal mood and affect. Her speech is normal and behavior is normal. Cognition and memory are normal.   Nursing note and vitals reviewed.      Assessment/Plan   Crystal was seen today for back pain and pain.    Diagnoses and all orders for this visit:    Other chronic pain    Arthropathy of lumbar facet joint  -     Ambulatory Referral to Physical Therapy Evaluate and treat    Kienbock disease, " adult    Encounter for long-term (current) use of high-risk medication    Sacroiliitis (CMS/HCC)  -     Case Request    SI (sacroiliac) joint dysfunction  -     Case Request    Trochanteric bursitis of right hip  -     Case Request    Other orders  -     OxyCODONE HCl ER (oxyCONTIN) 30 MG tablet extended-release 12 hour; Take 1 tablet by mouth Every 12 (Twelve) Hours.  -     oxyCODONE-acetaminophen (PERCOCET) 7.5-325 MG per tablet; Take 1 tablet by mouth Every 6 (Six) Hours As Needed for Severe Pain .      --- The urine drug screen confirmation from 5-9-18 has been reviewed and the result is APPROPRIATE based on patient history and STEVEN report  --- Refill OxyContin and Percocet. Patient appears stable with current regimen. No adverse effects. Regarding continuation of opioids, there is no evidence of aberrant behavior or any red flags.  The patient continues with appropriate response to opioid therapy. ADL's remain intact by self.   --- Refer to PT. Patient requests [facility].  --- Right SI joint and right greater trochanteric bursa injection. No blood thinners. Reviewed the procedure at length with the patient.  Included in the review was expectations, complications, risk and benefits.The procedure was described in detail and the risks, benefits and alternatives were discussed with the patient (including but not limited to: bleeding, infection, nerve damage, worsening of pain, inability to perform injection, paralysis, seizures, and death) who agreed to proceed.  Discussed the potential for sedation if warranted/wanted.  Questions were answered and in a way the patient could understand.  Patient verbalized understanding and wishes to proceed.  This intervention will be ordered.  --- back brace. Ordering an  LSO back brace with anterior, lateral and posterior support from T-9 to S-1 to reduce pain by restricting mobility of the trunk.  --- Follow-up 1 month or sooner if  needed.    ----------  Education about Sacroiliac joint injections:  This Sacroiliac joint injection (blockade) we have suggested is intended for diagnostic purposes, with the intent of offering the patient Radiofrequency thermal rhizotomy (RF) of the sensory branches to the joint if the block is diagnostically effective.  The diagnostic blockade is necessary to determine the likelihood that RF therapy could be efficacious in providing long term relief to the patient.    In this procedure, the sacroiliac joint is aligned with imaging, and under image guidance a needle is placed with the needle tip into the joint.  The needle position is confirmed to be appropriately in the joint before injection of medication into the joint.  When xray fluoroscopy is used, contrast dye is used to confirm a proper arthrogram (i.e., outline of the joint).  When ultrasound is used, IV fluid (normal saline) is injected to see the flow of the fluid into the joint.  Once confirmed, then the medication can be injected into the joint.  Oftentimes this medication is a combination of local anesthetics (for diagnostic purposes) and also a steroid (to decrease irritation & inflammation in the joint, also known as sacroilitis).      Medically, a successful RF procedure should provide a patient with 50% pain relief or more for at least 6 months.  Clinical experience suggests that successful patients receive relief more in the range of 12 months on average.  We also discussed that many patients receive therapeutic success from the intraarticular joint injection, and may not require RF ablation.  If a patient receives more than 8 weeks of relief from joint injection, then occasional repeat joint blocks for therapeutic purposes is a very reasonable alternative therapy.  This course of therapy is consistent with our LCDs according to our CMS  in the area, and therefore other insurance providers should follow accordingly.  We will monitor  our patients to screen for these therapeutic responders and will offer RF therapy only when necessary.      We discussed that joint injections & also RF procedures are not without risks.  Best practices regarding anticoagulant use & neuraxial procedures will be respected.  Oftentimes a patient on an anticoagulant can be offered a joint injection safely, but again this is not risk-free, and such patients give consent with regards to this increased bleeding risk, which could cause problems including but not limited to worsening of pain, nerve damage, or muscle damage.  Patients that are ill or otherwise may be at risk for sepsis will not have their spines accessed by neuraxial injections of any type.  This patient will not be offered these therapies if there is an increased risk.   We discussed that there is a risk of postprocedural pain and also a risk of worsening of clinical picture with these procedures as with any neuraxial procedure.    ----------       STEVEN REPORT    As part of the patient's treatment plan, I am prescribing controlled substances. The patient has been made aware of appropriate use of such medications, including potential risk of somnolence, limited ability to drive and/or work safely, and the potential for dependence or overdose. It has also bee made clear that these medications are for use by this patient only, without concomitant use of alcohol or other substances unless prescribed.     Patient has completed prescribing agreement detailing terms of continued prescribing of controlled substances, including monitoring STEVEN reports, urine drug screening, and pill counts if necessary. The patient is aware that inappropriate use will results in cessation of prescribing such medications.    STEVEN report has been reviewed and scanned into the patient's chart.    As the clinician, I personally reviewed the STEVEN from 7-6-18 while the patient was in the office today.    History and physical exam  exhibit continued safe and appropriate use of controlled substances.      EMR Dragon/Transcription disclaimer:   Much of this encounter note is an electronic transcription/translation of spoken language to printed text. The electronic translation of spoken language may permit erroneous, or at times, nonsensical words or phrases to be inadvertently transcribed; Although I have reviewed the note for such errors, some may still exist.

## 2018-07-10 ENCOUNTER — DOCUMENTATION (OUTPATIENT)
Dept: PAIN MEDICINE | Facility: CLINIC | Age: 53
End: 2018-07-10

## 2018-07-10 ENCOUNTER — OUTSIDE FACILITY SERVICE (OUTPATIENT)
Dept: PAIN MEDICINE | Facility: CLINIC | Age: 53
End: 2018-07-10

## 2018-07-10 PROCEDURE — 27096 INJECT SACROILIAC JOINT: CPT | Performed by: ANESTHESIOLOGY

## 2018-07-10 PROCEDURE — 20610 DRAIN/INJ JOINT/BURSA W/O US: CPT | Performed by: ANESTHESIOLOGY

## 2018-07-10 NOTE — PROGRESS NOTES
Unilateral Sacroiliac Joint Injection + Greater Trochanteric Hip Bursa injection under fluoroscopic guidance    [facility]        PREOPERATIVE DIAGNOSIS:   Sacroiliac joint dysfunction on the Right as well as greater trochanteric hip bursitis on the Right     POSTOPERATIVE DIAGNOSIS:  Same as preop     PROCEDURE:  Sacroiliac Joint Injection with fluoroscopic guidance, plus greater trochanteric bursa injection, under fluoroscopic guidance as well, on the aforementioned laterality      PRE-PROCEDURE DISCUSSION WITH PATIENT:    Risks and complications were discussed with the patient prior to starting the procedure and informed consent was obtained.  We discussed various topics including but not limited to bleeding, infection, injury, postprocedural site soreness, painful flareup, worsening of clinical picture, paralysis, coma, and death.      SURGEON:  Reji Law MD     REASON FOR PROCEDURE:    Patient has pain consistent with SI pathology on history and physical exam. Positive sacroiliac provocation maneuvers noted.   Hip bursitis is flared up also.       SEDATION:  Versed 6mg & Fentanyl 100 mcg IV  ANESTHETIC AGENT:  Marcaine 0.5%  STEROID AGENT:  80mg DepoMedrol split between the SI joint injection and hip bursa     DESCRIPTON OF PROCEDURE:    After obtaining informed consent, IV access was obtained in the preoperative area.  The patient was transported to the operative suite and placed in the prone position with a pillow under the pelvic area. EKG, blood pressure, and pulse oximeter were monitored.     A solution was prepared of 5ml of Marcaine 0.5% and 80mg Depomedrol, for a total of 6ml.    The lumbosacral area was prepped with Chloraprep and draped in a sterile fashion. Under fluoroscopic guidance the inferior most portion of the aforementioned SI joint was identified. The overlying skin and subcutaneous tissue was anesthetized with 1% lidocaine. A 22-gauge spinal needle was introduced  from the inferior most portion of the joint into the SI joint under fluoroscopic guidance in the AP dimension with slight oblique rotation to the contralateral side.  Aspiration was negative.  After confirming the position of the needle with fluoroscopy, 1 mL of Omnipaque was injected and after seeing appropriate spread into the joint a total of 3mL of 0.5% Marcaine, with approximately 40 mg of DepoMedrol, was injected very slowly.  Needle was removed intact.  Vital signs remained stable.  The onset of analgesia was noted.      Area over the greater trochanter on the aforementioned side was palpated and marked on the skin and then cleansed with Hibiclens ×2. A sterile needle was inserted about 1 & 1/2 inches in to the skin and into the bursa, under fluoro guidance, with periosteum contact over the greater trochanter and the needle was withdrawn about 2 mm into the bursa. Aspiration was negative and slowly after confirming bursa spread with omnipaque on each side, the rest of the injectate syringe was injected into the hip bursa.   The needle was removed intact on each side and bleeding was minimal. The insertion site was dressed with a Band-Aid.  There were no apparent complications.         ESTIMATED BLOOD LOSS:  Minimal  SPECIMENS:  None  COMPLICATIONS:   No complications were noted., There was no indication of vascular uptake on live injection of contrast dye. and The patient did not have any signs of postprocedure numbness nor weakness.       TOLERANCE & DISCHARGE CONDITION:    The patient tolerated the procedure well.  The patient was transported to the recovery area without difficulties.  The patient was discharged to home under the care of family in stable and satisfactory condition.     PLAN OF CARE:  1. The patient was given our standard instruction sheet and will resume all medications as per the medication reconciliation sheet.  2. The patient will Return to clinic 4 wks  3. The patient is instructed to  keep a pain log hourly for 8 hours after the procedure.

## 2018-07-17 ENCOUNTER — TELEPHONE (OUTPATIENT)
Dept: PAIN MEDICINE | Facility: CLINIC | Age: 53
End: 2018-07-17

## 2018-07-17 ENCOUNTER — HOSPITAL ENCOUNTER (OUTPATIENT)
Dept: PHYSICAL THERAPY | Facility: HOSPITAL | Age: 53
Setting detail: THERAPIES SERIES
Discharge: HOME | End: 2018-07-17

## 2018-07-17 DIAGNOSIS — M25.551 RIGHT HIP PAIN: ICD-10-CM

## 2018-07-17 DIAGNOSIS — M54.50 CHRONIC BILATERAL LOW BACK PAIN WITHOUT SCIATICA: ICD-10-CM

## 2018-07-17 DIAGNOSIS — M47.816 ARTHROPATHY OF LUMBAR FACET JOINT: Primary | ICD-10-CM

## 2018-07-17 DIAGNOSIS — G89.29 CHRONIC BILATERAL LOW BACK PAIN WITHOUT SCIATICA: ICD-10-CM

## 2018-07-17 PROCEDURE — 97032 APPL MODALITY 1+ESTIM EA 15: CPT

## 2018-07-17 PROCEDURE — G8978 MOBILITY CURRENT STATUS: HCPCS

## 2018-07-17 PROCEDURE — G8979 MOBILITY GOAL STATUS: HCPCS

## 2018-07-17 PROCEDURE — 97162 PT EVAL MOD COMPLEX 30 MIN: CPT

## 2018-07-17 PROCEDURE — 97110 THERAPEUTIC EXERCISES: CPT

## 2018-07-17 RX ORDER — LIDOCAINE 50 MG/G
1 PATCH TOPICAL EVERY 24 HOURS
Qty: 30 EACH | Refills: 4 | Status: SHIPPED | OUTPATIENT
Start: 2018-07-17 | End: 2018-08-08 | Stop reason: SDUPTHER

## 2018-07-17 NOTE — THERAPY EVALUATION
Outpatient Physical Therapy Ortho Initial Evaluation  STEVE Jane     Patient Name: Crystal Cruz  : 1965  MRN: [id number]  Today's Date: 2018      Visit Date: 2018    Patient Active Problem List   Diagnosis   • Acute upper respiratory infection   • Anxiety disorder   • Huitron's esophagus   • Knee pain   • Chronic constipation   • Chronic maxillary sinusitis   • Diabetes mellitus (CMS/HCC)   • Arthropathy of lumbar facet joint   • Fatigue   • Gastroesophageal reflux disease without esophagitis   • Hyperlipidemia   • Juvenile osteochondrosis of carpal lunate   • Discharge from nipple   • Pneumonitis   • Systemic sclerosis (CMS/HCC)   • Pain of upper extremity   • Kienbock disease, adult   • Acute pancreatitis   • Benign essential hypertension   • Chronic pain   • Intractable vomiting with nausea   • Seizure (CMS/HCC)   • Elevated troponin   • PRES (posterior reversible encephalopathy syndrome)   • Aftercare   • Acquired hypothyroidism   • Encounter for long-term (current) use of high-risk medication   • Arthritis   • Stenosis of carotid artery   • Hypertension   • Muscle pain   • Obstruction of carotid artery   • Tobacco use   • Controlled type 2 diabetes mellitus without complication (CMS/HCC)   • Vitamin D deficiency   • Atherosclerosis of aorta (CMS/HCC)   • Abdominal pain        Past Medical History:   Diagnosis Date   • Acid reflux    • Anemia    • Anxiety    • Atherosclerosis of aorta (CMS/HCC) 2017   • Huitron's esophagus    • Bursitis    • CAD (coronary artery disease)    • Chronic pain disorder    • CVA (cerebrovascular accident) (CMS/HCC)    • DDD (degenerative disc disease), cervical    • Diabetes mellitus (CMS/HCC)    • History of transfusion    • Hypertension    • Joint pain    • Kienbock's disease    • Low back pain    • Pancreatitis    • Pulmonary fibrosis (CMS/HCC) 2016   • Reflux gastritis    • Scleroderma (CMS/HCC)    • Seizure (CMS/HCC)         Past Surgical  History:   Procedure Laterality Date   • CAROTID ENDARTERECTOMY Left     Neurological   • CATARACT EXTRACTION Bilateral    •  SECTION     • CHOLECYSTECTOMY      Laparoscopic   • COLONOSCOPY      Complete   • COLONOSCOPY N/A 2016    Procedure: COLONOSCOPY;  Surgeon: Gaviota Hagen MD;  Location: [facility] OR;  Service:    • ENDOSCOPY N/A 2016    Procedure: ESOPHAGOGASTRODUODENOSCOPY WITH BIOPSIES;  Surgeon: Gaviota Hagen MD;  Location: [facility] OR;  Service:    • TONSILLECTOMY AND ADENOIDECTOMY     • UPPER GASTROINTESTINAL ENDOSCOPY      Diagnostic   • WRIST SURGERY Left        Visit Dx:     ICD-10-CM ICD-9-CM   1. Arthropathy of lumbar facet joint M12.88 721.3   2. Chronic bilateral low back pain without sciatica M54.5 724.2    G89.29 338.29   3. Right hip pain M25.551 719.45             Patient History     Row Name 18 1300             History    Chief Complaint Difficulty Walking;Difficulty with daily activities;Fatigue/poor endurance;Joint stiffness;Muscle tenderness;Muscle weakness;Pain;Numbness;Tinglings;Tightness  -LN      Type of Pain Back pain;Hip pain  -LN      Date Current Problem(s) Began --   2  years ago  -LN      Brief Description of Current Complaint Patient reports history of LBP for a couple years and has had 6 procedures on LB (2 epidurals and series of shots);no back surgery;  just had a procedure on SI joint 2 weeks ago and only had 2 days of relief.  CT scan/MRI showed severe arthritis and Degeneration per patient.   -LN      Previous treatment for THIS PROBLEM Injections;Pain Management;Medication   oxycontin 30 mg 2 x day; oxycodone every 3-6 hours   -LN      Patient/Caregiver Goals Relieve pain;Improve strength;Know what to do to help the symptoms;Improve mobility  -LN      Occupation/sports/leisure activities on disability; no hobbies reported.  -LN      Patient seeing anyone else for problem(s)? Pain management  -LN      How has patient tried to help current problem?  Meds; pain management- epidurals/injections; lidoderm patches; Ice  -LN      What clinical tests have you had for this problem? CT scan;MRI  -LN      Results of Clinical Tests Mild degenerative changes throughout the lumbar spine.  The degenerative changes are most pronounced at L4-5 where there is severe facet arthropathy with small bilateral synovial cysts.  There is no mass effect on the thecal sac from the synovial cysts.  Moderate neural foraminal stenosis at L4-5.   -LN      Related/Recent Hospitalizations Yes  -LN      Surgery/Hospitalization pancreatitis- 4 weeks ago  -LN      History of Previous Related Injuries none reported  -LN         Pain     Pain Location Back;Hip   right hip  -LN      Pain at Present 7  -LN      Pain at Best 7  -LN      Pain at Worst 10  -LN      Pain Frequency Constant/continuous;Intermittent   depending on day  -LN      Pain Description Burning;Throbbing;Spasm  -LN      What Performance Factors Make the Current Problem(s) WORSE? sweeping the kitchen floor; standing; stooping; walking a lot.  -LN      What Performance Factors Make the Current Problem(s) BETTER? no positions relieves it; only meds  -LN      Pain Comments occas N/T right hip/leg/back R>L  -LN      Tolerance Time- Standing limited to 10 min  -LN      Tolerance Time- Sitting limited to 30 minutes  -LN      Tolerance Time- Walking limited  -LN      Tolerance Time- Lying limited  -LN      Is your sleep disturbed? Yes  -LN      Is medication used to assist with sleep? Yes  -LN      What position do you sleep in? Supine;Right sidelying;Left sidelying  -LN      Difficulties at work? on disability  -LN      Difficulties with ADL's? does need help with shoes and socks   -LN      Difficulties with recreational activities? yes  -LN         Fall Risk Assessment    Any falls in the past year: No  -LN         Services    Prior Rehab/Home Health Experiences Yes  -LN      When was the prior experience with Rehab/Home Health Previous  "OT on wrists; PT after seizures for balance and walking  -LN      Are you currently receiving Home Health services No  -LN      Do you plan to receive Home Health services in the near future No  -LN         Daily Activities    Primary Language English  -LN      Are you able to read Yes  -LN      Are you able to write Yes  -LN      How does patient learn best? Reading;Pictures/Video  -LN      Teaching needs identified Home Exercise Program;Management of Condition;Other (comment)   Risks and benefits of treatment explained to patient.    -LN      Patient is concerned about/has problems with Bed Mobility;Difficulty with self care (i.e. bathing, dressing, toileting:;Flexibility;Performing home management (household chores, shopping, care of dependents);Sitting;Standing;Walking  -LN      Does patient have problems with the following? Anxiety  -LN      Barriers to learning None  -LN      Pt Participated in POC and Goals Yes  -LN         Safety    Are you being hurt, hit, or frightened by anyone at home or in your life? No  -LN      Are you being neglected by a caregiver No  -LN        User Key  (r) = Recorded By, (t) = Taken By, (c) = Cosigned By    Initials Name Provider Type    LN Kim Martin, PT Physical Therapist                PT Ortho     Row Name 07/17/18 1400       Subjective Comments    Subjective Comments \"The amount of pain I have, is dependent on what I'm doing.\"   -LN       Precautions and Contraindications    Precautions none  -LN       Subjective Pain    Able to rate subjective pain? yes  -LN    Pre-Treatment Pain Level 7  -LN    Post-Treatment Pain Level 6  -LN       Posture/Observations    Lumbar lordosis Mild;Increased;Standing posture  -LN    Iliac crests Left:;Elevated;Standing posture  -LN    Posture/Observations Comments Patient tends to sit and stand with decreased weightbearing on right leg.   -LN       Lumbosacral Palpation    SI Right:;Tender;Guarded/taut  -LN    Piriformis " Right:;Tender;Guarded/taut  -LN    Gluteus Andrew Right:;Tender;Guarded/taut  -LN    Erector Spinae (Paraspinals) Bilateral:;Tender;Guarded/taut  -LN    Ischial Tuberosity Right:;Tender  -LN       Lumbar/SI Special Tests    Standing Flexion Test (SI Dysfunction) Right:;Positive  -LN    SLR (Neural Tension) Right:;Positive  -LN    SI Compression Test (SI Dysfunction) Right:;Positive  -LN    SI Distraction Test (SI Dysfunction) Right:;Positive  -LN       Hip/Thigh Palpation    Greater Trochanter Right:;Tender  -LN    ASIS Right:;Tender  -LN    Hip/Thigh Palpation? --   Right PSIS tender  -LN       Hip Special Tests    GITA (hip vs SI pathology) Right:;Positive  -LN       Leg Length Test    True Equal  -LN    Apparent Unequal;Right Higher Leg  -LN       General ROM    GENERAL ROM COMMENTS Right LE ROM WFL- but with discomfort with hip flexion and knee extension  -LN       Head/Neck/Trunk    Trunk Extension AROM 75%  -LN    Trunk Flexion AROM 75%  -LN    Trunk Lt Lateral Flexion AROM WFL  -LN    Trunk Rt Lateral Flexion AROM 50%- pain  -LN    Trunk Lt Rotation AROM WFL  -LN    Trunk Rt Rotation AROM WFL  -LN       Right Hip (Manual Muscle Testing)    Right Hip Manual Muscle Testing (MMT) --   right leg very shakey with MMT  -LN    MMT: Flexion, Right Hip flexion  -LN    MMT, Gross Movement: Right Hip Flexion (3+/5) fair plus  -LN    MMT: Extension, Right Hip extension  -LN    MMT, Gross Movement: Right Hip Extension (4/5) good  -LN    MMT: ABduction, Right Hip abduction  -LN    MMT, Gross Movement: Right Hip ABduction (4-/5) good minus  -LN    MMT, Gross Movement: Right Hip ADduction (4-/5) good minus  -LN    MMT, Right Hip: Manual Muscle Testing (MMT) hip adduction  -LN       Right Knee (Manual Muscle Testing)    Right Knee Manual Muscle Testing (MMT) extension;flexion  -LN    MMT: Extension, Right Knee extension  -LN    MMT, Gross Movement: Right Knee Extension (4-/5) good minus  -LN    MMT: Flexion, Right Knee  flexion  -LN    MMT, Gross Movement: Right Knee Flexion (4/5) good  -LN    Comment, Right Knee: Manual Muscle Testing (MMT) Discomfort in right hip and LS area with knee extension.  Patient with history of 3 strokes and did have weakness on left side.  -LN       Sensation    Sensation WNL? WFL  -LN    Light Touch No apparent deficits  -LN    Additional Comments She does report occas N/T right back/hip/upper thigh (lateral)  -LN       Lower Extremity Flexibility    Hamstrings Bilateral:;Moderately limited  -LN    Hip Flexors Right:;Moderately limited  -LN    ITB Right:;Moderately limited  -LN       Transfers    Sit-Stand Sebastian (Transfers) independent  -LN    Stand-Sit Sebastian (Transfers) independent  -LN    Transfers, Sit-Stand-Sit, Assist Device other (see comments)   none  -LN    Comment (Transfers) All mobility is very guarded patricia with use of right leg.  -LN       Gait/Stairs Assessment/Training    Sebastian Level (Gait) independent  -LN    Assistive Device (Gait) other (see comments)   none  -LN    Deviations/Abnormal Patterns (Gait) right sided deviations;antalgic;lit decreased;stride length decreased;gait speed decreased  -LN      User Key  (r) = Recorded By, (t) = Taken By, (c) = Cosigned By    Initials Name Provider Type    LN Kim Martin, PT Physical Therapist                      Therapy Education  Education Details: Patient to work on HEP 1-2 x day as tolerated. She is to bring her home TENS unit in next visit to make sure she knows how to use it properly.   Given: HEP, Symptoms/condition management, Pain management, Posture/body mechanics  Program: New  How Provided: Verbal, Demonstration, Written  Provided to: Patient  Level of Understanding: Teach back education performed, Verbalized, Demonstrated           PT OP Goals     Row Name 07/17/18 1400          PT Short Term Goals    STG Date to Achieve 07/31/18  -LN     STG 1 Patient to verbally report decreased rating of pain in  LB and right hip to <6/10 with ADLs and everday activities.  -LN     STG 2 Trunk ROM improved by 25%.  -LN     STG 3 Patient able to perform 10-15 reps back stabilization and gentle stretching/ROM exercises without c/o increased pain.   -LN     STG 4 Patient to have improved standing tolerance to 30 minutes.   -LN     STG 5 Patient to have improved right hip and knee strength by 1/2 muscle grade.   -LN        Long Term Goals    LTG Date to Achieve 08/14/18  -LN     LTG 1 Patient to verbally report decreased rating of pain in LB and right hip to <4/10 with ADLs and everyday activities.   -LN     LTG 2 Trunk ROM WFL and painfree in all planes.  -LN     LTG 3 Patient independent with HEP issued by therapist.   -LN     LTG 4 Good pelvic alignment maintained between PT sessions with use of MET and back stabilization program.   -LN     LTG 5 Right hip and knee strength improved to 4+/5.   -LN     LTG 6 Patient able to walk around [employer] for 30 minutes with LBP no > than 4/10.   -LN        Time Calculation    PT Goal Re-Cert Due Date 08/14/18  -LN       User Key  (r) = Recorded By, (t) = Taken By, (c) = Cosigned By    Initials Name Provider Type    LN Kim Martin, PT Physical Therapist                PT Assessment/Plan     Row Name 07/17/18 9999          PT Assessment    Functional Limitations Decreased safety during functional activities;Impaired gait;Impaired locomotion;Limitation in home management;Performance in self-care ADL;Performance in leisure activities;Limitations in functional capacity and performance;Limitations in community activities  -LN     Impairments Gait;Impaired flexibility;Locomotion;Range of motion;Sensation;Muscle strength;Pain;Posture  -LN     Assessment Comments Patient presents with 2 year history of LBP with more recent onset of right SI and hip pain.  Patient with pain, decreased trunk ROM, decreased right leg strength, very guarded mobility, signs of right pelvic obliquity;  occasional N/T right LB/hip/upper leg; decreased sitting/standing/walking tolerance. Patient with signs of lumbar DDD/arthritis/nerve impingement and will benefit from modalities for pain relief and a good back stabilization/ROM/stretching exercise program as well as education on proper posture and body mechanics.   -LN     Please refer to paper survey for additional self-reported information Yes  -LN     Rehab Potential Good  -LN     Patient/caregiver participated in establishment of treatment plan and goals Yes  -LN     Patient would benefit from skilled therapy intervention Yes  -LN        PT Plan    PT Frequency 2x/week  -LN     Predicted Duration of Therapy Intervention (Therapy Eval) 4 weeks  -LN     Planned CPT's? PT EVAL MOD COMPLELITY: 39467;PT ULTRASOUND EA 15 MIN: 44987;PT ELECTRICAL STIM UNATTEND: ;PT HOT OR COLD PACK TREAT MCARE;PT THER PROC EA 15 MIN: 81682;PT MANUAL THERAPY EA 15 MIN: 73462  -LN     Physical Therapy Interventions (Optional Details) home exercise program;patient/family education;modalities;lumbar stabilization;postural re-education;ROM (Range of Motion);strengthening;stretching;taping;manual therapy techniques  -LN     PT Plan Comments Progress with exercises as tolerated. Modalities and MET PRN. Consider trial of Kinesiotape. Patient education.   -LN       User Key  (r) = Recorded By, (t) = Taken By, (c) = Cosigned By    Initials Name Provider Type    LN Kim Martin, PT Physical Therapist                Modalities     Row Name 07/17/18 1400             Moist Heat    Patient denies application of MH Yes   she reports that heat flares her up  -LN         ELECTRICAL STIMULATION    Attended/Unattended Unattended  -LN      Stimulation Type IFC  -LN      Location/Electrode Placement/Other Bilateral lumbar PS/right buttocks/hip area with patient prone.  -LN      97269 - PT Electrical Stimulation (Manual) Minutes 15  -LN        User Key  (r) = Recorded By, (t) = Taken By, (c)  "= Cosigned By    Initials Name Provider Type    YASMINE Martin, PT Physical Therapist              Exercises     Row Name 07/17/18 1400             Precautions    Existing Precautions/Restrictions no known precautions/restrictions  -LN         Subjective Comments    Subjective Comments \"The amount of pain I have, is dependent on what I'm doing.\"   -LN         Subjective Pain    Able to rate subjective pain? yes  -LN      Pre-Treatment Pain Level 7  -LN      Post-Treatment Pain Level 6  -LN         Exercise 1    Exercise Name 1 PPT  -LN      Cueing 1 Verbal;Tactile;Demo  -LN      Reps 1 5  -LN      Time 1 5 seconds  -LN         Exercise 2    Exercise Name 2 PPT with ball squeeze  -LN      Cueing 2 Verbal;Tactile;Demo  -LN      Reps 2 5  -LN      Time 2 5 seconds  -LN         Exercise 3    Exercise Name 3 PPT with hooklying hip abduction  -LN      Cueing 3 Verbal;Tactile;Demo  -LN      Reps 3 5  -LN      Time 3 5 seconds  -LN      Additional Comments red TB   -LN         Exercise 4    Exercise Name 4 LTR  -LN      Cueing 4 Verbal;Tactile  -LN      Reps 4 5  -LN      Time 4 2-3 seconds  -LN        User Key  (r) = Recorded By, (t) = Taken By, (c) = Cosigned By    Initials Name Provider Type    YASMINE Martin, PT Physical Therapist           Manual Rx (last 36 hours)      Manual Treatments     Row Name 07/17/18 1400             Manual Rx 1    Manual Rx 1 Location pelvis  -LN      Manual Rx 1 Type MET- shotgun/right anterior innominate  -LN      Manual Rx 1 Duration Good pelvic alignment noted after MET.  -LN        User Key  (r) = Recorded By, (t) = Taken By, (c) = Cosigned By    Initials Name Provider Type    YASMINE Martin, PT Physical Therapist                      Outcome Measure Options: Other Outcome Measure  Other Outcome Measure Tool Used  Other Outcome Measure Tool Comments: Back index- score of 78.      Time Calculation:     Start Time: 1400  Stop Time: 1500  Time Calculation " (min): 60 min     Therapy Charges for Today     Code Description Service Date Service Provider Modifiers Qty    95033541664 HC PT MOBILITY CURRENT 7/17/2018 Kim Martin, PT GP, CL 1    60547309441 HC PT MOBILITY PROJECTED 7/17/2018 Kim Martin, PT GP, CJ 1    27645948399 HC PT ELEC STIM EA-PER 15 MIN 7/17/2018 Kim Martin, PT GP 1    14378873921 HC PT EVAL MOD COMPLEXITY 2 7/17/2018 Kim Martin, PT GP 1    39484419604 HC PT THER PROC EA 15 MIN 7/17/2018 Kim Martin, PT GP 1      Evaluation done was moderate complexity secondary to patient with history of scleroderma, 3 strokes, seizures, diabetes with peripheral neuropathy- any of these could have an affect on her tolerance to PT and her exercise tolerance.     PT G-Codes  PT Professional Judgement Used?: Yes  Outcome Measure Options: Other Outcome Measure  Score: Back index- score of 78 today.  Functional Limitation: Mobility: Walking and moving around  Mobility: Walking and Moving Around Current Status (): At least 60 percent but less than 80 percent impaired, limited or restricted  Mobility: Walking and Moving Around Goal Status (): At least 20 percent but less than 40 percent impaired, limited or restricted         Kim Martin, PT  7/17/2018

## 2018-07-19 ENCOUNTER — HOSPITAL ENCOUNTER (OUTPATIENT)
Dept: PHYSICAL THERAPY | Facility: HOSPITAL | Age: 53
Setting detail: THERAPIES SERIES
Discharge: HOME | End: 2018-07-19

## 2018-07-19 DIAGNOSIS — M25.551 RIGHT HIP PAIN: ICD-10-CM

## 2018-07-19 DIAGNOSIS — M54.50 CHRONIC BILATERAL LOW BACK PAIN WITHOUT SCIATICA: ICD-10-CM

## 2018-07-19 DIAGNOSIS — G89.29 CHRONIC BILATERAL LOW BACK PAIN WITHOUT SCIATICA: ICD-10-CM

## 2018-07-19 DIAGNOSIS — M47.816 ARTHROPATHY OF LUMBAR FACET JOINT: Primary | ICD-10-CM

## 2018-07-19 PROCEDURE — G0283 ELEC STIM OTHER THAN WOUND: HCPCS

## 2018-07-19 PROCEDURE — 97110 THERAPEUTIC EXERCISES: CPT

## 2018-07-19 NOTE — THERAPY TREATMENT NOTE
Outpatient Physical Therapy Ortho Treatment Note  STEVE Jane     Patient Name: Crystal Cruz  : 1965  MRN: [id number]  Today's Date: 2018      Visit Date: 2018    Visit Dx:    ICD-10-CM ICD-9-CM   1. Arthropathy of lumbar facet joint M12.88 721.3   2. Chronic bilateral low back pain without sciatica M54.5 724.2    G89.29 338.29   3. Right hip pain M25.551 719.45       Patient Active Problem List   Diagnosis   • Acute upper respiratory infection   • Anxiety disorder   • Huitron's esophagus   • Knee pain   • Chronic constipation   • Chronic maxillary sinusitis   • Diabetes mellitus (CMS/HCC)   • Arthropathy of lumbar facet joint   • Fatigue   • Gastroesophageal reflux disease without esophagitis   • Hyperlipidemia   • Juvenile osteochondrosis of carpal lunate   • Discharge from nipple   • Pneumonitis   • Systemic sclerosis (CMS/HCC)   • Pain of upper extremity   • Kienbock disease, adult   • Acute pancreatitis   • Benign essential hypertension   • Chronic pain   • Intractable vomiting with nausea   • Seizure (CMS/HCC)   • Elevated troponin   • PRES (posterior reversible encephalopathy syndrome)   • Aftercare   • Acquired hypothyroidism   • Encounter for long-term (current) use of high-risk medication   • Arthritis   • Stenosis of carotid artery   • Hypertension   • Muscle pain   • Obstruction of carotid artery   • Tobacco use   • Controlled type 2 diabetes mellitus without complication (CMS/HCC)   • Vitamin D deficiency   • Atherosclerosis of aorta (CMS/HCC)   • Abdominal pain        Past Medical History:   Diagnosis Date   • Acid reflux    • Anemia    • Anxiety    • Atherosclerosis of aorta (CMS/HCC) 2017   • Huitron's esophagus    • Bursitis    • CAD (coronary artery disease)    • Chronic pain disorder    • CVA (cerebrovascular accident) (CMS/HCC)    • DDD (degenerative disc disease), cervical    • Diabetes mellitus (CMS/HCC)    • History of transfusion    • Hypertension    •  Joint pain    • Kienbock's disease    • Low back pain    • Pancreatitis    • Pulmonary fibrosis (CMS/HCC) 2016   • Reflux gastritis    • Scleroderma (CMS/HCC)    • Seizure (CMS/HCC)         Past Surgical History:   Procedure Laterality Date   • CAROTID ENDARTERECTOMY Left     Neurological   • CATARACT EXTRACTION Bilateral    •  SECTION     • CHOLECYSTECTOMY      Laparoscopic   • COLONOSCOPY      Complete   • COLONOSCOPY N/A 2016    Procedure: COLONOSCOPY;  Surgeon: Gaviota Hagen MD;  Location:  [facility];  Service:    • ENDOSCOPY N/A 2016    Procedure: ESOPHAGOGASTRODUODENOSCOPY WITH BIOPSIES;  Surgeon: Gaviota Hagen MD;  Location:  [facility];  Service:    • TONSILLECTOMY AND ADENOIDECTOMY     • UPPER GASTROINTESTINAL ENDOSCOPY      Diagnostic   • WRIST SURGERY Left                              PT Assessment/Plan     Row Name 18 1640          PT Assessment    Assessment Comments fair to good tolerance to today's session; no increased back pain reported but complains of muscle soreness after PPT ex's  -        PT Plan    PT Plan Comments Pt to add new ex's to HEP  -       User Key  (r) = Recorded By, (t) = Taken By, (c) = Cosigned By    Initials Name Provider Type     Michael Lama PTA Physical Therapy Assistant                Modalities     Row Name 18 1500             Subjective Comments    Subjective Comments Pt states she wasn't sore following activities of intial eval and was able to perform HEP without difficulty  -         Moist Heat    Patient denies application of  Yes   heat flares symptoms per pt  -         ELECTRICAL STIMULATION    Attended/Unattended Unattended  -      Stimulation Type IFC  -      Location/Electrode Placement/Other Bilateral lumbar PS/right buttocks/hip area with patient prone.  -        User Key  (r) = Recorded By, (t) = Taken By, (c) = Cosigned By    Initials Name Provider Type     Michael Lama PTA Physical Therapy Assistant                 Exercises     Row Name 07/19/18 1500             Precautions    Existing Precautions/Restrictions no known precautions/restrictions  -         Subjective Comments    Subjective Comments Pt states she wasn't sore following activities of intial eval and was able to perform HEP without difficulty  -         Exercise 1    Exercise Name 1 PPT  -      Cueing 1 Verbal  -MH      Reps 1 5  -MH      Time 1 5 seconds  -MH         Exercise 2    Exercise Name 2 PPT with ball squeeze  -MH      Cueing 2 Verbal;Tactile  -MH      Reps 2 5  -MH      Time 2 5 seconds  -MH         Exercise 3    Exercise Name 3 PPT with hooklying hip abduction  -      Cueing 3 Verbal;Tactile;Demo  -MH      Reps 3 5  -MH      Time 3 5 seconds  -MH      Additional Comments red  -         Exercise 4    Exercise Name 4 LTR  -MH      Cueing 4 Verbal;Tactile  -MH      Reps 4 5  -MH      Time 4 5 sec  -MH         Exercise 5    Exercise Name 5 (B) supine  hamstring stretch with sheet  -      Cueing 5 Verbal;Tactile  -MH      Reps 5 5  -MH      Time 5 10 sec  -MH         Exercise 6    Exercise Name 6 (B) hooklying piriformis stretch  -      Cueing 6 Verbal;Tactile  -MH      Reps 6 3  -MH      Time 6 10 sec  -MH        User Key  (r) = Recorded By, (t) = Taken By, (c) = Cosigned By    Initials Name Provider Type     Michael Lama PTA Physical Therapy Assistant                        Manual Rx (last 36 hours)      Manual Treatments     Row Name 07/19/18 1500             Manual Rx 1    Manual Rx 1 Location pelvis  -      Manual Rx 1 Type MET- shotgun/right anterior innominate  -      Manual Rx 1 Duration Good pelvic alignment noted after MET.  -        User Key  (r) = Recorded By, (t) = Taken By, (c) = Cosigned By    Initials Name Provider Type     Michael Lama PTA Physical Therapy Assistant              Therapy Education  Education Details: Pt to add hamstring stretch (instructed to hold exercise if flares symptoms in her  hands) and piriformis stretch to HEP - written instruction given; pt brought in her home TENS unit for instruction - discussed settings, wear time and how to unlock the unit  Given: HEP, Symptoms/condition management  Program: New  How Provided: Verbal, Demonstration, Written  Provided to: Patient  Level of Understanding: Teach back education performed, Verbalized, Demonstrated              Time Calculation:   Start Time: 1456  Stop Time: 1615  Time Calculation (min): 79 min  Therapy Suggested Charges     Code   Minutes Charges    None           Therapy Charges for Today     Code Description Service Date Service Provider Modifiers Qty    64415359983 HC PT ELECTRICAL STIM UNATTENDED 7/19/2018 Michael Lama, PTA  1    97257619196 HC PT THER PROC EA 15 MIN 7/19/2018 Michael Lama, HAJA GP 2                    Michael Lama, HAJA  7/19/2018

## 2018-07-20 ENCOUNTER — PRIOR AUTHORIZATION (OUTPATIENT)
Dept: PAIN MEDICINE | Facility: CLINIC | Age: 53
End: 2018-07-20

## 2018-07-20 NOTE — TELEPHONE ENCOUNTER
Sent PA for Lidocaine Patches to [facility] through [employer] (Key # [id number]) and received approval message:    MANSOOR FISCHER (Key: [id number])    This request has been approved.    Please note any additional information provided by [employer] at the bottom of your screen.

## 2018-07-23 ENCOUNTER — TELEPHONE (OUTPATIENT)
Dept: CARDIOLOGY | Facility: CLINIC | Age: 53
End: 2018-07-23

## 2018-07-23 NOTE — TELEPHONE ENCOUNTER
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 She report/s lightheadedness which has been occurring when she is doing physical therapy. It occurs when changes positions laying down to sitting up. It has not been associated with loss of consciousness. Most recent BP this morning was 104/64 with HR 93.     Pertinent medications include Lasix 20 MG PRN. Other pertinent medical history includes diabetes and strokes.    Do they need medication adjustment?    Do they need to be seen?     PT #: [phone]    Thanks Richa

## 2018-07-24 ENCOUNTER — APPOINTMENT (OUTPATIENT)
Dept: PHYSICAL THERAPY | Facility: HOSPITAL | Age: 53
End: 2018-07-24

## 2018-07-24 ENCOUNTER — DOCUMENTATION (OUTPATIENT)
Dept: PHYSICAL THERAPY | Facility: HOSPITAL | Age: 53
End: 2018-07-24

## 2018-07-24 NOTE — SIGNIFICANT NOTE
"Spoke to patient on phone today and she states that she is still getting dizzy after she does her exercises and it is getting worse so she is going to go see her vascular doctor. \"I was supposed to already have gone to see him but I have been putting it off.\" \"I already had 1 procedure on my carotid artery and I need another one.\" \"So I need to put my therapy on hold for now until I get that taken care of.\"  Will hold PT for now and await to hear back from patient or MD.   "

## 2018-07-25 ENCOUNTER — TELEPHONE (OUTPATIENT)
Dept: PAIN MEDICINE | Facility: CLINIC | Age: 53
End: 2018-07-25

## 2018-07-25 NOTE — TELEPHONE ENCOUNTER
Patient LM stating she was doing PT in her home and states she was getting very dizzy and almost passing out. She spoke to her PCP and they are sending her to see a vascular doctor and she will resume PT once she see's them.

## 2018-07-27 ENCOUNTER — APPOINTMENT (OUTPATIENT)
Dept: PHYSICAL THERAPY | Facility: HOSPITAL | Age: 53
End: 2018-07-27

## 2018-07-31 NOTE — TELEPHONE ENCOUNTER
Pt is calling to see if you can set her up with the cardiovascular doctor? A referral is in there. She would like an appointment in September around 12:00-1:00PM.    PT # [phone]    Thanks Richa

## 2018-07-31 NOTE — TELEPHONE ENCOUNTER
Patient has been rescheduled for 9/11/18 @ 2pm with [facility]. Patient has been called with date/time/instructions. trm

## 2018-08-02 RX ORDER — OXYCODONE AND ACETAMINOPHEN 7.5; 325 MG/1; MG/1
1 TABLET ORAL EVERY 6 HOURS PRN
Qty: 120 TABLET | Refills: 0 | Status: SHIPPED | OUTPATIENT
Start: 2018-08-02 | End: 2018-09-06 | Stop reason: SDUPTHER

## 2018-08-02 RX ORDER — OXYCODONE HYDROCHLORIDE 30 MG/1
30 TABLET, FILM COATED, EXTENDED RELEASE ORAL EVERY 12 HOURS SCHEDULED
Qty: 60 TABLET | Refills: 0 | Status: SHIPPED | OUTPATIENT
Start: 2018-08-02 | End: 2018-09-06 | Stop reason: SDUPTHER

## 2018-08-08 ENCOUNTER — OFFICE VISIT (OUTPATIENT)
Dept: PAIN MEDICINE | Facility: CLINIC | Age: 53
End: 2018-08-08

## 2018-08-08 VITALS
DIASTOLIC BLOOD PRESSURE: 72 MMHG | OXYGEN SATURATION: 95 % | RESPIRATION RATE: 16 BRPM | HEART RATE: 69 BPM | TEMPERATURE: 98.4 F | SYSTOLIC BLOOD PRESSURE: 133 MMHG | WEIGHT: 172 LBS | BODY MASS INDEX: 29.37 KG/M2 | HEIGHT: 64 IN

## 2018-08-08 DIAGNOSIS — M93.1 KIENBOCK DISEASE, ADULT: ICD-10-CM

## 2018-08-08 DIAGNOSIS — M47.816 ARTHROPATHY OF LUMBAR FACET JOINT: ICD-10-CM

## 2018-08-08 DIAGNOSIS — Z79.899 ENCOUNTER FOR LONG-TERM (CURRENT) USE OF HIGH-RISK MEDICATION: ICD-10-CM

## 2018-08-08 DIAGNOSIS — G89.29 OTHER CHRONIC PAIN: Primary | ICD-10-CM

## 2018-08-08 DIAGNOSIS — M25.551 RIGHT HIP PAIN: ICD-10-CM

## 2018-08-08 PROCEDURE — 99214 OFFICE O/P EST MOD 30 MIN: CPT | Performed by: NURSE PRACTITIONER

## 2018-08-08 RX ORDER — LIDOCAINE 50 MG/G
1 PATCH TOPICAL EVERY 24 HOURS
Qty: 30 EACH | Refills: 4 | Status: SHIPPED | OUTPATIENT
Start: 2018-08-08 | End: 2019-09-12

## 2018-08-08 NOTE — PROGRESS NOTES
CHIEF COMPLAINT  Pt states she has moderate relief of LBP but no R hip pain relief following 7/10/18 R S. I. And R hip bursa injection.    Subjective   Crystal Cruz is a 52 y.o. female  who presents to the office for follow-up of procedure.  She completed a Right SI and greater trochanteric bursa injection   on  7-10-18 performed by Dr. HAJI for management of low back and hip pain. Patient reports MODERATE relief from the procedure.  She is noticing 50% ongoing relief from the procedure in her low back. She notes minimal relief with her right hip injection.  Was doing PT and was having positive response with this until she started having intense dizziness and had to stop. Her PCP referred her to vascular.     Complains of pain in her low back and right hip and hands. Today her pain is 4/10VAs. Describes her pain as continuous, with back pain being better, hip pain being worse and hand pain unchanged.  Continues with OxyContin 30 mg BID and Percocet 7.5/325 3-4/day, Cymbalta 60 mg and gabapentin 600 mg TID. Denies any side effects from the regimen, including constipation.   The regimen helps decrease her pain by 25-60%. ADL's by self.    Patient reports a history of 3 strokes.  Extremity Pain    The pain is present in the neck, back, left wrist, left hand, left foot, right foot, right hand, right wrist, right fingers and left fingers. This is a chronic problem. The current episode started more than 1 year ago. There has been no history of extremity trauma. The problem occurs constantly. Progression since onset: unchanged since last office visit. The quality of the pain is described as aching, pounding and burning. The pain is moderate. Pertinent negatives include no fever or numbness. The symptoms are aggravated by cold (moist/rainy weather). She has tried acetaminophen, heat, movement, NSAIDS, OTC ointments, OTC pain meds, oral narcotics and rest (Oxycontin 30 mg Q12 hours, Oxycodone 7.5/325 4/day) for the  symptoms. The treatment provided moderate relief. mitchell, systemic sclerosis   Back Pain   This is a chronic problem. The current episode started 1 to 4 weeks ago. The problem occurs constantly. Progression since onset: improved  since last office visit. The pain is present in the lumbar spine. The quality of the pain is described as aching. The pain does not radiate. The pain is at a severity of 4/10 (pain ranges from 4-7/10VAS). The pain is moderate. The symptoms are aggravated by bending, position, lying down, standing and twisting. Associated symptoms include abdominal pain. Pertinent negatives include no bladder incontinence, bowel incontinence, chest pain, dysuria, fever, headaches, numbness or weakness. She has tried analgesics, heat and bed rest (lumbar FJN/RFL--- significant long-term relief) for the symptoms.     Procedure List  -- 7-10-18-- Right SI joint injection with right greater trochanteric bursa injection  -- 5-22-18-- LESI L4-5-- 60-90% relief until 6-19-18.   -- 5-10-18-- LESI L4-5  -- 2-20-18-- bilateral L1-L4 RFL.     PEG Assessment   What number best describes your pain on average in the past week?7  What number best describes how, during the past week, pain has interfered with your enjoyment of life?8  What number best describes how, during the past week, pain has interfered with your general activity?  8    The following portions of the patient's history were reviewed and updated as appropriate: allergies, current medications, past family history, past medical history, past social history, past surgical history and problem list.    Review of Systems   Constitutional: Negative for activity change, fatigue and fever.   HENT: Positive for congestion.    Eyes: Negative for visual disturbance.   Respiratory: Negative for cough, shortness of breath and wheezing.    Cardiovascular: Negative for chest pain, palpitations and leg swelling.   Gastrointestinal: Positive for abdominal pain and  "constipation. Negative for bowel incontinence, diarrhea and nausea.   Genitourinary: Negative for bladder incontinence, difficulty urinating and dysuria.   Musculoskeletal: Positive for arthralgias, back pain and myalgias.   Neurological: Negative for dizziness, weakness, light-headedness, numbness and headaches.   Psychiatric/Behavioral: Positive for sleep disturbance. Negative for suicidal ideas. The patient is nervous/anxious.        Vitals:    08/08/18 1233   BP: 133/72   Pulse: 69   Resp: 16   Temp: 98.4 °F (36.9 °C)   SpO2: 95%   Weight: 78 kg (172 lb)   Height: 162.6 cm (64.02\")   PainSc: 4  Comment: LBP ranges from 4-7/10   PainLoc: Back     Objective   Physical Exam   Constitutional: She is oriented to person, place, and time. She appears well-developed and well-nourished. She is cooperative.   HENT:   Head: Normocephalic and atraumatic.   Eyes: Lids are normal.   Neck: Trachea normal.   Cardiovascular: Normal rate.    Pulmonary/Chest: Effort normal.   Musculoskeletal:        Right hip: She exhibits tenderness.        Lumbar back: She exhibits tenderness and bony tenderness (minimal tenderness to palpation of bilateral L3-L5, moderate of T12-L3 tenderness).        Right hand: She exhibits decreased range of motion and tenderness. She exhibits normal capillary refill.        Left hand: She exhibits decreased range of motion and tenderness. She exhibits normal capillary refill.   Arthritic changes bilateral hands and wrists  Guarded of BUE's    Moderate tenderness to right greater trochanteric bursa  Negative SLR bilaterally   Neurological: She is alert and oriented to person, place, and time. Gait normal.   Reflex Scores:       Patellar reflexes are 1+ on the right side and 1+ on the left side.  Skin: Skin is warm, dry and intact.   Psychiatric: She has a normal mood and affect. Her speech is normal and behavior is normal. Cognition and memory are normal.   Nursing note and vitals " reviewed.      Assessment/Plan   Crystal was seen today for back pain.    Diagnoses and all orders for this visit:    Other chronic pain    Kienbock disease, adult    Arthropathy of lumbar facet joint    Encounter for long-term (current) use of high-risk medication    Right hip pain  -     XR hip w or wo pelvis 2-3 view right; Future    Other orders  -     lidocaine (LIDODERM) 5 %; Place 1 patch on the skin as directed by provider Daily. Remove & Discard patch within 12 hours or as directed by MD      --- The urine drug screen confirmation from 5-9-18 has been reviewed and the result is appropriate based on patient history and STEVEN report  --- Refill oxycontin and Percocet. Patient appears stable with current regimen. No adverse effects. Regarding continuation of opioids, there is no evidence of aberrant behavior or any red flags.  The patient continues with appropriate response to opioid therapy. ADL's remain intact by self.   --- Re-start PT once released by PCP/Dr. Kulkarni.  --- x ray right hip. Orders noted.  --- Follow-up 1 month or sooner if needed.       STEVEN REPORT    As part of the patient's treatment plan, I am prescribing controlled substances. The patient has been made aware of appropriate use of such medications, including potential risk of somnolence, limited ability to drive and/or work safely, and the potential for dependence or overdose. It has also bee made clear that these medications are for use by this patient only, without concomitant use of alcohol or other substances unless prescribed.     Patient has completed prescribing agreement detailing terms of continued prescribing of controlled substances, including monitoring STEVEN reports, urine drug screening, and pill counts if necessary. The patient is aware that inappropriate use will results in cessation of prescribing such medications.    STEVEN report has been reviewed and scanned into the patient's chart.    As the clinician, I  personally reviewed the STEVEN from 8-7-18 while the patient was in the office today.    History and physical exam exhibit continued safe and appropriate use of controlled substances.       EMR Dragon/Transcription disclaimer:   Much of this encounter note is an electronic transcription/translation of spoken language to printed text. The electronic translation of spoken language may permit erroneous, or at times, nonsensical words or phrases to be inadvertently transcribed; Although I have reviewed the note for such errors, some may still exist.

## 2018-08-14 ENCOUNTER — TELEPHONE (OUTPATIENT)
Dept: SURGERY | Facility: CLINIC | Age: 53
End: 2018-08-14

## 2018-08-22 ENCOUNTER — HOSPITAL ENCOUNTER (OUTPATIENT)
Dept: GENERAL RADIOLOGY | Facility: HOSPITAL | Age: 53
Discharge: HOME | End: 2018-08-22
Admitting: NURSE PRACTITIONER

## 2018-08-22 DIAGNOSIS — M25.551 RIGHT HIP PAIN: ICD-10-CM

## 2018-08-22 PROCEDURE — 73502 X-RAY EXAM HIP UNI 2-3 VIEWS: CPT

## 2018-08-30 ENCOUNTER — TELEPHONE (OUTPATIENT)
Dept: SURGERY | Facility: CLINIC | Age: 53
End: 2018-08-30

## 2018-09-04 ENCOUNTER — TELEPHONE (OUTPATIENT)
Dept: GASTROENTEROLOGY | Facility: CLINIC | Age: 53
End: 2018-09-04

## 2018-09-04 DIAGNOSIS — K22.70 BARRETT'S ESOPHAGUS WITHOUT DYSPLASIA: Primary | ICD-10-CM

## 2018-09-06 ENCOUNTER — OFFICE VISIT (OUTPATIENT)
Dept: PAIN MEDICINE | Facility: CLINIC | Age: 53
End: 2018-09-06

## 2018-09-06 VITALS
SYSTOLIC BLOOD PRESSURE: 143 MMHG | DIASTOLIC BLOOD PRESSURE: 77 MMHG | HEIGHT: 64 IN | OXYGEN SATURATION: 94 % | WEIGHT: 175 LBS | BODY MASS INDEX: 29.88 KG/M2 | TEMPERATURE: 98.9 F | HEART RATE: 83 BPM

## 2018-09-06 DIAGNOSIS — M47.816 ARTHROPATHY OF LUMBAR FACET JOINT: ICD-10-CM

## 2018-09-06 DIAGNOSIS — M54.16 LUMBAR RADICULOPATHY: ICD-10-CM

## 2018-09-06 DIAGNOSIS — G89.29 OTHER CHRONIC PAIN: Primary | ICD-10-CM

## 2018-09-06 DIAGNOSIS — M92.219 JUVENILE OSTEOCHONDROSIS OF CARPAL LUNATE, UNSPECIFIED LATERALITY: ICD-10-CM

## 2018-09-06 DIAGNOSIS — M48.061 FORAMINAL STENOSIS OF LUMBAR REGION: ICD-10-CM

## 2018-09-06 DIAGNOSIS — Z79.899 ENCOUNTER FOR LONG-TERM (CURRENT) USE OF HIGH-RISK MEDICATION: ICD-10-CM

## 2018-09-06 PROCEDURE — 99214 OFFICE O/P EST MOD 30 MIN: CPT | Performed by: NURSE PRACTITIONER

## 2018-09-06 RX ORDER — OXYCODONE AND ACETAMINOPHEN 7.5; 325 MG/1; MG/1
1 TABLET ORAL EVERY 6 HOURS PRN
Qty: 120 TABLET | Refills: 0 | Status: SHIPPED | OUTPATIENT
Start: 2018-09-06 | End: 2018-09-26 | Stop reason: SDUPTHER

## 2018-09-06 RX ORDER — OXYCODONE HYDROCHLORIDE 30 MG/1
30 TABLET, FILM COATED, EXTENDED RELEASE ORAL EVERY 12 HOURS SCHEDULED
Qty: 60 TABLET | Refills: 0 | Status: SHIPPED | OUTPATIENT
Start: 2018-09-06 | End: 2018-09-26 | Stop reason: SDUPTHER

## 2018-09-06 NOTE — PROGRESS NOTES
CHIEF COMPLAINT  Low back and joint pain    Subjective   Crystal Cruz is a 52 y.o. female  who presents to the office for follow-up chronic joint pain and low back pain Reports her pain is worse since last office visit.    Complains of pain in her low back, right hip, hands. Her right leg pain is her worse pain and it continues to worsen. Today her pain is 7/10VAS. Describes the pain as continuous and worse.  Continues with OxyContin 30 mg BID and Percocet 7.5/325 3-4/day, Cymbalta 60 mg and gabapentin 600 mg TID. Denies any side effects from the regimen, including constipation.   The regimen helps decrease her pain by 25-60%. ADL's by self.    Was going to PT previously. Had to stop due to dizziness. Was referred to vascular. Has appt 9-19-18.   Presents with . Wants to discuss long-term plans. REviewed waiting on TFLESI and SOTERO evaluation at this time.     Extremity Pain    The pain is present in the neck, back, left wrist, left hand, left foot, right foot, right hand, right wrist, right fingers and left fingers. This is a chronic problem. The current episode started more than 1 year ago. There has been no history of extremity trauma. The problem occurs constantly. The quality of the pain is described as aching, pounding and burning. The pain is at a severity of 7/10. The pain is moderate. Pertinent negatives include no fever or numbness. The symptoms are aggravated by cold (moist/rainy weather). She has tried acetaminophen, heat, movement, NSAIDS, OTC ointments, OTC pain meds, oral narcotics and rest (Oxycontin 30 mg Q12 hours, Oxycodone 7.5/325 4/day) for the symptoms. The treatment provided moderate relief. mitchell, systemic sclerosis   Back Pain   This is a chronic problem. The current episode started 1 to 4 weeks ago. The problem occurs constantly. The pain is present in the lumbar spine. The quality of the pain is described as aching. The pain does not radiate. The pain is at a severity of 7/10  (pain ranges from 4-7/10VAS). The pain is moderate. The symptoms are aggravated by bending, position, lying down, standing and twisting. Associated symptoms include abdominal pain. Pertinent negatives include no bladder incontinence, bowel incontinence, chest pain, dysuria, fever, headaches, numbness or weakness. She has tried analgesics, heat and bed rest (lumbar FJN/RFL--- significant long-term relief) for the symptoms.      Procedure List  -- 7-10-18-- Right SI joint injection with right greater trochanteric bursa injection  -- 5-22-18-- LESI L4-5-- 60-90% relief until 6-19-18.   -- 5-10-18-- LESI L4-5  -- 2-20-18-- bilateral L1-L4 RFL.     PEG Assessment   What number best describes your pain on average in the past week?7  What number best describes how, during the past week, pain has interfered with your enjoyment of life?8  What number best describes how, during the past week, pain has interfered with your general activity?  8    The following portions of the patient's history were reviewed and updated as appropriate: allergies, current medications, past family history, past medical history, past social history, past surgical history and problem list.    Review of Systems   Constitutional: Negative for activity change and fever.   Respiratory: Negative for shortness of breath.    Cardiovascular: Negative for chest pain.   Gastrointestinal: Positive for abdominal pain. Negative for bowel incontinence.   Genitourinary: Negative for bladder incontinence and dysuria.   Musculoskeletal: Positive for arthralgias and back pain.        Hip     Skin: Negative for color change.   Neurological: Negative for weakness, numbness and headaches.   Psychiatric/Behavioral: Positive for sleep disturbance. The patient is nervous/anxious.        Vitals:    09/06/18 1231   BP: 143/77   BP Location: Left arm   Patient Position: Sitting   Cuff Size: Adult   Pulse: 83   Temp: 98.9 °F (37.2 °C)   TempSrc: Oral   SpO2: 94%   Weight:  "79.4 kg (175 lb)   Height: 162.6 cm (64.02\")   PainSc:   7   PainLoc: Hip     Objective   Physical Exam   Constitutional: She is oriented to person, place, and time. She appears well-developed and well-nourished. She is cooperative.   HENT:   Head: Normocephalic and atraumatic.   Eyes: Lids are normal.   Neck: Trachea normal.   Cardiovascular: Normal rate.    Pulmonary/Chest: Effort normal.   Musculoskeletal:        Right hip: She exhibits tenderness.        Lumbar back: She exhibits tenderness and bony tenderness (minimal tenderness to palpation of bilateral L3-L5, moderate of T12-L3 tenderness).        Right hand: She exhibits decreased range of motion and tenderness. She exhibits normal capillary refill.        Left hand: She exhibits decreased range of motion and tenderness. She exhibits normal capillary refill.   Arthritic changes bilateral hands and wrists  Guarded of BUE's    Mild to moderate tenderness to right greater trochanteric bursa  +SLR on right, negative on left   Neurological: She is alert and oriented to person, place, and time. Gait normal.   Reflex Scores:       Patellar reflexes are 1+ on the right side and 1+ on the left side.  Skin: Skin is warm, dry and intact.   Psychiatric: She has a normal mood and affect. Her speech is normal and behavior is normal. Cognition and memory are normal.   Nursing note and vitals reviewed.      Assessment/Plan   Diagnoses and all orders for this visit:    Other chronic pain    Juvenile osteochondrosis of carpal lunate, unspecified laterality    Arthropathy of lumbar facet joint  -     Ambulatory Referral to Neurosurgery    Encounter for long-term (current) use of high-risk medication    Lumbar radiculopathy  -     Ambulatory Referral to Neurosurgery  -     Case Request    Foraminal stenosis of lumbar region  -     Ambulatory Referral to Neurosurgery  -     Case Request      --- The urine drug screen confirmation from 5-9-18 has been reviewed and the result is " APPROPRIATE based on patient history and STEVEN report  --- refill OxyContin and Percocet per Dr. Law plan of care. Patient appears stable with current regimen. No adverse effects. Regarding continuation of opioids, there is no evidence of aberrant behavior or any red flags.  The patient continues with appropriate response to opioid therapy. ADL's remain intact by self.   --- Refer to Neurosurgery.   --- TF BURT right L5. No blood thinners. Reviewed the procedure at length with the patient.  Included in the review was expectations, complications, risk and benefits.The procedure was described in detail and the risks, benefits and alternatives were discussed with the patient (including but not limited to: bleeding, infection, nerve damage, worsening of pain, inability to perform injection, paralysis, seizures, and death) who agreed to proceed.  Discussed the potential for sedation if warranted/wanted.  Questions were answered and in a way the patient could understand.  Patient verbalized understanding and wishes to proceed.  This intervention will be ordered.  --- Follow-up 1 month or sooner if needed.         STEVEN REPORT    As part of the patient's treatment plan, I am prescribing controlled substances. The patient has been made aware of appropriate use of such medications, including potential risk of somnolence, limited ability to drive and/or work safely, and the potential for dependence or overdose. It has also bee made clear that these medications are for use by this patient only, without concomitant use of alcohol or other substances unless prescribed.     Patient has completed prescribing agreement detailing terms of continued prescribing of controlled substances, including monitoring STEVEN reports, urine drug screening, and pill counts if necessary. The patient is aware that inappropriate use will results in cessation of prescribing such medications.    STEVEN report has been reviewed and scanned into  the patient's chart.    As the clinician, I personally reviewed the STEVEN from 9-4-18 while the patient was in the office today.    History and physical exam exhibit continued safe and appropriate use of controlled substances.      EMR Dragon/Transcription disclaimer:   Much of this encounter note is an electronic transcription/translation of spoken language to printed text. The electronic translation of spoken language may permit erroneous, or at times, nonsensical words or phrases to be inadvertently transcribed; Although I have reviewed the note for such errors, some may still exist.

## 2018-09-10 ENCOUNTER — PREP FOR SURGERY (OUTPATIENT)
Dept: OTHER | Facility: HOSPITAL | Age: 53
End: 2018-09-10

## 2018-09-10 PROBLEM — K22.70 BARRETT'S ESOPHAGUS WITHOUT DYSPLASIA: Status: ACTIVE | Noted: 2018-09-10

## 2018-09-10 NOTE — TELEPHONE ENCOUNTER
Mailed instructions  EGD  Esophagogastroduodenoscopy  This procedure uses an endoscope to examine the lining of your esophagus, stomach and duodenum. Average time for this procedure is 30 minutes. The procedure may include any of the following: biopsies, banding or dilation of the esophagus.       Date: ___9/28/18_____ Arrival Time: ____11:15am_____    Hospital:  [facility] [place]([address] [place] [address] [place] [address]) (back of hospital at the emergency room)       • Hold blood thinners 5-7 days prior (see back of sheet)  • Nothing to eat or drink after midnight.  You may have clear liquids only up to 4-8 hours prior to your procedure. No gum or candy!  • You will need to be accompanied by a . No bus or uber allowed  • You may take your morning heart, blood pressure (no diuretic), seizure, psych and breathing medications only with a small sip of water.  Please call the office as soon as possible if you need to reschedule or cancel your procedure you must give two weeks’ notice.  If you do not show up or frequently reschedule your procedure your provider has the option of not rescheduling. [phone] Yesi. If you need to speak to someone after hours or over the weekend please call the office number. [phone] (Ivette Navarro) [phone](Gaviota Hagen)        Avoid these medications 5-7 days prior to surgery  Please check with your prescribing doctor before stopping any medications    NSAIDS- Advil, Aleve, Motrin, Ibuprofen, Midol, Excedrin, Fiorinal, Merly-[place]  (Some cold medications may have these in them)    All herbal medications- iron, vitamin E, fish oil, decongestants (phenylephrine, pseudoephedrine), ginkgo, garlic, ginseng, Swapna’s wart  Mobic (meloxicam), Celebrex, Diclofenac (Voltaren), Nambumetone ( Relafen ), Daypro, Naproxen, Sulindac, Indomethacin, Toradol, Feldine, Salsalate, Etodolac (Lodine),   Viagra, Cialis, Levitra  Aspirin, Plavix (clopidogrel), Effient, Pletal, Coumadin,  Pradaxa,  Brilinta, Ticlide, Eliquis, (Xaralto- 3 days)    Diet pills -Adipex (phentermine) -2 weeks prior

## 2018-09-11 ENCOUNTER — OUTSIDE FACILITY SERVICE (OUTPATIENT)
Dept: PAIN MEDICINE | Facility: CLINIC | Age: 53
End: 2018-09-11

## 2018-09-11 ENCOUNTER — DOCUMENTATION (OUTPATIENT)
Dept: PAIN MEDICINE | Facility: CLINIC | Age: 53
End: 2018-09-11

## 2018-09-11 PROCEDURE — 64483 NJX AA&/STRD TFRM EPI L/S 1: CPT | Performed by: ANESTHESIOLOGY

## 2018-09-11 NOTE — PROGRESS NOTES
Lumbar Transforaminal Epidural Steroid Injection (one level Unilateral)  [facility]      PREOPERATIVE DIAGNOSIS:  Lumbar Spinal Stenosis unspecified regarding Neurogenic Claudication and Lumbar Radiculopathy    POSTOPERATIVE DIAGNOSIS:  Same as preop diagnosis    PROCEDURE:  CPT 11510 --  Diagnostic Transforaminal Epidural Steroid Injection at the L5 level, on the right     PRE-PROCEDURE DISCUSSION WITH PATIENT:    Risks and complications were discussed with the patient prior to starting the procedure and informed consent was obtained.  We discussed various topics including but not limited to bleeding, infection, injury, nerve injury, paralysis, coma, death, postprocedural painful flare-up, postprocedural site soreness, and a lack of pain relief.  We discussed the diagnostic aspect of transforaminal epidural / selective nerve root blockade.    SURGEON:  Reji Law MD    REASON FOR PROCEDURE:    Diagnostic injection at this level is needed, Previous clinically significant therapeutic effect is noted., Radicular pain pattern seems consistent with this dermatome. and Acute pain flareup is noted & problematic, and the injection is used to attempt to break the flareup.      SEDATION:  Versed 6mg & Fentanyl 100 mcg IV  ANESTHETIC:  Marcaine 0.25%  STEROID:  Methylprednisolone (DEPO MEDROL) 80mg/ml    DESCRIPTON OF PROCEDURE:  After obtaining informed consent, an I.V. was started in the preoperative area. The patient taken to the operating room and was placed in the prone position with a pillow under the abdomen.  All pressure points were well padded.  EKG, blood pressure, and pulse oximeter were monitored.  The lumbar area was prepped with Chloraprep and draped in a sterile fashion. Under fluoroscopic guidance in an oblique dimension on the above mentioned side, the transverse process of the aforementioned vertebra at the junction of the body at 6 o'clock position was identified. Skin and  subcutaneous tissue was anesthetized with 1% lidocaine. A 22-gauge spinal needle was introduced under fluoroscopic guidance at the above junction into the foramen without parasthesias and into the epidural space. After confirming the position of the needle with PA, lateral, and oblique fluoroscopic views, aspiration was checked and was clear of blood or CSF.  Next, 1 mL of Omnipaque was injected. After seeing adequate spread on the corresponding nerve root, a total volume 3mL of injectate containing 1ml of the above mentioned local anesthetic, 1 ml saline,  and the above mentioned corticosteroid was injected into the epidural space.    The needle was removed intact.  Vital signs were stable throughout.        ESTIMATED BLOOD LOSS:  <5 mL  SPECIMENS:  none    COMPLICATIONS:   No complications were noted., There was no indication of vascular uptake on live injection of contrast dye. and The patient did not have any signs of postprocedure numbness nor weakness.    TOLERANCE & DISCHARGE CONDITION:    The patient tolerated the procedure well.  The patient was transported to the recovery area without difficulties.  The patient was discharged to home under the care of family in stable and satisfactory condition.    PLAN OF CARE:  1. The patient was given our standard instruction sheet.  2. The patient will Return to clinic 4 wks.  3. The patient will resume all medications as per the medication reconciliation sheet.

## 2018-09-19 RX ORDER — OMEPRAZOLE 20 MG/1
20 CAPSULE, DELAYED RELEASE ORAL 2 TIMES DAILY
Qty: 180 CAPSULE | Refills: 3 | Status: SHIPPED | OUTPATIENT
Start: 2018-09-19 | End: 2018-10-19 | Stop reason: SDUPTHER

## 2018-09-24 RX ORDER — GABAPENTIN 600 MG/1
TABLET ORAL
Qty: 90 TABLET | Refills: 3 | Status: SHIPPED | OUTPATIENT
Start: 2018-09-24 | End: 2018-11-20 | Stop reason: SDUPTHER

## 2018-09-26 RX ORDER — OXYCODONE HYDROCHLORIDE 30 MG/1
30 TABLET, FILM COATED, EXTENDED RELEASE ORAL EVERY 12 HOURS SCHEDULED
Qty: 60 TABLET | Refills: 0 | Status: SHIPPED | OUTPATIENT
Start: 2018-09-26 | End: 2018-11-01 | Stop reason: SDUPTHER

## 2018-09-26 RX ORDER — OXYCODONE AND ACETAMINOPHEN 7.5; 325 MG/1; MG/1
1 TABLET ORAL EVERY 6 HOURS PRN
Qty: 120 TABLET | Refills: 0 | Status: SHIPPED | OUTPATIENT
Start: 2018-09-26 | End: 2018-11-01 | Stop reason: SDUPTHER

## 2018-09-27 ENCOUNTER — ANESTHESIA EVENT (OUTPATIENT)
Dept: PERIOP | Facility: HOSPITAL | Age: 53
End: 2018-09-27

## 2018-09-28 ENCOUNTER — ANESTHESIA (OUTPATIENT)
Dept: PERIOP | Facility: HOSPITAL | Age: 53
End: 2018-09-28

## 2018-09-28 ENCOUNTER — HOSPITAL ENCOUNTER (OUTPATIENT)
Facility: HOSPITAL | Age: 53
Setting detail: HOSPITAL OUTPATIENT SURGERY
Discharge: HOME | End: 2018-09-28
Attending: INTERNAL MEDICINE | Admitting: NURSE ANESTHETIST, CERTIFIED REGISTERED

## 2018-09-28 VITALS
HEART RATE: 70 BPM | SYSTOLIC BLOOD PRESSURE: 133 MMHG | OXYGEN SATURATION: 97 % | DIASTOLIC BLOOD PRESSURE: 68 MMHG | TEMPERATURE: 97.2 F | WEIGHT: 173.8 LBS | RESPIRATION RATE: 16 BRPM | BODY MASS INDEX: 29.81 KG/M2

## 2018-09-28 DIAGNOSIS — K22.70 BARRETT'S ESOPHAGUS WITHOUT DYSPLASIA: ICD-10-CM

## 2018-09-28 LAB
GLUCOSE BLDC GLUCOMTR-MCNC: 150 MG/DL (ref 70–130)
GLUCOSE BLDC GLUCOMTR-MCNC: 78 MG/DL (ref 70–130)
GLUCOSE BLDC GLUCOMTR-MCNC: 88 MG/DL (ref 70–130)
POTASSIUM BLD-SCNC: 4 MMOL/L (ref 3.5–5.2)

## 2018-09-28 PROCEDURE — 84132 ASSAY OF SERUM POTASSIUM: CPT | Performed by: NURSE ANESTHETIST, CERTIFIED REGISTERED

## 2018-09-28 PROCEDURE — 43239 EGD BIOPSY SINGLE/MULTIPLE: CPT | Performed by: INTERNAL MEDICINE

## 2018-09-28 PROCEDURE — 88305 TISSUE EXAM BY PATHOLOGIST: CPT

## 2018-09-28 PROCEDURE — 25010000002 PROPOFOL 10 MG/ML EMULSION: Performed by: NURSE ANESTHETIST, CERTIFIED REGISTERED

## 2018-09-28 PROCEDURE — 82962 GLUCOSE BLOOD TEST: CPT

## 2018-09-28 RX ORDER — PROPOFOL 10 MG/ML
VIAL (ML) INTRAVENOUS AS NEEDED
Status: DISCONTINUED | OUTPATIENT
Start: 2018-09-28 | End: 2018-09-28 | Stop reason: SURG

## 2018-09-28 RX ORDER — OXYCODONE AND ACETAMINOPHEN 7.5; 325 MG/1; MG/1
1 TABLET ORAL ONCE AS NEEDED
Status: COMPLETED | OUTPATIENT
Start: 2018-09-28 | End: 2018-09-28

## 2018-09-28 RX ORDER — SODIUM CHLORIDE, SODIUM LACTATE, POTASSIUM CHLORIDE, CALCIUM CHLORIDE 600; 310; 30; 20 MG/100ML; MG/100ML; MG/100ML; MG/100ML
9 INJECTION, SOLUTION INTRAVENOUS CONTINUOUS
Status: DISCONTINUED | OUTPATIENT
Start: 2018-09-28 | End: 2018-09-28 | Stop reason: HOSPADM

## 2018-09-28 RX ORDER — MAGNESIUM HYDROXIDE 1200 MG/15ML
LIQUID ORAL AS NEEDED
Status: DISCONTINUED | OUTPATIENT
Start: 2018-09-28 | End: 2018-09-28 | Stop reason: HOSPADM

## 2018-09-28 RX ORDER — GLYCOPYRROLATE 0.2 MG/ML
INJECTION INTRAMUSCULAR; INTRAVENOUS AS NEEDED
Status: DISCONTINUED | OUTPATIENT
Start: 2018-09-28 | End: 2018-09-28 | Stop reason: SURG

## 2018-09-28 RX ORDER — PROPOFOL 10 MG/ML
VIAL (ML) INTRAVENOUS CONTINUOUS PRN
Status: DISCONTINUED | OUTPATIENT
Start: 2018-09-28 | End: 2018-09-28 | Stop reason: SURG

## 2018-09-28 RX ORDER — LIDOCAINE HYDROCHLORIDE 10 MG/ML
0.5 INJECTION, SOLUTION EPIDURAL; INFILTRATION; INTRACAUDAL; PERINEURAL ONCE AS NEEDED
Status: DISCONTINUED | OUTPATIENT
Start: 2018-09-28 | End: 2018-09-28 | Stop reason: HOSPADM

## 2018-09-28 RX ORDER — MEPERIDINE HYDROCHLORIDE 25 MG/ML
12.5 INJECTION INTRAMUSCULAR; INTRAVENOUS; SUBCUTANEOUS
Status: DISCONTINUED | OUTPATIENT
Start: 2018-09-28 | End: 2018-09-28 | Stop reason: HOSPADM

## 2018-09-28 RX ORDER — DEXTROSE MONOHYDRATE 25 G/50ML
12.5 INJECTION, SOLUTION INTRAVENOUS ONCE
Status: COMPLETED | OUTPATIENT
Start: 2018-09-28 | End: 2018-09-28

## 2018-09-28 RX ORDER — ONDANSETRON 2 MG/ML
4 INJECTION INTRAMUSCULAR; INTRAVENOUS ONCE AS NEEDED
Status: DISCONTINUED | OUTPATIENT
Start: 2018-09-28 | End: 2018-09-28 | Stop reason: HOSPADM

## 2018-09-28 RX ORDER — SODIUM CHLORIDE, SODIUM LACTATE, POTASSIUM CHLORIDE, CALCIUM CHLORIDE 600; 310; 30; 20 MG/100ML; MG/100ML; MG/100ML; MG/100ML
100 INJECTION, SOLUTION INTRAVENOUS CONTINUOUS
Status: DISCONTINUED | OUTPATIENT
Start: 2018-09-28 | End: 2018-09-28 | Stop reason: HOSPADM

## 2018-09-28 RX ORDER — SODIUM CHLORIDE 9 MG/ML
40 INJECTION, SOLUTION INTRAVENOUS AS NEEDED
Status: DISCONTINUED | OUTPATIENT
Start: 2018-09-28 | End: 2018-09-28 | Stop reason: HOSPADM

## 2018-09-28 RX ORDER — FLUCONAZOLE 100 MG/1
100 TABLET ORAL DAILY
Qty: 14 TABLET | Refills: 0 | Status: SHIPPED | OUTPATIENT
Start: 2018-09-28 | End: 2019-09-12

## 2018-09-28 RX ORDER — SODIUM CHLORIDE 0.9 % (FLUSH) 0.9 %
1-10 SYRINGE (ML) INJECTION AS NEEDED
Status: DISCONTINUED | OUTPATIENT
Start: 2018-09-28 | End: 2018-09-28 | Stop reason: HOSPADM

## 2018-09-28 RX ORDER — LIDOCAINE HYDROCHLORIDE 10 MG/ML
INJECTION, SOLUTION INFILTRATION; PERINEURAL AS NEEDED
Status: DISCONTINUED | OUTPATIENT
Start: 2018-09-28 | End: 2018-09-28 | Stop reason: SURG

## 2018-09-28 RX ADMIN — OXYCODONE HYDROCHLORIDE AND ACETAMINOPHEN 1 TABLET: 7.5; 325 TABLET ORAL at 11:41

## 2018-09-28 RX ADMIN — GLYCOPYRROLATE 0.1 MG: 0.2 INJECTION INTRAMUSCULAR; INTRAVENOUS at 10:54

## 2018-09-28 RX ADMIN — PROPOFOL 50 MG: 10 INJECTION, EMULSION INTRAVENOUS at 11:01

## 2018-09-28 RX ADMIN — PROPOFOL 50 MG: 10 INJECTION, EMULSION INTRAVENOUS at 11:11

## 2018-09-28 RX ADMIN — PROPOFOL 50 MG: 10 INJECTION, EMULSION INTRAVENOUS at 11:07

## 2018-09-28 RX ADMIN — DEXTROSE MONOHYDRATE 12.5 G: 25 INJECTION, SOLUTION INTRAVENOUS at 10:21

## 2018-09-28 RX ADMIN — PROPOFOL 100 MCG/KG/MIN: 10 INJECTION, EMULSION INTRAVENOUS at 10:54

## 2018-09-28 RX ADMIN — LIDOCAINE HYDROCHLORIDE 50 MG: 10 INJECTION, SOLUTION INFILTRATION; PERINEURAL at 10:53

## 2018-09-28 RX ADMIN — SODIUM CHLORIDE, POTASSIUM CHLORIDE, SODIUM LACTATE AND CALCIUM CHLORIDE: 600; 310; 30; 20 INJECTION, SOLUTION INTRAVENOUS at 10:52

## 2018-09-28 RX ADMIN — PROPOFOL 50 MG: 10 INJECTION, EMULSION INTRAVENOUS at 10:54

## 2018-09-28 NOTE — ANESTHESIA PREPROCEDURE EVALUATION
Anesthesia Evaluation     Patient summary reviewed and Nursing notes reviewed   history of anesthetic complications: difficult airway  NPO Solid Status: > 8 hours  NPO Liquid Status: > 4 hours           Airway   Mallampati: II  TM distance: >3 FB  Neck ROM: full  Difficult intubation highly probable  Dental      Pulmonary - normal exam    breath sounds clear to auscultation  (+) a smoker (1 ppd - advised to quit, smoker's cough) Current Smoked day of surgery,   Sleep apnea: mitch brady.    ROS comment: Pulmonary fibrosis, uses inhaler as needed, used this am.  Cardiovascular - normal exam  Exercise tolerance: poor (<4 METS)    Rhythm: regular  Rate: normal    (+) hypertension (no meds at present) well controlled, CAD, angina (occ chest pain at rest and with exertion), PVD, hyperlipidemia (no meds at the present time),  carotid artery disease right carotid      Neuro/Psych  (+) seizures (on Keppra, but patient feels due to abrupt decrease in benzodiazepine), CVA (three, weakness left side, short term memory loss), headaches (now), dizziness/light headedness (to be seen by vascular surgery), psychiatric history Anxiety,     GI/Hepatic/Renal/Endo    (+)  GERD (barrera's, better controlled on PPI) well controlled,  diabetes mellitus type 2 well controlled using insulin,   Hypothyroidism: no meds.    ROS Comment: History of pancreatitis    Musculoskeletal     (+) back pain (DDD, arthritis low back), myalgias (fibromylagia), radiculopathy Right lower extremity  Abdominal  - normal exam   Substance History   (+) alcohol use (history of binge drinking, none for many years),      OB/GYN          Other   (+) blood dyscrasia (anemia), arthritis       Other Comment: scleroderma                  Anesthesia Plan    ASA 3     MAC     intravenous induction   Anesthetic plan, all risks, benefits, and alternatives have been provided, discussed and informed consent has been obtained with: patient.

## 2018-09-28 NOTE — ANESTHESIA POSTPROCEDURE EVALUATION
Patient: Crystal Cruz    Procedure Summary     Date:  09/28/18 Room / Location:   [facility] /  [facility]    Anesthesia Start:  1052 Anesthesia Stop:  1117    Procedure:  ESOPHAGOGASTRODUODENOSCOPY, biopsy (N/A Esophagus) Diagnosis:       Huitron's esophagus without dysplasia      (Huitron's esophagus without dysplasia [K22.70])    Surgeon:  Gaviota Hagen MD Provider:  Mathieu Lawler CRNA    Anesthesia Type:  MAC ASA Status:  3          Anesthesia Type: MAC  Last vitals  BP   147/68 (09/28/18 1120)   Temp   97.2 °F (36.2 °C) (09/28/18 1120)   Pulse   68 (09/28/18 1120)   Resp   16 (09/28/18 1120)     SpO2   99 % (09/28/18 1120)     Post Anesthesia Care and Evaluation    Patient location during evaluation: PHASE II  Patient participation: complete - patient participated  Level of consciousness: awake and alert  Pain score: 6 (pain improved after pain meds)  Pain management: satisfactory to patient  Airway patency: patent  Anesthetic complications: No anesthetic complications  PONV Status: none  Cardiovascular status: acceptable  Respiratory status: acceptable  Hydration status: acceptable

## 2018-10-01 LAB
CYTO UR: NORMAL
LAB AP CASE REPORT: NORMAL
PATH REPORT.FINAL DX SPEC: NORMAL
PATH REPORT.GROSS SPEC: NORMAL

## 2018-10-03 ENCOUNTER — TELEPHONE (OUTPATIENT)
Dept: SURGERY | Facility: CLINIC | Age: 53
End: 2018-10-03

## 2018-10-03 NOTE — TELEPHONE ENCOUNTER
PT IS ASKING IF DR. HELTON WILL PRESCRIBE A DIGESTIVE PROBIOTIC-DUAL ACTION SO THAT HER INSURANCE WILL COVER IT.  SEND TO [facility] [place].  SHE ALSO IS ASKING FOR HER EGD RESULTS.  PT # [phone]

## 2018-10-04 ENCOUNTER — TELEPHONE (OUTPATIENT)
Dept: PAIN MEDICINE | Facility: CLINIC | Age: 53
End: 2018-10-04

## 2018-10-04 ENCOUNTER — OFFICE VISIT (OUTPATIENT)
Dept: PAIN MEDICINE | Facility: CLINIC | Age: 53
End: 2018-10-04

## 2018-10-04 VITALS
HEART RATE: 74 BPM | SYSTOLIC BLOOD PRESSURE: 126 MMHG | TEMPERATURE: 97.2 F | DIASTOLIC BLOOD PRESSURE: 75 MMHG | OXYGEN SATURATION: 99 % | RESPIRATION RATE: 18 BRPM | HEIGHT: 64 IN | BODY MASS INDEX: 30.39 KG/M2 | WEIGHT: 178 LBS

## 2018-10-04 DIAGNOSIS — M47.816 ARTHROPATHY OF LUMBAR FACET JOINT: ICD-10-CM

## 2018-10-04 DIAGNOSIS — M92.219 JUVENILE OSTEOCHONDROSIS OF CARPAL LUNATE, UNSPECIFIED LATERALITY: ICD-10-CM

## 2018-10-04 DIAGNOSIS — G89.29 OTHER CHRONIC PAIN: Primary | ICD-10-CM

## 2018-10-04 DIAGNOSIS — Z79.899 ENCOUNTER FOR LONG-TERM (CURRENT) USE OF HIGH-RISK MEDICATION: ICD-10-CM

## 2018-10-04 PROCEDURE — 99214 OFFICE O/P EST MOD 30 MIN: CPT | Performed by: NURSE PRACTITIONER

## 2018-10-04 NOTE — TELEPHONE ENCOUNTER
Pt states there is a [place] counties in [place] and wonders if we could look into that regarding a pain psychologist. States you discussed this In her office visit.

## 2018-10-04 NOTE — TELEPHONE ENCOUNTER
As far as I know, they do not have a pain psychologist on staff there. But it would be GREAT if she started seeing anyone, not just a pain psychologist. Thanks. bb

## 2018-10-04 NOTE — PROGRESS NOTES
"CHIEF COMPLAINT  Back pain has increased since last visit.    Subjective   Crystal Cruz is a 52 y.o. female  who presents to the office for follow-up.She has a history of  Chronic back and hand pain. Reports her back pain is increased since last office visit.   She had a TF BURT right L5 performed by Dr Melgar on 9-11-18 for low back and leg pain. Patient reports 30-40% relief from the procedure for approximately 7-10 days    Complains of pain in her low back, right hip, right leg and hands. Today her pain is 8//10VAS. Describes the pain as continuous and worse. Continues with OxyContin 30 mg BID and Percocet 7.5/325 3-4/day, Cymbalta 60 mg and gabapentin 600 mg TID. Denies any side effects from the regimen, including constipation.   The regimen helps decrease her pain by moderately (did ask about increasing or changing medication). ADL's by self.   Presents with . Helps with history. \"Winter is coming and my fibromyalgia is awful.\"   \"I'm not suicidal.\"  Reviewed her activity level. Needs to increase.   Takes Xanax for anxiety. Is also on Cymbalta.   Presents with  who helps with history.    Extremity Pain    The pain is present in the neck, back, left wrist, left hand, left foot, right foot, right hand, right wrist, right fingers and left fingers. This is a chronic problem. The current episode started more than 1 year ago. There has been no history of extremity trauma. The problem occurs constantly. The quality of the pain is described as aching, pounding and burning. The pain is at a severity of 8/10. The pain is moderate. Pertinent negatives include no fever or numbness. The symptoms are aggravated by cold (moist/rainy weather). She has tried acetaminophen, heat, movement, NSAIDS, OTC ointments, OTC pain meds, oral narcotics and rest (Oxycontin 30 mg Q12 hours, Oxycodone 7.5/325 4/day) for the symptoms. The treatment provided moderate relief. mitchell systemic sclerosis   Back Pain   This is " a chronic problem. The current episode started 1 to 4 weeks ago. The problem occurs constantly. Progression since onset: worse since last office visit. The pain is present in the lumbar spine. The quality of the pain is described as aching. The pain does not radiate. The pain is at a severity of 8/10 (pain ranges from 4-7/10VAS). The pain is moderate. The symptoms are aggravated by bending, position, lying down, standing and twisting. Associated symptoms include abdominal pain and weakness. Pertinent negatives include no bladder incontinence, bowel incontinence, chest pain, dysuria, fever, headaches or numbness. She has tried analgesics, heat and bed rest (lumbar FJN/RFL--- significant long-term relief) for the symptoms.      Procedure List  -- 9-11-18-- TF BURT right L5  -- 7-10-18-- Right SI joint injection with right greater trochanteric bursa injection  -- 5-22-18-- LESI L4-5-- 60-90% relief until 6-19-18.   -- 5-10-18-- LESI L4-5  -- 2-20-18-- bilateral L1-L4 RFL.    PEG Assessment   What number best describes your pain on average in the past week?8  What number best describes how, during the past week, pain has interfered with your enjoyment of life?10  What number best describes how, during the past week, pain has interfered with your general activity?  10    The following portions of the patient's history were reviewed and updated as appropriate: allergies, current medications, past family history, past medical history, past social history, past surgical history and problem list.    Review of Systems   Constitutional: Negative for chills and fever.   Respiratory: Negative for shortness of breath.    Cardiovascular: Negative for chest pain.   Gastrointestinal: Positive for abdominal pain and constipation. Negative for bowel incontinence, diarrhea, nausea and vomiting.   Genitourinary: Negative for bladder incontinence, difficulty urinating, dyspareunia and dysuria.   Musculoskeletal: Positive for back pain.  "  Neurological: Positive for dizziness and weakness. Negative for light-headedness, numbness and headaches.   Psychiatric/Behavioral: Positive for sleep disturbance. Negative for confusion, hallucinations, self-injury and suicidal ideas. The patient is nervous/anxious.        Vitals:    10/04/18 1236   BP: 126/75   Pulse: 74   Resp: 18   Temp: 97.2 °F (36.2 °C)   SpO2: 99%   Weight: 80.7 kg (178 lb)   Height: 162.6 cm (64.02\")   PainSc:   8   PainLoc: Back     Objective   Physical Exam   Constitutional: She is oriented to person, place, and time. She appears well-developed and well-nourished. She is cooperative.   HENT:   Head: Normocephalic and atraumatic.   Eyes: Lids are normal.   Neck: Trachea normal.   Cardiovascular: Normal rate.    Pulmonary/Chest: Effort normal.   Musculoskeletal:        Right hip: She exhibits tenderness.        Lumbar back: She exhibits tenderness and bony tenderness (minimal tenderness to palpation of bilateral L3-L5, [id number] tenderness).        Right hand: She exhibits decreased range of motion and tenderness. She exhibits normal capillary refill.        Left hand: She exhibits decreased range of motion and tenderness. She exhibits normal capillary refill.   Arthritic changes bilateral hands and wrists  Guarded of BUE's  Mild tenderness to right greater trochanteric bursa     Neurological: She is alert and oriented to person, place, and time. Gait normal.   Reflex Scores:       Patellar reflexes are 1+ on the right side and 1+ on the left side.  Skin: Skin is warm, dry and intact.   Psychiatric: She has a normal mood and affect. Her speech is normal and behavior is normal. Cognition and memory are normal.   Nursing note and vitals reviewed.    Assessment/Plan   Crystal was seen today for back pain.    Diagnoses and all orders for this visit:    Other chronic pain    Arthropathy of lumbar facet joint    Juvenile osteochondrosis of carpal lunate, unspecified laterality    Encounter for " long-term (current) use of high-risk medication      --- The urine drug screen confirmation from 5-9-18 has been reviewed and the result is APPROPRIATE based on patient history and STEVEN report  --- Refill OxyContin and percocet. Patient appears stable with current regimen. No adverse effects. Regarding continuation of opioids, there is no evidence of aberrant behavior or any red flags.  The patient continues with appropriate response to opioid therapy. ADL's remain intact by self.   --- Continue with evaluation by Neurosurgery.   --- Declines referral to psychology. Reviewed importance of increasing physical activity with pain medication.   --- Follow-up 1 month or sooner if needed.     STEVEN REPORT    As part of the patient's treatment plan, I am prescribing controlled substances. The patient has been made aware of appropriate use of such medications, including potential risk of somnolence, limited ability to drive and/or work safely, and the potential for dependence or overdose. It has also bee made clear that these medications are for use by this patient only, without concomitant use of alcohol or other substances unless prescribed.     Patient has completed prescribing agreement detailing terms of continued prescribing of controlled substances, including monitoring STEVEN reports, urine drug screening, and pill counts if necessary. The patient is aware that inappropriate use will results in cessation of prescribing such medications.    STEVEN report has been reviewed and scanned into the patient's chart.    As the clinician, I personally reviewed the STEVEN from 10-3-18 while the patient was in the office today.    History and physical exam exhibit continued safe and appropriate use of controlled substances.      EMR Dragon/Transcription disclaimer:   Much of this encounter note is an electronic transcription/translation of spoken language to printed text. The electronic translation of spoken language may  permit erroneous, or at times, nonsensical words or phrases to be inadvertently transcribed; Although I have reviewed the note for such errors, some may still exist.

## 2018-10-09 ENCOUNTER — TELEPHONE (OUTPATIENT)
Dept: PAIN MEDICINE | Facility: CLINIC | Age: 53
End: 2018-10-09

## 2018-10-09 NOTE — TELEPHONE ENCOUNTER
Pt called stating she had a couple questions for Nicolasa Moyer. I called her back on both phone lines and pt did not answer so I left a voicemail.

## 2018-10-19 ENCOUNTER — TELEPHONE (OUTPATIENT)
Dept: GASTROENTEROLOGY | Facility: CLINIC | Age: 53
End: 2018-10-19

## 2018-10-19 RX ORDER — OMEPRAZOLE 20 MG/1
20 CAPSULE, DELAYED RELEASE ORAL 2 TIMES DAILY
Qty: 180 CAPSULE | Refills: 3 | Status: SHIPPED | OUTPATIENT
Start: 2018-10-19 | End: 2020-02-10 | Stop reason: HOSPADM

## 2018-11-01 ENCOUNTER — RESULTS ENCOUNTER (OUTPATIENT)
Dept: PAIN MEDICINE | Facility: CLINIC | Age: 53
End: 2018-11-01

## 2018-11-01 ENCOUNTER — OFFICE VISIT (OUTPATIENT)
Dept: PAIN MEDICINE | Facility: CLINIC | Age: 53
End: 2018-11-01

## 2018-11-01 VITALS
HEART RATE: 62 BPM | WEIGHT: 176 LBS | RESPIRATION RATE: 16 BRPM | DIASTOLIC BLOOD PRESSURE: 87 MMHG | HEIGHT: 64 IN | TEMPERATURE: 97.9 F | SYSTOLIC BLOOD PRESSURE: 163 MMHG | OXYGEN SATURATION: 95 % | BODY MASS INDEX: 30.05 KG/M2

## 2018-11-01 DIAGNOSIS — Z79.899 ENCOUNTER FOR LONG-TERM (CURRENT) USE OF HIGH-RISK MEDICATION: ICD-10-CM

## 2018-11-01 DIAGNOSIS — M93.1 KIENBOCK DISEASE, ADULT: ICD-10-CM

## 2018-11-01 DIAGNOSIS — G89.29 OTHER CHRONIC PAIN: Primary | ICD-10-CM

## 2018-11-01 DIAGNOSIS — M47.816 ARTHROPATHY OF LUMBAR FACET JOINT: ICD-10-CM

## 2018-11-01 DIAGNOSIS — G89.29 OTHER CHRONIC PAIN: ICD-10-CM

## 2018-11-01 LAB
POC AMPHETAMINES: NEGATIVE
POC BARBITURATES: NEGATIVE
POC BENZODIAZEPHINES: POSITIVE
POC COCAINE: NEGATIVE
POC METHADONE: NEGATIVE
POC METHAMPHETAMINE SCREEN URINE: NEGATIVE
POC OPIATES: POSITIVE
POC OXYCODONE: POSITIVE
POC PHENCYCLIDINE: NEGATIVE
POC PROPOXYPHENE: NEGATIVE
POC THC: NEGATIVE
POC TRICYCLIC ANTIDEPRESSANTS: NEGATIVE

## 2018-11-01 PROCEDURE — 99214 OFFICE O/P EST MOD 30 MIN: CPT | Performed by: NURSE PRACTITIONER

## 2018-11-01 PROCEDURE — 80305 DRUG TEST PRSMV DIR OPT OBS: CPT | Performed by: NURSE PRACTITIONER

## 2018-11-01 RX ORDER — OXYCODONE HYDROCHLORIDE 30 MG/1
30 TABLET, FILM COATED, EXTENDED RELEASE ORAL EVERY 12 HOURS SCHEDULED
Qty: 60 TABLET | Refills: 0 | Status: SHIPPED | OUTPATIENT
Start: 2018-11-01 | End: 2018-11-28 | Stop reason: SDUPTHER

## 2018-11-01 RX ORDER — OXYCODONE AND ACETAMINOPHEN 7.5; 325 MG/1; MG/1
1 TABLET ORAL EVERY 6 HOURS PRN
Qty: 120 TABLET | Refills: 0 | Status: SHIPPED | OUTPATIENT
Start: 2018-11-01 | End: 2018-11-28 | Stop reason: SDUPTHER

## 2018-11-01 NOTE — PROGRESS NOTES
"CHIEF COMPLAINT  Pt  states her LBP is slightly worse since 10/4/18 office visit.    Subjective   Crystal Cruz is a 52 y.o. female  who presents to the office for follow-up.She has a history of chronic back pain, as well as hand pain. Reports her back pain is worse since last office visit.    Complains of pain in her back and hands. Today her pain is 5/10VAS. Continues with OxyContin 30 mg BID and Percocet 7.5/325 3-4/day, Cymbalta 60 mg and gabapentin 600 mg TID. Denies any side effects from the regimen, including constipation.   The regimen helps decrease her pain by moderately. ADL's by self.     Has an appointment with counselor next week.     At last office visit, she reports she was having a \"severe reaction\" to Diflucan. Had been on due to thrush in throat. She was having significant fatigue and joint pain. \"It had to be something different than the fibromyalgia.\"   Has increased activity. Is working on watching weight.     Extremity Pain    The pain is present in the neck, back, left wrist, left hand, left foot, right foot, right hand, right wrist, right fingers and left fingers. This is a chronic problem. The current episode started more than 1 year ago. There has been no history of extremity trauma. The problem occurs constantly. Progression since onset: unchanged from last office visit. The quality of the pain is described as aching, pounding and burning. The pain is moderate. Pertinent negatives include no fever or numbness. The symptoms are aggravated by cold (moist/rainy weather). She has tried acetaminophen, heat, movement, NSAIDS, OTC ointments, OTC pain meds, oral narcotics and rest (Oxycontin 30 mg Q12 hours, Oxycodone 7.5/325 4/day) for the symptoms. The treatment provided moderate relief. mitchell, systemic sclerosis   Back Pain   This is a chronic problem. The current episode started more than 1 month ago. The problem occurs constantly. Progression since onset: worse since last office " visit. The pain is present in the lumbar spine. The quality of the pain is described as aching. The pain does not radiate. The pain is at a severity of 5/10 (pain ranges from 4-7/10VAS). The pain is moderate. The symptoms are aggravated by bending, position, lying down, standing and twisting. Associated symptoms include weakness (R leg). Pertinent negatives include no abdominal pain, bladder incontinence, bowel incontinence, chest pain, dysuria, fever, headaches or numbness. She has tried analgesics, heat and bed rest (lumbar FJN/RFL--- significant long-term relief) for the symptoms.      Procedure List  -- 9-11-18-- TF BURT right L5  -- 7-10-18-- Right SI joint injection with right greater trochanteric bursa injection  -- 5-22-18-- LESI L4-5-- 60-90% relief until 6-19-18.   -- 5-10-18-- LESI L4-5  -- 2-20-18-- bilateral L1-L4 RFL.    PEG Assessment   What number best describes your pain on average in the past week?5  What number best describes how, during the past week, pain has interfered with your enjoyment of life?6  What number best describes how, during the past week, pain has interfered with your general activity?  6    The following portions of the patient's history were reviewed and updated as appropriate: allergies, current medications, past family history, past medical history, past social history, past surgical history and problem list.    Review of Systems   Constitutional: Negative for activity change, chills and fever.   Respiratory: Negative for shortness of breath.    Cardiovascular: Negative for chest pain.   Gastrointestinal: Negative for abdominal pain, bowel incontinence, constipation, diarrhea, nausea and vomiting.   Genitourinary: Negative for bladder incontinence, difficulty urinating, dyspareunia and dysuria.   Musculoskeletal: Positive for back pain.   Neurological: Positive for weakness (R leg). Negative for dizziness, light-headedness, numbness and headaches.   Psychiatric/Behavioral:  "Positive for sleep disturbance. Negative for confusion, hallucinations, self-injury and suicidal ideas. The patient is nervous/anxious.        Vitals:    11/01/18 1433   BP: 163/87   Pulse: 62   Resp: 16   Temp: 97.9 °F (36.6 °C)   SpO2: 95%   Weight: 79.8 kg (176 lb)   Height: 162.6 cm (64.02\")   PainSc: 5  Comment: LBP bilaterally ranges from 5-8/10   PainLoc: Back     Objective   Physical Exam   Constitutional: She is oriented to person, place, and time. She appears well-developed and well-nourished. She is cooperative.   Laying down in exam room upon entry   HENT:   Head: Normocephalic and atraumatic.   Eyes: Lids are normal.   Neck: Trachea normal.   Cardiovascular: Normal rate.    Pulmonary/Chest: Effort normal.   Musculoskeletal:        Right hip: She exhibits tenderness.        Lumbar back: She exhibits tenderness and bony tenderness (mild tenderness to palpation of bilateral L3-L5, MODERATE T12-L3 tenderness; + loading manuever).        Right hand: She exhibits decreased range of motion and tenderness. She exhibits normal capillary refill.        Left hand: She exhibits decreased range of motion and tenderness. She exhibits normal capillary refill.   Arthritic changes bilateral hands and wrists  Guarded of BUE's  Mild tenderness to right greater trochanteric bursa     Neurological: She is alert and oriented to person, place, and time. Gait normal.   Reflex Scores:       Patellar reflexes are 1+ on the right side and 1+ on the left side.  Skin: Skin is warm, dry and intact.   Psychiatric: She has a normal mood and affect. Her speech is normal and behavior is normal. Cognition and memory are normal.   Nursing note and vitals reviewed.      Assessment/Plan   Crystal was seen today for back pain.    Diagnoses and all orders for this visit:    Other chronic pain    Arthropathy of lumbar facet joint  -     Case Request    Kienbock disease, adult    Encounter for long-term (current) use of high-risk " medication    Other orders  -     oxyCODONE-acetaminophen (PERCOCET) 7.5-325 MG per tablet; Take 1 tablet by mouth Every 6 (Six) Hours As Needed for Severe Pain .  -     OxyCODONE HCl ER (oxyCONTIN) 30 MG tablet extended-release 12 hour; Take 1 tablet by mouth Every 12 (Twelve) Hours.      --- Routine UDS in office today as part of monitoring requirements for controlled substances.  The specimen was viewed and the immunoassay result reviewed and is +OPI, +OXY, +BZD.  This specimen will be sent to [facility] laboratory for confirmation.     --- Refill OxyContin and Percocet. Patient appears stable with current regimen. No adverse effects. Regarding continuation of opioids, there is no evidence of aberrant behavior or any red flags.  The patient continues with appropriate response to opioid therapy. ADL's remain intact by self.   --- bilateral L1-L4 MBB. No blood thinners. Reviewed the procedure at length with the patient.  Included in the review was expectations, complications, risk and benefits.The procedure was described in detail and the risks, benefits and alternatives were discussed with the patient (including but not limited to: bleeding, infection, nerve damage, worsening of pain, inability to perform injection, paralysis, seizures, and death) who agreed to proceed.  Discussed the potential for sedation if warranted/wanted.  Questions were answered and in a way the patient could understand.  Patient verbalized understanding and wishes to proceed.  This intervention will be ordered.  --- Follow-up 1 month or sooner if needed.    -------  Education about Medial Branch Blockade and RF Therapy:    This medial branch blockade (MBB) suggested is intended for diagnostic purposes, with the intent of offering the patient Radiofrequency thermal rhizotomy (RF) if the MBB is diagnostically effective.  The diagnostic blockade is necessary to determine the likelihood that RF therapy could be efficacious in providing long  "term relief to the patient.    Medial branches are sensory nerve branches that connect to a facet joint and transmit sensations & pain signals from that joint.  Facet is a term for the type of joints found in the spine.  Medial branches are the nerves that go to a facet, and therefore are also sometimes called \"facet joint nerves\" (FJNs).      In a medial branch blockade procedure, xray fluoroscopy is used to verify the locations of the outside of the joint lines which are being targeted.  Under xray guidance, needles are placed to these areas.  Contrast dye is injected to confirm proper placement, with dye flowing over the joint area, and to ensure that the dye does not flow into unintended areas such as a vein.  When this is confirmed, local anesthetic is injected to block the medial branch at that joint level.      If MBBs are diagnostically successful in blocking pain, then the patient is most likely a great candidate for Radiofrequency of those facet joint nerves.  In the RF procedure, needles are placed to the joint lines in the same fashion, and after testing, the needle tips are heated to thermally treat the nerves, blocking the nerves by in essence damaging the nerves with the heat treatment.       Medically, a successful RF procedure should provide a patient with 50% pain relief or more for at least 6 months.  Clinical experience suggests that successful patients receive relief more in the range of 12 months on average.  We also discussed that a fortunate minority of patients receive therapeutic success from the MBB, and may not require RF ablation.  If a patient receives more than 8 weeks of relief from MBB, then occasional repeat MBB for therapeutic purposes is a very reasonable alternative therapy.  This course of therapy is consistent with our LCDs according to our CMS  in the area, and therefore other insurance providers should follow accordingly.  We will monitor our patients to screen " for these therapeutic responders and will offer RF therapy only when necessary.        We discussed that MBB & RF are not without risks.  Guidelines regarding anticoagulant use & neuraxial procedures will be respected.  Patients that are ill or otherwise may be at risk for sepsis will not have their spines accessed by neuraxial injections of any type.  This patient will not be offered these therapies if there is an increased risk.   We discussed that there is a risk of postprocedural pain and also a risk of worsening of clinical picture with these procedures as with any neuraxial procedure.    -------     STEVEN REPORT    As part of the patient's treatment plan, I am prescribing controlled substances. The patient has been made aware of appropriate use of such medications, including potential risk of somnolence, limited ability to drive and/or work safely, and the potential for dependence or overdose. It has also bee made clear that these medications are for use by this patient only, without concomitant use of alcohol or other substances unless prescribed.     Patient has completed prescribing agreement detailing terms of continued prescribing of controlled substances, including monitoring STEVEN reports, urine drug screening, and pill counts if necessary. The patient is aware that inappropriate use will results in cessation of prescribing such medications.    STEVEN report has been reviewed and scanned into the patient's chart.    As the clinician, I personally reviewed the STEVEN from 10-31-18 while the patient was in the office today.    History and physical exam exhibit continued safe and appropriate use of controlled substances.      EMR Dragon/Transcription disclaimer:   Much of this encounter note is an electronic transcription/translation of spoken language to printed text. The electronic translation of spoken language may permit erroneous, or at times, nonsensical words or phrases to be inadvertently  transcribed; Although I have reviewed the note for such errors, some may still exist.

## 2018-11-06 ENCOUNTER — OUTSIDE FACILITY SERVICE (OUTPATIENT)
Dept: PAIN MEDICINE | Facility: CLINIC | Age: 53
End: 2018-11-06

## 2018-11-06 ENCOUNTER — DOCUMENTATION (OUTPATIENT)
Dept: PAIN MEDICINE | Facility: CLINIC | Age: 53
End: 2018-11-06

## 2018-11-06 PROCEDURE — 64493 INJ PARAVERT F JNT L/S 1 LEV: CPT | Performed by: ANESTHESIOLOGY

## 2018-11-06 PROCEDURE — 64494 INJ PARAVERT F JNT L/S 2 LEV: CPT | Performed by: ANESTHESIOLOGY

## 2018-11-06 PROCEDURE — 64495 INJ PARAVERT F JNT L/S 3 LEV: CPT | Performed by: ANESTHESIOLOGY

## 2018-11-11 ENCOUNTER — APPOINTMENT (OUTPATIENT)
Dept: CT IMAGING | Facility: HOSPITAL | Age: 53
End: 2018-11-11

## 2018-11-11 ENCOUNTER — APPOINTMENT (OUTPATIENT)
Dept: GENERAL RADIOLOGY | Facility: HOSPITAL | Age: 53
End: 2018-11-11

## 2018-11-11 ENCOUNTER — HOSPITAL ENCOUNTER (EMERGENCY)
Facility: HOSPITAL | Age: 53
Discharge: HOME | End: 2018-11-11
Attending: EMERGENCY MEDICINE | Admitting: EMERGENCY MEDICINE

## 2018-11-11 VITALS
WEIGHT: 178 LBS | RESPIRATION RATE: 18 BRPM | HEIGHT: 64 IN | SYSTOLIC BLOOD PRESSURE: 141 MMHG | HEART RATE: 87 BPM | DIASTOLIC BLOOD PRESSURE: 70 MMHG | OXYGEN SATURATION: 98 % | BODY MASS INDEX: 30.39 KG/M2 | TEMPERATURE: 98.1 F

## 2018-11-11 DIAGNOSIS — I10 ESSENTIAL HYPERTENSION: Primary | ICD-10-CM

## 2018-11-11 DIAGNOSIS — I95.1 ORTHOSTASIS: ICD-10-CM

## 2018-11-11 LAB
ALBUMIN SERPL-MCNC: 4.5 G/DL (ref 3.5–5.2)
ALBUMIN/GLOB SERPL: 1.3 G/DL
ALP SERPL-CCNC: 74 U/L (ref 40–129)
ALT SERPL W P-5'-P-CCNC: 16 U/L (ref 5–33)
ANION GAP SERPL CALCULATED.3IONS-SCNC: 16.6 MMOL/L
AST SERPL-CCNC: 19 U/L (ref 5–32)
BASOPHILS # BLD AUTO: 0.12 10*3/MM3 (ref 0–0.2)
BASOPHILS NFR BLD AUTO: 0.8 % (ref 0–2)
BILIRUB SERPL-MCNC: 0.2 MG/DL (ref 0.2–1.2)
BUN BLD-MCNC: 11 MG/DL (ref 6–20)
BUN/CREAT SERPL: 14.5 (ref 7–25)
CALCIUM SPEC-SCNC: 9 MG/DL (ref 8.6–10.5)
CHLORIDE SERPL-SCNC: 93 MMOL/L (ref 98–107)
CO2 SERPL-SCNC: 19.4 MMOL/L (ref 22–29)
CREAT BLD-MCNC: 0.76 MG/DL (ref 0.57–1)
D DIMER PPP FEU-MCNC: 0.52 MCGFEU/ML (ref 0–0.46)
DEPRECATED RDW RBC AUTO: 41.3 FL (ref 37–54)
EOSINOPHIL # BLD AUTO: 0.28 10*3/MM3 (ref 0.1–0.3)
EOSINOPHIL NFR BLD AUTO: 1.8 % (ref 0–4)
ERYTHROCYTE [DISTWIDTH] IN BLOOD BY AUTOMATED COUNT: 12 % (ref 11.5–14.5)
GFR SERPL CREATININE-BSD FRML MDRD: 79 ML/MIN/1.73
GLOBULIN UR ELPH-MCNC: 3.6 GM/DL
GLUCOSE BLD-MCNC: 149 MG/DL (ref 65–99)
HCT VFR BLD AUTO: 46.3 % (ref 37–47)
HGB BLD-MCNC: 15.7 G/DL (ref 12–16)
IMM GRANULOCYTES # BLD: 0.07 10*3/MM3 (ref 0–0.03)
IMM GRANULOCYTES NFR BLD: 0.5 % (ref 0–0.5)
LYMPHOCYTES # BLD AUTO: 3.45 10*3/MM3 (ref 0.6–4.8)
LYMPHOCYTES NFR BLD AUTO: 22.4 % (ref 20–45)
MCH RBC QN AUTO: 31.6 PG (ref 27–31)
MCHC RBC AUTO-ENTMCNC: 33.9 G/DL (ref 31–37)
MCV RBC AUTO: 93.2 FL (ref 81–99)
MONOCYTES # BLD AUTO: 1.26 10*3/MM3 (ref 0–1)
MONOCYTES NFR BLD AUTO: 8.2 % (ref 3–8)
NEUTROPHILS # BLD AUTO: 10.24 10*3/MM3 (ref 1.5–8.3)
NEUTROPHILS NFR BLD AUTO: 66.3 % (ref 45–70)
NRBC BLD MANUAL-RTO: 0 /100 WBC (ref 0–0)
NT-PROBNP SERPL-MCNC: 554 PG/ML (ref 0–900)
PLATELET # BLD AUTO: 308 10*3/MM3 (ref 140–500)
PMV BLD AUTO: 9.7 FL (ref 7.4–10.4)
POTASSIUM BLD-SCNC: 4.9 MMOL/L (ref 3.5–5.2)
PROT SERPL-MCNC: 8.1 G/DL (ref 6–8.5)
RBC # BLD AUTO: 4.97 10*6/MM3 (ref 4.2–5.4)
SODIUM BLD-SCNC: 129 MMOL/L (ref 136–145)
TROPONIN T SERPL-MCNC: <0.01 NG/ML (ref 0–0.03)
WBC NRBC COR # BLD: 15.42 10*3/MM3 (ref 4.8–10.8)

## 2018-11-11 PROCEDURE — 93005 ELECTROCARDIOGRAM TRACING: CPT | Performed by: EMERGENCY MEDICINE

## 2018-11-11 PROCEDURE — 80053 COMPREHEN METABOLIC PANEL: CPT | Performed by: EMERGENCY MEDICINE

## 2018-11-11 PROCEDURE — 71045 X-RAY EXAM CHEST 1 VIEW: CPT

## 2018-11-11 PROCEDURE — 99284 EMERGENCY DEPT VISIT MOD MDM: CPT

## 2018-11-11 PROCEDURE — 93010 ELECTROCARDIOGRAM REPORT: CPT | Performed by: INTERNAL MEDICINE

## 2018-11-11 PROCEDURE — 83880 ASSAY OF NATRIURETIC PEPTIDE: CPT | Performed by: EMERGENCY MEDICINE

## 2018-11-11 PROCEDURE — 85379 FIBRIN DEGRADATION QUANT: CPT | Performed by: EMERGENCY MEDICINE

## 2018-11-11 PROCEDURE — 84484 ASSAY OF TROPONIN QUANT: CPT | Performed by: EMERGENCY MEDICINE

## 2018-11-11 PROCEDURE — 99284 EMERGENCY DEPT VISIT MOD MDM: CPT | Performed by: EMERGENCY MEDICINE

## 2018-11-11 PROCEDURE — 85025 COMPLETE CBC W/AUTO DIFF WBC: CPT | Performed by: EMERGENCY MEDICINE

## 2018-11-11 PROCEDURE — 96360 HYDRATION IV INFUSION INIT: CPT

## 2018-11-11 PROCEDURE — 70450 CT HEAD/BRAIN W/O DYE: CPT

## 2018-11-11 RX ORDER — AMILORIDE HYDROCHLORIDE 5 MG/1
5 TABLET ORAL DAILY
COMMUNITY
End: 2019-12-05

## 2018-11-11 RX ADMIN — SODIUM CHLORIDE 1000 ML: 9 INJECTION, SOLUTION INTRAVENOUS at 01:07

## 2018-11-11 NOTE — ED PROVIDER NOTES
"Subjective   History of Present Illness  History of Present Illness    Chief complaint: Chest pain, shortness of breath, and dizziness    Location: Substernal chest pain    Quality/Severity:  Moderate at its worst, no pain now    Timing/Onset/Duration: Patient's last episode of chest pain was 4 days ago    Modifying Factors: Nothing seems to make it better or worse    Associated Symptoms: Right sided headache.  No fever or chills.  The patient's had a cough, there is unchanged in color or amount.  The patient states it is a smoker's cough.  No earache or nasal congestion.  No abdominal pain.  No diarrhea or burning when she urinates.  No nausea or vomiting.  The patient has had diaphoresis.  Patient claims of dizziness.    Narrative: This 52-year-old white female presents with a fast heart rate of 107 and a blood pressure of 150/80.  She had chest pain 4 days ago.  She complains of dizziness and shortness of air.    PCP:  Fuentes      Review of Systems   Constitutional: Negative for chills and fever.   HENT: Negative for ear pain and sore throat.    Eyes: Negative for discharge and redness.   Respiratory: Positive for shortness of breath. Negative for cough and chest tightness.    Cardiovascular: Positive for chest pain. Negative for palpitations and leg swelling.   Gastrointestinal: Negative for abdominal pain, diarrhea, nausea and vomiting.   Endocrine: Negative for polyuria.   Genitourinary: Negative for difficulty urinating and dysuria.   Musculoskeletal: Negative for back pain, neck pain and neck stiffness.   Skin: Negative for rash.   Neurological: Positive for dizziness. Negative for weakness, light-headedness, numbness and headaches.   Hematological: Negative for adenopathy.   Psychiatric/Behavioral: Negative.  Negative for confusion.        Medication List      CONTINUE taking these medications    Insulin Syringe 31G X 5/16\" 1 ML misc  Use twice daily for shots        ASK your doctor about these medications "    ALPRAZolam 2 MG tablet  Commonly known as:  XANAX     aspirin 81 MG EC tablet  Take 1 tablet by mouth daily.     calcium citrate-vitamin d 200-250 MG-UNIT tablet tablet  Commonly known as:  CITRACAL     docusate sodium 100 MG capsule  Commonly known as:  COLACE  Take 1 capsule by mouth 2 (Two) Times a Day.     DULoxetine 60 MG capsule  Commonly known as:  CYMBALTA  Take 1 capsule by mouth Daily.     fluconazole 100 MG tablet  Commonly known as:  DIFLUCAN  Take 1 tablet by mouth Daily.     furosemide 20 MG tablet  Commonly known as:  LASIX     gabapentin 600 MG tablet  Commonly known as:  NEURONTIN  TAKE 1 TABLET BY MOUTH THREE TIMES A DAY     LACTOBACILLUS EXTRA STRENGTH capsule  Take 1 capsule by mouth Daily.     LANTUS 100 UNIT/ML injection  Generic drug:  insulin glargine     levETIRAcetam 500 MG tablet  Commonly known as:  KEPPRA  Take 1 tablet by mouth Every 12 (Twelve) Hours.     lidocaine 5 %  Commonly known as:  LIDODERM  Place 1 patch on the skin as directed by provider Daily. Remove & Discard   patch within 12 hours or as directed by MD     omeprazole 20 MG capsule  Commonly known as:  priLOSEC  Take 1 capsule by mouth 2 (Two) Times a Day.     OxyCODONE HCl ER 30 MG tablet extended-release 12 hour  Commonly known as:  oxyCONTIN  Take 1 tablet by mouth Every 12 (Twelve) Hours.     oxyCODONE-acetaminophen 7.5-325 MG per tablet  Commonly known as:  PERCOCET  Take 1 tablet by mouth Every 6 (Six) Hours As Needed for Severe Pain .     prenatal vitamin 27-0.8 27-0.8 MG tablet tablet     Prenatal Vitamins 0.8 MG tablet     PROAIR  (90 Base) MCG/ACT inhaler  Generic drug:  albuterol     PROBIOTIC-10 PO     promethazine 25 MG tablet  Commonly known as:  PHENERGAN     traZODone 100 MG tablet  Commonly known as:  DESYREL          Past Medical History:   Diagnosis Date   • Acid reflux    • Anemia    • Anxiety    • Atherosclerosis of aorta (CMS/HCC) 12/19/2017   • Huitron's esophagus    • Bursitis    • CAD  (coronary artery disease)    • Chronic pain disorder    • CVA (cerebrovascular accident) (CMS/HCC)    • DDD (degenerative disc disease), cervical    • Diabetes mellitus (CMS/HCC)    • Hard to intubate    • History of transfusion    • Hypertension    • Joint pain    • Kienbock's disease    • Low back pain    • Pancreatitis    • Pulmonary fibrosis (CMS/HCC) 2016   • Reflux gastritis    • Scleroderma (CMS/HCC)    • Seizure (CMS/HCC)        Allergies   Allergen Reactions   • Atorvastatin    • Diflucan [Fluconazole] Other (See Comments)     Severe pain   • Levofloxacin    • Nsaids Nausea Only   • Toradol [Ketorolac Tromethamine] GI Intolerance   • Victoza  [Liraglutide]        Past Surgical History:   Procedure Laterality Date   • CAROTID ENDARTERECTOMY Left     Neurological   • CATARACT EXTRACTION Bilateral    •  SECTION     • CHOLECYSTECTOMY      Laparoscopic   • COLONOSCOPY      Complete   • TONSILLECTOMY AND ADENOIDECTOMY     • UPPER GASTROINTESTINAL ENDOSCOPY      Diagnostic   • WRIST SURGERY Left        Family History   Problem Relation Age of Onset   • Other Mother         Cardiac Disorder   • Migraines Mother    • Heart disease Mother    • Sudden death Mother    • Other Father         Cardiac Disorder   • Hypertension Father    • Heart disease Father    • Cancer Father    • Hypertension Sister    • Cancer Sister    • Migraines Daughter    • Cancer Other         Uncle   • Migraines Maternal Aunt    • Stroke Maternal Grandmother    • Stroke Maternal Grandfather        Social History     Socioeconomic History   • Marital status:      Spouse name: Not on file   • Number of children: Not on file   • Years of education: Not on file   • Highest education level: Not on file   Tobacco Use   • Smoking status: Current Every Day Smoker     Packs/day: 1.00     Years: 40.00     Pack years: 40.00   • Smokeless tobacco: Never Used   Substance and Sexual Activity   • Alcohol use: No   • Drug use: No   • Sexual  activity: Defer     Comment: EXERCISE - RARELY           Objective   Physical Exam   Constitutional: She is oriented to person, place, and time. She appears well-developed and well-nourished. No distress.   ED Triage Vitals  Temp: 98.1 °F (36.7 °C) (11/11/18 0028)  Heart Rate: 110 (11/11/18 0027)  Resp: 18 (11/11/18 0027)  BP: 173/97 (11/11/18 0027)  SpO2: 98 % (11/11/18 0028)  Temp src: n/a  Heart Rate Source: Monitor (11/11/18 0027)  Patient Position: Lying (11/11/18 0027)  BP Location: Right arm (11/11/18 0027)  FiO2 (%): n/a    The patient's vitals were reviewed by me.  Unless otherwise noted they are within normal limits.     HENT:   Head: Normocephalic and atraumatic.   Right Ear: External ear normal.   Left Ear: External ear normal.   Nose: Nose normal.   Mouth/Throat: Oropharynx is clear and moist. No oropharyngeal exudate.   Eyes: Conjunctivae and EOM are normal. Pupils are equal, round, and reactive to light. Right eye exhibits no discharge. Left eye exhibits no discharge. No scleral icterus.   Neck: Normal range of motion. Neck supple. No JVD present. No tracheal deviation present. No thyromegaly present.   Cardiovascular: Normal rate, regular rhythm, normal heart sounds and intact distal pulses. Exam reveals no gallop and no friction rub.   No murmur heard.  Pulmonary/Chest: Effort normal and breath sounds normal. No stridor. No respiratory distress. She has no wheezes. She has no rales. She exhibits no tenderness.   Abdominal: Soft. Bowel sounds are normal. She exhibits no distension and no mass. There is no tenderness. There is no rebound and no guarding. No hernia.   Musculoskeletal: Normal range of motion. She exhibits no edema or deformity.   Lymphadenopathy:     She has no cervical adenopathy.   Neurological: She is alert and oriented to person, place, and time. No cranial nerve deficit or sensory deficit. She exhibits normal muscle tone. Coordination normal.   Skin: Skin is warm and dry. No rash  noted. She is not diaphoretic. No erythema. No pallor.   Psychiatric: Her behavior is normal.   Nursing note and vitals reviewed.      Procedures           ED Course  ED Course as of Nov 11 0131   Sun Nov 11, 2018 0109 The laboratory values were reviewed by me.  The serum glucose is 1.9.  The sodium is 1.9.  The colitis 93.  CO2 is 19.4.  The white blood cell count is 15.4.  The d-dimer is 0.52.  The laboratory values are otherwise unremarkable.  [RC]      ED Course User Index  [RC] Cody Shrestha MD      12:37 AM, 11/11/18: The EKG was obtained at 00 23.  EKG was read by me at 00 24.  The EKG shows a sinus tachycardia with rate of 107.  There is a normal axis with no hypertrophy.  The CA, QRS, QT intervals are unremarkable.  There is no ectopy.  There is no acute ST elevation or depression.  Thee is artifact in leads 1, 2, 3, aVL, and V1.  The EKG tonight was compared to an EKG dated June 8, 2018.  The EKG is essentially unchanged.  1:09 AM, 11/11/18:  The chest x-ray was compared to previously reviewed by me.  There is no active disease.    1:26 AM, 11/11/18:  The CT the head shows no acute intracranial abnormality.  Evidence of prior insults consistent with stroke history.  If concern for acute stroke, recommend follow-up imagery.  Sinusitis.  This was read by .  1:31 AM, 11/11/18:  The orthostatics are positive.    2:25 AM, 11/11/18:  The patient was reassessed.  She feels better.  She has no dizziness.  She has no shortness of breath.  Lung exam: Clear to auscultation bilaterally.  Multiple attempts at establishing at 118-gauge IV to obtain a CT angiogram of  the chest were unsuccessful.  Given the way the patient appears clinically, a VQ scan is not indicated.  The patient states that her white blood cell count is always slightly elevated and is usually the value obtained here in the emergency department secondary to her condition.    2:26 AM, 11/11/18:  The patient's diagnosis of  "hypertension and orthostasis was discussed with her.  The patient should rest, and drink lots of fluids.  She should check her blood pressure a couple times a day and record the time and the value.  She should return to emergency department if the blood pressure is 170/110 after 2 checks or if she has headache, chest pain, shortness of breath, abdominal pain, worse in any way at all.  All the patient's and 's questions were answered the patient will be discharged in good condition.  She has a history of chronic sinus problems.        MDM    XR Chest 1 View    (Results Pending)     Labs Reviewed   COMPREHENSIVE METABOLIC PANEL   TROPONIN (IN-HOUSE)   D-DIMER, QUANTITATIVE   CBC WITH AUTO DIFFERENTIAL   CBC AND DIFFERENTIAL    Narrative:     The following orders were created for panel order CBC & Differential.  Procedure                               Abnormality         Status                     ---------                               -----------         ------                     CBC Auto Differential[[id number]]                                                         Please view results for these tests on the individual orders.     No results found.    Final diagnoses:   Essential hypertension   Orthostasis         ED Medications:  Medications - No data to display    New Medications:     Medication List      CONTINUE taking these medications    Insulin Syringe 31G X 5/16\" 1 ML misc  Use twice daily for shots        ASK your doctor about these medications    ALPRAZolam 2 MG tablet  Commonly known as:  XANAX     aspirin 81 MG EC tablet  Take 1 tablet by mouth daily.     calcium citrate-vitamin d 200-250 MG-UNIT tablet tablet  Commonly known as:  CITRACAL     docusate sodium 100 MG capsule  Commonly known as:  COLACE  Take 1 capsule by mouth 2 (Two) Times a Day.     DULoxetine 60 MG capsule  Commonly known as:  CYMBALTA  Take 1 capsule by mouth Daily.     fluconazole 100 MG tablet  Commonly known as:  " "DIFLUCAN  Take 1 tablet by mouth Daily.     furosemide 20 MG tablet  Commonly known as:  LASIX     gabapentin 600 MG tablet  Commonly known as:  NEURONTIN  TAKE 1 TABLET BY MOUTH THREE TIMES A DAY     LACTOBACILLUS EXTRA STRENGTH capsule  Take 1 capsule by mouth Daily.     LANTUS 100 UNIT/ML injection  Generic drug:  insulin glargine     levETIRAcetam 500 MG tablet  Commonly known as:  KEPPRA  Take 1 tablet by mouth Every 12 (Twelve) Hours.     lidocaine 5 %  Commonly known as:  LIDODERM  Place 1 patch on the skin as directed by provider Daily. Remove & Discard   patch within 12 hours or as directed by MD     omeprazole 20 MG capsule  Commonly known as:  priLOSEC  Take 1 capsule by mouth 2 (Two) Times a Day.     OxyCODONE HCl ER 30 MG tablet extended-release 12 hour  Commonly known as:  oxyCONTIN  Take 1 tablet by mouth Every 12 (Twelve) Hours.     oxyCODONE-acetaminophen 7.5-325 MG per tablet  Commonly known as:  PERCOCET  Take 1 tablet by mouth Every 6 (Six) Hours As Needed for Severe Pain .     prenatal vitamin 27-0.8 27-0.8 MG tablet tablet     Prenatal Vitamins 0.8 MG tablet     PROAIR  (90 Base) MCG/ACT inhaler  Generic drug:  albuterol     PROBIOTIC-10 PO     promethazine 25 MG tablet  Commonly known as:  PHENERGAN     traZODone 100 MG tablet  Commonly known as:  DESYREL          Stopped Medications:     Medication List      CONTINUE taking these medications    Insulin Syringe 31G X 5/16\" 1 ML misc  Use twice daily for shots        ASK your doctor about these medications    ALPRAZolam 2 MG tablet  Commonly known as:  XANAX     aspirin 81 MG EC tablet  Take 1 tablet by mouth daily.     calcium citrate-vitamin d 200-250 MG-UNIT tablet tablet  Commonly known as:  CITRACAL     docusate sodium 100 MG capsule  Commonly known as:  COLACE  Take 1 capsule by mouth 2 (Two) Times a Day.     DULoxetine 60 MG capsule  Commonly known as:  CYMBALTA  Take 1 capsule by mouth Daily.     fluconazole 100 MG " tablet  Commonly known as:  DIFLUCAN  Take 1 tablet by mouth Daily.     furosemide 20 MG tablet  Commonly known as:  LASIX     gabapentin 600 MG tablet  Commonly known as:  NEURONTIN  TAKE 1 TABLET BY MOUTH THREE TIMES A DAY     LACTOBACILLUS EXTRA STRENGTH capsule  Take 1 capsule by mouth Daily.     LANTUS 100 UNIT/ML injection  Generic drug:  insulin glargine     levETIRAcetam 500 MG tablet  Commonly known as:  KEPPRA  Take 1 tablet by mouth Every 12 (Twelve) Hours.     lidocaine 5 %  Commonly known as:  LIDODERM  Place 1 patch on the skin as directed by provider Daily. Remove & Discard   patch within 12 hours or as directed by MD     omeprazole 20 MG capsule  Commonly known as:  priLOSEC  Take 1 capsule by mouth 2 (Two) Times a Day.     OxyCODONE HCl ER 30 MG tablet extended-release 12 hour  Commonly known as:  oxyCONTIN  Take 1 tablet by mouth Every 12 (Twelve) Hours.     oxyCODONE-acetaminophen 7.5-325 MG per tablet  Commonly known as:  PERCOCET  Take 1 tablet by mouth Every 6 (Six) Hours As Needed for Severe Pain .     prenatal vitamin 27-0.8 27-0.8 MG tablet tablet     Prenatal Vitamins 0.8 MG tablet     PROAIR  (90 Base) MCG/ACT inhaler  Generic drug:  albuterol     PROBIOTIC-10 PO     promethazine 25 MG tablet  Commonly known as:  PHENERGAN     traZODone 100 MG tablet  Commonly known as:  DESYREL            Final diagnoses:   Essential hypertension   Orthostasis            Cody Shrestha MD  11/11/18 8052

## 2018-11-11 NOTE — DISCHARGE INSTRUCTIONS
Rest.  Drink more fluids.  Check your blood pressure 2 times a day record the time in the value.  Return to the emergency department the value is 170/110 or greater after 2 checks, or if there is headache, chest pain, shortness of breath, abdominal pain, worse in any way at all.

## 2018-11-11 NOTE — ED NOTES
Attempted x4 for larger bore IV for CT angiogram. Unable to obtain IV. Mariam Colin, RN  11/11/18 0722

## 2018-11-11 NOTE — ED NOTES
Pt denies any chest pain upon arrival via w/c to room. Skin warm and dry.     Mariam Polk, RN  11/11/18 0044

## 2018-11-20 RX ORDER — GABAPENTIN 600 MG/1
600 TABLET ORAL 3 TIMES DAILY
Qty: 90 TABLET | Refills: 3 | Status: SHIPPED | OUTPATIENT
Start: 2018-11-20 | End: 2018-12-10 | Stop reason: SDUPTHER

## 2018-11-27 ENCOUNTER — DOCUMENTATION (OUTPATIENT)
Dept: PHYSICAL THERAPY | Facility: HOSPITAL | Age: 53
End: 2018-11-27

## 2018-11-27 NOTE — THERAPY DISCHARGE NOTE
Outpatient Physical Therapy Discharge Summary         Patient Name: Crystal Cruz  : 1965  MRN: [id number]    Today's Date: 2018    Visit Dx:  No diagnosis found.    PT OP Goals     Row Name 18 1400          PT Short Term Goals    STG Date to Achieve  18  -LN     STG 1  Patient to verbally report decreased rating of pain in LB and right hip to <6/10 with ADLs and everday activities.  -LN     STG 1 Progress  Not Met  -LN     STG 2  Trunk ROM improved by 25%.  -LN     STG 2 Progress  Not Met  -LN     STG 2 Progress Comments  Trunk ROM had not been reassessed yet.   -LN     STG 3  Patient able to perform 10-15 reps back stabilization and gentle stretching/ROM exercises without c/o increased pain.   -LN     STG 3 Progress  Not Met  -LN     STG 3 Progress Comments  Only do 5 reps at last visit- exercises were causing dizziness.   -LN     STG 4  Patient to have improved standing tolerance to 30 minutes.   -LN     STG 4 Progress  Not Met  -LN     STG 5  Patient to have improved right hip and knee strength by 1/2 muscle grade.   -LN     STG 5 Progress  Not Met  -LN        Long Term Goals    LTG Date to Achieve  18  -LN     LTG 1  Patient to verbally report decreased rating of pain in LB and right hip to <4/10 with ADLs and everyday activities.   -LN     LTG 1 Progress  Not Met  -LN     LTG 2  Trunk ROM WFL and painfree in all planes.  -LN     LTG 2 Progress  Not Met  -LN     LTG 3  Patient independent with HEP issued by therapist.   -LN     LTG 3 Progress  Not Met  -LN     LTG 3 Progress Comments  Patient had to stop doing the exercises secondary to them making her feel dizzy.   -LN     LTG 4  Good pelvic alignment maintained between PT sessions with use of MET and back stabilization program.   -LN     LTG 4 Progress  Not Met  -LN     LTG 5  Right hip and knee strength improved to 4+/5.   -LN     LTG 5 Progress  Not Met  -LN     LTG 6  Patient able to walk around [employer] for 30  minutes with LBP no > than 4/10.   -LN     LTG 6 Progress  Not Met  -LN       User Key  (r) = Recorded By, (t) = Taken By, (c) = Cosigned By    Initials Name Provider Type    Kim Bo, PT Physical Therapist      Patient was seen for 2 visits for initial evaluation, therapeutic exercise with HEP, IFC, MET, and patient education.      OP PT Discharge Summary  Date of Discharge: 11/27/18  Reason for Discharge: (Per patient needs to put PT on hold secondary to needs to see her vascular MD & reports that she is still getting dizzy when she does her exercises.  No further word from patient or MD.)  Outcomes Achieved: Unable to make functional progress toward goals at this time(secondary to only came for 2 visits and had to stop the exercises secondary to them causing her to feel dizzy.)                         Kim Martin, PT  11/27/2018

## 2018-11-28 RX ORDER — OXYCODONE HYDROCHLORIDE 30 MG/1
30 TABLET, FILM COATED, EXTENDED RELEASE ORAL EVERY 12 HOURS SCHEDULED
Qty: 60 TABLET | Refills: 0 | Status: SHIPPED | OUTPATIENT
Start: 2018-11-28 | End: 2018-12-28 | Stop reason: SDUPTHER

## 2018-11-28 RX ORDER — OXYCODONE AND ACETAMINOPHEN 7.5; 325 MG/1; MG/1
1 TABLET ORAL EVERY 6 HOURS PRN
Qty: 120 TABLET | Refills: 0 | Status: SHIPPED | OUTPATIENT
Start: 2018-11-28 | End: 2018-12-28 | Stop reason: SDUPTHER

## 2018-11-29 ENCOUNTER — OFFICE VISIT (OUTPATIENT)
Dept: PAIN MEDICINE | Facility: CLINIC | Age: 53
End: 2018-11-29

## 2018-11-29 VITALS
TEMPERATURE: 97.6 F | HEART RATE: 75 BPM | SYSTOLIC BLOOD PRESSURE: 169 MMHG | RESPIRATION RATE: 18 BRPM | OXYGEN SATURATION: 95 % | BODY MASS INDEX: 31.58 KG/M2 | DIASTOLIC BLOOD PRESSURE: 86 MMHG | HEIGHT: 64 IN | WEIGHT: 185 LBS

## 2018-11-29 DIAGNOSIS — Z79.899 ENCOUNTER FOR LONG-TERM (CURRENT) USE OF HIGH-RISK MEDICATION: ICD-10-CM

## 2018-11-29 DIAGNOSIS — G89.29 OTHER CHRONIC PAIN: Primary | ICD-10-CM

## 2018-11-29 DIAGNOSIS — M47.816 ARTHROPATHY OF LUMBAR FACET JOINT: ICD-10-CM

## 2018-11-29 DIAGNOSIS — M93.1 KIENBOCK DISEASE, ADULT: ICD-10-CM

## 2018-11-29 PROCEDURE — 99214 OFFICE O/P EST MOD 30 MIN: CPT | Performed by: NURSE PRACTITIONER

## 2018-11-29 NOTE — PROGRESS NOTES
"CHIEF COMPLAINT  Back pain has decreased since last visit. She states that she has received 90% relief from her injection and states she is still getting releif.    Subjective   Crystal Cruz is a 53 y.o. female  who presents to the office for follow-up of procedure.  She completed a Bilateral T12-L3 Lumbar Medial Branch Blockade   on  11/6/18 performed by Dr. SHABAZZ for management of LOW BACK PAIN. Patient reports SIGNIFICANT relief from the procedure. She had 90% relief from the procedure initially. She is reporting approximately 60-70% ongoing relief.      Complains of pain in her low back and hands. Today her pain is 2/10VAS.  Describes her pain as continuous aching. Overall, she is improved since last office visit. Continues with OxyContin 30 mg BID and Percocet 7.5/325 3-4/day, Cymbalta 60 mg and gabapentin 600 mg TID. Denies any side effects from the regimen, including constipation.   The regimen helps decrease her pain by 50-75%. ADL's by self.  Trying to be more active at home.     Reviewed weight gain. She admits she \"indulged\" for Thanksgiving.   Had a recent ER visit due to HTN. Given a beta blocker to take as needed.  Decided not to go see psychiatrist.  Did not end up seeing therapist, which we had discussed at last office visit.   Extremity Pain    The pain is present in the neck, back, left wrist, left hand, left foot, right foot, right hand, right wrist, right fingers and left fingers. This is a chronic problem. The current episode started more than 1 year ago. There has been no history of extremity trauma. The problem occurs constantly. Progression since onset: unchanged from last office visit. The quality of the pain is described as aching, pounding and burning. The pain is moderate. Pertinent negatives include no fever or numbness. The symptoms are aggravated by cold (moist/rainy weather). She has tried acetaminophen, heat, movement, NSAIDS, OTC ointments, OTC pain meds, oral narcotics and " rest (Oxycontin 30 mg Q12 hours, Oxycodone 7.5/325 4/day) for the symptoms. The treatment provided moderate relief. mitchell, systemic sclerosis   Back Pain   This is a chronic problem. The current episode started more than 1 month ago. The problem occurs constantly. Progression since onset: improved since last office visit. The pain is present in the lumbar spine. The quality of the pain is described as aching. The pain does not radiate. The pain is at a severity of 2/10. The pain is moderate. The symptoms are aggravated by bending, position, lying down, standing and twisting. Pertinent negatives include no abdominal pain, bladder incontinence, bowel incontinence, chest pain, dysuria, fever, headaches, numbness or weakness. She has tried analgesics, heat and bed rest (lumbar FJN/RFL--- significant long-term relief) for the symptoms.      Procedure List  -- 9-11-18-- TF BURT right L5  -- 7-10-18-- Right SI joint injection with right greater trochanteric bursa injection  -- 5-22-18-- LESI L4-5-- 60-90% relief until 6-19-18.   -- 5-10-18-- LESI L4-5  -- 2-20-18-- bilateral L1-L4 RFL.    PEG Assessment   What number best describes your pain on average in the past week?3  What number best describes how, during the past week, pain has interfered with your enjoyment of life?2  What number best describes how, during the past week, pain has interfered with your general activity?  2    The following portions of the patient's history were reviewed and updated as appropriate: allergies, current medications, past family history, past medical history, past social history, past surgical history and problem list.    Review of Systems   Constitutional: Negative for chills and fever.   Respiratory: Negative for shortness of breath.    Cardiovascular: Negative for chest pain.   Gastrointestinal: Negative for abdominal pain, bowel incontinence, constipation, diarrhea, nausea and vomiting.   Genitourinary: Negative for bladder  "incontinence, difficulty urinating, dyspareunia and dysuria.   Musculoskeletal: Positive for back pain.   Neurological: Negative for dizziness, weakness, light-headedness, numbness and headaches.   Psychiatric/Behavioral: Negative for confusion, hallucinations, self-injury, sleep disturbance and suicidal ideas. The patient is not nervous/anxious.        Vitals:    11/29/18 1015   BP: 169/86   BP Location: Left arm   Patient Position: Sitting   Cuff Size: Adult   Pulse: 75   Resp: 18   Temp: 97.6 °F (36.4 °C)   TempSrc: Oral   SpO2: 95%   Weight: 83.9 kg (185 lb)   Height: 162 cm (63.78\")   PainSc:   2   PainLoc: Back     Objective   Physical Exam   Constitutional: She is oriented to person, place, and time. She appears well-developed and well-nourished. She is cooperative.   HENT:   Head: Normocephalic and atraumatic.   Eyes: Lids are normal.   Neck: Trachea normal.   Cardiovascular: Normal rate.   Pulmonary/Chest: Effort normal.   Musculoskeletal:        Lumbar back: She exhibits tenderness and bony tenderness (mild tenderness to palpation of bilateral L3-L5, [id number] tenderness; + loading manuever).        Right hand: She exhibits decreased range of motion and tenderness. She exhibits normal capillary refill.        Left hand: She exhibits decreased range of motion and tenderness. She exhibits normal capillary refill.   Arthritic changes bilateral hands and wrists  Guarded of BUE's  Mild tenderness to right greater trochanteric bursa     Neurological: She is alert and oriented to person, place, and time. Gait normal.   Reflex Scores:       Patellar reflexes are 1+ on the right side and 1+ on the left side.  Skin: Skin is warm, dry and intact.   Psychiatric: She has a normal mood and affect. Her speech is normal and behavior is normal. Cognition and memory are normal.   Nursing note and vitals reviewed.      Assessment/Plan   Crystal was seen today for back pain.    Diagnoses and all orders for this " visit:    Other chronic pain    Arthropathy of lumbar facet joint    Kienbock disease, adult    Encounter for long-term (current) use of high-risk medication      --- The urine drug screen confirmation from 11-1-18 has been reviewed and the result is APPROPRIATE(prescribed xanax) based on patient history and STEVEN report  --- Refill OxyContin and Percocet. Patient appears stable with current regimen. No adverse effects. Regarding continuation of opioids, there is no evidence of aberrant behavior or any red flags.  The patient continues with appropriate response to opioid therapy. ADL's remain intact by self.   --- Consider repeat RFL in future PRN.   --- Reviewed weight loss efforts.  --- Reviewed pain coping as needed.  --- Follow-up 1 month or sooner if needed.     STEVEN REPORT    As part of the patient's treatment plan, I am prescribing controlled substances. The patient has been made aware of appropriate use of such medications, including potential risk of somnolence, limited ability to drive and/or work safely, and the potential for dependence or overdose. It has also bee made clear that these medications are for use by this patient only, without concomitant use of alcohol or other substances unless prescribed.     Patient has completed prescribing agreement detailing terms of continued prescribing of controlled substances, including monitoring STEVEN reports, urine drug screening, and pill counts if necessary. The patient is aware that inappropriate use will results in cessation of prescribing such medications.    STEVEN report has been reviewed and scanned into the patient's chart.    As the clinician, I personally reviewed the STEVEN from 11-28-18 while the patient was in the office today.    History and physical exam exhibit continued safe and appropriate use of controlled substances.       EMR Dragon/Transcription disclaimer:   Much of this encounter note is an electronic transcription/translation of  spoken language to printed text. The electronic translation of spoken language may permit erroneous, or at times, nonsensical words or phrases to be inadvertently transcribed; Although I have reviewed the note for such errors, some may still exist.

## 2018-12-10 RX ORDER — GABAPENTIN 600 MG/1
600 TABLET ORAL 3 TIMES DAILY
Qty: 270 TABLET | Refills: 1 | Status: SHIPPED | OUTPATIENT
Start: 2018-12-10 | End: 2019-08-21 | Stop reason: SDUPTHER

## 2018-12-10 NOTE — TELEPHONE ENCOUNTER
Medication Refill Request    Date of phone call: 12/10/18    Medication being requested: Gabapentin 600 mg sig: 3xday  Qty: 270    Date of last visit: 11/29/18    Date of last refill: 11/20/18    STEVEN up to date?: 11/28/18    Next Follow up?: 12/28/18    Any new pertinent information? (i.e, new medication allergies, new use of medications, change in patient's health or condition, non-compliance or inconsistency with prescribing agreement?): patient is requesting 90 day supply

## 2018-12-28 ENCOUNTER — OFFICE VISIT (OUTPATIENT)
Dept: PAIN MEDICINE | Facility: CLINIC | Age: 53
End: 2018-12-28

## 2018-12-28 VITALS
RESPIRATION RATE: 18 BRPM | BODY MASS INDEX: 30.56 KG/M2 | HEART RATE: 70 BPM | OXYGEN SATURATION: 96 % | SYSTOLIC BLOOD PRESSURE: 133 MMHG | WEIGHT: 179 LBS | HEIGHT: 64 IN | TEMPERATURE: 97.3 F | DIASTOLIC BLOOD PRESSURE: 84 MMHG

## 2018-12-28 DIAGNOSIS — M93.1 KIENBOCK DISEASE, ADULT: ICD-10-CM

## 2018-12-28 DIAGNOSIS — Z79.899 ENCOUNTER FOR LONG-TERM (CURRENT) USE OF HIGH-RISK MEDICATION: ICD-10-CM

## 2018-12-28 DIAGNOSIS — M47.816 ARTHROPATHY OF LUMBAR FACET JOINT: ICD-10-CM

## 2018-12-28 DIAGNOSIS — G89.29 OTHER CHRONIC PAIN: Primary | ICD-10-CM

## 2018-12-28 PROCEDURE — 99214 OFFICE O/P EST MOD 30 MIN: CPT | Performed by: NURSE PRACTITIONER

## 2018-12-28 RX ORDER — OXYCODONE AND ACETAMINOPHEN 7.5; 325 MG/1; MG/1
1 TABLET ORAL EVERY 6 HOURS PRN
Qty: 120 TABLET | Refills: 0 | Status: SHIPPED | OUTPATIENT
Start: 2018-12-28 | End: 2019-01-24 | Stop reason: SDUPTHER

## 2018-12-28 RX ORDER — OXYCODONE HYDROCHLORIDE 30 MG/1
30 TABLET, FILM COATED, EXTENDED RELEASE ORAL EVERY 12 HOURS SCHEDULED
Qty: 60 TABLET | Refills: 0 | Status: SHIPPED | OUTPATIENT
Start: 2018-12-28 | End: 2019-01-24 | Stop reason: SDUPTHER

## 2018-12-28 NOTE — PROGRESS NOTES
CHIEF COMPLAINT  Back pain is unchanged since last visit.    Subjective   Crystal Cruz is a 53 y.o. female  who presents to the office for follow-up.She has a history of chronic back and hand pain. Reports this is unchanged since last office visit.    Complains of pain in her back and hands. Today her pain is 3/10VAS. Describes the pain as nearly continuous aching and throbbing. Pain increases with bending/lifting/twisting; pain decreases with medication, rest and procedures. Continues with OxyContin 30 mg BID and Percocet 7.5/325 3-4/day, Cymbalta 60 mg and gabapentin 600 mg TID. Denies any side effects from the regimen.  The regimen helps decrease her pain by 50%. ADL's by self.  Has lost weight since last office visit.     She completed a Bilateral T12-L3 Lumbar Medial Branch Blockade   on  11/6/18 performed by Dr. SHABAZZ for management of LOW BACK PAIN. Patient reports SIGNIFICANT relief from the procedure. She had 90% relief from the procedure initially. She was reporting approximately 60-70% ongoing relief at last office visit.   She completed a Bilateral L1-4 Lumbar Medial Branch RADIOFREQUENCY   on  2/20/2018 performed by Dr. Law for management of chronic low back pain.  Having a sinus procedure 1-11-19. Was given a prescription for quantity of 1 percocet 5/325, valium and zofran. Reviewed not filling Percocet 5/325. Also discussed talking to surgeon in regards to valium prior to procedure in place of Xanax.  Presents with  who helps with history.  Extremity Pain    The pain is present in the neck, back, left wrist, left hand, left foot, right foot, right hand, right wrist, right fingers and left fingers. This is a chronic problem. The current episode started more than 1 year ago. There has been no history of extremity trauma. The problem occurs constantly. Progression since onset: unchanged from last office visit. The quality of the pain is described as aching, pounding and burning. The  pain is moderate. Pertinent negatives include no fever or numbness. The symptoms are aggravated by cold (moist/rainy weather). She has tried acetaminophen, heat, movement, NSAIDS, OTC ointments, OTC pain meds, oral narcotics and rest (Oxycontin 30 mg Q12 hours, Oxycodone 7.5/325 4/day) for the symptoms. The treatment provided moderate relief. [email or web address], systemic sclerosis   Back Pain   This is a chronic problem. The current episode started more than 1 month ago. The problem occurs constantly. Progression since onset: improved since last office visit. The pain is present in the lumbar spine. The quality of the pain is described as aching. The pain does not radiate. The pain is at a severity of 2/10. The pain is moderate. The symptoms are aggravated by bending, position, lying down, standing and twisting. Pertinent negatives include no abdominal pain, bladder incontinence, bowel incontinence, chest pain, dysuria, fever, headaches, numbness or weakness. She has tried analgesics, heat and bed rest (lumbar FJN/RFL--- significant long-term relief) for the symptoms.      PEG Assessment   What number best describes your pain on average in the past week?3  What number best describes how, during the past week, pain has interfered with your enjoyment of life?4  What number best describes how, during the past week, pain has interfered with your general activity?  4    The following portions of the patient's history were reviewed and updated as appropriate: allergies, current medications, past family history, past medical history, past social history, past surgical history and problem list.    Review of Systems   Constitutional: Negative for chills and fever.   Respiratory: Negative for shortness of breath.    Cardiovascular: Negative for chest pain.   Gastrointestinal: Negative for abdominal pain, bowel incontinence, constipation, diarrhea, nausea and vomiting.   Genitourinary: Negative for bladder incontinence, difficulty  "urinating, dyspareunia and dysuria.   Musculoskeletal: Positive for back pain.   Neurological: Negative for dizziness, weakness, light-headedness, numbness and headaches.   Psychiatric/Behavioral: Negative for confusion, hallucinations, self-injury, sleep disturbance and suicidal ideas. The patient is not nervous/anxious.        Vitals:    12/28/18 1316   BP: 133/84   BP Location: Left arm   Patient Position: Sitting   Cuff Size: Adult   Pulse: 70   Resp: 18   Temp: 97.3 °F (36.3 °C)   TempSrc: Oral   SpO2: 96%   Weight: 81.2 kg (179 lb)   Height: 162 cm (63.78\")   PainSc:   3   PainLoc: Back     Objective   Physical Exam   Constitutional: She is oriented to person, place, and time. She appears well-developed and well-nourished. She is cooperative.   HENT:   Head: Normocephalic and atraumatic.   Eyes: Lids are normal.   Neck: Trachea normal.   Cardiovascular: Normal rate.   Pulmonary/Chest: Effort normal.   Musculoskeletal:        Lumbar back: She exhibits tenderness and bony tenderness (mild tenderness to palpation of bilateral L3-L5, [id number] tenderness; + loading manuever).        Right hand: She exhibits decreased range of motion and tenderness. She exhibits normal capillary refill.        Left hand: She exhibits decreased range of motion and tenderness. She exhibits normal capillary refill.   Arthritic changes bilateral hands and wrists     Neurological: She is alert and oriented to person, place, and time. Gait normal.   Non-focal   Skin: Skin is warm, dry and intact.   Psychiatric: She has a normal mood and affect. Her speech is normal and behavior is normal. Cognition and memory are normal.   Nursing note and vitals reviewed.      Assessment/Plan   Crystal was seen today for back pain.    Diagnoses and all orders for this visit:    Other chronic pain    Arthropathy of lumbar facet joint    Kienbock disease, adult    Encounter for long-term (current) use of high-risk medication      --- The urine drug screen " confirmation from 11-1-18 has been reviewed and the result is APPROPRIATE based on patient history and STEVEN report  --- Refill OxyContin and Percocet. Patient appears stable with current regimen. No adverse effects. Regarding continuation of opioids, there is no evidence of aberrant behavior or any red flags.  The patient continues with appropriate response to opioid therapy. ADL's remain intact by self.   --- Consider repeat lumbar RFL in future PRN.  --- Follow-up 1 month or sooner if needed.       STEVEN REPORT    As part of the patient's treatment plan, I am prescribing controlled substances. The patient has been made aware of appropriate use of such medications, including potential risk of somnolence, limited ability to drive and/or work safely, and the potential for dependence or overdose. It has also bee made clear that these medications are for use by this patient only, without concomitant use of alcohol or other substances unless prescribed.     Patient has completed prescribing agreement detailing terms of continued prescribing of controlled substances, including monitoring STEVEN reports, urine drug screening, and pill counts if necessary. The patient is aware that inappropriate use will results in cessation of prescribing such medications.    STEVEN report has been reviewed and scanned into the patient's chart.    As the clinician, I personally reviewed the STEVEN from 12-27-18 while the patient was in the office today.    History and physical exam exhibit continued safe and appropriate use of controlled substances.      EMR Dragon/Transcription disclaimer:   Much of this encounter note is an electronic transcription/translation of spoken language to printed text. The electronic translation of spoken language may permit erroneous, or at times, nonsensical words or phrases to be inadvertently transcribed; Although I have reviewed the note for such errors, some may still exist.

## 2019-01-24 RX ORDER — OXYCODONE AND ACETAMINOPHEN 7.5; 325 MG/1; MG/1
1 TABLET ORAL EVERY 6 HOURS PRN
Qty: 120 TABLET | Refills: 0 | Status: SHIPPED | OUTPATIENT
Start: 2019-01-24 | End: 2019-02-26 | Stop reason: SDUPTHER

## 2019-01-24 RX ORDER — OXYCODONE HYDROCHLORIDE 30 MG/1
30 TABLET, FILM COATED, EXTENDED RELEASE ORAL EVERY 12 HOURS SCHEDULED
Qty: 60 TABLET | Refills: 0 | Status: SHIPPED | OUTPATIENT
Start: 2019-01-24 | End: 2019-02-26 | Stop reason: SDUPTHER

## 2019-01-28 ENCOUNTER — OFFICE VISIT (OUTPATIENT)
Dept: PAIN MEDICINE | Facility: CLINIC | Age: 54
End: 2019-01-28

## 2019-01-28 VITALS
WEIGHT: 175 LBS | TEMPERATURE: 98.4 F | HEART RATE: 79 BPM | SYSTOLIC BLOOD PRESSURE: 146 MMHG | RESPIRATION RATE: 16 BRPM | HEIGHT: 64 IN | DIASTOLIC BLOOD PRESSURE: 77 MMHG | OXYGEN SATURATION: 92 % | BODY MASS INDEX: 29.88 KG/M2

## 2019-01-28 DIAGNOSIS — Z79.899 ENCOUNTER FOR LONG-TERM (CURRENT) USE OF HIGH-RISK MEDICATION: ICD-10-CM

## 2019-01-28 DIAGNOSIS — G89.29 OTHER CHRONIC PAIN: Primary | ICD-10-CM

## 2019-01-28 DIAGNOSIS — M47.816 ARTHROPATHY OF LUMBAR FACET JOINT: ICD-10-CM

## 2019-01-28 DIAGNOSIS — M93.1 KIENBOCK DISEASE, ADULT: ICD-10-CM

## 2019-01-28 PROCEDURE — 99214 OFFICE O/P EST MOD 30 MIN: CPT | Performed by: NURSE PRACTITIONER

## 2019-01-28 NOTE — PROGRESS NOTES
CHIEF COMPLAINT  Pt states the R side LBP has increased significantly since 12/28/18 office visit.    Subjective   Crystal Cruz is a 53 y.o. female  who presents to the office for follow-up.She has a history of chronic back and hand pain. Reports her back pain is worse since last office visit.    Complains of pain in her low back and hands. Today her pain is 6/10VAS. Describes her pain as continuous aching and throbbing. Pain is also worse due to cold weather/changes. Continues with OxyContin 30 mg BID and Percocet 7.5/325 3-4/day, Cymbalta 60 mg and gabapentin 600 mg TID. Denies any side effects from the regimen.  The regimen helps decrease her pain by 40-50%. Notes improvement in activity and function with regimen. ADL's by self.  Denies any bowel or bladder changes.  Has lost some more weight since last office visit. Also got a hospital bed for home.     She completed a Bilateral T12-L3 Lumbar Medial Branch Blockade   on  11/6/18 performed by Dr. SHABAZZ for management of LOW BACK PAIN. Patient reports SIGNIFICANT relief from the procedure. She had 90% relief from the procedure initially. She was reporting approximately 60-70% ongoing relief at last office visit.   She completed a Bilateral L1-4 Lumbar Medial Branch RADIOFREQUENCY   on  2/20/2018 performed by Dr. Law for management of chronic low back pain.  She is requesting repeating RFL.  Extremity Pain    The pain is present in the neck, back, left wrist, left hand, left foot, right foot, right hand, right wrist, right fingers and left fingers. This is a chronic problem. The current episode started more than 1 year ago. There has been no history of extremity trauma. The problem occurs constantly. Progression since onset: unchanged from last office visit. The quality of the pain is described as aching, pounding and burning. The pain is moderate. Pertinent negatives include no fever or numbness. The symptoms are aggravated by cold (moist/rainy  weather). She has tried acetaminophen, heat, movement, NSAIDS, OTC ointments, OTC pain meds, oral narcotics and rest (Oxycontin 30 mg Q12 hours, Oxycodone 7.5/325 4/day) for the symptoms. The treatment provided moderate relief. sangsarah systemic sclerosis   Back Pain   This is a chronic problem. The current episode started more than 1 month ago. The problem occurs constantly. Progression since onset: worse since last office visit. The pain is present in the lumbar spine. The quality of the pain is described as aching. The pain does not radiate. The pain is at a severity of 6/10. The pain is moderate. The symptoms are aggravated by bending, position, lying down, standing and twisting. Associated symptoms include headaches (frequent,daily since 2 weeks ago.). Pertinent negatives include no abdominal pain, bladder incontinence, bowel incontinence, chest pain, dysuria, fever, numbness or weakness. She has tried analgesics, heat and bed rest (lumbar FJN/RFL--- significant long-term relief) for the symptoms.      PEG Assessment   What number best describes your pain on average in the past week?7  What number best describes how, during the past week, pain has interfered with your enjoyment of life?8  What number best describes how, during the past week, pain has interfered with your general activity?  8    The following portions of the patient's history were reviewed and updated as appropriate: allergies, current medications, past family history, past medical history, past social history, past surgical history and problem list.    Review of Systems   Constitutional: Negative for activity change, chills and fever.   Respiratory: Negative for shortness of breath.    Cardiovascular: Negative for chest pain.   Gastrointestinal: Positive for constipation. Negative for abdominal pain, bowel incontinence, diarrhea, nausea and vomiting.   Genitourinary: Negative for bladder incontinence, difficulty urinating, dyspareunia and  "dysuria.   Musculoskeletal: Positive for back pain.   Neurological: Positive for headaches (frequent,daily since 2 weeks ago.). Negative for dizziness, weakness, light-headedness and numbness.   Psychiatric/Behavioral: Positive for sleep disturbance. Negative for confusion, hallucinations, self-injury and suicidal ideas. The patient is not nervous/anxious.        Vitals:    01/28/19 1059   BP: 146/77   Pulse: 79   Resp: 16   Temp: 98.4 °F (36.9 °C)   SpO2: 92%   Weight: 79.4 kg (175 lb)   Height: 162 cm (63.78\")   PainSc:   6   PainLoc: Back  Comment: LBP ranges from 4-8/10     Objective   Physical Exam   Constitutional: She is oriented to person, place, and time. She appears well-developed and well-nourished. She is cooperative.   HENT:   Head: Normocephalic and atraumatic.   Eyes: Lids are normal.   Neck: Trachea normal.   Cardiovascular: Normal rate.   Pulmonary/Chest: Effort normal.   Musculoskeletal:        Lumbar back: She exhibits tenderness and bony tenderness (moderate T12-L3 tenderness; + loading manuever).        Right hand: She exhibits decreased range of motion and tenderness. She exhibits normal capillary refill.        Left hand: She exhibits decreased range of motion and tenderness. She exhibits normal capillary refill.   Arthritic changes bilateral hands and wrists     Neurological: She is alert and oriented to person, place, and time. Gait normal.   Non-focal   Skin: Skin is warm, dry and intact.   Psychiatric: She has a normal mood and affect. Her speech is normal and behavior is normal. Cognition and memory are normal.   Nursing note and vitals reviewed.      Assessment/Plan   Crystal was seen today for back pain.    Diagnoses and all orders for this visit:    Other chronic pain    Arthropathy of lumbar facet joint  -     Case Request    Kienbock disease, adult    Encounter for long-term (current) use of high-risk medication      --- The urine drug screen confirmation from 11-1-18 has been " "reviewed and the result is APPROPRIATE based on patient history and STEVEN report  --- bilateral T12-L3 RFL. No blood thinners. Reviewed the procedure at length with the patient.  Included in the review was expectations, complications, risk and benefits.The procedure was described in detail and the risks, benefits and alternatives were discussed with the patient (including but not limited to: bleeding, infection, nerve damage, worsening of pain, inability to perform injection, paralysis, seizures, and death) who agreed to proceed.  Discussed the potential for sedation if warranted/wanted.  The procedure will plan to be performed at [facility] with fluoroscopic guidance(unless ultrasound is indicated). Questions were answered and in a way the patient could understand.  Patient verbalized understanding and wishes to proceed.  This intervention will be ordered.  --- Refill OxyContin and Percocet. Patient appears stable with current regimen. No adverse effects. Regarding continuation of opioids, there is no evidence of aberrant behavior or any red flags.  The patient continues with appropriate response to opioid therapy. ADL's remain intact by self.   --- Follow-up 1 month or sooner if needed.    -------  Education about Medial Branch Blockade and RF Therapy:    This medial branch blockade (MBB) suggested is intended for diagnostic purposes, with the intent of offering the patient Radiofrequency thermal rhizotomy (RF) if the MBB is diagnostically effective.  The diagnostic blockade is necessary to determine the likelihood that RF therapy could be efficacious in providing long term relief to the patient.    Medial branches are sensory nerve branches that connect to a facet joint and transmit sensations & pain signals from that joint.  Facet is a term for the type of joints found in the spine.  Medial branches are the nerves that go to a facet, and therefore are also sometimes called \"facet joint " "nerves\" (FJNs).      In a medial branch blockade procedure, xray fluoroscopy is used to verify the locations of the outside of the joint lines which are being targeted.  Under xray guidance, needles are placed to these areas.  Contrast dye is injected to confirm proper placement, with dye flowing over the joint area, and to ensure that the dye does not flow into unintended areas such as a vein.  When this is confirmed, local anesthetic is injected to block the medial branch at that joint level.      If MBBs are diagnostically successful in blocking pain, then the patient is most likely a great candidate for Radiofrequency of those facet joint nerves.  In the RF procedure, needles are placed to the joint lines in the same fashion, and after testing, the needle tips are heated to thermally treat the nerves, blocking the nerves by in essence damaging the nerves with the heat treatment.       Medically, a successful RF procedure should provide a patient with 50% pain relief or more for at least 6 months.  Clinical experience suggests that successful patients receive relief more in the range of 12 months on average.  We also discussed that a fortunate minority of patients receive therapeutic success from the MBB, and may not require RF ablation.  If a patient receives more than 8 weeks of relief from MBB, then occasional repeat MBB for therapeutic purposes is a very reasonable alternative therapy.  This course of therapy is consistent with our LCDs according to our CMS  in the area, and therefore other insurance providers should follow accordingly.  We will monitor our patients to screen for these therapeutic responders and will offer RF therapy only when necessary.        We discussed that MBB & RF are not without risks.  Guidelines regarding anticoagulant use & neuraxial procedures will be respected.  Patients that are ill or otherwise may be at risk for sepsis will not have their spines accessed by " neuraxial injections of any type.  This patient will not be offered these therapies if there is an increased risk.   We discussed that there is a risk of postprocedural pain and also a risk of worsening of clinical picture with these procedures as with any neuraxial procedure.    -------     STEVEN REPORT    As part of the patient's treatment plan, I am prescribing controlled substances. The patient has been made aware of appropriate use of such medications, including potential risk of somnolence, limited ability to drive and/or work safely, and the potential for dependence or overdose. It has also bee made clear that these medications are for use by this patient only, without concomitant use of alcohol or other substances unless prescribed.     Patient has completed prescribing agreement detailing terms of continued prescribing of controlled substances, including monitoring STEVEN reports, urine drug screening, and pill counts if necessary. The patient is aware that inappropriate use will results in cessation of prescribing such medications.    STEVEN report has been reviewed and scanned into the patient's chart.    As the clinician, I personally reviewed the STEVEN from 1-25-19 while the patient was in the office today.    History and physical exam exhibit continued safe and appropriate use of controlled substances.      EMR Dragon/Transcription disclaimer:   Much of this encounter note is an electronic transcription/translation of spoken language to printed text. The electronic translation of spoken language may permit erroneous, or at times, nonsensical words or phrases to be inadvertently transcribed; Although I have reviewed the note for such errors, some may still exist.

## 2019-02-05 ENCOUNTER — DOCUMENTATION (OUTPATIENT)
Dept: PAIN MEDICINE | Facility: CLINIC | Age: 54
End: 2019-02-05

## 2019-02-05 ENCOUNTER — OUTSIDE FACILITY SERVICE (OUTPATIENT)
Dept: PAIN MEDICINE | Facility: CLINIC | Age: 54
End: 2019-02-05

## 2019-02-05 PROCEDURE — 64635 DESTROY LUMB/SAC FACET JNT: CPT | Performed by: ANESTHESIOLOGY

## 2019-02-05 PROCEDURE — 64636 DESTROY L/S FACET JNT ADDL: CPT | Performed by: ANESTHESIOLOGY

## 2019-02-05 PROCEDURE — 99152 MOD SED SAME PHYS/QHP 5/>YRS: CPT | Performed by: ANESTHESIOLOGY

## 2019-02-05 NOTE — PROGRESS NOTES
Bilateral T12-L3 Lumbar Medial Branch RADIOFREQUENCY  [facility]      PREOPERATIVE DIAGNOSIS:  Lumbar spondylosis without myelopathy    POSTOPERATIVE DIAGNOSIS:  Lumbar spondylosis without myelopathy    PROCEDURE:   Diagnostic Bilateral Lumbar Medial Branch Nerve thermal radiofrequency lesioning, with fluoroscopy:  T12, L1, L2, L3, nerves (at the L1, L2, L3, L4  transverse processes) to thermally treat the innervation to facet joints L1-2, L2-3, L3-4.  1. 52012-41 -- Bilateral L/S facet neuro destr., 1st Level  2. 79390-43 -- Bilateral L/S facet neuro destr., 2nd  Level  3. [id number] -- Bilateral  L/S facet neuro destr., 3rd Level    PRE-PROCEDURE DISCUSSION WITH PATIENT:    Risks and complications were discussed with the patient prior to starting the procedure and informed consent was obtained.      SURGEON:  Reji Law MD    REASON FOR PROCEDURE:    The patient complains of pain that seems to have a significant axial component and Previous clinically significant therapeutic effect after prior Radiofrequency procedure    SEDATION:  Versed 6mg and Fentanyl 200 mcg IV, The use of increased procedural sedation was carefully considered, and for this particular patient the need for additional procedural sedation seemed necessary in this instance to safely perform the procedure. and The patient had higher than average levels of procedural anxiety and the need to provide additional procedural sedation was needed to safely proceed.  TIME OF PROCEDURE:   The intraoperative procedure time after administration of the sedative was 26 minutes.       ANESTHETIC:  Lidocaine 2%  STEROID:  NONE      DESCRIPTON OF PROCEDURE:  After obtaining informed consent, IV access was obtained in the preoperative area.   The patient was taken to the operating room.  The patient was placed in the prone position with a pillow under the abdomen. All pressure points were well padded.  EKG, blood pressure, and pulse oximeter  were monitored.  The patient was monitored and sedated by the RN under my direction. The lumbosacral area was prepped with Chloraprep and draped in a sterile fashion.     Under fluoroscopic guidance the transverse processes of the L1, L2, L3, L4 vertebrae at the junctions of the superior articular processes were identified on the right. Skin and subcutaneous tissue were anesthetized with 1ml of 1% lidocaine above each of these points. Then, radiofrequency probe needles were advanced in this fluoro view to the above junctions.  Aspiration was negative for blood and CSF.  After confirming the position of the needle with fluoroscope in all views, testing was initiated.  First, sensory testing was started on each needle a 1V and 50Hz and slowly decreased until painful pressure stimulation diminished at 0.5V.  Next, motor testing was confirmed to be negative at 3V and 2Hz for any radicular stimulation.  Then 1mL of the local anesthetic was instilled in each needle.  Two minutes elapsed, and during this time a lateral fluoroscopic view was confirmed again to ensure the needles had not advanced nor retracted.  Then, Radiofrequency Lesioning was initiated for 1.5 minutes at 85 degrees Celsius.  Needles were removed intact from each of the areas.     A similar procedure was repeated to address the same nerves on the contralateral side.   Onset of analgesia was noted.  Vital signs remained stable throughout.      ESTIMATED BLOOD LOSS:  <5 mL  SPECIMENS:  none    COMPLICATIONS:   No complications were noted. and The patient did not have any signs of postprocedure numbness nor weakness.    TOLERANCE & DISCHARGE CONDITION:    The patient tolerated the procedure well.  The patient was transported to the recovery area without difficulties.  The patient was discharged to home under the care of family in stable and satisfactory condition.    PLAN OF CARE:  1. The patient was given our standard instruction sheet.  2. The patient will   Return to clinic 3 wks.  3. The patient will resume all medications as per the medication reconciliation sheet.

## 2019-02-26 ENCOUNTER — OFFICE VISIT (OUTPATIENT)
Dept: PAIN MEDICINE | Facility: CLINIC | Age: 54
End: 2019-02-26

## 2019-02-26 VITALS
WEIGHT: 180 LBS | SYSTOLIC BLOOD PRESSURE: 186 MMHG | DIASTOLIC BLOOD PRESSURE: 85 MMHG | OXYGEN SATURATION: 94 % | BODY MASS INDEX: 30.73 KG/M2 | HEIGHT: 64 IN | TEMPERATURE: 98 F | RESPIRATION RATE: 16 BRPM | HEART RATE: 78 BPM

## 2019-02-26 DIAGNOSIS — M47.816 ARTHROPATHY OF LUMBAR FACET JOINT: ICD-10-CM

## 2019-02-26 DIAGNOSIS — G89.29 OTHER CHRONIC PAIN: Primary | ICD-10-CM

## 2019-02-26 DIAGNOSIS — M93.1 KIENBOCK DISEASE, ADULT: ICD-10-CM

## 2019-02-26 DIAGNOSIS — Z79.899 ENCOUNTER FOR LONG-TERM (CURRENT) USE OF HIGH-RISK MEDICATION: ICD-10-CM

## 2019-02-26 PROCEDURE — 99214 OFFICE O/P EST MOD 30 MIN: CPT | Performed by: NURSE PRACTITIONER

## 2019-02-26 RX ORDER — OXYCODONE HYDROCHLORIDE 30 MG/1
30 TABLET, FILM COATED, EXTENDED RELEASE ORAL EVERY 12 HOURS SCHEDULED
Qty: 60 TABLET | Refills: 0 | Status: SHIPPED | OUTPATIENT
Start: 2019-02-26 | End: 2019-03-01 | Stop reason: SDUPTHER

## 2019-02-26 RX ORDER — OXYCODONE AND ACETAMINOPHEN 7.5; 325 MG/1; MG/1
1 TABLET ORAL EVERY 6 HOURS PRN
Qty: 120 TABLET | Refills: 0 | Status: SHIPPED | OUTPATIENT
Start: 2019-02-26 | End: 2019-02-26 | Stop reason: SDUPTHER

## 2019-02-26 RX ORDER — OXYCODONE AND ACETAMINOPHEN 7.5; 325 MG/1; MG/1
1 TABLET ORAL EVERY 6 HOURS PRN
Qty: 120 TABLET | Refills: 0 | Status: SHIPPED | OUTPATIENT
Start: 2019-02-26 | End: 2019-03-01 | Stop reason: SDUPTHER

## 2019-02-26 RX ORDER — OXYCODONE HYDROCHLORIDE 30 MG/1
30 TABLET, FILM COATED, EXTENDED RELEASE ORAL EVERY 12 HOURS SCHEDULED
Qty: 60 TABLET | Refills: 0 | Status: SHIPPED | OUTPATIENT
Start: 2019-02-26 | End: 2019-02-26 | Stop reason: SDUPTHER

## 2019-02-26 NOTE — PROGRESS NOTES
"CHIEF COMPLAINT  Pt is noticing low back pain more since the 2/5/19 Bilat. T12-L3 RF; the mid back pain is \"practically gone\" since the RF.    Subjective   Crystal Cruz is a 53 y.o. female  who presents to the office for follow-up of procedure.  She completed a bilateral T12-L3 RFL   on  2-5-19 performed by Dr. HAJI for management of LOW BACK PAIN. Patient reports SIGNIFICANT relief from the procedure.     Complains of pain in her low back and right hip. Today her pain is 3/10VAS. Describes her pain as continuous aching and throbbing. HAs a hospital bed at home. Is doing more exercises and lifting. Continues with OxyContin 30 mg BID and Percocet 7.5/325 3-4/day, Cymbalta 60 mg and gabapentin 600 mg TID. Denies any side effects from the regimen.  The regimen helps decrease her pain by 60-70%. Notes improvement in activity and function with regimen. ADL's by self.  Denies any bowel or bladder changes.  Has lost some more weight since last office visit.     Extremity Pain    The pain is present in the neck, back, left wrist, left hand, left foot, right foot, right hand, right wrist, right fingers and left fingers. This is a chronic problem. The current episode started more than 1 year ago. There has been no history of extremity trauma. The problem occurs constantly. Progression since onset: unchanged from last office visit. The quality of the pain is described as aching, pounding and burning. The pain is moderate. Associated symptoms include numbness (L side post CVA). Pertinent negatives include no fever. The symptoms are aggravated by cold (moist/rainy weather). She has tried acetaminophen, heat, movement, NSAIDS, OTC ointments, OTC pain meds, oral narcotics and rest (Oxycontin 30 mg Q12 hours, Oxycodone 7.5/325 4/day) for the symptoms. The treatment provided moderate relief. mitchell, systemic sclerosis   Back Pain   This is a chronic problem. The current episode started more than 1 month ago. The problem " occurs constantly. Progression since onset: mid low back pain--improved; lower low back pain is worse. The pain is present in the lumbar spine. The quality of the pain is described as aching. The pain does not radiate. The pain is at a severity of 3/10. The pain is moderate. The symptoms are aggravated by bending, position, lying down, standing and twisting. Associated symptoms include headaches (frequent,daily since 2 weeks ago.) and numbness (L side post CVA). Pertinent negatives include no abdominal pain, bladder incontinence, bowel incontinence, chest pain, dysuria, fever or weakness. She has tried analgesics, heat and bed rest (lumbar FJN/RFL--- significant long-term relief) for the symptoms.      Procedure List  --- 2-5-19-- bilateral T12-L3 RFL  --- 11-6-18-- bilateral T12-L3 MBB  --- 9-11-18-- TF BURT right L5  --- 7-10-18-- right SI  --- 5-22-18 LESI L4-5  --- 5-10-18-- LESI L4-5  --- 2-20-18-- bilateral L1-L4 RFL    PEG Assessment   What number best describes your pain on average in the past week?5  What number best describes how, during the past week, pain has interfered with your enjoyment of life?6  What number best describes how, during the past week, pain has interfered with your general activity?  6    The following portions of the patient's history were reviewed and updated as appropriate: allergies, current medications, past family history, past medical history, past social history, past surgical history and problem list.    Review of Systems   Constitutional: Negative for activity change, chills and fever.   Respiratory: Negative for shortness of breath.    Cardiovascular: Negative for chest pain.   Gastrointestinal: Positive for constipation (improved). Negative for abdominal pain, bowel incontinence, diarrhea, nausea and vomiting.   Genitourinary: Negative for bladder incontinence, difficulty urinating, dyspareunia and dysuria.   Musculoskeletal: Positive for back pain.   Neurological: Positive  "for numbness (L side post CVA) and headaches (frequent,daily since 2 weeks ago.). Negative for dizziness, weakness and light-headedness.   Psychiatric/Behavioral: Positive for sleep disturbance. Negative for confusion, hallucinations, self-injury and suicidal ideas. The patient is not nervous/anxious.        Vitals:    02/26/19 0949   BP: (!) 186/85   Pulse: 78   Resp: 16   Temp: 98 °F (36.7 °C)   SpO2: 94%   Weight: 81.6 kg (180 lb)   Height: 162 cm (63.78\")   PainSc:   3   PainLoc: Back  Comment: LBP ranges from 3-8/10     Objective   Physical Exam   Constitutional: She is oriented to person, place, and time. She appears well-developed and well-nourished. She is cooperative.   HENT:   Head: Normocephalic and atraumatic.   Eyes: Lids are normal.   Neck: Trachea normal.   Cardiovascular: Normal rate.   Pulmonary/Chest: Effort normal.   Musculoskeletal:        Lumbar back: She exhibits tenderness and bony tenderness (MILD T12-L3 tenderness).        Right hand: She exhibits decreased range of motion and tenderness. She exhibits normal capillary refill.        Left hand: She exhibits decreased range of motion and tenderness. She exhibits normal capillary refill.   Arthritic changes bilateral hands and wrists  Mild to moderate tenderness of right SI, mild of left SI   Neurological: She is alert and oriented to person, place, and time. Gait normal.   Non-focal   Skin: Skin is warm, dry and intact.   Psychiatric: She has a normal mood and affect. Her speech is normal and behavior is normal. Cognition and memory are normal.   Nursing note and vitals reviewed.      Assessment/Plan   Crystal was seen today for back pain.    Diagnoses and all orders for this visit:    Other chronic pain    Arthropathy of lumbar facet joint    Kienbock disease, adult    Encounter for long-term (current) use of high-risk medication      --- The urine drug screen confirmation from 12-27-18 has been reviewed and the result is APPROPRIATE based on " patient history and STEVEN report  --- Refill OxyContin and Percocet. Patient appears stable with current regimen. No adverse effects. Regarding continuation of opioids, there is no evidence of aberrant behavior or any red flags.  The patient continues with appropriate response to opioid therapy. ADL's remain intact by self.   --- consider right SI joint injection versus lower lumbar MBB. Will wait at this time and see if she has more relief with recent RFL.  --- Follow-up 1 month or sooner if needed.     STEVEN REPORT    As part of the patient's treatment plan, I am prescribing controlled substances. The patient has been made aware of appropriate use of such medications, including potential risk of somnolence, limited ability to drive and/or work safely, and the potential for dependence or overdose. It has also bee made clear that these medications are for use by this patient only, without concomitant use of alcohol or other substances unless prescribed.     Patient has completed prescribing agreement detailing terms of continued prescribing of controlled substances, including monitoring STEVEN reports, urine drug screening, and pill counts if necessary. The patient is aware that inappropriate use will results in cessation of prescribing such medications.    STEVEN report has been reviewed and scanned into the patient's chart.    As the clinician, I personally reviewed the STEVEN from 2-25-19 while the patient was in the office today.    History and physical exam exhibit continued safe and appropriate use of controlled substances.       EMR Dragon/Transcription disclaimer:   Much of this encounter note is an electronic transcription/translation of spoken language to printed text. The electronic translation of spoken language may permit erroneous, or at times, nonsensical words or phrases to be inadvertently transcribed; Although I have reviewed the note for such errors, some may still exist.

## 2019-03-01 RX ORDER — OXYCODONE HYDROCHLORIDE 30 MG/1
30 TABLET, FILM COATED, EXTENDED RELEASE ORAL EVERY 12 HOURS SCHEDULED
Qty: 60 TABLET | Refills: 0 | Status: SHIPPED | OUTPATIENT
Start: 2019-03-01 | End: 2019-03-21 | Stop reason: SDUPTHER

## 2019-03-01 RX ORDER — OXYCODONE AND ACETAMINOPHEN 7.5; 325 MG/1; MG/1
1 TABLET ORAL EVERY 6 HOURS PRN
Qty: 120 TABLET | Refills: 0 | Status: SHIPPED | OUTPATIENT
Start: 2019-03-01 | End: 2019-03-21 | Stop reason: SDUPTHER

## 2019-03-01 NOTE — TELEPHONE ENCOUNTER
Medication Refill Request    Date of phone call: 2-25-19    Medication being requested: Oxycodone 30 mg ER sig: Take 1 tablet by mouth Every 12 (Twelve) Hours  Qty: 60 tablets    Date of last visit: 2-26-19    Date of last refill: 2-26-19    STEVEN up to date?: 2-25-19    Next Follow up?: 3-21-19    Any new pertinent information? (i.e, new medication allergies, new use of medications, change in patient's health or condition, non-compliance or inconsistency with prescribing agreement?): n/a    Medication Refill Request    Date of phone call: 2-25-19    Medication being requested: Oxycodone-apap 7.5-325 mg sig: Take 1 tablet by mouth Every 6 (Six) Hours As Needed for Severe Pain   Qty: 120 tablets    Date of last visit: 2-26-19    Date of last refill: 2-26-19    STEVEN up to date?: 2-25-19    Next Follow up?: 3-21-19    Any new pertinent information? (i.e, new medication allergies, new use of medications, change in patient's health or condition, non-compliance or inconsistency with prescribing agreement?): n/a    PLEASE REFUSE I SEE THIS HAS ALREADY BEEN FILLED.

## 2019-03-06 ENCOUNTER — TELEPHONE (OUTPATIENT)
Dept: PAIN MEDICINE | Facility: CLINIC | Age: 54
End: 2019-03-06

## 2019-03-06 NOTE — TELEPHONE ENCOUNTER
Spoke to pharmacy to ask them to run medication through medicare per passport fax. Pharmacist states pt has already picked up medicine and paid $1.25 for it.

## 2019-03-07 DIAGNOSIS — M51.37 DEGENERATION OF LUMBAR OR LUMBOSACRAL INTERVERTEBRAL DISC: Primary | ICD-10-CM

## 2019-03-21 ENCOUNTER — OFFICE VISIT (OUTPATIENT)
Dept: PAIN MEDICINE | Facility: CLINIC | Age: 54
End: 2019-03-21

## 2019-03-21 VITALS
TEMPERATURE: 97.5 F | DIASTOLIC BLOOD PRESSURE: 75 MMHG | HEIGHT: 64 IN | RESPIRATION RATE: 18 BRPM | OXYGEN SATURATION: 95 % | WEIGHT: 182.2 LBS | SYSTOLIC BLOOD PRESSURE: 164 MMHG | HEART RATE: 78 BPM | BODY MASS INDEX: 31.1 KG/M2

## 2019-03-21 DIAGNOSIS — G89.29 OTHER CHRONIC PAIN: Primary | ICD-10-CM

## 2019-03-21 DIAGNOSIS — M46.1 SACROILIITIS (HCC): ICD-10-CM

## 2019-03-21 DIAGNOSIS — Z79.899 ENCOUNTER FOR LONG-TERM (CURRENT) USE OF HIGH-RISK MEDICATION: ICD-10-CM

## 2019-03-21 DIAGNOSIS — M53.3 DISORDER OF SACROILIAC JOINT: ICD-10-CM

## 2019-03-21 DIAGNOSIS — M93.1 KIENBOCK DISEASE, ADULT: ICD-10-CM

## 2019-03-21 DIAGNOSIS — M70.61 GREATER TROCHANTERIC BURSITIS OF RIGHT HIP: ICD-10-CM

## 2019-03-21 DIAGNOSIS — M47.816 ARTHROPATHY OF LUMBAR FACET JOINT: ICD-10-CM

## 2019-03-21 PROCEDURE — 99214 OFFICE O/P EST MOD 30 MIN: CPT | Performed by: NURSE PRACTITIONER

## 2019-03-21 RX ORDER — OXYCODONE AND ACETAMINOPHEN 7.5; 325 MG/1; MG/1
1 TABLET ORAL EVERY 6 HOURS PRN
Qty: 120 TABLET | Refills: 0 | Status: SHIPPED | OUTPATIENT
Start: 2019-03-21 | End: 2019-04-18 | Stop reason: SDUPTHER

## 2019-03-21 RX ORDER — OXYCODONE HYDROCHLORIDE 30 MG/1
30 TABLET, FILM COATED, EXTENDED RELEASE ORAL EVERY 12 HOURS SCHEDULED
Qty: 60 TABLET | Refills: 0 | Status: SHIPPED | OUTPATIENT
Start: 2019-03-21 | End: 2019-04-18 | Stop reason: SDUPTHER

## 2019-03-21 NOTE — PROGRESS NOTES
CHIEF COMPLAINT  F/U back pain. Patient states that her pain has been worst since her last visit. She is complained of right hip pain as well.     Subjective   Crystal Cruz is a 53 y.o. female  who presents to the office for follow-up.She has a history of chronic back pain and hand pain. Reports her pain is worse since last office visit.    Complains of pain in her low back, right worse than left, right hip and hands. Today her pain is 7/10VAS. Describes the pain as continuous aching and throbbing. Pain increases with walking, standing, bending/lifting/twisting; pain decreases with medication, rest and procedures. Continues with OxyContin 30 mg BID and Percocet 7.5/325 3-4/day, Cymbalta 60 mg and gabapentin 600 mg TID. Denies any side effects from the regimen.  The regimen helps decrease her pain by 40-50%. Notes improvement in activity and function with regimen. ADL's by self.  Denies any bowel or bladder changes.     Wants to repeat hip and right low back injection.     Extremity Pain    The pain is present in the neck, back, left wrist, left hand, left foot, right foot, right hand, right wrist, right fingers and left fingers. This is a chronic problem. The current episode started more than 1 year ago. There has been no history of extremity trauma. The problem occurs constantly. Progression since onset: unchanged from last office visit. The quality of the pain is described as aching, pounding and burning. The pain is at a severity of 7/10. The pain is moderate. Associated symptoms include numbness (L side post CVA). Pertinent negatives include no fever. The symptoms are aggravated by cold (moist/rainy weather). She has tried acetaminophen, heat, movement, NSAIDS, OTC ointments, OTC pain meds, oral narcotics and rest (Oxycontin 30 mg Q12 hours, Oxycodone 7.5/325 4/day) for the symptoms. The treatment provided moderate relief. mitchell systemic sclerosis   Back Pain   This is a chronic problem. The current  episode started more than 1 month ago. The problem occurs constantly. Progression since onset: worse since last office visit. The pain is present in the lumbar spine. The quality of the pain is described as aching. The pain does not radiate. The pain is at a severity of 7/10. The pain is moderate. The symptoms are aggravated by bending, position, lying down, standing and twisting. Associated symptoms include headaches (occ) and numbness (L side post CVA). Pertinent negatives include no abdominal pain, bladder incontinence, bowel incontinence, chest pain, dysuria, fever or weakness. She has tried analgesics, heat and bed rest (lumbar FJN/RFL--- significant long-term relief) for the symptoms.      Procedure List  --- 2-5-19-- bilateral T12-L3 RFL  --- 11-6-18-- bilateral T12-L3 MBB  --- 9-11-18-- TF BURT right L5  --- 7-10-18-- right SI  --- 5-22-18 LESI L4-5  --- 5-10-18-- LESI L4-5  --- 2-20-18-- bilateral L1-L4 RFL    PEG Assessment   What number best describes your pain on average in the past week?8 (after education 6-7/10VAS)  What number best describes how, during the past week, pain has interfered with your enjoyment of life?9 (after education 7)  What number best describes how, during the past week, pain has interfered with your general activity?  9 (after education 7)    The following portions of the patient's history were reviewed and updated as appropriate: allergies, current medications, past family history, past medical history, past social history, past surgical history and problem list.    Review of Systems   Constitutional: Positive for activity change (decreased) and fatigue. Negative for chills and fever.   Respiratory: Negative for chest tightness and shortness of breath.    Cardiovascular: Negative for chest pain.   Gastrointestinal: Negative for abdominal pain, bowel incontinence, constipation (improved), diarrhea, nausea and vomiting.   Genitourinary: Negative for bladder incontinence, difficulty  "urinating, dyspareunia and dysuria.   Musculoskeletal: Positive for back pain.   Neurological: Positive for light-headedness (r/t HTN), numbness (L side post CVA) and headaches (occ). Negative for dizziness and weakness.   Psychiatric/Behavioral: Positive for sleep disturbance. Negative for confusion, hallucinations, self-injury and suicidal ideas. The patient is nervous/anxious.        Vitals:    03/21/19 0950   BP: 164/75   Pulse: 78   Resp: 18   Temp: 97.5 °F (36.4 °C)   SpO2: 95%   Weight: 82.6 kg (182 lb 3.2 oz)   Height: 162 cm (63.78\")   PainSc:   7   PainLoc: Back     Objective   Physical Exam   Constitutional: She is oriented to person, place, and time. She appears well-developed and well-nourished. She is cooperative.   HENT:   Head: Normocephalic and atraumatic.   Eyes: Lids are normal.   Neck: Trachea normal.   Cardiovascular: Normal rate.   Pulmonary/Chest: Effort normal.   Musculoskeletal:        Lumbar back: She exhibits tenderness and bony tenderness (MILD T12-L3 tenderness).        Right hand: She exhibits decreased range of motion and tenderness. She exhibits normal capillary refill.        Left hand: She exhibits decreased range of motion and tenderness. She exhibits normal capillary refill.   Arthritic changes bilateral hands and wrists  moderate tenderness of right SI, mild of left SI  Moderate tenderness of right greater trochanteric bursa   Neurological: She is alert and oriented to person, place, and time. Gait normal.   Non-focal   Skin: Skin is warm, dry and intact.   Psychiatric: She has a normal mood and affect. Her speech is normal and behavior is normal. Cognition and memory are normal.   Nursing note and vitals reviewed.      Assessment/Plan   Crystal was seen today for back pain.    Diagnoses and all orders for this visit:    Other chronic pain    Arthropathy of lumbar facet joint    Kienbock disease, adult    Encounter for long-term (current) use of high-risk " medication    Sacroiliitis (CMS/HCC)  -     Case Request    Disorder of sacroiliac joint  -     Case Request    Greater trochanteric bursitis of right hip  -     Case Request      --- The urine drug screen confirmation from 12-27-18 has been reviewed and the result is APPROPRIATE based on patient history and STEVEN report  --- cancel LESI.  --- Right SI joint injection and right greater trochanteric bursa injection. No blood thinners. Reviewed the procedure at length with the patient.  Included in the review was expectations, complications, risk and benefits.The procedure was described in detail and the risks, benefits and alternatives were discussed with the patient (including but not limited to: bleeding, infection, nerve damage, worsening of pain, inability to perform injection, paralysis, seizures, and death) who agreed to proceed.  Discussed the potential for sedation if warranted/wanted.  The procedure will plan to be performed at [facility] with fluoroscopic guidance(unless ultrasound is indicated). Questions were answered and in a way the patient could understand.  Patient verbalized understanding and wishes to proceed.  This intervention will be ordered.  --- Refill OxyContin and Percocet with DNF 3-26-19. Patient appears stable with current regimen. No adverse effects. Regarding continuation of opioids, there is no evidence of aberrant behavior or any red flags.  The patient continues with appropriate response to opioid therapy. ADL's remain intact by self.   --- Follow-up 1 month or sooner if needed.    --- Extensive education regarding pain scale. Reviewed that pain scale is 0/no pain to 10/worst pain the patient has ever experienced. Reviewed that the scale is not specific to their area of complaint, but rather their overall total experience with pain throughout life.  Reviewed that pain much above 8-10/10VAS warrants immediate medical intervention in the acute care setting, which  is management unable to be provided by this clinic. Reviewed other signs of distress due to pain, including labored breathing, crying, and unable to walk unassisted. Vital signs appear stable. Patient appears in no acute distress. This is not to discredit the patient and make them feel as if they are not having pain, but rather to put a better perspective on pain experience expectations.Once reviewing this, patient changed their pain level to 7/10VAS.       STEVEN REPORT    As part of the patient's treatment plan, I am prescribing controlled substances. The patient has been made aware of appropriate use of such medications, including potential risk of somnolence, limited ability to drive and/or work safely, and the potential for dependence or overdose. It has also bee made clear that these medications are for use by this patient only, without concomitant use of alcohol or other substances unless prescribed.     Patient has completed prescribing agreement detailing terms of continued prescribing of controlled substances, including monitoring STEVEN reports, urine drug screening, and pill counts if necessary. The patient is aware that inappropriate use will results in cessation of prescribing such medications.    STEVEN report has been reviewed and scanned into the patient's chart.    As the clinician, I personally reviewed the STEVEN from 3-20-19 while the patient was in the office today.    History and physical exam exhibit continued safe and appropriate use of controlled substances.      EMR Dragon/Transcription disclaimer:   Much of this encounter note is an electronic transcription/translation of spoken language to printed text. The electronic translation of spoken language may permit erroneous, or at times, nonsensical words or phrases to be inadvertently transcribed; Although I have reviewed the note for such errors, some may still exist.

## 2019-03-26 ENCOUNTER — DOCUMENTATION (OUTPATIENT)
Dept: PAIN MEDICINE | Facility: CLINIC | Age: 54
End: 2019-03-26

## 2019-03-26 ENCOUNTER — OUTSIDE FACILITY SERVICE (OUTPATIENT)
Dept: PAIN MEDICINE | Facility: CLINIC | Age: 54
End: 2019-03-26

## 2019-03-26 PROCEDURE — 20610 DRAIN/INJ JOINT/BURSA W/O US: CPT | Performed by: ANESTHESIOLOGY

## 2019-03-26 PROCEDURE — 27096 INJECT SACROILIAC JOINT: CPT | Performed by: ANESTHESIOLOGY

## 2019-03-27 NOTE — PROGRESS NOTES
Unilateral Sacroiliac Joint Injection + Greater Trochanteric Hip Bursa injection under fluoroscopic guidance    [facility]        PREOPERATIVE DIAGNOSIS:   Sacroiliac joint dysfunction on the Right as well as greater trochanteric hip bursitis on the Right     POSTOPERATIVE DIAGNOSIS:  Same as preop     PROCEDURE:  Sacroiliac Joint Injection with fluoroscopic guidance, plus greater trochanteric bursa injection, under fluoroscopic guidance as well, on the aforementioned laterality      PRE-PROCEDURE DISCUSSION WITH PATIENT:    Risks and complications were discussed with the patient prior to starting the procedure and informed consent was obtained.  We discussed various topics including but not limited to bleeding, infection, injury, postprocedural site soreness, painful flareup, worsening of clinical picture, paralysis, coma, and death.      SURGEON:  Reji Law MD     REASON FOR PROCEDURE:    Patient has pain consistent with SI pathology on history and physical exam. Positive sacroiliac provocation maneuvers noted.   Hip bursitis is flared up also.     SEDATION:  Versed 8mg & Fentanyl 100 mcg IV  ANESTHETIC AGENT:  Marcaine 0.5%  STEROID AGENT:  80mg DepoMedrol split between the SI joint injection and hip bursa     DESCRIPTON OF PROCEDURE:  After obtaining informed consent, IV access was obtained in the preoperative area.  The patient was transported to the operative suite and placed in the prone position with a pillow under the pelvic area. EKG, blood pressure, and pulse oximeter were monitored.     A solution was prepared of 5ml of Marcaine 0.5% and 80mg Depomedrol, for a total of 6ml.    The lumbosacral area was prepped with Chloraprep and draped in a sterile fashion. Under fluoroscopic guidance the inferior most portion of the aforementioned SI joint was identified. The overlying skin and subcutaneous tissue was anesthetized with 1% lidocaine. A 22-gauge spinal needle was introduced from  the inferior most portion of the joint into the SI joint under fluoroscopic guidance in the AP dimension with slight oblique rotation to the contralateral side.  Aspiration was negative.  After confirming the position of the needle with fluoroscopy, 1 mL of Omnipaque was injected and after seeing appropriate spread into the joint a total of 3mL of 0.5% Marcaine, with approximately 40 mg of DepoMedrol, was injected very slowly.  Needle was removed intact.  Vital signs remained stable.  The onset of analgesia was noted.      Area over the greater trochanter on the aforementioned side was palpated and marked on the skin and then cleansed with Hibiclens ×2. A sterile needle was inserted about 1 & 1/2 inches in to the skin and into the bursa, under fluoro guidance, with periosteum contact over the greater trochanter and the needle was withdrawn about 2 mm into the bursa. Aspiration was negative and slowly after confirming bursa spread with omnipaque on each side, the rest of the injectate syringe was injected into the hip bursa.   The needle was removed intact on each side and bleeding was minimal. The insertion site was dressed with a Band-Aid.  There were no apparent complications.       ESTIMATED BLOOD LOSS:  Minimal  SPECIMENS:  None  COMPLICATIONS:   No complications were noted.     TOLERANCE & DISCHARGE CONDITION:    The patient tolerated the procedure well.  The patient was transported to the recovery area without difficulties.  The patient was discharged to home under the care of family in stable and satisfactory condition.     PLAN OF CARE:  1. The patient was given our standard instruction sheet and will resume all medications as per the medication reconciliation sheet.  2. The patient will Return to clinic 3 wks  3. The patient is instructed to keep a pain log hourly for 8 hours after the procedure.

## 2019-04-18 RX ORDER — OXYCODONE AND ACETAMINOPHEN 7.5; 325 MG/1; MG/1
1 TABLET ORAL EVERY 6 HOURS PRN
Qty: 120 TABLET | Refills: 0 | Status: SHIPPED | OUTPATIENT
Start: 2019-04-18 | End: 2019-05-22 | Stop reason: SDUPTHER

## 2019-04-18 RX ORDER — OXYCODONE HYDROCHLORIDE 30 MG/1
30 TABLET, FILM COATED, EXTENDED RELEASE ORAL EVERY 12 HOURS SCHEDULED
Qty: 60 TABLET | Refills: 0 | Status: SHIPPED | OUTPATIENT
Start: 2019-04-18 | End: 2019-05-22 | Stop reason: SDUPTHER

## 2019-04-22 ENCOUNTER — OFFICE VISIT (OUTPATIENT)
Dept: PAIN MEDICINE | Facility: CLINIC | Age: 54
End: 2019-04-22

## 2019-04-22 VITALS
TEMPERATURE: 98.2 F | DIASTOLIC BLOOD PRESSURE: 84 MMHG | WEIGHT: 181.6 LBS | BODY MASS INDEX: 31 KG/M2 | RESPIRATION RATE: 18 BRPM | OXYGEN SATURATION: 95 % | SYSTOLIC BLOOD PRESSURE: 173 MMHG | HEART RATE: 75 BPM | HEIGHT: 64 IN

## 2019-04-22 DIAGNOSIS — M47.816 ARTHROPATHY OF LUMBAR FACET JOINT: ICD-10-CM

## 2019-04-22 DIAGNOSIS — M19.90 ARTHRITIS: ICD-10-CM

## 2019-04-22 DIAGNOSIS — M70.61 GREATER TROCHANTERIC BURSITIS OF RIGHT HIP: ICD-10-CM

## 2019-04-22 DIAGNOSIS — Z79.899 ENCOUNTER FOR LONG-TERM (CURRENT) USE OF HIGH-RISK MEDICATION: ICD-10-CM

## 2019-04-22 DIAGNOSIS — M46.1 SACROILIITIS (HCC): ICD-10-CM

## 2019-04-22 DIAGNOSIS — G89.29 OTHER CHRONIC PAIN: Primary | ICD-10-CM

## 2019-04-22 DIAGNOSIS — M93.1 KIENBOCK DISEASE, ADULT: ICD-10-CM

## 2019-04-22 PROCEDURE — 99214 OFFICE O/P EST MOD 30 MIN: CPT | Performed by: NURSE PRACTITIONER

## 2019-04-22 NOTE — PROGRESS NOTES
CHIEF COMPLAINT  F/U back pain- Unilateral Sacroiliac Joint Injection + Greater Trochanteric Hip Bursa injection under fluoroscopic guidance. Patient states that the procedure improved her pain 80%.     Subjective   Crystal Cruz is a 53 y.o. female  who presents to the office for follow-up of procedure.  She completed a right Si joint and greater trochanteric bursa injection   on  3-26-19 performed by Dr. HAJI for management of LOW BACK AND HIP PAIN. Patient reports 80% ongoing relief from the procedure.     Complains of pain in her low back, right hip and hands. Today her pain is 0/10VAs(in her low back), 4/10VAs(hands). Describes the pain as continuous aching and throbbing. Continues with OxyContin 30 mg BID and Percocet 7.5/325 3-4/day, Cymbalta 60 mg and gabapentin 600 mg TID. Denies any side effects from the regimen.  The regimen helps decrease her pain by 50-75%. Notes improvement in activity and function with regimen. ADL's by self.  Denies any bowel or bladder changes.   Has gained weight since last office visit. She quit smoking. She was smoking 2 PPD x 35 years.   Had recent cardio and vascular work-up. Does have a carotid bruit and will have to have ultrasound every 6 months.   Extremity Pain    The pain is present in the neck, back, left wrist, left hand, left foot, right foot, right hand, right wrist, right fingers and left fingers. This is a chronic problem. The current episode started more than 1 year ago. There has been no history of extremity trauma. The problem occurs constantly. Progression since onset: unchanged from last office visit. The quality of the pain is described as aching, pounding and burning. The pain is at a severity of 3/10. The pain is moderate. Associated symptoms include numbness (L side post CVA). Pertinent negatives include no fever. The symptoms are aggravated by cold (moist/rainy weather). She has tried acetaminophen, heat, movement, NSAIDS, OTC ointments, OTC pain  meds, oral narcotics and rest (Oxycontin 30 mg Q12 hours, Oxycodone 7.5/325 4/day) for the symptoms. The treatment provided moderate relief. mitchell, systemic sclerosis   Back Pain   This is a chronic problem. The current episode started more than 1 month ago. The problem occurs constantly. Progression since onset: improved since last office visit. The pain is present in the lumbar spine. The quality of the pain is described as aching. The pain does not radiate. The pain is at a severity of 1/10. The pain is moderate. The symptoms are aggravated by bending, position, lying down, standing and twisting. Associated symptoms include headaches (occ) and numbness (L side post CVA). Pertinent negatives include no abdominal pain, bladder incontinence, bowel incontinence, chest pain, dysuria, fever or weakness. She has tried analgesics, heat and bed rest (lumbar FJN/RFL--- significant long-term relief) for the symptoms.      Procedure List  --- 3-26-19-- right SI and right GTB  --- 2-5-19-- bilateral T12-L3 RFL  --- 11-6-18-- bilateral T12-L3 MBB  --- 9-11-18-- TF BURT right L5  --- 7-10-18-- right SI  --- 5-22-18 LESI L4-5  --- 5-10-18-- LESI L4-5  --- 2-20-18-- bilateral L1-L4 RFL    PEG Assessment   What number best describes your pain on average in the past week?1  What number best describes how, during the past week, pain has interfered with your enjoyment of life?0  What number best describes how, during the past week, pain has interfered with your general activity?  0    The following portions of the patient's history were reviewed and updated as appropriate: allergies, current medications, past family history, past medical history, past social history, past surgical history and problem list.    Review of Systems   Constitutional: Positive for fatigue. Negative for activity change (decreased), chills and fever.   Respiratory: Negative for chest tightness and shortness of breath.    Cardiovascular: Negative for chest  "pain.   Gastrointestinal: Negative for abdominal pain, bowel incontinence, constipation (improved), diarrhea, nausea and vomiting.   Genitourinary: Negative for bladder incontinence, difficulty urinating, dyspareunia and dysuria.   Musculoskeletal: Positive for back pain.   Neurological: Positive for light-headedness (r/t HTN), numbness (L side post CVA) and headaches (occ). Negative for dizziness and weakness.   Psychiatric/Behavioral: Negative for confusion, hallucinations, self-injury, sleep disturbance and suicidal ideas. The patient is nervous/anxious.        Vitals:    04/22/19 1047   BP: 173/84   Pulse: 75   Resp: 18   Temp: 98.2 °F (36.8 °C)   SpO2: 95%   Weight: 82.4 kg (181 lb 9.6 oz)   Height: 162 cm (63.78\")   PainSc: 0-No pain     Objective   Physical Exam   Constitutional: She is oriented to person, place, and time. She appears well-developed and well-nourished. She is cooperative.   HENT:   Head: Normocephalic and atraumatic.   Eyes: Lids are normal.   Neck: Trachea normal.   Cardiovascular: Normal rate.   Pulmonary/Chest: Effort normal.   Musculoskeletal:        Lumbar back: She exhibits tenderness and bony tenderness (MILD T12-L3 tenderness).        Right hand: She exhibits decreased range of motion and tenderness. She exhibits normal capillary refill.        Left hand: She exhibits decreased range of motion and tenderness. She exhibits normal capillary refill.   Arthritic changes bilateral hands and wrists  mild tenderness of right SI, mild of left SI  mild tenderness of right greater trochanteric bursa   Neurological: She is alert and oriented to person, place, and time. Gait normal.   Non-focal   Skin: Skin is warm, dry and intact.   Psychiatric: She has a normal mood and affect. Her speech is normal and behavior is normal. Cognition and memory are normal.   Nursing note and vitals reviewed.      Assessment/Plan   Crystal was seen today for back pain.    Diagnoses and all orders for this " visit:    Other chronic pain    Arthropathy of lumbar facet joint    Kienbock disease, adult    Sacroiliitis (CMS/HCC)    Arthritis    Greater trochanteric bursitis of right hip    Encounter for long-term (current) use of high-risk medication      --- The urine drug screen confirmation from 12-27-18 has been reviewed and the result is APPROPRIATE based on patient history and STEVEN report  --- Refill Oxycontin and Percocet. Patient appears stable with current regimen. No adverse effects. Regarding continuation of opioids, there is no evidence of aberrant behavior or any red flags.  The patient continues with appropriate response to opioid therapy. ADL's remain intact by self.  Reviewed trying to wean down on Percocet as tolerated. Reviewed daily doses of MED.  --- congratulated on smoking cessation. Applauded efforts.  --- Follow-up 1 month or sooner if needed.     STEVEN REPORT    As part of the patient's treatment plan, I am prescribing controlled substances. The patient has been made aware of appropriate use of such medications, including potential risk of somnolence, limited ability to drive and/or work safely, and the potential for dependence or overdose. It has also bee made clear that these medications are for use by this patient only, without concomitant use of alcohol or other substances unless prescribed.     Patient has completed prescribing agreement detailing terms of continued prescribing of controlled substances, including monitoring STEVEN reports, urine drug screening, and pill counts if necessary. The patient is aware that inappropriate use will results in cessation of prescribing such medications.    STEVEN report has been reviewed and scanned into the patient's chart.    As the clinician, I personally reviewed the STEVEN from 4-18-19 while the patient was in the office today.    History and physical exam exhibit continued safe and appropriate use of controlled substances.       EMR  Dragon/Transcription disclaimer:   Much of this encounter note is an electronic transcription/translation of spoken language to printed text. The electronic translation of spoken language may permit erroneous, or at times, nonsensical words or phrases to be inadvertently transcribed; Although I have reviewed the note for such errors, some may still exist.

## 2019-05-07 ENCOUNTER — TELEPHONE (OUTPATIENT)
Dept: PAIN MEDICINE | Facility: CLINIC | Age: 54
End: 2019-05-07

## 2019-05-07 NOTE — TELEPHONE ENCOUNTER
Patient wants to know if there is anyway she can have her procedure prior to her next appt. I told her I could ask but no guarantee.

## 2019-05-08 DIAGNOSIS — M47.816 LUMBAR FACET ARTHROPATHY: Primary | ICD-10-CM

## 2019-05-08 DIAGNOSIS — M46.1 SACROILIITIS (HCC): ICD-10-CM

## 2019-05-13 ENCOUNTER — TRANSCRIBE ORDERS (OUTPATIENT)
Dept: ADMINISTRATIVE | Facility: HOSPITAL | Age: 54
End: 2019-05-13

## 2019-05-13 DIAGNOSIS — R26.89 SCISSOR GAIT: ICD-10-CM

## 2019-05-13 DIAGNOSIS — R51.9 FACIAL PAIN: ICD-10-CM

## 2019-05-13 DIAGNOSIS — R47.1 DYSARTHROSIS: Primary | ICD-10-CM

## 2019-05-17 ENCOUNTER — HOSPITAL ENCOUNTER (OUTPATIENT)
Dept: MRI IMAGING | Facility: HOSPITAL | Age: 54
End: 2019-05-17

## 2019-05-22 ENCOUNTER — OFFICE VISIT (OUTPATIENT)
Dept: PAIN MEDICINE | Facility: CLINIC | Age: 54
End: 2019-05-22

## 2019-05-22 VITALS
BODY MASS INDEX: 30.39 KG/M2 | DIASTOLIC BLOOD PRESSURE: 76 MMHG | TEMPERATURE: 98.9 F | WEIGHT: 178 LBS | HEART RATE: 80 BPM | OXYGEN SATURATION: 94 % | RESPIRATION RATE: 16 BRPM | SYSTOLIC BLOOD PRESSURE: 104 MMHG | HEIGHT: 64 IN

## 2019-05-22 DIAGNOSIS — M70.61 GREATER TROCHANTERIC BURSITIS OF RIGHT HIP: ICD-10-CM

## 2019-05-22 DIAGNOSIS — M93.1 KIENBOCK DISEASE, ADULT: ICD-10-CM

## 2019-05-22 DIAGNOSIS — Z79.899 ENCOUNTER FOR LONG-TERM (CURRENT) USE OF HIGH-RISK MEDICATION: ICD-10-CM

## 2019-05-22 DIAGNOSIS — M46.1 SACROILIITIS (HCC): ICD-10-CM

## 2019-05-22 DIAGNOSIS — M47.816 ARTHROPATHY OF LUMBAR FACET JOINT: ICD-10-CM

## 2019-05-22 DIAGNOSIS — G89.29 OTHER CHRONIC PAIN: Primary | ICD-10-CM

## 2019-05-22 PROCEDURE — 99214 OFFICE O/P EST MOD 30 MIN: CPT | Performed by: NURSE PRACTITIONER

## 2019-05-22 RX ORDER — OXYCODONE AND ACETAMINOPHEN 7.5; 325 MG/1; MG/1
1 TABLET ORAL EVERY 6 HOURS PRN
Qty: 120 TABLET | Refills: 0 | Status: SHIPPED | OUTPATIENT
Start: 2019-05-22 | End: 2019-06-19 | Stop reason: SDUPTHER

## 2019-05-22 RX ORDER — LISINOPRIL 20 MG/1
TABLET ORAL
COMMUNITY
Start: 2019-04-19 | End: 2019-10-09

## 2019-05-22 RX ORDER — OXYCODONE HYDROCHLORIDE 30 MG/1
30 TABLET, FILM COATED, EXTENDED RELEASE ORAL EVERY 12 HOURS SCHEDULED
Qty: 60 TABLET | Refills: 0 | Status: SHIPPED | OUTPATIENT
Start: 2019-05-22 | End: 2019-06-19 | Stop reason: SDUPTHER

## 2019-05-22 RX ORDER — TIZANIDINE 2 MG/1
2 TABLET ORAL 2 TIMES DAILY PRN
Qty: 60 TABLET | Refills: 1 | Status: ON HOLD | OUTPATIENT
Start: 2019-05-22 | End: 2020-02-10

## 2019-05-22 NOTE — PROGRESS NOTES
"CHIEF COMPLAINT  Pt is here to f/u on back pain. Pt sts the pain has increased.    Subjective   Crystal Cruz is a 53 y.o. female  who presents to the office for follow-up.She has a history of chronic back and joint pain. Reports her back pain is worse since last office visit.  Reports she fell a couple weeks ago. \"I think I had a stroke.\" Getting MRI tomorrow. Did not go to hospital. Reports she has a large hematoma/knot on her left hip and buttock area. \"You can feel the knot still.\" Is now having increased neck pain as well. Is wondering about a muscle relaxer.     Complains of pain in her low back, left hip and joints. Today her pain is 7/10VAS. Describes the pain as continuous aching and throbbing with sharp pain. Continues with OxyContin 30 mg BID and Percocet 7.5/325 3-4/day, Cymbalta 60 mg and gabapentin 600 mg TID. Denies any side effects from the regimen.  The regimen helps decrease her pain by 40-50%. Notes improvement in activity and function with regimen. ADL's by self.  Denies any bowel or bladder changes.     Asked about weaning off Xanax. She is wondering how to do this.   Extremity Pain    The pain is present in the neck, back, left wrist, left hand, left foot, right foot, right hand, right wrist, right fingers and left fingers. This is a chronic problem. The current episode started more than 1 year ago. There has been no history of extremity trauma. The problem occurs constantly. Progression since onset: unchanged from last office visit. The quality of the pain is described as aching, pounding and burning. The pain is at a severity of 5/10. The pain is moderate. Associated symptoms include numbness (L side post CVA). Pertinent negatives include no fever. The symptoms are aggravated by cold (moist/rainy weather). She has tried acetaminophen, heat, movement, NSAIDS, OTC ointments, OTC pain meds, oral narcotics and rest (Oxycontin 30 mg Q12 hours, Oxycodone 7.5/325 4/day) for the symptoms. The " treatment provided moderate relief. mitchell systemic sclerosis   Back Pain   This is a chronic problem. The current episode started more than 1 month ago. The problem occurs constantly. Progression since onset: worse since last office visit. The pain is present in the lumbar spine. The quality of the pain is described as aching. The pain does not radiate. The pain is at a severity of 7/10. The pain is moderate. The symptoms are aggravated by bending, position, lying down, standing and twisting. Associated symptoms include headaches (occ) and numbness (L side post CVA). Pertinent negatives include no abdominal pain, bladder incontinence, bowel incontinence, chest pain, dysuria, fever or weakness. She has tried analgesics, heat and bed rest (lumbar FJN/RFL--- significant long-term relief) for the symptoms.      Procedure List  --- 3-26-19-- right SI and right GTB  --- 2-5-19-- bilateral T12-L3 RFL  --- 11-6-18-- bilateral T12-L3 MBB  --- 9-11-18-- TF BURT right L5  --- 7-10-18-- right SI  --- 5-22-18 LESI L4-5  --- 5-10-18-- LESI L4-5  --- 2-20-18-- bilateral L1-L4 RFL    PEG Assessment   What number best describes your pain on average in the past week?7  What number best describes how, during the past week, pain has interfered with your enjoyment of life?8  What number best describes how, during the past week, pain has interfered with your general activity?  9    The following portions of the patient's history were reviewed and updated as appropriate: allergies, current medications, past family history, past medical history, past social history, past surgical history and problem list.    Review of Systems   Constitutional: Negative for fatigue and fever.   HENT: Negative for congestion.    Respiratory: Negative for shortness of breath.    Cardiovascular: Negative for chest pain.   Gastrointestinal: Negative for abdominal pain and bowel incontinence.   Genitourinary: Negative for bladder incontinence, difficulty  "urinating and dysuria.   Musculoskeletal: Positive for back pain. Negative for neck pain.   Neurological: Positive for numbness (L side post CVA) and headaches (occ). Negative for dizziness and weakness.       Vitals:    05/22/19 0955   BP: 104/76   Pulse: 80   Resp: 16   Temp: 98.9 °F (37.2 °C)   SpO2: 94%   Weight: 80.7 kg (178 lb)   Height: 162 cm (63.78\")   PainSc:   7   PainLoc: Back     Objective   Physical Exam   Constitutional: She is oriented to person, place, and time. She appears well-developed and well-nourished. She is cooperative.   HENT:   Head: Normocephalic and atraumatic.   Eyes: Lids are normal.   Neck: Trachea normal.   Cardiovascular: Normal rate.   Pulmonary/Chest: Effort normal.   Musculoskeletal:        Lumbar back: She exhibits tenderness and bony tenderness (EXQUISITE T12-L3 tenderness).        Right hand: She exhibits decreased range of motion and tenderness. She exhibits normal capillary refill.        Left hand: She exhibits decreased range of motion and tenderness. She exhibits normal capillary refill.   Arthritic changes bilateral hands and wrists  mild tenderness of right SI, mild of left SI  moderate tenderness of right greater trochanteric bursa   Neurological: She is alert and oriented to person, place, and time. Gait normal.   Non-focal   Skin: Skin is warm, dry and intact.   Psychiatric: She has a normal mood and affect. Her speech is normal and behavior is normal. Cognition and memory are normal.   Nursing note and vitals reviewed.      Assessment/Plan   Crystal was seen today for back pain.    Diagnoses and all orders for this visit:    Other chronic pain    Arthropathy of lumbar facet joint    Sacroiliitis (CMS/HCC)    Greater trochanteric bursitis of right hip    Kienbock disease, adult    Encounter for long-term (current) use of high-risk medication    Other orders  -     oxyCODONE HCl ER (oxyCONTIN) 30 MG tablet extended-release 12 hour; Take 1 tablet by mouth Every 12 " (Twelve) Hours.  -     oxyCODONE-acetaminophen (PERCOCET) 7.5-325 MG per tablet; Take 1 tablet by mouth Every 6 (Six) Hours As Needed for Severe Pain .  -     tiZANidine (ZANAFLEX) 2 MG tablet; Take 1 tablet by mouth 2 (Two) Times a Day As Needed for Muscle Spasms.      --- The urine drug screen confirmation from 12-27-18 has been reviewed and the result is APPROPRIATE based on patient history and STEVEN report  --- Refill oxyContin and Percocet. Patient appears stable with current regimen. No adverse effects. Regarding continuation of opioids, there is no evidence of aberrant behavior or any red flags.  The patient continues with appropriate response to opioid therapy. ADL's remain intact by self.   --- Proceed with lumbar RFL as scheduled.  --- Proceed with other tests as scheduled.  --- Trial of Tizanidine 2 mg BID PRN. Discussed medication with the patient.  Included in this discussion was the potential for side effects and adverse events.  Patient verbalized understanding and wished to proceed.  Prescription will be sent to pharmacy. NOT TO TAKE AT SAME TIME AS XANAX. Patient verbalized understanding.  --- Reviewed she needed to discuss weaning of Xanax with PCP or even recommended psychiatrist.   --- Follow-up 1 month or sooner if needed     STEVEN REPORT    As part of the patient's treatment plan, I am prescribing controlled substances. The patient has been made aware of appropriate use of such medications, including potential risk of somnolence, limited ability to drive and/or work safely, and the potential for dependence or overdose. It has also bee made clear that these medications are for use by this patient only, without concomitant use of alcohol or other substances unless prescribed.     Patient has completed prescribing agreement detailing terms of continued prescribing of controlled substances, including monitoring STEVEN reports, urine drug screening, and pill counts if necessary. The patient is  aware that inappropriate use will results in cessation of prescribing such medications.    STEVEN report has been reviewed and scanned into the patient's chart.    As the clinician, I personally reviewed the STEVEN from 5-20-19 while the patient was in the office today.    History and physical exam exhibit continued safe and appropriate use of controlled substances.      EMR Dragon/Transcription disclaimer:   Much of this encounter note is an electronic transcription/translation of spoken language to printed text. The electronic translation of spoken language may permit erroneous, or at times, nonsensical words or phrases to be inadvertently transcribed; Although I have reviewed the note for such errors, some may still exist.

## 2019-05-23 ENCOUNTER — HOSPITAL ENCOUNTER (OUTPATIENT)
Dept: MRI IMAGING | Facility: HOSPITAL | Age: 54
Discharge: HOME | End: 2019-05-23
Admitting: FAMILY MEDICINE

## 2019-05-23 DIAGNOSIS — R26.89 SCISSOR GAIT: ICD-10-CM

## 2019-05-23 DIAGNOSIS — R51.9 FACIAL PAIN: ICD-10-CM

## 2019-05-23 DIAGNOSIS — R47.1 DYSARTHROSIS: ICD-10-CM

## 2019-05-23 PROCEDURE — 0 GADOBENATE DIMEGLUMINE 529 MG/ML SOLUTION: Performed by: FAMILY MEDICINE

## 2019-05-23 PROCEDURE — A9577 INJ MULTIHANCE: HCPCS | Performed by: FAMILY MEDICINE

## 2019-05-23 PROCEDURE — 70553 MRI BRAIN STEM W/O & W/DYE: CPT

## 2019-05-23 RX ADMIN — GADOBENATE DIMEGLUMINE 16 ML: 529 INJECTION, SOLUTION INTRAVENOUS at 16:29

## 2019-05-31 ENCOUNTER — DOCUMENTATION (OUTPATIENT)
Dept: PAIN MEDICINE | Facility: CLINIC | Age: 54
End: 2019-05-31

## 2019-05-31 ENCOUNTER — OUTSIDE FACILITY SERVICE (OUTPATIENT)
Dept: PAIN MEDICINE | Facility: CLINIC | Age: 54
End: 2019-05-31

## 2019-05-31 PROCEDURE — 99152 MOD SED SAME PHYS/QHP 5/>YRS: CPT | Performed by: ANESTHESIOLOGY

## 2019-05-31 PROCEDURE — 64636 DESTROY L/S FACET JNT ADDL: CPT | Performed by: ANESTHESIOLOGY

## 2019-05-31 PROCEDURE — 20610 DRAIN/INJ JOINT/BURSA W/O US: CPT | Performed by: ANESTHESIOLOGY

## 2019-05-31 PROCEDURE — 64635 DESTROY LUMB/SAC FACET JNT: CPT | Performed by: ANESTHESIOLOGY

## 2019-05-31 NOTE — PROGRESS NOTES
TWO INDEPENDENT PROCEDURES:  RF & also GTB    -------    Unilateral L4-S3 Medial and Dorsal Branch Radiofrequency Lesioning  [facility]    PREOPERATIVE DIAGNOSIS:  Sacroiliac Dysfunction, Right.  Lumbar spondylosis without myelopathy    POSTOPERATIVE DIAGNOSIS:  Same as above.      PROCEDURE:   Right Lumbrosacral Medial Branch and Sacral Dorsal Branch Nerve Radiofrequency lesioning, with fluoroscopy:  The medial branches of L4 and L5 and the Dorsal Branches of S1-3    PRE-PROCEDURE DISCUSSION WITH PATIENT:    Risks and complications were discussed with the patient prior to starting the procedure and informed consent was obtained.   We discussed various topics including but not limited to bleeding, infection, injury, paralysis, coma, death, postprocedural soreness, postprocedural painful flareup, worsening of clinical picture.      SURGEON:   Reji Law MD    REASON FOR PROCEDURE:    The patient presents with chronic sacroiliac joint dysfunction and pain. The patient underwent 2 Right-sided sacroiliac joint blocks with diagnostically positive relief. Given the patient’s significant pain relief, it is diagnostic that we have likely found the source of the patient’s pain; therefore, radiofrequency ablation of the nerves that supply the joint has been indicated for therapeutic pain relief.    SEDATION:  Versed 6mg and Fentanyl 200 mcg IV  TIME OF PROCEDURE:  The intraoperative procedure time after administration of the sedatives was 24 minutes.    ANESTHETIC:  Lidocaine 2%  STEROID:  NONE  TOTAL VOLUME OF SOLUTION:  qs    DESCRIPTON OF PROCEDURE:  After obtaining informed consent, IV access was  obtained in the preoperative area.   The patient was taken to the operating room.  The patient was placed in the prone position with a pillow under the abdomen. All pressure points were well padded.  EKG, blood pressure, and pulse oximeter were monitored.  The patient was monitored and sedated by the RN  under my direction. The lumbosacral area was prepped with Chloraprep and draped in a sterile fashion.     Under fluoroscopic guidance the transverse process of the L5 vertebrae at the junctions of the superior articular processes were identified on the aforementioned side. Also identified was the groove between the ala and the superior articular process of the sacrum on the ipsilateral side.  Also identified were the points on the lateral margin of the S1 & S2 & S3 posterior foramina.  Skin and subcutaneous tissue were anesthetized with 1% lidocaine above each of these points.     A radiofrequency probe needle was introduced under fluoroscopic guidance at the above junctions in the following manner.   After confirming the position of the needle with fluoroscope in all views, testing was initiated.   Aspiration was negative for blood and CSF.  First, sensory testing was started on the needles at the L4 and L5 medial branches, with initiation @ 1V and 50Hz and slowly decreased until painful pressure stimulation diminished at 0.5V.  Next, motor testing was confirmed to be negative at 3V and 2Hz for any radicular stimulation.  Then 1mL of the local anesthetic was instilled in each needle.  Two minutes elapsed, and during this time a lateral fluoroscopic view was confirmed again to ensure the needles had not advanced nor retracted.  Then, Radiofrequency Lesioning was initiated for 2.5 minutes at 85 degrees Celsius.  Needles were removed intact from each of the areas.       At the lateral margins of the S1-S3 foramina, two needles were placed in parallel, about 3 mm apart, contacting periosteum at the lateral margin of the foramen at each level and not entering any of the foramina.  Lateral fluoroscopic imaging confirmed.  Motor testing performed as detailed above, and then anesthesia was applied as above in each needle prior to performing RF at the same thermal parameters.      Onset of analgesia was noted.  Vital signs  remained stable throughout.        ESTIMATED BLOOD LOSS:  minimal  SPECIMENS:  none    COMPLICATIONS:   No complications were noted., The patient did not have any signs of postprocedure numbness nor weakness. and There was minimal amount of bleeding which was easily addressed with application of pressure and a bandage.    TOLERANCE & DISCHARGE CONDITION:    The patient tolerated the procedure well.  The patient was transported to the recovery area without difficulties.  The patient was discharged to home under the care of family in stable and satisfactory condition.    PLAN OF CARE:  1. The patient was given our standard instruction sheet.  2. The patient is asked to keep a pain log each hour for 8 hours after the procedure today.  3. The patient will  Return to clinic 3 wks  4. The patient will resume all medications as per the medication reconciliation sheet.        -------    Greater Trochanteric Hip Bursa injection under fluoroscopic guidance    [facility]        PREOPERATIVE DIAGNOSIS:   Greater trochanteric hip bursitis on the Right     POSTOPERATIVE DIAGNOSIS:  Same as preop     PROCEDURE:  Greater trochanteric bursa injection, under fluoroscopic guidance on the aforementioned laterality      PRE-PROCEDURE DISCUSSION WITH PATIENT:    Risks and complications were discussed with the patient prior to starting the procedure and informed consent was obtained.  We discussed various topics including but not limited to bleeding, infection, injury, postprocedural site soreness, painful flareup, worsening of clinical picture, paralysis, coma, and death.      SURGEON:  Reji Law MD     REASON FOR PROCEDURE:     Hip bursitis is flared up with tenderness of the GT Bursa on the aforementioned side.  Previous success with this independent procedure.    Of note, the time to do this was in addition to the time for the RF, I.e. Not included in the above procedure time.       SEDATION:  as  above  ANESTHETIC AGENT:  Marcaine 0.5%  STEROID AGENT:  Methylprednisolone (DEPO MEDROL) 40mg/ml  Total volume of injected solution:   5 mL     DESCRIPTON OF PROCEDURE:    After obtaining informed consent, IV access was obtained in the preoperative area.  The patient was transported to the operative suite and placed in the prone position with a pillow under the pelvic area. EKG, blood pressure, and pulse oximeter were monitored.  Injectate solution prepared as above    Area over the greater trochanter on the aforementioned side was palpated and marked on the skin and then cleansed with Hibiclens ×2. A sterile needle was inserted about 1 & 1/2 inches in to the skin and into the bursa, under fluoro guidance, with periosteum contact over the greater trochanter and the needle was withdrawn about 2 mm into the bursa. Aspiration was negative and slowly after confirming bursa spread with omnipaque on each side, the rest of the injectate syringe was injected into the hip bursa.  The needle was removed intact on each side and bleeding was minimal. The insertion site was dressed with a Band-Aid.  There were no apparent complications.         ESTIMATED BLOOD LOSS:  Minimal  SPECIMENS:  None  COMPLICATIONS:   No complications were noted.       TOLERANCE & DISCHARGE CONDITION:    The patient tolerated the procedure well.  The patient was transported to the recovery area without difficulties.  The patient was discharged to home under the care of family in stable and satisfactory condition.     PLAN OF CARE:  1. The patient was given our standard instruction sheet and will resume all medications as per the medication reconciliation sheet.  2. The patient will Plan for return visit as above  3. The patient is instructed to keep a pain log hourly for 8 hours after the procedure.

## 2019-06-14 ENCOUNTER — APPOINTMENT (OUTPATIENT)
Dept: CT IMAGING | Facility: HOSPITAL | Age: 54
End: 2019-06-14

## 2019-06-14 ENCOUNTER — HOSPITAL ENCOUNTER (EMERGENCY)
Facility: HOSPITAL | Age: 54
Discharge: HOME | End: 2019-06-14
Attending: EMERGENCY MEDICINE | Admitting: EMERGENCY MEDICINE

## 2019-06-14 VITALS
HEART RATE: 81 BPM | HEIGHT: 64 IN | RESPIRATION RATE: 20 BRPM | WEIGHT: 178.8 LBS | SYSTOLIC BLOOD PRESSURE: 167 MMHG | DIASTOLIC BLOOD PRESSURE: 80 MMHG | OXYGEN SATURATION: 93 % | BODY MASS INDEX: 30.52 KG/M2 | TEMPERATURE: 99.1 F

## 2019-06-14 DIAGNOSIS — R09.89 LABILE BLOOD PRESSURE: ICD-10-CM

## 2019-06-14 DIAGNOSIS — E87.1 HYPONATREMIA: ICD-10-CM

## 2019-06-14 DIAGNOSIS — I10 ELEVATED BLOOD PRESSURE READING WITH DIAGNOSIS OF HYPERTENSION: ICD-10-CM

## 2019-06-14 DIAGNOSIS — R51.9 NONINTRACTABLE HEADACHE, UNSPECIFIED CHRONICITY PATTERN, UNSPECIFIED HEADACHE TYPE: Primary | ICD-10-CM

## 2019-06-14 LAB
ANION GAP SERPL CALCULATED.3IONS-SCNC: 14.4 MMOL/L
APTT PPP: 29.8 SECONDS (ref 24.3–38.1)
BASOPHILS # BLD AUTO: 0.09 10*3/MM3 (ref 0–0.2)
BASOPHILS NFR BLD AUTO: 0.7 % (ref 0–1.5)
BUN BLD-MCNC: 9 MG/DL (ref 6–20)
BUN/CREAT SERPL: 12.7 (ref 7–25)
CALCIUM SPEC-SCNC: 9.5 MG/DL (ref 8.6–10.5)
CHLORIDE SERPL-SCNC: 91 MMOL/L (ref 98–107)
CO2 SERPL-SCNC: 24.6 MMOL/L (ref 22–29)
CREAT BLD-MCNC: 0.71 MG/DL (ref 0.57–1)
DEPRECATED RDW RBC AUTO: 42.9 FL (ref 37–54)
EOSINOPHIL # BLD AUTO: 0.18 10*3/MM3 (ref 0–0.4)
EOSINOPHIL NFR BLD AUTO: 1.5 % (ref 0.3–6.2)
ERYTHROCYTE [DISTWIDTH] IN BLOOD BY AUTOMATED COUNT: 12.9 % (ref 12.3–15.4)
GFR SERPL CREATININE-BSD FRML MDRD: 85 ML/MIN/1.73
GLUCOSE BLD-MCNC: 85 MG/DL (ref 65–99)
HCT VFR BLD AUTO: 45.4 % (ref 34–46.6)
HGB BLD-MCNC: 15.1 G/DL (ref 12–15.9)
IMM GRANULOCYTES # BLD AUTO: 0.05 10*3/MM3 (ref 0–0.05)
IMM GRANULOCYTES NFR BLD AUTO: 0.4 % (ref 0–0.5)
INR PPP: 0.94 (ref 0.9–1.1)
LYMPHOCYTES # BLD AUTO: 4.2 10*3/MM3 (ref 0.7–3.1)
LYMPHOCYTES NFR BLD AUTO: 34.2 % (ref 19.6–45.3)
MCH RBC QN AUTO: 30.3 PG (ref 26.6–33)
MCHC RBC AUTO-ENTMCNC: 33.3 G/DL (ref 31.5–35.7)
MCV RBC AUTO: 91 FL (ref 79–97)
MONOCYTES # BLD AUTO: 0.92 10*3/MM3 (ref 0.1–0.9)
MONOCYTES NFR BLD AUTO: 7.5 % (ref 5–12)
NEUTROPHILS # BLD AUTO: 6.85 10*3/MM3 (ref 1.7–7)
NEUTROPHILS NFR BLD AUTO: 55.7 % (ref 42.7–76)
NRBC BLD AUTO-RTO: 0 /100 WBC (ref 0–0.2)
PLATELET # BLD AUTO: 301 10*3/MM3 (ref 140–450)
PMV BLD AUTO: 9.9 FL (ref 6–12)
POTASSIUM BLD-SCNC: 4.1 MMOL/L (ref 3.5–5.2)
PROTHROMBIN TIME: 12.3 SECONDS (ref 12.1–15)
RBC # BLD AUTO: 4.99 10*6/MM3 (ref 3.77–5.28)
SODIUM BLD-SCNC: 130 MMOL/L (ref 136–145)
WBC NRBC COR # BLD: 12.29 10*3/MM3 (ref 3.4–10.8)

## 2019-06-14 PROCEDURE — 99284 EMERGENCY DEPT VISIT MOD MDM: CPT | Performed by: EMERGENCY MEDICINE

## 2019-06-14 PROCEDURE — 70450 CT HEAD/BRAIN W/O DYE: CPT

## 2019-06-14 PROCEDURE — 25010000002 DEXAMETHASONE PER 1 MG: Performed by: EMERGENCY MEDICINE

## 2019-06-14 PROCEDURE — 99283 EMERGENCY DEPT VISIT LOW MDM: CPT

## 2019-06-14 PROCEDURE — 85025 COMPLETE CBC W/AUTO DIFF WBC: CPT | Performed by: EMERGENCY MEDICINE

## 2019-06-14 PROCEDURE — 85610 PROTHROMBIN TIME: CPT | Performed by: EMERGENCY MEDICINE

## 2019-06-14 PROCEDURE — 85730 THROMBOPLASTIN TIME PARTIAL: CPT | Performed by: EMERGENCY MEDICINE

## 2019-06-14 PROCEDURE — 25010000002 DIPHENHYDRAMINE PER 50 MG: Performed by: EMERGENCY MEDICINE

## 2019-06-14 PROCEDURE — 96375 TX/PRO/DX INJ NEW DRUG ADDON: CPT

## 2019-06-14 PROCEDURE — 96374 THER/PROPH/DIAG INJ IV PUSH: CPT

## 2019-06-14 PROCEDURE — 80048 BASIC METABOLIC PNL TOTAL CA: CPT | Performed by: EMERGENCY MEDICINE

## 2019-06-14 PROCEDURE — 25010000002 PROCHLORPERAZINE 10 MG/2ML SOLUTION: Performed by: EMERGENCY MEDICINE

## 2019-06-14 RX ORDER — SODIUM CHLORIDE 0.9 % (FLUSH) 0.9 %
10 SYRINGE (ML) INJECTION AS NEEDED
Status: DISCONTINUED | OUTPATIENT
Start: 2019-06-14 | End: 2019-06-14 | Stop reason: HOSPADM

## 2019-06-14 RX ORDER — DEXAMETHASONE SODIUM PHOSPHATE 4 MG/ML
8 INJECTION, SOLUTION INTRA-ARTICULAR; INTRALESIONAL; INTRAMUSCULAR; INTRAVENOUS; SOFT TISSUE ONCE
Status: COMPLETED | OUTPATIENT
Start: 2019-06-14 | End: 2019-06-14

## 2019-06-14 RX ORDER — DIPHENHYDRAMINE HYDROCHLORIDE 50 MG/ML
25 INJECTION INTRAMUSCULAR; INTRAVENOUS ONCE
Status: COMPLETED | OUTPATIENT
Start: 2019-06-14 | End: 2019-06-14

## 2019-06-14 RX ADMIN — PROCHLORPERAZINE EDISYLATE 10 MG: 5 INJECTION INTRAMUSCULAR; INTRAVENOUS at 05:56

## 2019-06-14 RX ADMIN — DEXAMETHASONE SODIUM PHOSPHATE 8 MG: 4 INJECTION, SOLUTION INTRAMUSCULAR; INTRAVENOUS at 05:57

## 2019-06-14 RX ADMIN — DIPHENHYDRAMINE HYDROCHLORIDE 25 MG: 50 INJECTION, SOLUTION INTRAMUSCULAR; INTRAVENOUS at 05:58

## 2019-06-19 ENCOUNTER — RESULTS ENCOUNTER (OUTPATIENT)
Dept: PAIN MEDICINE | Facility: CLINIC | Age: 54
End: 2019-06-19

## 2019-06-19 ENCOUNTER — OFFICE VISIT (OUTPATIENT)
Dept: PAIN MEDICINE | Facility: CLINIC | Age: 54
End: 2019-06-19

## 2019-06-19 VITALS
BODY MASS INDEX: 30.22 KG/M2 | TEMPERATURE: 97.8 F | SYSTOLIC BLOOD PRESSURE: 126 MMHG | OXYGEN SATURATION: 96 % | HEIGHT: 64 IN | RESPIRATION RATE: 16 BRPM | HEART RATE: 88 BPM | DIASTOLIC BLOOD PRESSURE: 69 MMHG | WEIGHT: 177 LBS

## 2019-06-19 DIAGNOSIS — M93.1 KIENBOCK DISEASE, ADULT: ICD-10-CM

## 2019-06-19 DIAGNOSIS — Z79.899 ENCOUNTER FOR LONG-TERM (CURRENT) USE OF HIGH-RISK MEDICATION: ICD-10-CM

## 2019-06-19 DIAGNOSIS — M46.1 SACROILIITIS (HCC): ICD-10-CM

## 2019-06-19 DIAGNOSIS — M47.816 ARTHROPATHY OF LUMBAR FACET JOINT: ICD-10-CM

## 2019-06-19 DIAGNOSIS — G89.29 OTHER CHRONIC PAIN: ICD-10-CM

## 2019-06-19 DIAGNOSIS — M70.61 GREATER TROCHANTERIC BURSITIS OF RIGHT HIP: ICD-10-CM

## 2019-06-19 DIAGNOSIS — G89.29 OTHER CHRONIC PAIN: Primary | ICD-10-CM

## 2019-06-19 PROCEDURE — 80305 DRUG TEST PRSMV DIR OPT OBS: CPT | Performed by: NURSE PRACTITIONER

## 2019-06-19 PROCEDURE — 99214 OFFICE O/P EST MOD 30 MIN: CPT | Performed by: NURSE PRACTITIONER

## 2019-06-19 RX ORDER — OXYCODONE HYDROCHLORIDE 30 MG/1
30 TABLET, FILM COATED, EXTENDED RELEASE ORAL EVERY 12 HOURS SCHEDULED
Qty: 60 TABLET | Refills: 0 | Status: SHIPPED | OUTPATIENT
Start: 2019-06-19 | End: 2019-07-18 | Stop reason: SDUPTHER

## 2019-06-19 RX ORDER — OXYCODONE AND ACETAMINOPHEN 7.5; 325 MG/1; MG/1
1 TABLET ORAL EVERY 6 HOURS PRN
Qty: 120 TABLET | Refills: 0 | Status: SHIPPED | OUTPATIENT
Start: 2019-06-19 | End: 2019-07-18 | Stop reason: SDUPTHER

## 2019-06-19 NOTE — PROGRESS NOTES
"CHIEF COMPLAINT  F/U back pain- TWO INDEPENDENT PROCEDURES:  RF & also GTB- patient states that her pain has improved 70%.     Subjective   Crystal Cruz is a 53 y.o. female  who presents to the office for follow-up of procedure.  She completed a Right L4-S3 RFL and right GTB   on  5-31-19 performed by Dr. HAJI for management of LOW BACK AND HIP PAIN. Patient reports 70% ONGOING relief from the procedure.     Complains of pain in her low back, hands and hip. Also reports a history of fibromyalgia and \"that acted up this month.\" Today her pain is 2/10VAS. Describes her pain as continuous aching and throbbing, but much less severe. Pain increases with activity, walking, standing, bending/lifting/twisting; pain decreases with medication, rest and procedures. Continues with OxyContin 30 mg BID and Percocet 7.5/325 3-4/day, Cymbalta 60 mg and gabapentin 600 mg TID. Temporarily took tizanidine. Denies any side effects from the regimen.  The regimen helps decrease her pain by 50-70%. Notes improvement in activity and function with regimen. ADL's by self.  Denies any bowel or bladder changes.     Has stopped smoking. Is working on losing weight. Also reports she has stopped drinking coffee.  Is prescribed Xanax by her PCP. Is still thinking about weaning down on this. Reviewed she needs to discuss with Dr Dill and do not recommend \"cold turkey quitting.\"     Extremity Pain    The pain is present in the neck, back, left wrist, left hand, left foot, right foot, right hand, right wrist, right fingers and left fingers. This is a chronic problem. The current episode started more than 1 year ago. There has been no history of extremity trauma. The problem occurs constantly. Progression since onset: improved from last office visit. The quality of the pain is described as aching, pounding and burning. The pain is at a severity of 2/10. The pain is moderate. Pertinent negatives include no fever or numbness (L side post " CVA). The symptoms are aggravated by cold (moist/rainy weather). She has tried acetaminophen, heat, movement, NSAIDS, OTC ointments, OTC pain meds, oral narcotics and rest (Oxycontin 30 mg Q12 hours, Oxycodone 7.5/325 4/day) for the symptoms. The treatment provided moderate relief. sangsarah, systemic sclerosis   Back Pain   This is a chronic problem. The current episode started more than 1 month ago. The problem occurs constantly. Progression since onset: improved since last office visit. The pain is present in the lumbar spine. The quality of the pain is described as aching. The pain does not radiate. The pain is at a severity of 2/10. The pain is moderate. The symptoms are aggravated by bending, position, lying down, standing and twisting. Associated symptoms include weakness (left side post CVA). Pertinent negatives include no abdominal pain, bladder incontinence, bowel incontinence, chest pain, dysuria, fever, headaches (occ) or numbness (L side post CVA). She has tried analgesics, heat and bed rest (lumbar FJN/RFL--- significant long-term relief) for the symptoms.      PEG Assessment   What number best describes your pain on average in the past week?3  What number best describes how, during the past week, pain has interfered with your enjoyment of life?5  What number best describes how, during the past week, pain has interfered with your general activity?  4    The following portions of the patient's history were reviewed and updated as appropriate: allergies, current medications, past family history, past medical history, past social history, past surgical history and problem list.    Review of Systems   Constitutional: Negative for activity change, fatigue and fever.   HENT: Negative for congestion.    Respiratory: Negative for chest tightness and shortness of breath.    Cardiovascular: Negative for chest pain.   Gastrointestinal: Negative for abdominal pain, bowel incontinence, constipation and diarrhea.  "  Genitourinary: Negative for bladder incontinence, difficulty urinating and dysuria.   Musculoskeletal: Positive for back pain. Negative for neck pain.   Neurological: Positive for dizziness, weakness (left side post CVA) and light-headedness. Negative for numbness (L side post CVA) and headaches (occ).   Psychiatric/Behavioral: Positive for sleep disturbance. Negative for agitation. The patient is nervous/anxious.        Vitals:    06/19/19 1016   BP: 126/69   Pulse: 88   Resp: 16   Temp: 97.8 °F (36.6 °C)   SpO2: 96%   Weight: 80.3 kg (177 lb)   Height: 162.6 cm (64\")   PainSc:   2   PainLoc: Back     Objective   Physical Exam   Constitutional: She is oriented to person, place, and time. She appears well-developed and well-nourished. She is cooperative.   HENT:   Head: Normocephalic and atraumatic.   Eyes: Lids are normal.   Neck: Trachea normal.   Cardiovascular: Normal rate.   Pulmonary/Chest: Effort normal.   Musculoskeletal:        Lumbar back: She exhibits tenderness and bony tenderness (mild T12-L3 tenderness).        Right hand: She exhibits decreased range of motion and tenderness. She exhibits normal capillary refill.        Left hand: She exhibits decreased range of motion and tenderness. She exhibits normal capillary refill.   Arthritic changes bilateral hands and wrists  mild tenderness of right SI, mild of left SI  mild tenderness of right greater trochanteric bursa   Neurological: She is alert and oriented to person, place, and time. Gait normal.   Non-focal   Skin: Skin is warm, dry and intact.   Psychiatric: She has a normal mood and affect. Her speech is normal and behavior is normal. Cognition and memory are normal.   Nursing note and vitals reviewed.      Assessment/Plan   Crystal was seen today for back pain.    Diagnoses and all orders for this visit:    Other chronic pain    Arthropathy of lumbar facet joint    Kienbock disease, adult    Sacroiliitis (CMS/HCC)    Greater trochanteric bursitis " of right hip    Encounter for long-term (current) use of high-risk medication    Other orders  -     oxyCODONE HCl ER (oxyCONTIN) 30 MG tablet extended-release 12 hour; Take 1 tablet by mouth Every 12 (Twelve) Hours.  -     oxyCODONE-acetaminophen (PERCOCET) 7.5-325 MG per tablet; Take 1 tablet by mouth Every 6 (Six) Hours As Needed for Severe Pain .      --- Routine UDS in office today as part of monitoring requirements for controlled substances.  The specimen was viewed and the immunoassay result reviewed and is +BZD, +OXY(Appropriate).  This specimen will be sent to [facility] laboratory for confirmation.     --- refill OxyContin and Percocet. Patient appears stable with current regimen. No adverse effects. Regarding continuation of opioids, there is no evidence of aberrant behavior or any red flags.  The patient continues with appropriate response to opioid therapy. ADL's remain intact by self.   --- Repeat interventions in future PRN.  --- Follow-up 1 month or sooner if needed.       STEVEN REPORT    As part of the patient's treatment plan, I am prescribing controlled substances. The patient has been made aware of appropriate use of such medications, including potential risk of somnolence, limited ability to drive and/or work safely, and the potential for dependence or overdose. It has also bee made clear that these medications are for use by this patient only, without concomitant use of alcohol or other substances unless prescribed.     Patient has completed prescribing agreement detailing terms of continued prescribing of controlled substances, including monitoring STEVEN reports, urine drug screening, and pill counts if necessary. The patient is aware that inappropriate use will results in cessation of prescribing such medications.    STEVEN report has been reviewed and scanned into the patient's chart.    As the clinician, I personally reviewed the STEVEN from 6-18-19 while the patient was in the office  today.    History and physical exam exhibit continued safe and appropriate use of controlled substances.       EMR Dragon/Transcription disclaimer:   Much of this encounter note is an electronic transcription/translation of spoken language to printed text. The electronic translation of spoken language may permit erroneous, or at times, nonsensical words or phrases to be inadvertently transcribed; Although I have reviewed the note for such errors, some may still exist.

## 2019-06-20 ENCOUNTER — HOSPITAL ENCOUNTER (EMERGENCY)
Facility: HOSPITAL | Age: 54
Discharge: HOME | End: 2019-06-20
Attending: EMERGENCY MEDICINE | Admitting: EMERGENCY MEDICINE

## 2019-06-20 VITALS
BODY MASS INDEX: 30.22 KG/M2 | DIASTOLIC BLOOD PRESSURE: 58 MMHG | RESPIRATION RATE: 16 BRPM | HEIGHT: 64 IN | TEMPERATURE: 98 F | HEART RATE: 80 BPM | WEIGHT: 177 LBS | OXYGEN SATURATION: 93 % | SYSTOLIC BLOOD PRESSURE: 112 MMHG

## 2019-06-20 DIAGNOSIS — R00.2 INTERMITTENT PALPITATIONS: ICD-10-CM

## 2019-06-20 DIAGNOSIS — F41.8 ANXIETY ABOUT HEALTH: Primary | ICD-10-CM

## 2019-06-20 LAB
ALBUMIN SERPL-MCNC: 4.4 G/DL (ref 3.5–5.2)
ALBUMIN/GLOB SERPL: 1.4 G/DL
ALP SERPL-CCNC: 93 U/L (ref 39–117)
ALT SERPL W P-5'-P-CCNC: 12 U/L (ref 1–33)
ANION GAP SERPL CALCULATED.3IONS-SCNC: 14.3 MMOL/L
AST SERPL-CCNC: 16 U/L (ref 1–32)
BACTERIA UR QL AUTO: ABNORMAL /HPF
BASOPHILS # BLD AUTO: 0.08 10*3/MM3 (ref 0–0.2)
BASOPHILS NFR BLD AUTO: 0.8 % (ref 0–1.5)
BILIRUB SERPL-MCNC: 0.3 MG/DL (ref 0.2–1.2)
BILIRUB UR QL STRIP: NEGATIVE
BUN BLD-MCNC: 12 MG/DL (ref 6–20)
BUN/CREAT SERPL: 13.5 (ref 7–25)
CALCIUM SPEC-SCNC: 9.8 MG/DL (ref 8.6–10.5)
CHLORIDE SERPL-SCNC: 90 MMOL/L (ref 98–107)
CLARITY UR: CLEAR
CO2 SERPL-SCNC: 25.7 MMOL/L (ref 22–29)
COLOR UR: YELLOW
CREAT BLD-MCNC: 0.89 MG/DL (ref 0.57–1)
DEPRECATED RDW RBC AUTO: 44.5 FL (ref 37–54)
EOSINOPHIL # BLD AUTO: 0.41 10*3/MM3 (ref 0–0.4)
EOSINOPHIL NFR BLD AUTO: 4.2 % (ref 0.3–6.2)
ERYTHROCYTE [DISTWIDTH] IN BLOOD BY AUTOMATED COUNT: 13.1 % (ref 12.3–15.4)
GFR SERPL CREATININE-BSD FRML MDRD: 66 ML/MIN/1.73
GLOBULIN UR ELPH-MCNC: 3.1 GM/DL
GLUCOSE BLD-MCNC: 208 MG/DL (ref 65–99)
GLUCOSE UR STRIP-MCNC: NEGATIVE MG/DL
HCT VFR BLD AUTO: 42.5 % (ref 34–46.6)
HGB BLD-MCNC: 14 G/DL (ref 12–15.9)
HGB UR QL STRIP.AUTO: ABNORMAL
HYALINE CASTS UR QL AUTO: ABNORMAL /LPF
IMM GRANULOCYTES # BLD AUTO: 0.04 10*3/MM3 (ref 0–0.05)
IMM GRANULOCYTES NFR BLD AUTO: 0.4 % (ref 0–0.5)
KETONES UR QL STRIP: NEGATIVE
LEUKOCYTE ESTERASE UR QL STRIP.AUTO: NEGATIVE
LYMPHOCYTES # BLD AUTO: 3.98 10*3/MM3 (ref 0.7–3.1)
LYMPHOCYTES NFR BLD AUTO: 41 % (ref 19.6–45.3)
MCH RBC QN AUTO: 30.7 PG (ref 26.6–33)
MCHC RBC AUTO-ENTMCNC: 32.9 G/DL (ref 31.5–35.7)
MCV RBC AUTO: 93.2 FL (ref 79–97)
MONOCYTES # BLD AUTO: 0.8 10*3/MM3 (ref 0.1–0.9)
MONOCYTES NFR BLD AUTO: 8.2 % (ref 5–12)
NEUTROPHILS # BLD AUTO: 4.39 10*3/MM3 (ref 1.7–7)
NEUTROPHILS NFR BLD AUTO: 45.4 % (ref 42.7–76)
NITRITE UR QL STRIP: NEGATIVE
NRBC BLD AUTO-RTO: 0 /100 WBC (ref 0–0.2)
PH UR STRIP.AUTO: 5.5 [PH] (ref 4.5–8)
PLATELET # BLD AUTO: 280 10*3/MM3 (ref 140–450)
PMV BLD AUTO: 10.1 FL (ref 6–12)
POTASSIUM BLD-SCNC: 4.7 MMOL/L (ref 3.5–5.2)
PROT SERPL-MCNC: 7.5 G/DL (ref 6–8.5)
PROT UR QL STRIP: NEGATIVE
RBC # BLD AUTO: 4.56 10*6/MM3 (ref 3.77–5.28)
RBC # UR: ABNORMAL /HPF
REF LAB TEST METHOD: ABNORMAL
SODIUM BLD-SCNC: 130 MMOL/L (ref 136–145)
SP GR UR STRIP: 1.01 (ref 1–1.03)
SQUAMOUS #/AREA URNS HPF: ABNORMAL /HPF
TROPONIN T SERPL-MCNC: <0.01 NG/ML (ref 0–0.03)
TSH SERPL DL<=0.05 MIU/L-ACNC: 4.96 MIU/ML (ref 0.27–4.2)
UROBILINOGEN UR QL STRIP: ABNORMAL
WBC NRBC COR # BLD: 9.7 10*3/MM3 (ref 3.4–10.8)
WBC UR QL AUTO: ABNORMAL /HPF

## 2019-06-20 PROCEDURE — 93010 ELECTROCARDIOGRAM REPORT: CPT | Performed by: INTERNAL MEDICINE

## 2019-06-20 PROCEDURE — 99282 EMERGENCY DEPT VISIT SF MDM: CPT | Performed by: EMERGENCY MEDICINE

## 2019-06-20 PROCEDURE — 84443 ASSAY THYROID STIM HORMONE: CPT | Performed by: PHYSICIAN ASSISTANT

## 2019-06-20 PROCEDURE — 93005 ELECTROCARDIOGRAM TRACING: CPT | Performed by: PHYSICIAN ASSISTANT

## 2019-06-20 PROCEDURE — 85025 COMPLETE CBC W/AUTO DIFF WBC: CPT | Performed by: PHYSICIAN ASSISTANT

## 2019-06-20 PROCEDURE — 80053 COMPREHEN METABOLIC PANEL: CPT | Performed by: PHYSICIAN ASSISTANT

## 2019-06-20 PROCEDURE — 99283 EMERGENCY DEPT VISIT LOW MDM: CPT

## 2019-06-20 PROCEDURE — 81001 URINALYSIS AUTO W/SCOPE: CPT | Performed by: PHYSICIAN ASSISTANT

## 2019-06-20 PROCEDURE — 84484 ASSAY OF TROPONIN QUANT: CPT | Performed by: PHYSICIAN ASSISTANT

## 2019-06-20 RX ORDER — SODIUM CHLORIDE 0.9 % (FLUSH) 0.9 %
10 SYRINGE (ML) INJECTION AS NEEDED
Status: DISCONTINUED | OUTPATIENT
Start: 2019-06-20 | End: 2019-06-20 | Stop reason: HOSPADM

## 2019-07-18 ENCOUNTER — OFFICE VISIT (OUTPATIENT)
Dept: PAIN MEDICINE | Facility: CLINIC | Age: 54
End: 2019-07-18

## 2019-07-18 VITALS
TEMPERATURE: 97.8 F | BODY MASS INDEX: 29.88 KG/M2 | HEART RATE: 75 BPM | DIASTOLIC BLOOD PRESSURE: 87 MMHG | RESPIRATION RATE: 20 BRPM | WEIGHT: 175 LBS | HEIGHT: 64 IN | SYSTOLIC BLOOD PRESSURE: 167 MMHG

## 2019-07-18 DIAGNOSIS — M47.816 ARTHROPATHY OF LUMBAR FACET JOINT: ICD-10-CM

## 2019-07-18 DIAGNOSIS — Z79.899 ENCOUNTER FOR LONG-TERM (CURRENT) USE OF HIGH-RISK MEDICATION: ICD-10-CM

## 2019-07-18 DIAGNOSIS — G89.29 OTHER CHRONIC PAIN: Primary | ICD-10-CM

## 2019-07-18 DIAGNOSIS — M93.1 KIENBOCK DISEASE, ADULT: ICD-10-CM

## 2019-07-18 DIAGNOSIS — M46.1 SACROILIITIS (HCC): ICD-10-CM

## 2019-07-18 PROCEDURE — 99214 OFFICE O/P EST MOD 30 MIN: CPT | Performed by: NURSE PRACTITIONER

## 2019-07-18 RX ORDER — LISINOPRIL 20 MG/1
TABLET ORAL
COMMUNITY
Start: 2019-07-08 | End: 2019-07-30 | Stop reason: SDUPTHER

## 2019-07-18 RX ORDER — OXYCODONE AND ACETAMINOPHEN 7.5; 325 MG/1; MG/1
1 TABLET ORAL EVERY 6 HOURS PRN
Qty: 120 TABLET | Refills: 0 | Status: SHIPPED | OUTPATIENT
Start: 2019-07-18 | End: 2019-08-15 | Stop reason: SDUPTHER

## 2019-07-18 RX ORDER — OXYCODONE HYDROCHLORIDE 30 MG/1
30 TABLET, FILM COATED, EXTENDED RELEASE ORAL EVERY 12 HOURS SCHEDULED
Qty: 60 TABLET | Refills: 0 | Status: SHIPPED | OUTPATIENT
Start: 2019-07-18 | End: 2019-08-15 | Stop reason: SDUPTHER

## 2019-07-18 RX ORDER — TIZANIDINE 2 MG/1
2 TABLET ORAL
COMMUNITY
Start: 2019-05-22 | End: 2019-09-12

## 2019-07-18 RX ORDER — ASPIRIN 325 MG
325 TABLET ORAL DAILY
COMMUNITY
Start: 2019-06-18 | End: 2019-07-30

## 2019-07-18 NOTE — PROGRESS NOTES
CHIEF COMPLAINT  F/u back pain. Pt sts pain has worsened since last ov.     Subjective   Crystal Cruz is a 53 y.o. female  who presents to the office for follow-up.She has a history of chronic back and hand pain. Reports her back pain is worse since last office visit.  Has a history of scleroderma and Kleinbock disease. She admits she has been having more activity around house.    Complains of pain in her low back and hands. Today her pain is 4/10VAS. Describes the pain as continuous aching and throbbing. Pain increases with walking, standing, activity; pain decreases with medication, rest and procedures. Continues with OxyContin 30 mg BID and Percocet 7.5/325 3-4/day, Cymbalta 60 mg and gabapentin 600 mg TID. Temporarily took tizanidine. Denies any side effects from the regimen.  The regimen helps decrease her pain by 50%. Notes improvement in activity and function with regimen. ADL's by self.  Denies any bowel or bladder changes.     She completed a Right L4-S3 RFL and right GTB   on  5-31-19 performed by Dr. HAJI for management of LOW BACK AND HIP PAIN.  Extremity Pain    The pain is present in the neck, back, left wrist, left hand, left foot, right foot, right hand, right wrist, right fingers and left fingers. This is a chronic problem. The current episode started more than 1 year ago. There has been no history of extremity trauma. The problem occurs constantly. Progression since onset: unchanged from last office visit. The quality of the pain is described as aching, pounding and burning. The pain is at a severity of 4/10. The pain is moderate. Pertinent negatives include no fever or numbness (L side post CVA). The symptoms are aggravated by cold (moist/rainy weather). She has tried acetaminophen, heat, movement, NSAIDS, OTC ointments, OTC pain meds, oral narcotics and rest (Oxycontin 30 mg Q12 hours, Oxycodone 7.5/325 4/day) for the symptoms. The treatment provided moderate relief. kienbock, systemic  sclerosis   Back Pain   This is a chronic problem. The current episode started more than 1 month ago. The problem occurs constantly. Progression since onset: worse since last office visit. The pain is present in the lumbar spine. The quality of the pain is described as aching. The pain does not radiate. The pain is at a severity of 4/10. The pain is moderate. The symptoms are aggravated by bending, position, lying down, standing and twisting. Associated symptoms include headaches (occocc) and weakness (left side post CVA). Pertinent negatives include no abdominal pain, bladder incontinence, bowel incontinence, chest pain, dysuria, fever or numbness (L side post CVA). She has tried analgesics, heat and bed rest (lumbar FJN/RFL--- significant long-term relief) for the symptoms.      Procedure List  --- 5-31-19-- Right L4-S2 RFL  --- 3-26-19-- right SI and right GTB  --- 2-5-19-- bilateral T12-L3 RFL  --- 11-6-18-- bilateral T12-L3 MBB  --- 9-11-18-- TF BURT right L5  --- 7-10-18-- right SI  --- 5-22-18 LESI L4-5  --- 5-10-18-- LESI L4-5  --- 2-20-18-- bilateral L1-L4 RFL    PEG Assessment   What number best describes your pain on average in the past week?5  What number best describes how, during the past week, pain has interfered with your enjoyment of life?8  What number best describes how, during the past week, pain has interfered with your general activity?  7    The following portions of the patient's history were reviewed and updated as appropriate: allergies, current medications, past family history, past medical history, past social history, past surgical history and problem list.    Review of Systems   Constitutional: Positive for activity change (inc). Negative for fatigue and fever.   HENT: Negative for congestion.    Eyes: Negative for visual disturbance.   Respiratory: Negative for chest tightness and shortness of breath.    Cardiovascular: Negative for chest pain.   Gastrointestinal: Negative for abdominal  "pain, bowel incontinence, constipation and diarrhea.   Genitourinary: Negative for bladder incontinence, difficulty urinating and dysuria.   Musculoskeletal: Positive for back pain. Negative for neck pain.   Neurological: Positive for dizziness, weakness (left side post CVA), light-headedness and headaches (occocc). Negative for numbness (L side post CVA).   Psychiatric/Behavioral: Positive for sleep disturbance. Negative for agitation and suicidal ideas. The patient is nervous/anxious.        Vitals:    07/18/19 0836   BP: 167/87  Comment: pt sts hasn't had bp med this morning   Pulse: 75   Resp: 20   Temp: 97.8 °F (36.6 °C)   SpO2: Comment: unable to obtain, has scleroderma   Weight: 79.4 kg (175 lb)   Height: 162 cm (63.78\")   PainSc:   4   PainLoc: Back     Objective   Physical Exam   Constitutional: She is oriented to person, place, and time. She appears well-developed and well-nourished. She is cooperative.   HENT:   Head: Normocephalic and atraumatic.   Eyes: Lids are normal.   Neck: Trachea normal.   Cardiovascular: Normal rate.   Pulmonary/Chest: Effort normal.   Musculoskeletal:        Thoracic back: She exhibits tenderness and spasm.        Lumbar back: She exhibits tenderness and bony tenderness (mild T12-L3 tenderness).        Right hand: She exhibits decreased range of motion and tenderness. She exhibits normal capillary refill.        Left hand: She exhibits decreased range of motion and tenderness. She exhibits normal capillary refill.   Arthritic changes bilateral hands and wrists  mild tenderness of right SI, mild of left SI  mild tenderness of right greater trochanteric bursa   Neurological: She is alert and oriented to person, place, and time. Gait normal.   Non-focal   Skin: Skin is warm, dry and intact.   Psychiatric: She has a normal mood and affect. Her speech is normal and behavior is normal. Cognition and memory are normal.   Nursing note and vitals reviewed.      Assessment/Plan   Crystal " was seen today for back pain.    Diagnoses and all orders for this visit:    Other chronic pain    Arthropathy of lumbar facet joint    Kienbock disease, adult    Sacroiliitis (CMS/[facility])    Encounter for long-term (current) use of high-risk medication      --- The urine drug screen confirmation from 6-19-19 has been reviewed and the result is APPROPRIATE based on patient history and STEVEN report  --- refill OxyContin and Percocet. Patient appears stable with current regimen. No adverse effects. Regarding continuation of opioids, there is no evidence of aberrant behavior or any red flags.  The patient continues with appropriate response to opioid therapy. ADL's remain intact by self.   --- Repeat procedures in future PRN.  --- Discussed deconditioning. Patient refuses repeating PT. Most of her pain she is describing is muscular.   --- Follow-up 1 month or sooner if needed.     STEVEN REPORT    As part of the patient's treatment plan, I am prescribing controlled substances. The patient has been made aware of appropriate use of such medications, including potential risk of somnolence, limited ability to drive and/or work safely, and the potential for dependence or overdose. It has also bee made clear that these medications are for use by this patient only, without concomitant use of alcohol or other substances unless prescribed.     Patient has completed prescribing agreement detailing terms of continued prescribing of controlled substances, including monitoring STEVEN reports, urine drug screening, and pill counts if necessary. The patient is aware that inappropriate use will results in cessation of prescribing such medications.    STEVEN report has been reviewed and scanned into the patient's chart.    As the clinician, I personally reviewed the STEVEN from 7-16-19 while the patient was in the office today.    History and physical exam exhibit continued safe and appropriate use of controlled substances.      EMR  Dragon/Transcription disclaimer:   Much of this encounter note is an electronic transcription/translation of spoken language to printed text. The electronic translation of spoken language may permit erroneous, or at times, nonsensical words or phrases to be inadvertently transcribed; Although I have reviewed the note for such errors, some may still exist.

## 2019-07-30 ENCOUNTER — PATIENT OUTREACH (OUTPATIENT)
Dept: CASE MANAGEMENT | Facility: OTHER | Age: 54
End: 2019-07-30

## 2019-07-30 ENCOUNTER — HOSPITAL ENCOUNTER (EMERGENCY)
Facility: HOSPITAL | Age: 54
Discharge: HOME | End: 2019-07-30
Attending: EMERGENCY MEDICINE | Admitting: EMERGENCY MEDICINE

## 2019-07-30 VITALS
OXYGEN SATURATION: 99 % | RESPIRATION RATE: 18 BRPM | HEART RATE: 71 BPM | DIASTOLIC BLOOD PRESSURE: 60 MMHG | TEMPERATURE: 98.8 F | SYSTOLIC BLOOD PRESSURE: 138 MMHG

## 2019-07-30 DIAGNOSIS — R53.83 FATIGUE, UNSPECIFIED TYPE: ICD-10-CM

## 2019-07-30 DIAGNOSIS — R10.9 RIGHT FLANK PAIN: Primary | ICD-10-CM

## 2019-07-30 LAB
ALBUMIN SERPL-MCNC: 4.1 G/DL (ref 3.5–5.2)
ALBUMIN/GLOB SERPL: 1.3 G/DL
ALP SERPL-CCNC: 75 U/L (ref 39–117)
ALT SERPL W P-5'-P-CCNC: 10 U/L (ref 1–33)
ANION GAP SERPL CALCULATED.3IONS-SCNC: 12.3 MMOL/L (ref 5–15)
AST SERPL-CCNC: 14 U/L (ref 1–32)
BASOPHILS # BLD AUTO: 0.05 10*3/MM3 (ref 0–0.2)
BASOPHILS NFR BLD AUTO: 0.6 % (ref 0–1.5)
BILIRUB SERPL-MCNC: 0.2 MG/DL (ref 0.2–1.2)
BILIRUB UR QL STRIP: NEGATIVE
BUN BLD-MCNC: 10 MG/DL (ref 6–20)
BUN/CREAT SERPL: 14.1 (ref 7–25)
CALCIUM SPEC-SCNC: 9.3 MG/DL (ref 8.6–10.5)
CHLORIDE SERPL-SCNC: 96 MMOL/L (ref 98–107)
CLARITY UR: CLEAR
CO2 SERPL-SCNC: 25.7 MMOL/L (ref 22–29)
COLOR UR: YELLOW
CREAT BLD-MCNC: 0.71 MG/DL (ref 0.57–1)
DEPRECATED RDW RBC AUTO: 41.2 FL (ref 37–54)
EOSINOPHIL # BLD AUTO: 0.36 10*3/MM3 (ref 0–0.4)
EOSINOPHIL NFR BLD AUTO: 4.2 % (ref 0.3–6.2)
ERYTHROCYTE [DISTWIDTH] IN BLOOD BY AUTOMATED COUNT: 11.9 % (ref 12.3–15.4)
GFR SERPL CREATININE-BSD FRML MDRD: 85 ML/MIN/1.73
GLOBULIN UR ELPH-MCNC: 3.1 GM/DL
GLUCOSE BLD-MCNC: 84 MG/DL (ref 65–99)
GLUCOSE UR STRIP-MCNC: NEGATIVE MG/DL
HCT VFR BLD AUTO: 39.6 % (ref 34–46.6)
HGB BLD-MCNC: 12.8 G/DL (ref 12–15.9)
HGB UR QL STRIP.AUTO: NEGATIVE
IMM GRANULOCYTES # BLD AUTO: 0.02 10*3/MM3 (ref 0–0.05)
IMM GRANULOCYTES NFR BLD AUTO: 0.2 % (ref 0–0.5)
KETONES UR QL STRIP: NEGATIVE
LEUKOCYTE ESTERASE UR QL STRIP.AUTO: NEGATIVE
LYMPHOCYTES # BLD AUTO: 2.96 10*3/MM3 (ref 0.7–3.1)
LYMPHOCYTES NFR BLD AUTO: 34.4 % (ref 19.6–45.3)
MCH RBC QN AUTO: 30.3 PG (ref 26.6–33)
MCHC RBC AUTO-ENTMCNC: 32.3 G/DL (ref 31.5–35.7)
MCV RBC AUTO: 93.6 FL (ref 79–97)
MONOCYTES # BLD AUTO: 0.97 10*3/MM3 (ref 0.1–0.9)
MONOCYTES NFR BLD AUTO: 11.3 % (ref 5–12)
NEUTROPHILS # BLD AUTO: 4.25 10*3/MM3 (ref 1.7–7)
NEUTROPHILS NFR BLD AUTO: 49.3 % (ref 42.7–76)
NITRITE UR QL STRIP: NEGATIVE
PH UR STRIP.AUTO: <=5 [PH] (ref 4.5–8)
PLATELET # BLD AUTO: 220 10*3/MM3 (ref 140–450)
PMV BLD AUTO: 9.7 FL (ref 6–12)
POTASSIUM BLD-SCNC: 4.5 MMOL/L (ref 3.5–5.2)
PROT SERPL-MCNC: 7.2 G/DL (ref 6–8.5)
PROT UR QL STRIP: NEGATIVE
RBC # BLD AUTO: 4.23 10*6/MM3 (ref 3.77–5.28)
SODIUM BLD-SCNC: 134 MMOL/L (ref 136–145)
SP GR UR STRIP: <=1.005 (ref 1–1.03)
TSH SERPL DL<=0.05 MIU/L-ACNC: 2.26 MIU/ML (ref 0.27–4.2)
UROBILINOGEN UR QL STRIP: NORMAL
WBC NRBC COR # BLD: 8.61 10*3/MM3 (ref 3.4–10.8)

## 2019-07-30 PROCEDURE — 80053 COMPREHEN METABOLIC PANEL: CPT | Performed by: PHYSICIAN ASSISTANT

## 2019-07-30 PROCEDURE — 99284 EMERGENCY DEPT VISIT MOD MDM: CPT | Performed by: EMERGENCY MEDICINE

## 2019-07-30 PROCEDURE — 81003 URINALYSIS AUTO W/O SCOPE: CPT | Performed by: EMERGENCY MEDICINE

## 2019-07-30 PROCEDURE — 25010000002 NALOXONE PER 1 MG: Performed by: PHYSICIAN ASSISTANT

## 2019-07-30 PROCEDURE — 84443 ASSAY THYROID STIM HORMONE: CPT | Performed by: PHYSICIAN ASSISTANT

## 2019-07-30 PROCEDURE — 85025 COMPLETE CBC W/AUTO DIFF WBC: CPT | Performed by: PHYSICIAN ASSISTANT

## 2019-07-30 PROCEDURE — 99284 EMERGENCY DEPT VISIT MOD MDM: CPT

## 2019-07-30 RX ORDER — NALOXONE HCL 0.4 MG/ML
0.4 VIAL (ML) INJECTION ONCE
Status: COMPLETED | OUTPATIENT
Start: 2019-07-30 | End: 2019-07-30

## 2019-07-30 RX ORDER — SODIUM CHLORIDE 0.9 % (FLUSH) 0.9 %
10 SYRINGE (ML) INJECTION AS NEEDED
Status: DISCONTINUED | OUTPATIENT
Start: 2019-07-30 | End: 2019-07-30 | Stop reason: HOSPADM

## 2019-07-30 RX ORDER — NALOXONE HCL 0.4 MG/ML
0.4 VIAL (ML) INJECTION
Status: DISCONTINUED | OUTPATIENT
Start: 2019-07-30 | End: 2019-07-30

## 2019-07-30 RX ADMIN — NALOXONE HYDROCHLORIDE 0.4 MG: 0.4 INJECTION, SOLUTION INTRAMUSCULAR; INTRAVENOUS; SUBCUTANEOUS at 14:18

## 2019-07-30 NOTE — OUTREACH NOTE
Care Coordination Note    Communication with ED care manager update of the following:   Patient seen in ED 2 times in June.   Anxiety and HA primary complaints.   No history of CA.     Libby Hollins RN    7/30/2019, 12:30 PM

## 2019-08-15 ENCOUNTER — OFFICE VISIT (OUTPATIENT)
Dept: PAIN MEDICINE | Facility: CLINIC | Age: 54
End: 2019-08-15

## 2019-08-15 VITALS
SYSTOLIC BLOOD PRESSURE: 187 MMHG | BODY MASS INDEX: 29.81 KG/M2 | WEIGHT: 174.6 LBS | TEMPERATURE: 97.7 F | OXYGEN SATURATION: 98 % | HEIGHT: 64 IN | DIASTOLIC BLOOD PRESSURE: 72 MMHG | RESPIRATION RATE: 16 BRPM | HEART RATE: 76 BPM

## 2019-08-15 DIAGNOSIS — M70.61 GREATER TROCHANTERIC BURSITIS OF RIGHT HIP: ICD-10-CM

## 2019-08-15 DIAGNOSIS — M46.1 SACROILIITIS (HCC): ICD-10-CM

## 2019-08-15 DIAGNOSIS — M47.816 ARTHROPATHY OF LUMBAR FACET JOINT: ICD-10-CM

## 2019-08-15 DIAGNOSIS — R40.0 DAYTIME SOMNOLENCE: Primary | ICD-10-CM

## 2019-08-15 DIAGNOSIS — Z79.899 ENCOUNTER FOR LONG-TERM (CURRENT) USE OF HIGH-RISK MEDICATION: ICD-10-CM

## 2019-08-15 DIAGNOSIS — M93.1 KIENBOCK DISEASE, ADULT: ICD-10-CM

## 2019-08-15 DIAGNOSIS — G89.29 OTHER CHRONIC PAIN: Primary | ICD-10-CM

## 2019-08-15 PROCEDURE — 99214 OFFICE O/P EST MOD 30 MIN: CPT | Performed by: NURSE PRACTITIONER

## 2019-08-15 RX ORDER — OXYCODONE AND ACETAMINOPHEN 7.5; 325 MG/1; MG/1
1 TABLET ORAL 2 TIMES DAILY PRN
Qty: 60 TABLET | Refills: 0 | Status: SHIPPED | OUTPATIENT
Start: 2019-08-15 | End: 2019-09-12 | Stop reason: SDUPTHER

## 2019-08-15 RX ORDER — OXYCODONE HYDROCHLORIDE 30 MG/1
30 TABLET, FILM COATED, EXTENDED RELEASE ORAL EVERY 12 HOURS SCHEDULED
Qty: 60 TABLET | Refills: 0 | Status: SHIPPED | OUTPATIENT
Start: 2019-08-15 | End: 2019-09-12

## 2019-08-15 RX ORDER — NALOXONE HYDROCHLORIDE 0.4 MG/ML
0.4 INJECTION, SOLUTION INTRAMUSCULAR; INTRAVENOUS; SUBCUTANEOUS
Qty: 1 ML | Refills: 1 | Status: SHIPPED | OUTPATIENT
Start: 2019-08-15

## 2019-08-15 NOTE — PROGRESS NOTES
"CHIEF COMPLAINT  F/U back pain- patient states that her pain fluctuates with ambulation. She states that she is having increased pain in her right hip and bilateral knees.     Subjective   Crystal Cruz is a 53 y.o. female  who presents to the office for follow-up.She has a history of chronic back and joint pain. Reports her pain is worse since last office visit.    Complains of pain in her low back, right hip and bilateral knees. Today her pain is 5/10VAS. Describes the pain as continuous aching, throbbing with intermittent sharp pain. Pain increases with walking, standing, activity, kneeling; pain decreases with medication, rest and procedures.  Continues with OxyContin 30 mg BID and Percocet 7.5/325 3-4/day, Cymbalta 60 mg and gabapentin 600 mg TID. Temporarily took tizanidine. Denies any side effects from the regimen.  The regimen helps decrease her pain by 50%. Notes improvement in activity and function with regimen. ADL's by self.  Denies any bowel or bladder changes.     She also has questions about why the sides of her knees are sore. She states there are two bone areas sticking out on the outside that are sore to touch. She wants to know what is wrong with her bones. I reviewed obtaining xray to make sure nothing acute. Patient refused. Discussed consideration for PT. Patient states \"I'm poor and can't do that.\"    She completed a Right L4-S3 RFL and right GTB   on  5-31-19 performed by Dr. HAJI for management of LOW BACK AND HIP PAIN. Patient reports 70% ONGOING relief from the procedure.     WE reviewed at length her recent ER visit on 7-30-19. SHe went there for concern for having a kidney stone. This was ruled out. While there, patient had episodes of desaturation and difficulty with arousal. Her O2 was in the low 80's per report. The provider ordered Narcan. She became less somnolent and her saturation was up to mid 90's. I reviewed how this is objective information that may make us worry about " "opioid induced respiratory depression.  She states \"I didn't overdose.\" I reviewed with her that although she did not technically overdose, these were signs that pointed to the opioid induced respiratory depression, which is a very grave concern, especially with the amount of opioids she is. We also reviewed she is on xanax 2 mg TID. SHe is prescribed this by Dr Dill. She states she wants to get off this.  She states she tried to come off this years ago but had \"grand mal seizures.\" I told her she needed to discuss the discontinuation of xanax with the person prescribing this. She was upset but verbalized understanding. When I reviewed that Dr Law had told me we needed to decrease her pain medication, she became upset but verbalized understanding. Her  was supportive of decreasing her pain medication. 'I've been on pain medication since I was 14.\" Reviewed this clinic would be doing what is medically and ethically appropriate for the patient, as well as what was safe for her. Expressed we have grave concerns about her current opioid level. She is currently at 135 mg MED. Goal would be to move down to at least 90 MED. HOwever, if her pain improves with lower doses of opioids, concern for hyperalgesia. Reviewed potential for hyperalgesia extensively. She says she doesn't believe it's real.  SHe asks \"what if I have DT's or grand mal seizures.?\" I reviewed she would not be having DT's due to her not abusing alcohol, which is what it is associated with. Reviewed I don't anticipate her having grand mal seizures. She had stated she had this previously when she stopped Xanax. Reviewed she was still having opioids and would not have full withdrawal.     Extremity Pain    The pain is present in the neck, back, left wrist, left hand, left foot, right foot, right hand, right wrist, right fingers and left fingers. This is a chronic problem. The current episode started more than 1 year ago. There has been no " history of extremity trauma. The problem occurs constantly. Progression since onset: worse from last office visit. The quality of the pain is described as aching, pounding and burning. The pain is at a severity of 5/10. The pain is moderate. Pertinent negatives include no fever or numbness (L side post CVA). The symptoms are aggravated by cold (moist/rainy weather). She has tried acetaminophen, heat, movement, NSAIDS, OTC ointments, OTC pain meds, oral narcotics and rest (Oxycontin 30 mg Q12 hours, Oxycodone 7.5/325 4/day) for the symptoms. The treatment provided moderate relief. mitchell, systemic sclerosis   Back Pain   This is a chronic problem. The current episode started more than 1 month ago. The problem occurs constantly. Progression since onset: worse since last office visit. The pain is present in the lumbar spine. The quality of the pain is described as aching. The pain does not radiate. The pain is at a severity of 5/10. The pain is moderate. The symptoms are aggravated by bending, position, lying down, standing and twisting. Associated symptoms include headaches (occocc) and weakness (left side post CVA). Pertinent negatives include no abdominal pain, bladder incontinence, bowel incontinence, chest pain, dysuria, fever or numbness (L side post CVA). She has tried analgesics, heat and bed rest (lumbar FJN/RFL--- significant long-term relief) for the symptoms.      ER evaluation 7-30-19      PEG Assessment   What number best describes your pain on average in the past week?6  What number best describes how, during the past week, pain has interfered with your enjoyment of life?9  What number best describes how, during the past week, pain has interfered with your general activity?  9    The following portions of the patient's history were reviewed and updated as appropriate: allergies, current medications, past family history, past medical history, past social history, past surgical history and problem  "list.    Review of Systems   Constitutional: Positive for activity change (inc) and fatigue (with flaters of fybromyalgia). Negative for fever.   HENT: Negative for congestion.    Eyes: Negative for visual disturbance.   Respiratory: Negative for chest tightness and shortness of breath.    Cardiovascular: Negative for chest pain.   Gastrointestinal: Negative for abdominal pain, bowel incontinence, constipation and diarrhea.   Genitourinary: Negative for bladder incontinence, difficulty urinating and dysuria.   Musculoskeletal: Positive for back pain. Negative for neck pain.   Neurological: Positive for dizziness, weakness (left side post CVA), light-headedness and headaches (occocc). Negative for numbness (L side post CVA).   Psychiatric/Behavioral: Positive for agitation and sleep disturbance. Negative for suicidal ideas. The patient is nervous/anxious.        Vitals:    08/15/19 0928   BP: (!) 187/72   Pulse: 76   Resp: 16   Temp: 97.7 °F (36.5 °C)   SpO2: 98%   Weight: 79.2 kg (174 lb 9.6 oz)   Height: 162.6 cm (64\")   PainSc:   5   PainLoc: Back  Comment: hip     Objective   Physical Exam   Constitutional: She is oriented to person, place, and time. She appears well-developed and well-nourished. She is cooperative.   HENT:   Head: Normocephalic and atraumatic.   Eyes: Lids are normal.   Neck: Trachea normal.   Cardiovascular: Normal rate.   Pulmonary/Chest: Effort normal.   Musculoskeletal:        Right hip: She exhibits tenderness.        Right knee: She exhibits decreased range of motion. Tenderness found.        Left knee: She exhibits decreased range of motion. Tenderness found.        Thoracic back: She exhibits tenderness and spasm.        Lumbar back: She exhibits tenderness and bony tenderness (mild T12-L3 tenderness).        Right hand: She exhibits decreased range of motion and tenderness. She exhibits normal capillary refill.        Left hand: She exhibits decreased range of motion and tenderness. She " exhibits normal capillary refill.   Arthritic changes bilateral hands and wrists  mild tenderness of right SI, mild of left SI  mild tenderness of right greater trochanteric bursa   Neurological: She is alert and oriented to person, place, and time. Gait normal.   Non-focal   Skin: Skin is warm, dry and intact.   Psychiatric: She has a normal mood and affect. Her speech is normal and behavior is normal. Cognition and memory are normal.   Nursing note and vitals reviewed.      Assessment/Plan   Crystal was seen today for back pain.    Diagnoses and all orders for this visit:    Other chronic pain    Arthropathy of lumbar facet joint    Kienbock disease, adult    Sacroiliitis (CMS/HCC)    Greater trochanteric bursitis of right hip  -     Case Request    Encounter for long-term (current) use of high-risk medication      --- The urine drug screen confirmation from 6-19-19 has been reviewed and the result is APPROPRIATE based on patient history and STEVEN report  --- Refill OxyContin but decrease percocet 7.5/325 BID PRN. Discussed medication with the patient.  Included in this discussion was the potential for side effects and adverse events.  Patient verbalized understanding and wished to proceed.  Prescription will be sent to pharmacy. (decreasing from 135 MED to approximately 110-115 MED)  --- Patient declined xrays of knees.  --- Patient refused PT.  --- Right greater trochanteric bursa injection with Dr Law. Reviewed the procedure at length with the patient.  Included in the review was expectations, complications, risk and benefits.The procedure was described in detail and the risks, benefits and alternatives were discussed with the patient (including but not limited to: bleeding, infection, nerve damage, worsening of pain, inability to perform injection, paralysis, seizures, and death) who agreed to proceed.  Discussed the potential for sedation if warranted/wanted.  The procedure will plan to be performed at  [facility] with fluoroscopic guidance(unless ultrasound is indicated). Questions were answered and in a way the patient could understand.  Patient verbalized understanding and wishes to proceed.  This intervention will be ordered.  Discussed with patient that all procedures are part of a multimodal plan of care and include either formal PT or a home exercise program.    --- Follow-up 1 month or sooner if needed WITH DR HAJI to determin long-term plan of care.       STEVEN REPORT    As part of the patient's treatment plan, I am prescribing controlled substances. The patient has been made aware of appropriate use of such medications, including potential risk of somnolence, limited ability to drive and/or work safely, and the potential for dependence or overdose. It has also bee made clear that these medications are for use by this patient only, without concomitant use of alcohol or other substances unless prescribed.     Patient has completed prescribing agreement detailing terms of continued prescribing of controlled substances, including monitoring STEVEN reports, urine drug screening, and pill counts if necessary. The patient is aware that inappropriate use will results in cessation of prescribing such medications.    STEVEN report has been reviewed and scanned into the patient's chart.    As the clinician, I personally reviewed the STEVEN from 8-12-19 while the patient was in the office today.    History and physical exam exhibit continued safe and appropriate use of controlled substances.      EMR Dragon/Transcription disclaimer:   Much of this encounter note is an electronic transcription/translation of spoken language to printed text. The electronic translation of spoken language may permit erroneous, or at times, nonsensical words or phrases to be inadvertently transcribed; Although I have reviewed the note for such errors, some may still exist.

## 2019-08-15 NOTE — TELEPHONE ENCOUNTER
Seen in office today. As you requested, I have started to decrease Percocet. Refill OxyContin but decrease Percocet 7.5/325 BID from QID. I have had her schedule follow-up with you to further discuss. She is not happy with this plan. I reviewed objectively how her O2 sats were in low 80's and after narcan went to mid 90's.

## 2019-08-21 RX ORDER — GABAPENTIN 600 MG/1
TABLET ORAL
Qty: 270 TABLET | Refills: 0 | Status: SHIPPED | OUTPATIENT
Start: 2019-08-21 | End: 2019-11-17 | Stop reason: SDUPTHER

## 2019-08-26 ENCOUNTER — TELEPHONE (OUTPATIENT)
Dept: PAIN MEDICINE | Facility: CLINIC | Age: 54
End: 2019-08-26

## 2019-08-26 ENCOUNTER — PATIENT OUTREACH (OUTPATIENT)
Dept: CASE MANAGEMENT | Facility: OTHER | Age: 54
End: 2019-08-26

## 2019-08-26 NOTE — OUTREACH NOTE
Patient Outreach Note    Unable to contact patient x3. Will forward follow up to Dr. Celeste Dill office. Patient has a PCP appointment on 9/4/19.     Libby Hollins RN    8/26/2019, 4:04 PM

## 2019-08-26 NOTE — TELEPHONE ENCOUNTER
Patient called and stated that she has a procedure on Thursday. She states that it has been difficult to walk and was wondering if you can add something for her low back.

## 2019-08-27 DIAGNOSIS — M46.1 SACROILIAC INFLAMMATION (HCC): Primary | ICD-10-CM

## 2019-08-27 NOTE — TELEPHONE ENCOUNTER
More on the right than the left. She states it is right above her buttocks and goes across into her hips.

## 2019-08-27 NOTE — TELEPHONE ENCOUNTER
Ok. Going to add right SI joint injection with GTB. Of course, DR Law will see Thursday and can change if needed. Thanks. BB

## 2019-08-29 ENCOUNTER — OUTSIDE FACILITY SERVICE (OUTPATIENT)
Dept: PAIN MEDICINE | Facility: CLINIC | Age: 54
End: 2019-08-29

## 2019-08-29 ENCOUNTER — DOCUMENTATION (OUTPATIENT)
Dept: PAIN MEDICINE | Facility: CLINIC | Age: 54
End: 2019-08-29

## 2019-08-29 PROCEDURE — 20610 DRAIN/INJ JOINT/BURSA W/O US: CPT | Performed by: ANESTHESIOLOGY

## 2019-08-29 PROCEDURE — 27096 INJECT SACROILIAC JOINT: CPT | Performed by: ANESTHESIOLOGY

## 2019-08-29 NOTE — PROGRESS NOTES
Unilateral Sacroiliac Joint Injection + Greater Trochanteric Hip Bursa injection under fluoroscopic guidance    [facility]        PREOPERATIVE DIAGNOSIS:   Sacroiliac joint dysfunction on the Right as well as greater trochanteric hip bursitis on the Right     POSTOPERATIVE DIAGNOSIS:  Same as preop     PROCEDURE:  Sacroiliac Joint Injection with fluoroscopic guidance, plus greater trochanteric bursa injection, under fluoroscopic guidance as well, on the aforementioned laterality      PRE-PROCEDURE DISCUSSION WITH PATIENT:    Risks and complications were discussed with the patient prior to starting the procedure and informed consent was obtained.  We discussed various topics including but not limited to bleeding, infection, injury, postprocedural site soreness, painful flareup, worsening of clinical picture, paralysis, coma, and death.      SURGEON:  Reji Law MD     REASON FOR PROCEDURE:    Patient has pain consistent with SI pathology on history and physical exam. Positive sacroiliac provocation maneuvers noted.   Hip bursitis is flared up also.     SEDATION:  Versed 4mg & Fentanyl 100 mcg IV  ANESTHETIC AGENT:  Marcaine 0.5%  STEROID AGENT:  80mg DepoMedrol split between the SI joint injection and hip bursa     DESCRIPTON OF PROCEDURE:  After obtaining informed consent, IV access was obtained in the preoperative area.  The patient was transported to the operative suite and placed in the prone position with a pillow under the pelvic area. EKG, blood pressure, and pulse oximeter were monitored.     A solution was prepared of 5ml of Marcaine 0.5% and 80mg Depomedrol, for a total of 6ml.    The lumbosacral area was prepped with Chloraprep and draped in a sterile fashion. Under fluoroscopic guidance the inferior most portion of the aforementioned SI joint was identified. The overlying skin and subcutaneous tissue was anesthetized with 1% lidocaine. A 22-gauge spinal needle was introduced from  the inferior most portion of the joint into the SI joint under fluoroscopic guidance in the AP dimension with slight oblique rotation to the contralateral side.  Aspiration was negative.  After confirming the position of the needle with fluoroscopy, 1 mL of Omnipaque was injected and after seeing appropriate spread into the joint a total of 3mL of 0.5% Marcaine, with approximately 40 mg of DepoMedrol, was injected very slowly.  Needle was removed intact.  Vital signs remained stable.  The onset of analgesia was noted.      Area over the greater trochanter on the aforementioned side was palpated and marked on the skin and then cleansed with Hibiclens ×2. A sterile needle was inserted about 1 & 1/2 inches in to the skin and into the bursa, under fluoro guidance, with periosteum contact over the greater trochanter and the needle was withdrawn about 2 mm into the bursa. Aspiration was negative and slowly after confirming bursa spread with omnipaque on each side, the rest of the injectate syringe was injected into the hip bursa.   The needle was removed intact on each side and bleeding was minimal. The insertion site was dressed with a Band-Aid.  There were no apparent complications.       ESTIMATED BLOOD LOSS:  None  SPECIMENS:  None  COMPLICATIONS:   No complications were noted., There was no indication of vascular uptake on live injection of contrast dye. and The patient did not have any signs of postprocedure numbness nor weakness.     TOLERANCE & DISCHARGE CONDITION:    The patient tolerated the procedure well.  The patient was transported to the recovery area without difficulties.  The patient was discharged to home under the care of family in stable and satisfactory condition.     PLAN OF CARE:  1. The patient was given our standard instruction sheet and will resume all medications as per the medication reconciliation sheet.  2. The patient will Return to clinic 2 wks  3. The patient is instructed to keep a pain  log hourly for 8 hours after the procedure.

## 2019-09-06 ENCOUNTER — OFFICE VISIT (OUTPATIENT)
Dept: SLEEP MEDICINE | Facility: HOSPITAL | Age: 54
End: 2019-09-06

## 2019-09-06 VITALS
DIASTOLIC BLOOD PRESSURE: 72 MMHG | SYSTOLIC BLOOD PRESSURE: 135 MMHG | WEIGHT: 169 LBS | BODY MASS INDEX: 28.85 KG/M2 | HEIGHT: 64 IN | HEART RATE: 68 BPM

## 2019-09-06 DIAGNOSIS — G47.30 OBSERVED SLEEP APNEA: Primary | ICD-10-CM

## 2019-09-06 DIAGNOSIS — E11.9 CONTROLLED TYPE 2 DIABETES MELLITUS WITHOUT COMPLICATION, UNSPECIFIED WHETHER LONG TERM INSULIN USE (HCC): ICD-10-CM

## 2019-09-06 DIAGNOSIS — I10 HYPERTENSION, UNSPECIFIED TYPE: ICD-10-CM

## 2019-09-06 DIAGNOSIS — R06.83 SNORING: ICD-10-CM

## 2019-09-06 DIAGNOSIS — M34.9 SYSTEMIC SCLEROSIS (HCC): ICD-10-CM

## 2019-09-06 PROCEDURE — G0463 HOSPITAL OUTPT CLINIC VISIT: HCPCS

## 2019-09-06 PROCEDURE — 99204 OFFICE O/P NEW MOD 45 MIN: CPT | Performed by: INTERNAL MEDICINE

## 2019-09-06 RX ORDER — ZOLPIDEM TARTRATE 5 MG/1
5 TABLET ORAL TAKE AS DIRECTED
Qty: 2 TABLET | Refills: 0 | Status: SHIPPED | OUTPATIENT
Start: 2019-09-06 | End: 2019-12-05

## 2019-09-06 NOTE — PROGRESS NOTES
[facility]  [address]  [address]  [place] [address]  Phone   Fax       Crystal Cruz  1965  53 y.o.  female      PCP:Celeste Dill MD  Referring: Reji Law MD    Type of service: Initial consult  Date of service: 9/6/2019      Chief Complaint   Patient presents with   • Sleep Apnea   • Snoring   • Daytime Sleepiness   • Fatigue       History of present illness;  Thank you for asking to see Crystal Cruz, 53 y.o.  for evaluation of sleep apnea. The patient was seen today on 9/6/2019 at [facility].  Patient has a very complicated medical history.  She has scleroderma and chronic pain for which she is taking multiple medications in addition she has insomnia.  She had a sleep study couple of years ago which showed desaturation and sleep apnea and did not get treated.  She also reports that when they did the sleep study they used a regular finger oximetry probe and she always has low oxygen because of the poor circulation due to scleroderma.  She turns and tosses in the bed because of pain.  She was also at night owl, only goes to bed around 3- 4 AM and wakes up around 2 PM.     Patient gives the following sleep history.  Sleep schedule:  Bedtime: 3 4 AM  Wake time: 2 PM   Normally takes about about 2 to 3 hours to fall asleep  Average hours of sleep 6-8  Number of naps per day none  When patient wakes up still feels tired and not rested  Snoring yes  Witnessed apneas yes  Have you ever awakened gasping for breath, coughing, choking: Yes      Past Medical History:   Diagnosis Date   • Acid reflux    • Anemia    • Anxiety    • Atherosclerosis of aorta (CMS/[facility]) 12/19/2017   • Huitron's esophagus    • Bursitis    • CAD (coronary artery disease)    • Chronic pain disorder    • CVA (cerebrovascular accident) (CMS/[facility])    • DDD (degenerative disc disease), cervical    • Diabetes mellitus (CMS/[facility])    • Fibromyalgia    • Hard to  intubate    • History of transfusion    • Hypertension    • Joint pain    • Kienbock's disease    • Low back pain    • Pancreatitis    • Pulmonary fibrosis (CMS/HCC) 2016   • Reflux gastritis    • Scleroderma (CMS/HCC)    • Seizure (CMS/HCC)        Social history:  Shift work: No   Tobacco use: Unfortunately still smokes cigarettes  Alcohol use: No  Caffeinated drinks: 2 to 3 cups of coffee  Over-the-counter sleeping aid and medications: No  Narcotic medications: Yes    Family Hx  Family history of sleep apnea negative  Family History   Problem Relation Age of Onset   • Other Mother         Cardiac Disorder   • Migraines Mother    • Heart disease Mother    • Sudden death Mother    • Other Father         Cardiac Disorder   • Hypertension Father    • Heart disease Father    • Cancer Father    • Hypertension Sister    • Cancer Sister    • Migraines Daughter    • Cancer Other         Uncle   • Migraines Maternal Aunt    • Stroke Maternal Grandmother    • Stroke Maternal Grandfather        Medications: reviewed    Current Outpatient Medications:   •  ALPRAZolam (XANAX) 2 MG tablet, Take 2 mg by mouth 3 (Three) Times a Day As Needed for anxiety., Disp: , Rfl:   •  aMILoride (MIDAMOR) 5 MG tablet, Take 5 mg by mouth Daily., Disp: , Rfl:   •  aspirin 81 MG EC tablet, Take 1 tablet by mouth daily., Disp: 30 tablet, Rfl: 6  •  calcium citrate-vitamin d (CITRACAL) 200-250 MG-UNIT tablet tablet, Take 1 tablet by mouth Daily., Disp: , Rfl:   •  docusate sodium (COLACE) 100 MG capsule, Take 1 capsule by mouth 2 (Two) Times a Day., Disp: 60 capsule, Rfl: 0  •  DULoxetine (CYMBALTA) 60 MG capsule, Take 1 capsule by mouth Daily., Disp: 30 capsule, Rfl: 0  •  Evolocumab 140 MG/ML solution auto-injector, Inject 140 mg under the skin into the appropriate area as directed., Disp: , Rfl:   •  fluconazole (DIFLUCAN) 100 MG tablet, Take 1 tablet by mouth Daily., Disp: 14 tablet, Rfl: 0  •  furosemide (LASIX) 20 MG tablet, Take 20 mg by  "mouth Daily As Needed., Disp: , Rfl: 0  •  gabapentin (NEURONTIN) 600 MG tablet, TAKE ONE TABLET BY MOUTH THREE TIMES A DAY, Disp: 270 tablet, Rfl: 0  •  insulin glargine (LANTUS) 100 UNIT/ML injection, INJECT 35 UNITS INTO THE SKIN DAILY FOR 90 DAYS, Disp: , Rfl:   •  Insulin Syringe 31G X 5/16\" 1 ML misc, Use twice daily for shots, Disp: 100 each, Rfl: 3  •  LACTOBACILLUS EXTRA STRENGTH capsule, Take 1 capsule by mouth Daily., Disp: 30 each, Rfl: 6  •  levETIRAcetam (KEPPRA) 500 MG tablet, Take 1 tablet by mouth Every 12 (Twelve) Hours., Disp: 60 tablet, Rfl: 0  •  lidocaine (LIDODERM) 5 %, Place 1 patch on the skin as directed by provider Daily. Remove & Discard patch within 12 hours or as directed by MD, Disp: 30 each, Rfl: 4  •  lisinopril (PRINIVIL,ZESTRIL) 20 MG tablet, TAKE ONE TABLET BY MOUTH DAILY, Disp: , Rfl:   •  metFORMIN (GLUCOPHAGE) 1000 MG tablet, Take 1,000 mg by mouth 2 (Two) Times a Day With Meals., Disp: , Rfl:   •  metoprolol tartrate (LOPRESSOR) 25 MG tablet, Take 20 mg by mouth Daily., Disp: , Rfl:   •  Naloxone HCl (NARCAN) 0.4 MG/ML injection, Infuse 1 mL into a venous catheter Every 5 (Five) Minutes As Needed for Opioid Reversal., Disp: 1 mL, Rfl: 1  •  omeprazole (priLOSEC) 20 MG capsule, Take 1 capsule by mouth 2 (Two) Times a Day., Disp: 180 capsule, Rfl: 3  •  oxyCODONE HCl ER (oxyCONTIN) 30 MG tablet extended-release 12 hour, Take 1 tablet by mouth Every 12 (Twelve) Hours., Disp: 60 tablet, Rfl: 0  •  oxyCODONE-acetaminophen (PERCOCET) 7.5-325 MG per tablet, Take 1 tablet by mouth 2 (Two) Times a Day As Needed for Severe Pain ., Disp: 60 tablet, Rfl: 0  •  Prenatal Multivit-Min-Fe-FA (PRENATAL VITAMINS) 0.8 MG tablet, Prenatal Vitamins with Minerals 28 mg iron-800 mcg tablet  Take by oral route., Disp: , Rfl:   •  Prenatal Vit-Fe Fumarate-FA (PRENATAL VITAMIN 27-0.8) 27-0.8 MG tablet tablet, take 1 tablet by mouth once daily, Disp: , Rfl: 0  •  PROAIR  (90 Base) MCG/ACT " "inhaler, Inhale 2 puffs 2 (Two) Times a Day As Needed., Disp: , Rfl: 0  •  Probiotic Product (PROBIOTIC-10 PO), Probiotic 10 billion cell capsule, Disp: , Rfl:   •  promethazine (PHENERGAN) 25 MG tablet, take 1 tablet by mouth every 4 hours if needed for nausea, Disp: , Rfl: 0  •  tiZANidine (ZANAFLEX) 2 MG tablet, Take 1 tablet by mouth 2 (Two) Times a Day As Needed for Muscle Spasms., Disp: 60 tablet, Rfl: 1  •  tiZANidine (ZANAFLEX) 2 MG tablet, Take 2 mg by mouth., Disp: , Rfl:   •  traZODone (DESYREL) 100 MG tablet, , Disp: , Rfl:   •  zolpidem (AMBIEN) 5 MG tablet, Take 1 tablet by mouth Take As Directed for 2 doses. Bring the medication to the sleep lab. DO NOT USE AT HOME, Disp: 2 tablet, Rfl: 0    Review of systems:  [place] Sleepiness Scale: Total score: 3   Positive for snoring, witnessed apneas, fatigue and daytime excessive sleepiness,   Negative for shortness of breath, cough, wheezing, chest pain, nausea and vomiting, palpitation, swelling of feet:    Morning headaches: No  Awaken with sore throat or dry mouth : Yes  Leg jerking at night: Yes  Crawly feeling/urge sensation to move in the legs: No  Teeth grinding: No  Sleepwalking, nightmares, muscle weakness with laughing or anger,sleep paralysis: No  Nasal Congestion: No  Nocturia (how many times/night): 2  Memory Problem: Yes    Physical exam:  Vitals:    09/06/19 1100   BP: 135/72   Pulse: 68   Weight: 76.7 kg (169 lb)   Height: 162.6 cm (64\")    Body mass index is 29.01 kg/m². Neck Circumference: 14 inches  HEENT: Head is atraumatic, normocephalic   Eyes:pupils are round equal and reacting to light and accommodation, conjunctiva normal  Nose:no nasal septal defects or deviation and the nasal passages are clear, no nasal polyps,   Throat: tonsils are not enlarged, tongue normal size, oral airway Mallampati class 3  NECK: No lymphadenopathy, trachea is in the midline, thyroid not enlarged  RESPIRATORY SYSTEM: Breath sounds are equal on both sides, " there are no wheezes or crackles  CARDIOVASULAR SYSTEM: Heart sounds are regular rhythm and alvin rate, there are no murmurs or thrills  ABDOMEN: Soft, no hepatosplenomegaly, no evidence of ascites  EXTREMITES: No cyanosis, clubbing or edema   NEUROLOGICAL SYSTEM: Oriented x 3, no gross neurological defects, gait normal    Medical records and labs reviewed in [facility] reviewed    Assessment and plan:  · Sleep apnea unspecified, (G47.30) Patient's symptoms and examination is consistent with sleep apnea.  I have talked to the patient about the signs and symptoms of sleep apnea and consequences of untreated sleep apnea. Discussed sleep testing.  I have placed a order in epic for a in lab Split night sleep test.  Patient will have a follow-up after this sleep test is done.  Also I have made special arrangements for her to have a ear probe for oximetry.  I also given a prescription for Ambien for 2 tablets to be brought to the sleep center for sleep study.  · Overweight, patient's BMI is Body mass index is 29.01 kg/m².. I have discussed the relationship between weight and sleep apnea. Weight reduction is encouraged.  I will also discussed with the patient diet and exercise.  · Snoring secondary to sleep apnea  · Delayed sleep phase syndrome, with the patient is not working and able to cope up with waking up late and it is not interfering with her life.  I have talked briefly about the syndrome and no need for correcting her circadian rhythm  · Insomnia patient also gives a history of insomnia due to multiple reasons mainly pain and discomfort and using narcotics.  · History of scleroderma  · History of seizures recently  · History of tobacco smoking          Nicky Santana MD, [facility]  Sleep Medicine.(Board-certified)  [facility]  9/6/2019 ,

## 2019-09-09 ENCOUNTER — TELEPHONE (OUTPATIENT)
Dept: PAIN MEDICINE | Facility: CLINIC | Age: 54
End: 2019-09-09

## 2019-09-09 NOTE — TELEPHONE ENCOUNTER
She needs to be seen by Dr Law for long term plans of care....due to recent hospitalization and use of Narcan.

## 2019-09-09 NOTE — TELEPHONE ENCOUNTER
Called patient to see if she could come in today since you might be out next week. She cannot make it today, states that her and her  have been fighting high blood pressure and she doesn't want to risk driving out here today. I tried to r/s her with [facility] but there are not appts available on 9/16. She will be due for her meds on 9/16 and wants to know if those could be called in and r/s her for one month?  oxyCODONE HCl ER (oxyCONTIN) 30 MG tablet extended-release 12 hour,  oxyCODONE-acetaminophen (PERCOCET) 7.5-325 MG per tablet

## 2019-09-12 ENCOUNTER — OFFICE VISIT (OUTPATIENT)
Dept: PAIN MEDICINE | Facility: CLINIC | Age: 54
End: 2019-09-12

## 2019-09-12 ENCOUNTER — DOCUMENTATION (OUTPATIENT)
Dept: SLEEP MEDICINE | Facility: HOSPITAL | Age: 54
End: 2019-09-12

## 2019-09-12 ENCOUNTER — HOSPITAL ENCOUNTER (OUTPATIENT)
Dept: SLEEP MEDICINE | Facility: HOSPITAL | Age: 54
Discharge: HOME | End: 2019-09-12
Admitting: INTERNAL MEDICINE

## 2019-09-12 VITALS
SYSTOLIC BLOOD PRESSURE: 158 MMHG | HEART RATE: 89 BPM | TEMPERATURE: 98.4 F | HEIGHT: 64 IN | DIASTOLIC BLOOD PRESSURE: 76 MMHG | BODY MASS INDEX: 29.37 KG/M2 | RESPIRATION RATE: 16 BRPM | WEIGHT: 172 LBS | OXYGEN SATURATION: 96 %

## 2019-09-12 DIAGNOSIS — R06.83 SNORING: ICD-10-CM

## 2019-09-12 DIAGNOSIS — M34.9 SYSTEMIC SCLEROSIS (HCC): ICD-10-CM

## 2019-09-12 DIAGNOSIS — G47.30 OBSERVED SLEEP APNEA: ICD-10-CM

## 2019-09-12 DIAGNOSIS — M70.61 GREATER TROCHANTERIC BURSITIS OF RIGHT HIP: ICD-10-CM

## 2019-09-12 DIAGNOSIS — M47.816 ARTHROPATHY OF LUMBAR FACET JOINT: ICD-10-CM

## 2019-09-12 DIAGNOSIS — E11.9 CONTROLLED TYPE 2 DIABETES MELLITUS WITHOUT COMPLICATION, UNSPECIFIED WHETHER LONG TERM INSULIN USE (HCC): ICD-10-CM

## 2019-09-12 DIAGNOSIS — M46.1 SACROILIITIS (HCC): Primary | ICD-10-CM

## 2019-09-12 DIAGNOSIS — Z79.899 ENCOUNTER FOR LONG-TERM (CURRENT) USE OF HIGH-RISK MEDICATION: ICD-10-CM

## 2019-09-12 DIAGNOSIS — I10 HYPERTENSION, UNSPECIFIED TYPE: ICD-10-CM

## 2019-09-12 PROCEDURE — 95810 POLYSOM 6/> YRS 4/> PARAM: CPT

## 2019-09-12 PROCEDURE — 99213 OFFICE O/P EST LOW 20 MIN: CPT | Performed by: PHYSICAL MEDICINE & REHABILITATION

## 2019-09-12 RX ORDER — OXYCODONE HCL 20 MG/1
20 TABLET, FILM COATED, EXTENDED RELEASE ORAL 2 TIMES DAILY
Qty: 60 TABLET | Refills: 0 | Status: SHIPPED | OUTPATIENT
Start: 2019-09-16 | End: 2019-10-09 | Stop reason: SDUPTHER

## 2019-09-12 RX ORDER — OXYCODONE AND ACETAMINOPHEN 7.5; 325 MG/1; MG/1
1 TABLET ORAL 2 TIMES DAILY PRN
Qty: 60 TABLET | Refills: 0 | Status: SHIPPED | OUTPATIENT
Start: 2019-09-12 | End: 2019-10-09 | Stop reason: SDUPTHER

## 2019-09-12 NOTE — PROGRESS NOTES
S/W Respiratory and they did not have an ear probe but they are providing either a nose probe or head probe to use.

## 2019-09-12 NOTE — PROGRESS NOTES
CHIEF COMPLAINT  F/U back pain- Unilateral Sacroiliac Joint Injection + Greater Trochanteric Hip Bursa injection under fluoroscopic guidance- patient states that her pain was improved 95% and is still working.       Subjective HPI  Crystal Cruz is a 53 y.o. female  who presents to the office for follow-up of procedure.  She completed a Unilateral Sacroiliac Joint Injection + Greater Trochanteric Hip Bursa injection under fluoroscopic guidance on  8/29/2019 performed by Dr. Law for management of SI joint dysfunction and greater trochanteric hip bursitis. Patient reports 80-90% relief at the SI joint and 60% relief at the GT from the procedure. She rates her pain at 2/10 today. She reports that her xanax has been decreased from 6mg to 4mg.     X-ray right hip 8/22/2018: negative right hip.    PEG Assessment   What number best describes your pain on average in the past week?3  What number best describes how, during the past week, pain has interfered with your enjoyment of life?2  What number best describes how, during the past week, pain has interfered with your general activity?  2      The following portions of the patient's history were reviewed and updated as appropriate: allergies, current medications, past family history, past medical history, past social history, past surgical history and problem list.    Review of Systems   Constitutional: Positive for activity change (inc) and fatigue (with flaters of fybromyalgia). Negative for fever.   HENT: Negative for congestion.    Eyes: Negative for visual disturbance.   Respiratory: Negative for chest tightness and shortness of breath.    Cardiovascular: Negative for chest pain.   Gastrointestinal: Positive for constipation. Negative for abdominal pain and diarrhea.   Genitourinary: Negative for difficulty urinating and dysuria.   Musculoskeletal: Positive for back pain. Negative for neck pain.   Neurological: Positive for dizziness, weakness (left side post  "CVA) and light-headedness. Negative for numbness (L side post CVA) and headaches (occocc).   Psychiatric/Behavioral: Positive for sleep disturbance. Negative for agitation and suicidal ideas. The patient is nervous/anxious.        Vitals:    09/12/19 1237   BP: 158/76   Pulse: 89   Resp: 16   Temp: 98.4 °F (36.9 °C)   SpO2: 96%   Weight: 78 kg (172 lb)   Height: 162.6 cm (64\")   PainSc:   2   PainLoc: Back         Objective   Physical Exam   Constitutional: She is oriented to person, place, and time. She appears well-developed and well-nourished.   HENT:   Head: Normocephalic and atraumatic.   Eyes: EOM are normal.   PER   Neck: Neck supple.   Cardiovascular: Normal rate, regular rhythm and normal heart sounds.   No murmur heard.  Pulmonary/Chest: Effort normal and breath sounds normal. No respiratory distress. She has no wheezes.   Abdominal: Soft. Bowel sounds are normal. There is no tenderness.   Musculoskeletal:   Back NTTP. Right hip TTP over and surrounding greater trochanter.  Full ROM with flexion, extension and S to S bending.   Neurological: She is alert and oriented to person, place, and time. She displays no atrophy.   Reflex Scores:       Patellar reflexes are 2+ on the right side and 2+ on the left side.       Achilles reflexes are 2+ on the right side and 2+ on the left side.  SILT BLE  MMT: Strength 5/5 BLE.   Skin: Skin is warm and dry.   Psychiatric: She has a normal mood and affect. Her behavior is normal.   Vitals reviewed.      Assessment/Plan   Crystal was seen today for back pain.    Diagnoses and all orders for this visit:    Sacroiliitis (CMS/HCC)    Greater trochanteric bursitis of right hip    Arthropathy of lumbar facet joint    Encounter for long-term (current) use of high-risk medication    Other orders  -     oxyCODONE ER (oxyCONTIN) 20 MG 12 hr tablet; Take 1 tablet by mouth 2 (Two) Times a Day.  -     oxyCODONE-acetaminophen (PERCOCET) 7.5-325 MG per tablet; Take 1 tablet by mouth 2 " (Two) Times a Day As Needed for Severe Pain .  -     Cancel: XR hip w or wo pelvis 2-3 view right; Future    Decrease Oxycodone ER to 20mg BID.  Continue oxycodone-acet 7.5/325 BID prn    --- Follow-up 2 months.         STEVEN REPORT    As part of the patient's treatment plan, I am prescribing controlled substances. The patient has been made aware of appropriate use of such medications, including potential risk of somnolence, limited ability to drive and/or work safely, and the potential for dependence or overdose. It has also bee made clear that these medications are for use by this patient only, without concomitant use of alcohol or other substances unless prescribed.     Patient has completed prescribing agreement detailing terms of continued prescribing of controlled substances, including monitoring STEVEN reports, urine drug screening, and pill counts if necessary. The patient is aware that inappropriate use will results in cessation of prescribing such medications.    STEVEN report has been reviewed and scanned into the patient's chart.    As the clinician, I personally reviewed the STEVEN from 9/10/2019 while the patient was in the office today.    History and physical exam exhibit continued safe and appropriate use of controlled substances.       EMR Dragon/Transcription disclaimer:   Much of this encounter note is an electronic transcription/translation of spoken language to printed text. The electronic translation of spoken language may permit erroneous, or at times, nonsensical words or phrases to be inadvertently transcribed; Although I have reviewed the note for such errors, some may still exist.

## 2019-10-09 ENCOUNTER — OFFICE VISIT (OUTPATIENT)
Dept: PAIN MEDICINE | Facility: CLINIC | Age: 54
End: 2019-10-09

## 2019-10-09 VITALS
BODY MASS INDEX: 29.53 KG/M2 | HEIGHT: 64 IN | WEIGHT: 173 LBS | DIASTOLIC BLOOD PRESSURE: 87 MMHG | SYSTOLIC BLOOD PRESSURE: 167 MMHG | RESPIRATION RATE: 16 BRPM | HEART RATE: 81 BPM | TEMPERATURE: 97 F

## 2019-10-09 DIAGNOSIS — Z79.899 ENCOUNTER FOR LONG-TERM (CURRENT) USE OF HIGH-RISK MEDICATION: ICD-10-CM

## 2019-10-09 DIAGNOSIS — M70.61 GREATER TROCHANTERIC BURSITIS OF RIGHT HIP: ICD-10-CM

## 2019-10-09 DIAGNOSIS — M92.219 JUVENILE OSTEOCHONDROSIS OF CARPAL LUNATE, UNSPECIFIED LATERALITY: ICD-10-CM

## 2019-10-09 DIAGNOSIS — G89.29 OTHER CHRONIC PAIN: Primary | ICD-10-CM

## 2019-10-09 DIAGNOSIS — M47.816 ARTHROPATHY OF LUMBAR FACET JOINT: ICD-10-CM

## 2019-10-09 PROCEDURE — 99214 OFFICE O/P EST MOD 30 MIN: CPT | Performed by: NURSE PRACTITIONER

## 2019-10-09 RX ORDER — AMLODIPINE BESYLATE 2.5 MG/1
2.5 TABLET ORAL 4 TIMES DAILY PRN
COMMUNITY
Start: 2019-09-17 | End: 2020-02-10 | Stop reason: HOSPADM

## 2019-10-09 RX ORDER — OXYCODONE AND ACETAMINOPHEN 7.5; 325 MG/1; MG/1
1 TABLET ORAL 2 TIMES DAILY PRN
Qty: 60 TABLET | Refills: 0 | Status: SHIPPED | OUTPATIENT
Start: 2019-10-09 | End: 2019-11-06 | Stop reason: SDUPTHER

## 2019-10-09 RX ORDER — OXYCODONE HCL 20 MG/1
20 TABLET, FILM COATED, EXTENDED RELEASE ORAL 2 TIMES DAILY
Qty: 60 TABLET | Refills: 0 | Status: SHIPPED | OUTPATIENT
Start: 2019-10-09 | End: 2019-11-02

## 2019-10-09 RX ORDER — IRBESARTAN 300 MG/1
300 TABLET ORAL DAILY
Status: ON HOLD | COMMUNITY
Start: 2019-09-10 | End: 2020-02-10

## 2019-10-09 NOTE — PROGRESS NOTES
"CHIEF COMPLAINT  F/U back pain- patient states that her pain has remained the same. She states that her arm, wrist and her hip pain has worsened.     Subjective   Crystal Cruz is a 53 y.o. female  who presents to the office for follow-up.She has a history of chronic back and joint pain. REports her pain has worsened since last office visit.    Complains of pain in her low back and joints. Today her pain is 4/10VAS. Describes her pain as continuous aching and throbbing. Pain increases with walking, standing, activity; pain decreases with medication, rest and procedure. She is currently taking OxyContin 20 mg BID and Percocet 7.5/325mg BID, as well as Cymbalta 60 mg and gabapentin 600 mg TID. Denies any side effects from the regimen.  The regimen helps decrease her pain by 20-30%. States multiple times that her quality of life has decreased since decreasing the pain medication. ADL's by self.  Denies any bowel or bladder changes.     \"I never want steroids again.\" \"You tell me to get up and exercise and the steroids but the steroids tear down my tendons and stuff. It's counter-intuitive.\"    \"I am offended that you sent in a prescription of Narcan to my pharmacy.\"    Has been able to wean down on Xanax 6mg to 4 mg. \"My primary care doctor told me not to wean down any more because of my anxiety about my pain medicines being weaned down.\"    She is upset also today because she was under the assumption she could be seen every other month if her daily morphine equivalent was below 90 mg/day. I reviewed with her multiple times today, as I did in her last office visit with myself, that patients on MED of 60 mg and no Benzo/ambien could be seen bi-monthly. If a patient is on anything greater than 60 mg MED and/or on an opioid and benzo, they will be on monthly follow-ups. She is upset about this. She is also upset stating her quality of life has decreased. I reviewed she is still at 90 mg MED. If she did not feel this " "was sufficient, she could seek a second opinion elsewhere. I reviewed multiple times that it is important to keep her alive and do what is safest for her. She states multiple times that while at the hospital, her hands were cold, and that would have given an erroneous reading of her O2. I reviewed I was not going to argue about the hospital. She had Narcan while there and we needed to work to make sure to keep her alive, in the safest way possible, while also treating her pain. She also then states we're not helping her hand pain. I reviewed we had no interventions for her hands. She states \"The management used to help.\" I asked \"the pain medication?\" She replied affirmatively.  I reviewed she was again still receiving 90 mg MED. She states it wasn't enough to help her hands. I reviewed she could absolutely follow-up with Dr Dill if she was dissatisfied with her care. She could also seek a second opinion. I again reviewed my goal was to treat her pain but to also make sure she stays alive.     Extremity Pain    The pain is present in the neck, back, left wrist, left hand, left foot, right foot, right hand, right wrist, right fingers and left fingers. This is a chronic problem. The current episode started more than 1 year ago. There has been no history of extremity trauma. The problem occurs constantly. Progression since onset: worse from last office visit. The quality of the pain is described as aching, pounding and burning. The pain is at a severity of 4/10. The pain is moderate. Pertinent negatives include no fever or numbness (L side post CVA). The symptoms are aggravated by cold (moist/rainy weather). She has tried acetaminophen, heat, movement, NSAIDS, OTC ointments, OTC pain meds, oral narcotics and rest (Oxycontin 30 mg Q12 hours, Oxycodone 7.5/325 4/day) for the symptoms. The treatment provided moderate relief. mitchell, systemic sclerosis   Back Pain   This is a chronic problem. The current episode " started more than 1 month ago. The problem occurs constantly. Progression since onset: worse since last office visit. The pain is present in the lumbar spine. The quality of the pain is described as aching. The pain does not radiate. The pain is at a severity of 5/10. The pain is moderate. The symptoms are aggravated by bending, position, lying down, standing and twisting. Associated symptoms include weakness (left side post CVA). Pertinent negatives include no abdominal pain, bladder incontinence, bowel incontinence, chest pain, dysuria, fever, headaches (occ) or numbness (L side post CVA). She has tried analgesics, heat and bed rest (lumbar FJN/RFL--- significant long-term relief) for the symptoms.      She completed a Right L4-S3 RFL and right GTB   on  5-31-19 performed by Dr. HAJI for management of LOW BACK AND HIP PAIN. Patient reports 70% ONGOING relief from the procedure.     Procedure List  --- 8-29-19-- right SI and right GTB  --- 5-31-19-- Right L4-S2 RFL  --- 3-26-19-- right SI and right GTB  --- 2-5-19-- bilateral T12-L3 RFL  --- 11-6-18-- bilateral T12-L3 MBB  --- 9-11-18-- TF BURT right L5  --- 7-10-18-- right SI  --- 5-22-18 LESI L4-5  --- 5-10-18-- LESI L4-5  --- 2-20-18-- bilateral L1-L4 RFL    PEG Assessment   What number best describes your pain on average in the past week?4  What number best describes how, during the past week, pain has interfered with your enjoyment of life?4  What number best describes how, during the past week, pain has interfered with your general activity?  4    The following portions of the patient's history were reviewed and updated as appropriate: allergies, current medications, past family history, past medical history, past social history, past surgical history and problem list.    Review of Systems   Constitutional: Positive for activity change (inc) and fatigue (with flaters of fybromyalgia). Negative for fever.   HENT: Negative for congestion.    Eyes: Negative  "for visual disturbance.   Respiratory: Negative for chest tightness and shortness of breath.    Cardiovascular: Negative for chest pain.   Gastrointestinal: Positive for constipation. Negative for abdominal pain, bowel incontinence and diarrhea.   Genitourinary: Negative for bladder incontinence, difficulty urinating and dysuria.   Musculoskeletal: Positive for back pain. Negative for neck pain.   Neurological: Positive for dizziness, weakness (left side post CVA) and light-headedness. Negative for numbness (L side post CVA) and headaches (occ).   Psychiatric/Behavioral: Positive for agitation and sleep disturbance. Negative for suicidal ideas. The patient is nervous/anxious.        Vitals:    10/09/19 0928   BP: 167/87   Pulse: 81   Resp: 16   Temp: 97 °F (36.1 °C)   Weight: 78.5 kg (173 lb)   Height: 162.6 cm (64\")   PainSc:   4   PainLoc: Back     Objective   Physical Exam   Constitutional: She is oriented to person, place, and time. She appears well-developed and well-nourished. She is cooperative.   HENT:   Head: Normocephalic and atraumatic.   Eyes: Lids are normal.   Neck: Trachea normal.   Cardiovascular: Normal rate.   Pulmonary/Chest: Effort normal.   Musculoskeletal:        Right hip: She exhibits tenderness.        Right knee: She exhibits decreased range of motion. Tenderness found.        Left knee: She exhibits decreased range of motion. Tenderness found.        Thoracic back: She exhibits tenderness and spasm.        Lumbar back: She exhibits tenderness and bony tenderness (mild T12-L3 tenderness).        Right hand: She exhibits decreased range of motion and tenderness. She exhibits normal capillary refill.        Left hand: She exhibits decreased range of motion and tenderness. She exhibits normal capillary refill.   Arthritic changes bilateral hands and wrists   Neurological: She is alert and oriented to person, place, and time. Gait abnormal.   Non-focal   Skin: Skin is warm, dry and intact. "   Psychiatric: Her speech is normal. Her affect is angry. She is agitated.   Nursing note and vitals reviewed.    Assessment/Plan   Crystal was seen today for back pain.    Diagnoses and all orders for this visit:    Other chronic pain    Arthropathy of lumbar facet joint    Juvenile osteochondrosis of carpal lunate, unspecified laterality    Greater trochanteric bursitis of right hip    Encounter for long-term (current) use of high-risk medication      --- The urine drug screen confirmation from 6-9-19 has been reviewed and the result is APPROPRIATE based on patient history and STEVEN report  --- Refill OxyContin 20 mg BID and Percocet 7.5/325 BID. Patient appears stable with current regimen. No adverse effects. Regarding continuation of opioids, there is no evidence of aberrant behavior or any red flags.  The patient continues with appropriate response to opioid therapy. ADL's remain intact by self.   --- Reviewed at length expectations of this clinic. Reviewed protocols. Invited patient to seek a second opinion. She refused.   --- Reviewed policy related to home NArcan when patient has a history of overdose and received Narcan while at hospital. Also reviewed black box warning for opioids and Benzodiazapenes. Patient verbalized understanding.   --- Follow-up 1 month or sooner if needed.       STEVEN REPORT    As part of the patient's treatment plan, I am prescribing controlled substances. The patient has been made aware of appropriate use of such medications, including potential risk of somnolence, limited ability to drive and/or work safely, and the potential for dependence or overdose. It has also bee made clear that these medications are for use by this patient only, without concomitant use of alcohol or other substances unless prescribed.     Patient has completed prescribing agreement detailing terms of continued prescribing of controlled substances, including monitoring STEVEN reports, urine drug screening,  and pill counts if necessary. The patient is aware that inappropriate use will results in cessation of prescribing such medications.    STEVEN report has been reviewed and scanned into the patient's chart.    As the clinician, I personally reviewed the STEVEN from 10-8-19 while the patient was in the office today.    History and physical exam exhibit continued safe and appropriate use of controlled substances.      EMR Dragon/Transcription disclaimer:   Much of this encounter note is an electronic transcription/translation of spoken language to printed text. The electronic translation of spoken language may permit erroneous, or at times, nonsensical words or phrases to be inadvertently transcribed; Although I have reviewed the note for such errors, some may still exist.

## 2019-10-17 ENCOUNTER — TELEPHONE (OUTPATIENT)
Dept: PAIN MEDICINE | Facility: CLINIC | Age: 54
End: 2019-10-17

## 2019-10-17 NOTE — TELEPHONE ENCOUNTER
I attempted a PA for pt on 10/15/19 for oxycodone ER 20mg, it was denied 10/15/19, I appealed and it was denied. The medicare D formularies are morphine ER, nucynta ER, oxycontin CR, and methadone HCL. I've called and explained this information to pt. Pt verbalized understanding. What would you like to do as far as ordering her another medication? Please advise. Thank you.

## 2019-10-18 NOTE — TELEPHONE ENCOUNTER
I have no idea what the difference between oxycontin CR and ER is... Could you ask them what they mean by this?  That would be the most logical decision

## 2019-11-01 ENCOUNTER — APPOINTMENT (OUTPATIENT)
Dept: SLEEP MEDICINE | Facility: HOSPITAL | Age: 54
End: 2019-11-01

## 2019-11-01 ENCOUNTER — TELEPHONE (OUTPATIENT)
Dept: PAIN MEDICINE | Facility: CLINIC | Age: 54
End: 2019-11-01

## 2019-11-01 NOTE — TELEPHONE ENCOUNTER
I submitted a PA for oxycodone ER for pt on 10/15/16, pt insurance denied it, I appealed it and they denied the appeal. I called her insurance company and they told me that oxycontin CR is on formulary until 12/31/19. I called her pharmacy and they told me you have to change the order to Oxycontin CR in order for it to be covered and filled by insurance until the end of the year. Then in January we can submit another PA. Could you change it and send it to her [facility] pharmacy? Thank you.

## 2019-11-02 RX ORDER — OXYCODONE HCL 20 MG/1
20 TABLET, FILM COATED, EXTENDED RELEASE ORAL EVERY 12 HOURS
Qty: 60 TABLET | Refills: 0 | Status: SHIPPED | OUTPATIENT
Start: 2019-11-02 | End: 2019-11-06 | Stop reason: SDUPTHER

## 2019-11-02 NOTE — TELEPHONE ENCOUNTER
There is no Oxycontin CR or Oxycodone CR in the Epic database.  I picked something different, hopefully that is right.  In any event I do not want to give her the immediate release oxycodone in that dose.

## 2019-11-04 ENCOUNTER — TELEPHONE (OUTPATIENT)
Dept: PAIN MEDICINE | Facility: CLINIC | Age: 54
End: 2019-11-04

## 2019-11-04 NOTE — TELEPHONE ENCOUNTER
Called pt pharmacy, they said Rx for oxycontin 20mg needs to say brand name only for pt to have $0 copay, then pt would have no problem filling. Pt filled last Rx on 10/12/19 so it's too early to fill right now, but pharmacy aware and will fill her on 11/12/19. I gave pharmacy verbal order from you to fill brand name only per previous conversation, pharmacy said if they have any problems they will call us on 11/12/19. Called pt to let her know.

## 2019-11-06 ENCOUNTER — OFFICE VISIT (OUTPATIENT)
Dept: PAIN MEDICINE | Facility: CLINIC | Age: 54
End: 2019-11-06

## 2019-11-06 VITALS
RESPIRATION RATE: 20 BRPM | OXYGEN SATURATION: 96 % | DIASTOLIC BLOOD PRESSURE: 74 MMHG | HEART RATE: 82 BPM | WEIGHT: 174.2 LBS | SYSTOLIC BLOOD PRESSURE: 150 MMHG | TEMPERATURE: 97.5 F | HEIGHT: 64 IN | BODY MASS INDEX: 29.74 KG/M2

## 2019-11-06 DIAGNOSIS — Z79.899 ENCOUNTER FOR LONG-TERM (CURRENT) USE OF HIGH-RISK MEDICATION: ICD-10-CM

## 2019-11-06 DIAGNOSIS — M47.816 ARTHROPATHY OF LUMBAR FACET JOINT: ICD-10-CM

## 2019-11-06 DIAGNOSIS — M92.219 JUVENILE OSTEOCHONDROSIS OF CARPAL LUNATE, UNSPECIFIED LATERALITY: ICD-10-CM

## 2019-11-06 DIAGNOSIS — G89.29 OTHER CHRONIC PAIN: Primary | ICD-10-CM

## 2019-11-06 PROCEDURE — 99214 OFFICE O/P EST MOD 30 MIN: CPT | Performed by: NURSE PRACTITIONER

## 2019-11-06 RX ORDER — ALPRAZOLAM 2 MG/1
TABLET ORAL
COMMUNITY
Start: 2019-10-20 | End: 2019-12-05

## 2019-11-06 RX ORDER — OMEPRAZOLE 20 MG/1
CAPSULE, DELAYED RELEASE ORAL
COMMUNITY
Start: 2019-08-28 | End: 2019-12-05

## 2019-11-06 RX ORDER — HYDROXYZINE 50 MG/1
25-50 TABLET, FILM COATED ORAL
COMMUNITY
Start: 2019-11-01 | End: 2019-12-01

## 2019-11-06 RX ORDER — AMLODIPINE BESYLATE 2.5 MG/1
2.5 TABLET ORAL DAILY
COMMUNITY
Start: 2019-11-04 | End: 2019-12-05

## 2019-11-06 RX ORDER — TRAZODONE HYDROCHLORIDE 100 MG/1
TABLET ORAL
COMMUNITY
Start: 2019-10-23 | End: 2019-12-05

## 2019-11-06 RX ORDER — BUSPIRONE HYDROCHLORIDE 10 MG/1
10 TABLET ORAL
Status: ON HOLD | COMMUNITY
Start: 2019-09-04 | End: 2020-02-10

## 2019-11-06 RX ORDER — FUROSEMIDE 20 MG/1
TABLET ORAL
COMMUNITY
Start: 2019-09-16 | End: 2019-12-05

## 2019-11-06 RX ORDER — LEVETIRACETAM 500 MG/1
TABLET ORAL
COMMUNITY
Start: 2019-10-16 | End: 2020-02-10 | Stop reason: HOSPADM

## 2019-11-06 RX ORDER — DOCUSATE SODIUM 100 MG/1
CAPSULE, LIQUID FILLED ORAL
Status: ON HOLD | COMMUNITY
Start: 2019-10-02 | End: 2020-02-10

## 2019-11-06 RX ORDER — GABAPENTIN 600 MG/1
TABLET ORAL 3 TIMES DAILY
COMMUNITY
Start: 2019-08-21

## 2019-11-06 RX ORDER — DULOXETIN HYDROCHLORIDE 60 MG/1
60 CAPSULE, DELAYED RELEASE ORAL DAILY
COMMUNITY
Start: 2019-11-01 | End: 2019-12-05

## 2019-11-06 RX ORDER — NYSTATIN 100000 U/G
CREAM TOPICAL 3 TIMES DAILY
COMMUNITY
Start: 2019-10-11 | End: 2019-11-10

## 2019-11-06 RX ORDER — OXYCODONE AND ACETAMINOPHEN 7.5; 325 MG/1; MG/1
1 TABLET ORAL 2 TIMES DAILY PRN
Qty: 60 TABLET | Refills: 0 | Status: SHIPPED | OUTPATIENT
Start: 2019-11-06 | End: 2019-12-05 | Stop reason: SDUPTHER

## 2019-11-06 RX ORDER — OXYCODONE HCL 20 MG/1
TABLET, FILM COATED, EXTENDED RELEASE ORAL
COMMUNITY
Start: 2019-09-12 | End: 2019-11-06 | Stop reason: SDUPTHER

## 2019-11-06 RX ORDER — OXYCODONE HCL 20 MG/1
20 TABLET, FILM COATED, EXTENDED RELEASE ORAL EVERY 12 HOURS
Qty: 60 TABLET | Refills: 0 | Status: SHIPPED | OUTPATIENT
Start: 2019-11-06 | End: 2019-12-05 | Stop reason: SDUPTHER

## 2019-11-06 NOTE — PROGRESS NOTES
"CHIEF COMPLAINT  F/U back pain- patient states that her pain has remained the same. She states that her arm, wrist and her hip pain from scleroderma has worsened.      Subjective   Crystal Cruz is a 53 y.o. female  who presents to the office for follow-up.She has a history of chronic back pain which she reports is unchanged since last office visit. She reports her hand and joint pain from scleroderma is worse since last office visit.    Complains of pain in her joints and low back. Today her pain is 2/10. Describes her pain as continuous aching and throbbing. Pain increases with standing, activity, cold weather; pain decreases with medication, rest and procedures. walking, standing, activity; pain decreases with medication, rest and procedure. She is currently taking OxyContin 20 mg BID and Percocet 7.5/325mg BID, as well as Cymbalta 60 mg and gabapentin 600 mg TID. Denies any side effects from the regimen.  The regimen helps decrease her pain by 30%. ADL's by self.  Denies any bowel or bladder changes.     She states her right hip is really hurting her, where \"he usually puts the shots but I don't want steroids.\"     Has been working on decreasing Xanax. Is now down to 2.5 mg/day. By next week, plans to be down to 2 mg/day. She also tried to stop Cymbalta. She had serious side effects from stopping this.  \"my  thinks i'm doing great.\"     She completed a Right L4-S3 RFL and right GTB   on  5-31-19 performed by Dr. HAJI for management of LOW BACK AND HIP PAIN. Patient reports 70% ONGOING relief from the procedure.     Declines referral to rheumatologist.   IS supposed to follow-up with neurology for history of stroke.     Extremity Pain    The pain is present in the neck, back, left wrist, left hand, left foot, right foot, right hand, right wrist, right fingers and left fingers. This is a chronic problem. The current episode started more than 1 year ago. There has been no history of extremity trauma. " The problem occurs constantly. Progression since onset: worse from last office visit. The quality of the pain is described as aching, pounding and burning. The pain is at a severity of 2/10. The pain is moderate. Pertinent negatives include no fever or numbness (L side post CVA). The symptoms are aggravated by cold (moist/rainy weather). She has tried acetaminophen, heat, movement, NSAIDS, OTC ointments, OTC pain meds, oral narcotics and rest (Oxycontin 30 mg Q12 hours, Oxycodone 7.5/325 4/day) for the symptoms. The treatment provided moderate relief. mitchell systemic sclerosis   Back Pain   This is a chronic problem. The current episode started more than 1 month ago. The problem occurs constantly. Progression since onset: unchanged since last office visit. The pain is present in the lumbar spine. The quality of the pain is described as aching. The pain does not radiate. The pain is at a severity of 2/10. The pain is moderate. The symptoms are aggravated by bending, position, lying down, standing and twisting. Associated symptoms include headaches (occ) and weakness (left side post CVA). Pertinent negatives include no abdominal pain, bladder incontinence, bowel incontinence, chest pain, dysuria, fever or numbness (L side post CVA). She has tried analgesics, heat and bed rest (lumbar FJN/RFL--- significant long-term relief) for the symptoms.      PEG Assessment   What number best describes your pain on average in the past week?5  What number best describes how, during the past week, pain has interfered with your enjoyment of life?6  What number best describes how, during the past week, pain has interfered with your general activity?  6    The following portions of the patient's history were reviewed and updated as appropriate: allergies, current medications, past family history, past medical history, past social history, past surgical history and problem list.    Review of Systems   Constitutional: Positive for  "activity change (dec) and fatigue (with flares of fybromyalgia). Negative for fever.   HENT: Negative for congestion.    Eyes: Negative for visual disturbance.   Respiratory: Negative for chest tightness and shortness of breath.    Cardiovascular: Negative for chest pain.   Gastrointestinal: Positive for constipation (occ, on linzess). Negative for abdominal pain, bowel incontinence and diarrhea.   Genitourinary: Negative for bladder incontinence, difficulty urinating and dysuria.   Musculoskeletal: Positive for arthralgias (right hip, hands, wrists) and back pain. Negative for gait problem and neck pain.   Allergic/Immunologic: Negative for immunocompromised state.   Neurological: Positive for weakness (left side post CVA) and headaches (occ). Negative for dizziness, light-headedness and numbness (L side post CVA).   Psychiatric/Behavioral: Positive for agitation and sleep disturbance. Negative for suicidal ideas. The patient is nervous/anxious.        Vitals:    11/06/19 0933   BP: 150/74   Pulse: 82   Resp: 20   Temp: 97.5 °F (36.4 °C)   SpO2: 96%   Weight: 79 kg (174 lb 3.2 oz)   Height: 162.6 cm (64\")   PainSc:   2   PainLoc: Back     Objective   Physical Exam   Constitutional: She is oriented to person, place, and time. She appears well-developed and well-nourished. She is cooperative.   HENT:   Head: Normocephalic and atraumatic.   Eyes: Lids are normal.   Neck: Trachea normal.   Cardiovascular: Normal rate.   Pulmonary/Chest: Effort normal.   Musculoskeletal:        Right hip: She exhibits tenderness.        Right knee: She exhibits decreased range of motion. Tenderness found.        Left knee: She exhibits decreased range of motion. Tenderness found.        Thoracic back: She exhibits tenderness and spasm.        Lumbar back: She exhibits tenderness and bony tenderness (mild T12-L3 tenderness).        Right hand: She exhibits decreased range of motion and tenderness. She exhibits normal capillary refill.    "     Left hand: She exhibits decreased range of motion and tenderness. She exhibits normal capillary refill.   Arthritic changes bilateral hands and wrists   Neurological: She is alert and oriented to person, place, and time. Gait abnormal.   Non-focal   Skin: Skin is warm, dry and intact.   Psychiatric: She has a normal mood and affect. Her speech is normal and behavior is normal.   Nursing note and vitals reviewed.      Assessment/Plan   Crystal was seen today for back pain.    Diagnoses and all orders for this visit:    Other chronic pain    Arthropathy of lumbar facet joint    Juvenile osteochondrosis of carpal lunate, unspecified laterality    Encounter for long-term (current) use of high-risk medication      --- The urine drug screen confirmation from 6-9-19 has been reviewed and the result is APPROPRIATE based on patient history and STEVEN report  --- Refill OxyContin(NBO- due to insurance) and Percocet. Patient appears stable with current regimen. No adverse effects. Regarding continuation of opioids, there is no evidence of aberrant behavior or any red flags.  The patient continues with appropriate response to opioid therapy. ADL's remain intact by self.   --- Declines repeat injections at this time.  --- Reviewed black box related to BZD's and Opioids. Patient verbalized understanding.   --- declines referral to rheumatology.  --- Follow-up 1 month or sooner if needed     STEVEN REPORT    As part of the patient's treatment plan, I am prescribing controlled substances. The patient has been made aware of appropriate use of such medications, including potential risk of somnolence, limited ability to drive and/or work safely, and the potential for dependence or overdose. It has also bee made clear that these medications are for use by this patient only, without concomitant use of alcohol or other substances unless prescribed.     Patient has completed prescribing agreement detailing terms of continued  prescribing of controlled substances, including monitoring STEVEN reports, urine drug screening, and pill counts if necessary. The patient is aware that inappropriate use will results in cessation of prescribing such medications.    STEVEN report has been reviewed and scanned into the patient's chart.    As the clinician, I personally reviewed the STEVEN from 11-5-19 while the patient was in the office today.    History and physical exam exhibit continued safe and appropriate use of controlled substances.      EMR Dragon/Transcription disclaimer:   Much of this encounter note is an electronic transcription/translation of spoken language to printed text. The electronic translation of spoken language may permit erroneous, or at times, nonsensical words or phrases to be inadvertently transcribed; Although I have reviewed the note for such errors, some may still exist.

## 2019-11-18 ENCOUNTER — TELEPHONE (OUTPATIENT)
Dept: PAIN MEDICINE | Facility: CLINIC | Age: 54
End: 2019-11-18

## 2019-11-18 RX ORDER — GABAPENTIN 600 MG/1
TABLET ORAL
Qty: 270 TABLET | Refills: 1 | Status: SHIPPED | OUTPATIENT
Start: 2019-11-18 | End: 2019-12-05

## 2019-11-18 NOTE — TELEPHONE ENCOUNTER
Pt called in stating she doesn't want her percocet 7.5mg to be decreased until the spring 2020. She sts at the last ov, it was discussed that her pain meds were going to be lowered. Please advise.

## 2019-12-02 RX ORDER — OMEPRAZOLE 20 MG/1
CAPSULE, DELAYED RELEASE ORAL
Qty: 180 CAPSULE | Refills: 2 | OUTPATIENT
Start: 2019-12-02

## 2019-12-05 ENCOUNTER — OFFICE VISIT (OUTPATIENT)
Dept: PAIN MEDICINE | Facility: CLINIC | Age: 54
End: 2019-12-05

## 2019-12-05 VITALS
WEIGHT: 172.2 LBS | TEMPERATURE: 97.7 F | OXYGEN SATURATION: 93 % | SYSTOLIC BLOOD PRESSURE: 179 MMHG | BODY MASS INDEX: 29.4 KG/M2 | DIASTOLIC BLOOD PRESSURE: 96 MMHG | HEART RATE: 95 BPM | RESPIRATION RATE: 16 BRPM | HEIGHT: 64 IN

## 2019-12-05 DIAGNOSIS — M79.601 PAIN IN BOTH UPPER EXTREMITIES: ICD-10-CM

## 2019-12-05 DIAGNOSIS — M79.602 PAIN IN BOTH UPPER EXTREMITIES: ICD-10-CM

## 2019-12-05 DIAGNOSIS — M47.816 ARTHROPATHY OF LUMBAR FACET JOINT: ICD-10-CM

## 2019-12-05 DIAGNOSIS — M70.61 GREATER TROCHANTERIC BURSITIS OF RIGHT HIP: ICD-10-CM

## 2019-12-05 DIAGNOSIS — Z79.899 ENCOUNTER FOR LONG-TERM (CURRENT) USE OF HIGH-RISK MEDICATION: ICD-10-CM

## 2019-12-05 DIAGNOSIS — G89.29 OTHER CHRONIC PAIN: Primary | ICD-10-CM

## 2019-12-05 PROCEDURE — 80305 DRUG TEST PRSMV DIR OPT OBS: CPT | Performed by: NURSE PRACTITIONER

## 2019-12-05 PROCEDURE — 99214 OFFICE O/P EST MOD 30 MIN: CPT | Performed by: NURSE PRACTITIONER

## 2019-12-05 NOTE — PROGRESS NOTES
CHIEF COMPLAINT  F/U back, bilateral hand and bilateral wrist pain- patient states that her pain has worsened since her last visit.     Subjective   Crystal Cruz is a 54 y.o. female  who presents to the office for follow-up.She has a history of chronic back and hand pain. Reports her hand pain is worse since last office visit. She associates this with cold weather.    Complains of pain in her hands, wrists and low back. Today her pain is 4/10VAS.  Describes her pain as intermittent pain. Pain worsens as day progresses and can interfere with sleep. Pain increases with weather changes; pain decreases with medication, rest. She is currently taking OxyContin 20 mg BID and Percocet 7.5/325mg BID, as well as Cymbalta 60 mg and gabapentin 600 mg TID. Denies any side effects from the regimen.  The regimen helps decrease her pain by 50-60%. ADL's by self.  Denies any bowel or bladder changes.     Is still working on decreasing Xanax. Next week, she will be down to xanax 1 mg/day for 2 weeks. Then she hopes to be down to 0.5 mg/daily for 2 weeks. Then hopes to stop after that.   She recently was prescribed buspar. She has not taken this yet. Has been having trouble with sleeping.     She completed a Right L4-S3 RFL and right GTB   on  5-31-19 performed by Dr. HAJI for management of LOW BACK AND HIP PAIN. Patient reports 70% ONGOING relief from the procedure.     ALLERGY TO NSAIDS.  Extremity Pain    The pain is present in the neck, back, left wrist, left hand, left foot, right foot, right hand, right wrist, right fingers and left fingers. This is a chronic problem. The current episode started more than 1 year ago. There has been no history of extremity trauma. The problem occurs constantly. Progression since onset: worse from last office visit. The quality of the pain is described as aching, pounding and burning. The pain is at a severity of 4/10. The pain is moderate. Pertinent negatives include no fever or numbness  (L side post CVA). The symptoms are aggravated by cold (moist/rainy weather). She has tried acetaminophen, heat, movement, NSAIDS, OTC ointments, OTC pain meds, oral narcotics and rest (Oxycontin 30 mg Q12 hours, Oxycodone 7.5/325 4/day) for the symptoms. The treatment provided moderate relief. sangsarah, systemic sclerosis   Back Pain   This is a chronic problem. The current episode started more than 1 month ago. The problem occurs constantly. Progression since onset: worse since last office visit. The pain is present in the lumbar spine. The quality of the pain is described as aching. The pain does not radiate. The pain is moderate. The symptoms are aggravated by bending, position, lying down, standing and twisting. Associated symptoms include headaches (occ) and weakness (left side post CVA). Pertinent negatives include no abdominal pain, bladder incontinence, bowel incontinence, chest pain, dysuria, fever or numbness (L side post CVA). She has tried analgesics, heat and bed rest (lumbar FJN/RFL--- significant long-term relief) for the symptoms.      PEG Assessment   What number best describes your pain on average in the past week?5  What number best describes how, during the past week, pain has interfered with your enjoyment of life?7  What number best describes how, during the past week, pain has interfered with your general activity?  7    The following portions of the patient's history were reviewed and updated as appropriate: allergies, current medications, past family history, past medical history, past social history, past surgical history and problem list.    Review of Systems   Constitutional: Positive for activity change (dec) and fatigue (with flares of fybromyalgia). Negative for fever.   HENT: Negative for congestion.    Eyes: Negative for visual disturbance.   Respiratory: Positive for shortness of breath. Negative for chest tightness.    Cardiovascular: Negative for chest pain.   Gastrointestinal:  "Positive for constipation (occ, on linzess). Negative for abdominal pain, bowel incontinence and diarrhea.   Genitourinary: Negative for bladder incontinence, difficulty urinating and dysuria.   Musculoskeletal: Positive for arthralgias (right hip, hands, wrists) and back pain. Negative for gait problem and neck pain.   Allergic/Immunologic: Negative for immunocompromised state.   Neurological: Positive for dizziness (r/t increased BP), weakness (left side post CVA) and headaches (occ). Negative for light-headedness and numbness (L side post CVA).   Psychiatric/Behavioral: Positive for agitation and sleep disturbance. Negative for suicidal ideas. The patient is nervous/anxious.        Vitals:    12/05/19 1014   BP: 179/96   Pulse: 95   Resp: 16   Temp: 97.7 °F (36.5 °C)   SpO2: 93%   Weight: 78.1 kg (172 lb 3.2 oz)   Height: 162.6 cm (64\")   PainSc:   4   PainLoc: Hand  Comment: bilateral     Objective   Physical Exam   Constitutional: She is oriented to person, place, and time. She appears well-developed and well-nourished. She is cooperative.   HENT:   Head: Normocephalic and atraumatic.   Eyes: Lids are normal.   Neck: Trachea normal.   Cardiovascular: Normal rate.   Pulmonary/Chest: Effort normal.   Musculoskeletal:        Right hip: She exhibits tenderness.        Right knee: She exhibits decreased range of motion. Tenderness found.        Left knee: She exhibits decreased range of motion. Tenderness found.        Thoracic back: She exhibits tenderness and spasm.        Lumbar back: She exhibits tenderness and bony tenderness (mild T12-L3 tenderness).        Right hand: She exhibits decreased range of motion and tenderness. She exhibits normal capillary refill.        Left hand: She exhibits decreased range of motion and tenderness. She exhibits normal capillary refill.   Arthritic changes bilateral hands and wrists   Neurological: She is alert and oriented to person, place, and time. Gait abnormal.   Non-focal "   Skin: Skin is warm, dry and intact.   Psychiatric: She has a normal mood and affect. Her speech is normal and behavior is normal.   Nursing note and vitals reviewed.      Assessment/Plan   Crystal was seen today for wrist pain.    Diagnoses and all orders for this visit:    Other chronic pain    Arthropathy of lumbar facet joint    Greater trochanteric bursitis of right hip    Pain in both upper extremities    Encounter for long-term (current) use of high-risk medication      --- Routine UDS in office today as part of monitoring requirements for controlled substances.  The specimen was viewed and the immunoassay result reviewed and is +OXY, +BZD.  This specimen will be sent to [facility] for confirmation.     --- Declines referral to rheumatology or hand specialists  --- Consider right/bilateral L4-S3 MBB of bilateral T12-L3 MBB WITHOUT STEROIDS. Patient wants to wait at this time.   --- Refill OxyContin and Percocet. Patient appears stable with current regimen. No adverse effects. Regarding continuation of opioids, there is no evidence of aberrant behavior or any red flags.  The patient continues with appropriate response to opioid therapy. ADL's remain intact by self.   --- She is anticipating changing Medicare Part D next year-- she called them and they recommended Xtampza and Hysingla ER.  --- patient repeats multiple times she does not want to be treated with steroids.  --- Congratulated on efforts weaning down Xanax.   --- Follow-up 1 month or sooner if needed     STEVEN GARCÍA    As part of the patient's treatment plan, I am prescribing controlled substances. The patient has been made aware of appropriate use of such medications, including potential risk of somnolence, limited ability to drive and/or work safely, and the potential for dependence or overdose. It has also bee made clear that these medications are for use by this patient only, without concomitant use of alcohol or other substances  unless prescribed.     Patient has completed prescribing agreement detailing terms of continued prescribing of controlled substances, including monitoring STEVEN reports, urine drug screening, and pill counts if necessary. The patient is aware that inappropriate use will results in cessation of prescribing such medications.    STEVEN report has been reviewed and scanned into the patient's chart.    As the clinician, I personally reviewed the STEVEN from 12-4-19 while the patient was in the office today.    History and physical exam exhibit continued safe and appropriate use of controlled substances.      EMR Dragon/Transcription disclaimer:   Much of this encounter note is an electronic transcription/translation of spoken language to printed text. The electronic translation of spoken language may permit erroneous, or at times, nonsensical words or phrases to be inadvertently transcribed; Although I have reviewed the note for such errors, some may still exist.

## 2019-12-06 ENCOUNTER — RESULTS ENCOUNTER (OUTPATIENT)
Dept: PAIN MEDICINE | Facility: CLINIC | Age: 54
End: 2019-12-06

## 2019-12-06 DIAGNOSIS — G89.29 OTHER CHRONIC PAIN: ICD-10-CM

## 2019-12-06 DIAGNOSIS — M70.61 GREATER TROCHANTERIC BURSITIS OF RIGHT HIP: ICD-10-CM

## 2019-12-06 DIAGNOSIS — M79.601 PAIN IN BOTH UPPER EXTREMITIES: ICD-10-CM

## 2019-12-06 DIAGNOSIS — Z79.899 ENCOUNTER FOR LONG-TERM (CURRENT) USE OF HIGH-RISK MEDICATION: ICD-10-CM

## 2019-12-06 DIAGNOSIS — M79.602 PAIN IN BOTH UPPER EXTREMITIES: ICD-10-CM

## 2019-12-06 DIAGNOSIS — M47.816 ARTHROPATHY OF LUMBAR FACET JOINT: ICD-10-CM

## 2019-12-06 RX ORDER — OXYCODONE AND ACETAMINOPHEN 7.5; 325 MG/1; MG/1
1 TABLET ORAL 2 TIMES DAILY PRN
Qty: 60 TABLET | Refills: 0 | Status: SHIPPED | OUTPATIENT
Start: 2019-12-06 | End: 2020-01-09 | Stop reason: SDUPTHER

## 2019-12-06 RX ORDER — OXYCODONE HCL 20 MG/1
20 TABLET, FILM COATED, EXTENDED RELEASE ORAL EVERY 12 HOURS
Qty: 60 TABLET | Refills: 0 | Status: SHIPPED | OUTPATIENT
Start: 2019-12-06 | End: 2020-01-09

## 2019-12-10 ENCOUNTER — TRANSCRIBE ORDERS (OUTPATIENT)
Dept: ADMINISTRATIVE | Facility: HOSPITAL | Age: 54
End: 2019-12-10

## 2019-12-10 DIAGNOSIS — Z12.31 SCREENING MAMMOGRAM, ENCOUNTER FOR: Primary | ICD-10-CM

## 2019-12-11 RX ORDER — OMEPRAZOLE 20 MG/1
CAPSULE, DELAYED RELEASE ORAL
Qty: 180 CAPSULE | Refills: 2 | OUTPATIENT
Start: 2019-12-11

## 2019-12-30 ENCOUNTER — TELEPHONE (OUTPATIENT)
Dept: PAIN MEDICINE | Facility: CLINIC | Age: 54
End: 2019-12-30

## 2019-12-30 NOTE — TELEPHONE ENCOUNTER
Patient called and asked if you would call her in a muscle relaxer. She states that she is having spasms and she is unable to sleep. Please advise.

## 2019-12-30 NOTE — TELEPHONE ENCOUNTER
Then she can't have any muscle relaxants. There is too much crossover between muscle relaxants and xanax. Sorry. Thanks. BB

## 2020-01-02 ENCOUNTER — APPOINTMENT (OUTPATIENT)
Dept: MAMMOGRAPHY | Facility: HOSPITAL | Age: 55
End: 2020-01-02

## 2020-01-09 ENCOUNTER — OFFICE VISIT (OUTPATIENT)
Dept: PAIN MEDICINE | Facility: CLINIC | Age: 55
End: 2020-01-09

## 2020-01-09 VITALS
RESPIRATION RATE: 20 BRPM | TEMPERATURE: 97.1 F | HEART RATE: 87 BPM | SYSTOLIC BLOOD PRESSURE: 144 MMHG | HEIGHT: 64 IN | OXYGEN SATURATION: 94 % | DIASTOLIC BLOOD PRESSURE: 79 MMHG | WEIGHT: 169.8 LBS | BODY MASS INDEX: 28.99 KG/M2

## 2020-01-09 DIAGNOSIS — M47.816 ARTHROPATHY OF LUMBAR FACET JOINT: ICD-10-CM

## 2020-01-09 DIAGNOSIS — M79.601 PAIN IN BOTH UPPER EXTREMITIES: ICD-10-CM

## 2020-01-09 DIAGNOSIS — M46.1 SACROILIITIS (HCC): ICD-10-CM

## 2020-01-09 DIAGNOSIS — M70.61 GREATER TROCHANTERIC BURSITIS OF RIGHT HIP: ICD-10-CM

## 2020-01-09 DIAGNOSIS — Z79.899 ENCOUNTER FOR LONG-TERM (CURRENT) USE OF HIGH-RISK MEDICATION: ICD-10-CM

## 2020-01-09 DIAGNOSIS — M79.602 PAIN IN BOTH UPPER EXTREMITIES: ICD-10-CM

## 2020-01-09 DIAGNOSIS — M19.90 ARTHRITIS: ICD-10-CM

## 2020-01-09 DIAGNOSIS — G89.29 OTHER CHRONIC PAIN: Primary | ICD-10-CM

## 2020-01-09 PROCEDURE — 99214 OFFICE O/P EST MOD 30 MIN: CPT | Performed by: NURSE PRACTITIONER

## 2020-01-09 RX ORDER — ALPRAZOLAM 0.25 MG/1
0.75 TABLET ORAL
Status: ON HOLD | COMMUNITY
Start: 2019-12-27 | End: 2020-02-10

## 2020-01-09 RX ORDER — FUROSEMIDE 20 MG/1
TABLET ORAL
Status: ON HOLD | COMMUNITY
Start: 2019-12-11 | End: 2020-02-10

## 2020-01-09 RX ORDER — DOCUSATE SODIUM 100 MG/1
100 CAPSULE, LIQUID FILLED ORAL
Status: ON HOLD | COMMUNITY
Start: 2019-11-14 | End: 2020-02-10

## 2020-01-09 RX ORDER — HYDROXYZINE 50 MG/1
TABLET, FILM COATED ORAL
COMMUNITY
Start: 2019-11-18 | End: 2020-02-05

## 2020-01-09 RX ORDER — ESZOPICLONE 3 MG/1
TABLET, FILM COATED ORAL
Status: ON HOLD | COMMUNITY
Start: 2020-01-06 | End: 2020-02-10

## 2020-01-09 RX ORDER — AMLODIPINE BESYLATE 2.5 MG/1
2.5 TABLET ORAL 4 TIMES DAILY
Status: ON HOLD | COMMUNITY
Start: 2019-11-27 | End: 2020-02-10

## 2020-01-09 RX ORDER — AMITRIPTYLINE HYDROCHLORIDE 100 MG/1
100 TABLET, FILM COATED ORAL DAILY
Status: ON HOLD | COMMUNITY
Start: 2019-12-30 | End: 2020-02-10

## 2020-01-09 RX ORDER — IRBESARTAN 300 MG/1
TABLET ORAL
Status: ON HOLD | COMMUNITY
Start: 2020-01-07 | End: 2020-02-10

## 2020-01-09 RX ORDER — OXYCODONE AND ACETAMINOPHEN 7.5; 325 MG/1; MG/1
1 TABLET ORAL 2 TIMES DAILY PRN
Qty: 60 TABLET | Refills: 0 | Status: SHIPPED | OUTPATIENT
Start: 2020-01-09 | End: 2020-01-16 | Stop reason: SDUPTHER

## 2020-01-09 RX ORDER — METFORMIN HYDROCHLORIDE 500 MG/1
TABLET, EXTENDED RELEASE ORAL
COMMUNITY
Start: 2019-11-29 | End: 2020-02-10 | Stop reason: HOSPADM

## 2020-01-09 RX ORDER — OMEPRAZOLE 20 MG/1
20 CAPSULE, DELAYED RELEASE ORAL DAILY
Status: ON HOLD | COMMUNITY
Start: 2019-12-11 | End: 2020-02-10

## 2020-01-09 NOTE — PROGRESS NOTES
"CHIEF COMPLAINT  F/U back, bilateral hand and bilateral wrist pain- patient states that her pain has worsened since her last visit. pt sts needs percocet refill, since pt has medicare part D, would like either Xtampza or Hysingla ER.     Subjective   Crystal Cruz is a 54 y.o. female  who presents to the office for follow-up.She has a history of chronic back and hand pain. Reports her pain is worse since last office visit.    Complains of pain in her low back and hands/wrists. Today her pain is 5/10VAS. Describes her pain as continuous aching and throbbing. Pain increases with activity, stress, household chores; pain decreases with medication, rest and procedures. She is currently taking OxyContin 20 mg BID and Percocet 7.5/325mg BID, as well as Cymbalta 60 mg and gabapentin 600 mg TID. Denies any side effects from the regimen.  The regimen helps decrease her pain by 40-50%. ADL's by self.  Denies any bowel or bladder changes.     She did ask about increasing her pain medication(percocet).  She did ask about a prescription for Phenergan because her neck pain is making her nauseous. Reviewed Phenergan can cause neck pain and muscle pain.  Reports her sleep has been poor lately. PCP prescribed Lunesta. No help.     Is still working on weaning Xanax. Is currently on 0.25 mg morning and 0.50mg at night. \"i'm working on it.\"  She completed a Right L4-S3 RFL and right GTB   on  5-31-19 performed by Dr. HAJI for management of LOW BACK AND HIP PAIN. Patient reports 70% ONGOING relief from the procedure.      ALLERGY TO NSAIDS and Toradol.  Extremity Pain    The pain is present in the neck, back, left wrist, left hand, left foot, right foot, right hand, right wrist, right fingers and left fingers. This is a chronic problem. The current episode started more than 1 year ago. There has been no history of extremity trauma. The problem occurs constantly. Progression since onset: worse from last office visit. The quality of " the pain is described as aching, pounding and burning. The pain is at a severity of 5/10. The pain is moderate. Pertinent negatives include no fever or numbness (L side post CVA). The symptoms are aggravated by cold (moist/rainy weather). She has tried acetaminophen, heat, movement, NSAIDS, OTC ointments, OTC pain meds, oral narcotics and rest (Oxycontin 30 mg Q12 hours, Oxycodone 7.5/325 4/day) for the symptoms. The treatment provided moderate relief. mitchell systemic sclerosis   Back Pain   This is a chronic problem. The current episode started more than 1 month ago. The problem occurs constantly. Progression since onset: worse since last office visit. The pain is present in the lumbar spine. The quality of the pain is described as aching. The pain does not radiate. The pain is at a severity of 5/10. The pain is moderate. The symptoms are aggravated by bending, position, lying down, standing and twisting. Associated symptoms include headaches (occ) and weakness (left side post CVA). Pertinent negatives include no abdominal pain, bladder incontinence, bowel incontinence, chest pain, dysuria, fever or numbness (L side post CVA). She has tried analgesics, heat and bed rest (lumbar FJN/RFL--- significant long-term relief) for the symptoms.      PEG Assessment   What number best describes your pain on average in the past week?7  What number best describes how, during the past week, pain has interfered with your enjoyment of life?7  What number best describes how, during the past week, pain has interfered with your general activity?  7    The following portions of the patient's history were reviewed and updated as appropriate: allergies, current medications, past family history, past medical history, past social history, past surgical history and problem list.    Review of Systems   Constitutional: Positive for activity change (dec) and fatigue (with flares of fybromyalgia). Negative for fever.   HENT: Negative for  "congestion.    Eyes: Negative for visual disturbance.   Respiratory: Positive for shortness of breath. Negative for chest tightness.    Cardiovascular: Negative for chest pain.   Gastrointestinal: Positive for constipation (occ, on linzess). Negative for abdominal pain, bowel incontinence and diarrhea.   Genitourinary: Negative for bladder incontinence, difficulty urinating and dysuria.   Musculoskeletal: Positive for arthralgias (right hip, hands, wrists) and back pain. Negative for gait problem and neck pain.   Allergic/Immunologic: Negative for immunocompromised state.   Neurological: Positive for dizziness (r/t increased BP), weakness (left side post CVA) and headaches (occ). Negative for light-headedness and numbness (L side post CVA).   Psychiatric/Behavioral: Positive for agitation and sleep disturbance. Negative for suicidal ideas. The patient is nervous/anxious.        Vitals:    01/09/20 1056   BP: 144/79   Pulse: 87   Resp: 20   Temp: 97.1 °F (36.2 °C)   SpO2: 94%   Weight: 77 kg (169 lb 12.8 oz)   Height: 162.6 cm (64\")   PainSc:   5   PainLoc: Hand  Comment: bilat     Objective   Physical Exam   Constitutional: She is oriented to person, place, and time. She appears well-developed and well-nourished. She is cooperative.   HENT:   Head: Normocephalic and atraumatic.   Eyes: Lids are normal.   Neck: Trachea normal.   Cardiovascular: Normal rate.   Pulmonary/Chest: Effort normal.   Musculoskeletal:        Thoracic back: She exhibits tenderness and spasm.        Lumbar back: She exhibits tenderness and bony tenderness (mild T12-L3 tenderness).        Right hand: She exhibits decreased range of motion and tenderness. She exhibits normal capillary refill.        Left hand: She exhibits decreased range of motion and tenderness. She exhibits normal capillary refill.   Arthritic changes bilateral hands and wrists   Neurological: She is alert and oriented to person, place, and time. Gait abnormal.   Non-focal "   Skin: Skin is warm, dry and intact.   Psychiatric: She has a normal mood and affect. Her speech is normal and behavior is normal.   Nursing note and vitals reviewed.      Assessment/Plan   Crystal was seen today for back pain, hand pain and wrist pain.    Diagnoses and all orders for this visit:    Other chronic pain    Arthropathy of lumbar facet joint    Sacroiliitis (CMS/HCC)    Greater trochanteric bursitis of right hip    Pain in both upper extremities    Arthritis    Encounter for long-term (current) use of high-risk medication      --- The urine drug screen confirmation from 12-7-19 has been reviewed and the result is APPROPRIATE based on patient history and STEVEN report  --- Trial of Xtampza ER 18 mg BID in place of OxyContin due to insurance changes. Discussed medication with the patient.  Included in this discussion was the potential for side effects and adverse events.  Patient verbalized understanding and wished to proceed.  Prescription will be sent to pharmacy.  ---  Refill Percocet. Reviewed continuing at current rate at this time as we are changing from OxyContin to Xtampza ER.  Patient appears stable with current regimen. No adverse effects. Regarding continuation of opioids, there is no evidence of aberrant behavior or any red flags.  The patient continues with appropriate response to opioid therapy. ADL's remain intact by self.   --- Declined referral to hand specialist.  --- declined lumbar interventions at this time.   --- Follow-up 1 month or sooner if needed.     STEVEN REPORT    As part of the patient's treatment plan, I am prescribing controlled substances. The patient has been made aware of appropriate use of such medications, including potential risk of somnolence, limited ability to drive and/or work safely, and the potential for dependence or overdose. It has also bee made clear that these medications are for use by this patient only, without concomitant use of alcohol or other  substances unless prescribed.     Patient has completed prescribing agreement detailing terms of continued prescribing of controlled substances, including monitoring STEVEN reports, urine drug screening, and pill counts if necessary. The patient is aware that inappropriate use will results in cessation of prescribing such medications.    STEVEN report has been reviewed and scanned into the patient's chart.    As the clinician, I personally reviewed the STEVEN from 1-7-20 while the patient was in the office today.    History and physical exam exhibit continued safe and appropriate use of controlled substances.      EMR Dragon/Transcription disclaimer:   Much of this encounter note is an electronic transcription/translation of spoken language to printed text. The electronic translation of spoken language may permit erroneous, or at times, nonsensical words or phrases to be inadvertently transcribed; Although I have reviewed the note for such errors, some may still exist.

## 2020-01-15 ENCOUNTER — TELEPHONE (OUTPATIENT)
Dept: PAIN MEDICINE | Facility: CLINIC | Age: 55
End: 2020-01-15

## 2020-01-16 RX ORDER — OXYCODONE AND ACETAMINOPHEN 7.5; 325 MG/1; MG/1
1 TABLET ORAL 2 TIMES DAILY PRN
Qty: 46 TABLET | Refills: 0 | Status: SHIPPED | OUTPATIENT
Start: 2020-01-16 | End: 2020-02-06 | Stop reason: SDUPTHER

## 2020-01-16 NOTE — TELEPHONE ENCOUNTER
Could you please resend. I spoke with the pharmacist Juvenal at Ms. Cruz's pharmacy and was told her insurance on paid for a 7 day supply. She needs a new Rx for the additional medication.

## 2020-01-29 ENCOUNTER — HOSPITAL ENCOUNTER (OUTPATIENT)
Dept: MAMMOGRAPHY | Facility: HOSPITAL | Age: 55
Discharge: HOME | End: 2020-01-29
Admitting: FAMILY MEDICINE

## 2020-01-29 DIAGNOSIS — Z12.31 SCREENING MAMMOGRAM, ENCOUNTER FOR: ICD-10-CM

## 2020-01-29 PROCEDURE — 77067 SCR MAMMO BI INCL CAD: CPT

## 2020-01-29 PROCEDURE — 77063 BREAST TOMOSYNTHESIS BI: CPT

## 2020-01-30 ENCOUNTER — TRANSCRIBE ORDERS (OUTPATIENT)
Dept: ADMINISTRATIVE | Facility: HOSPITAL | Age: 55
End: 2020-01-30

## 2020-01-30 DIAGNOSIS — R92.8 ABNORMAL MAMMOGRAM: Primary | ICD-10-CM

## 2020-02-04 ENCOUNTER — TELEPHONE (OUTPATIENT)
Dept: PAIN MEDICINE | Facility: CLINIC | Age: 55
End: 2020-02-04

## 2020-02-04 NOTE — TELEPHONE ENCOUNTER
She wants an xray of neck or upper back? Also, we won't be able to get CT or MRI approved without PT. I can place order for PT as well. Just need more information. Thanks. BB

## 2020-02-04 NOTE — TELEPHONE ENCOUNTER
Patient left voicemail asking if she can have an xray. Ct or Mri of neck and upper back. Whatever you feel is best before her next appt. She asked that it be sent to [facility]. Please advise.

## 2020-02-05 ENCOUNTER — HOSPITAL ENCOUNTER (EMERGENCY)
Facility: HOSPITAL | Age: 55
Discharge: HOME | End: 2020-02-05
Attending: EMERGENCY MEDICINE | Admitting: EMERGENCY MEDICINE

## 2020-02-05 VITALS
RESPIRATION RATE: 16 BRPM | BODY MASS INDEX: 30.35 KG/M2 | WEIGHT: 177.8 LBS | SYSTOLIC BLOOD PRESSURE: 138 MMHG | OXYGEN SATURATION: 95 % | HEIGHT: 64 IN | TEMPERATURE: 98.4 F | DIASTOLIC BLOOD PRESSURE: 66 MMHG | HEART RATE: 83 BPM

## 2020-02-05 DIAGNOSIS — T78.40XA ALLERGIC REACTION, INITIAL ENCOUNTER: Primary | ICD-10-CM

## 2020-02-05 LAB
ALBUMIN SERPL-MCNC: 4.9 G/DL (ref 3.5–5.2)
ALBUMIN/GLOB SERPL: 1.2 G/DL
ALP SERPL-CCNC: 97 U/L (ref 39–117)
ALT SERPL W P-5'-P-CCNC: 10 U/L (ref 1–33)
ANION GAP SERPL CALCULATED.3IONS-SCNC: 14.3 MMOL/L (ref 5–15)
AST SERPL-CCNC: 12 U/L (ref 1–32)
BASOPHILS # BLD AUTO: 0.07 10*3/MM3 (ref 0–0.2)
BASOPHILS NFR BLD AUTO: 0.5 % (ref 0–1.5)
BILIRUB SERPL-MCNC: 0.3 MG/DL (ref 0.2–1.2)
BUN BLD-MCNC: 14 MG/DL (ref 6–20)
BUN/CREAT SERPL: 21.5 (ref 7–25)
CALCIUM SPEC-SCNC: 10.4 MG/DL (ref 8.6–10.5)
CHLORIDE SERPL-SCNC: 100 MMOL/L (ref 98–107)
CO2 SERPL-SCNC: 22.7 MMOL/L (ref 22–29)
CREAT BLD-MCNC: 0.65 MG/DL (ref 0.57–1)
DEPRECATED RDW RBC AUTO: 42.1 FL (ref 37–54)
EOSINOPHIL # BLD AUTO: 0.01 10*3/MM3 (ref 0–0.4)
EOSINOPHIL NFR BLD AUTO: 0.1 % (ref 0.3–6.2)
ERYTHROCYTE [DISTWIDTH] IN BLOOD BY AUTOMATED COUNT: 12.9 % (ref 12.3–15.4)
GFR SERPL CREATININE-BSD FRML MDRD: 94 ML/MIN/1.73
GLOBULIN UR ELPH-MCNC: 4.2 GM/DL
GLUCOSE BLD-MCNC: 182 MG/DL (ref 65–99)
HCT VFR BLD AUTO: 45.1 % (ref 34–46.6)
HGB BLD-MCNC: 14.8 G/DL (ref 12–15.9)
IMM GRANULOCYTES # BLD AUTO: 0.06 10*3/MM3 (ref 0–0.05)
IMM GRANULOCYTES NFR BLD AUTO: 0.4 % (ref 0–0.5)
LYMPHOCYTES # BLD AUTO: 2.47 10*3/MM3 (ref 0.7–3.1)
LYMPHOCYTES NFR BLD AUTO: 17.8 % (ref 19.6–45.3)
MCH RBC QN AUTO: 29.8 PG (ref 26.6–33)
MCHC RBC AUTO-ENTMCNC: 32.8 G/DL (ref 31.5–35.7)
MCV RBC AUTO: 90.7 FL (ref 79–97)
MONOCYTES # BLD AUTO: 0.65 10*3/MM3 (ref 0.1–0.9)
MONOCYTES NFR BLD AUTO: 4.7 % (ref 5–12)
NEUTROPHILS # BLD AUTO: 10.63 10*3/MM3 (ref 1.7–7)
NEUTROPHILS NFR BLD AUTO: 76.5 % (ref 42.7–76)
NRBC BLD AUTO-RTO: 0 /100 WBC (ref 0–0.2)
PLATELET # BLD AUTO: 303 10*3/MM3 (ref 140–450)
PMV BLD AUTO: 10.7 FL (ref 6–12)
POTASSIUM BLD-SCNC: 4.3 MMOL/L (ref 3.5–5.2)
PROT SERPL-MCNC: 9.1 G/DL (ref 6–8.5)
RBC # BLD AUTO: 4.97 10*6/MM3 (ref 3.77–5.28)
SODIUM BLD-SCNC: 137 MMOL/L (ref 136–145)
WBC NRBC COR # BLD: 13.89 10*3/MM3 (ref 3.4–10.8)

## 2020-02-05 PROCEDURE — 63710000001 DIPHENHYDRAMINE PER 50 MG: Performed by: EMERGENCY MEDICINE

## 2020-02-05 PROCEDURE — 96375 TX/PRO/DX INJ NEW DRUG ADDON: CPT

## 2020-02-05 PROCEDURE — 99284 EMERGENCY DEPT VISIT MOD MDM: CPT | Performed by: EMERGENCY MEDICINE

## 2020-02-05 PROCEDURE — 85025 COMPLETE CBC W/AUTO DIFF WBC: CPT | Performed by: EMERGENCY MEDICINE

## 2020-02-05 PROCEDURE — 99284 EMERGENCY DEPT VISIT MOD MDM: CPT

## 2020-02-05 PROCEDURE — 25010000002 DIPHENHYDRAMINE PER 50 MG: Performed by: EMERGENCY MEDICINE

## 2020-02-05 PROCEDURE — 80053 COMPREHEN METABOLIC PANEL: CPT | Performed by: EMERGENCY MEDICINE

## 2020-02-05 PROCEDURE — 96374 THER/PROPH/DIAG INJ IV PUSH: CPT

## 2020-02-05 RX ORDER — FAMOTIDINE 10 MG/ML
20 INJECTION, SOLUTION INTRAVENOUS ONCE
Status: COMPLETED | OUTPATIENT
Start: 2020-02-05 | End: 2020-02-05

## 2020-02-05 RX ORDER — DIPHENHYDRAMINE HYDROCHLORIDE 50 MG/ML
25 INJECTION INTRAMUSCULAR; INTRAVENOUS ONCE
Status: COMPLETED | OUTPATIENT
Start: 2020-02-05 | End: 2020-02-05

## 2020-02-05 RX ORDER — FAMOTIDINE 20 MG/1
20 TABLET, FILM COATED ORAL 2 TIMES DAILY
Qty: 14 TABLET | Refills: 0 | Status: SHIPPED | OUTPATIENT
Start: 2020-02-05 | End: 2020-02-12

## 2020-02-05 RX ORDER — AMLODIPINE BESYLATE 5 MG/1
5 TABLET ORAL
Status: DISCONTINUED | OUTPATIENT
Start: 2020-02-05 | End: 2020-02-05 | Stop reason: HOSPADM

## 2020-02-05 RX ORDER — SODIUM CHLORIDE 9 MG/ML
INJECTION, SOLUTION INTRAVENOUS
Status: COMPLETED
Start: 2020-02-05 | End: 2020-02-05

## 2020-02-05 RX ORDER — DIPHENHYDRAMINE HCL 50 MG
50 CAPSULE ORAL ONCE
Status: COMPLETED | OUTPATIENT
Start: 2020-02-05 | End: 2020-02-05

## 2020-02-05 RX ORDER — DIPHENHYDRAMINE HCL 25 MG
25 TABLET ORAL EVERY 6 HOURS PRN
Qty: 30 TABLET | Refills: 0 | Status: SHIPPED | OUTPATIENT
Start: 2020-02-05 | End: 2020-02-10

## 2020-02-05 RX ORDER — ASPIRIN 325 MG
325 TABLET ORAL DAILY
COMMUNITY
End: 2020-02-10 | Stop reason: HOSPADM

## 2020-02-05 RX ADMIN — AMLODIPINE BESYLATE 5 MG: 5 TABLET ORAL at 20:14

## 2020-02-05 RX ADMIN — DIPHENHYDRAMINE HYDROCHLORIDE 50 MG: 50 CAPSULE ORAL at 21:33

## 2020-02-05 RX ADMIN — SODIUM CHLORIDE 500 ML: 9 INJECTION, SOLUTION INTRAVENOUS at 20:15

## 2020-02-05 RX ADMIN — FAMOTIDINE 20 MG: 10 INJECTION INTRAVENOUS at 20:14

## 2020-02-05 RX ADMIN — DIPHENHYDRAMINE HYDROCHLORIDE 25 MG: 50 INJECTION, SOLUTION INTRAMUSCULAR; INTRAVENOUS at 20:14

## 2020-02-06 ENCOUNTER — PATIENT OUTREACH (OUTPATIENT)
Dept: CASE MANAGEMENT | Facility: OTHER | Age: 55
End: 2020-02-06

## 2020-02-06 ENCOUNTER — HOSPITAL ENCOUNTER (OUTPATIENT)
Dept: GENERAL RADIOLOGY | Facility: HOSPITAL | Age: 55
Discharge: HOME | End: 2020-02-06
Admitting: NURSE PRACTITIONER

## 2020-02-06 ENCOUNTER — OFFICE VISIT (OUTPATIENT)
Dept: PAIN MEDICINE | Facility: CLINIC | Age: 55
End: 2020-02-06

## 2020-02-06 VITALS
HEART RATE: 87 BPM | OXYGEN SATURATION: 93 % | DIASTOLIC BLOOD PRESSURE: 79 MMHG | WEIGHT: 161.6 LBS | SYSTOLIC BLOOD PRESSURE: 164 MMHG | TEMPERATURE: 98.9 F | RESPIRATION RATE: 20 BRPM | BODY MASS INDEX: 27.59 KG/M2 | HEIGHT: 64 IN

## 2020-02-06 DIAGNOSIS — M47.816 ARTHROPATHY OF LUMBAR FACET JOINT: ICD-10-CM

## 2020-02-06 DIAGNOSIS — M79.601 PAIN IN BOTH UPPER EXTREMITIES: ICD-10-CM

## 2020-02-06 DIAGNOSIS — M54.2 NECK PAIN: ICD-10-CM

## 2020-02-06 DIAGNOSIS — M19.90 ARTHRITIS: ICD-10-CM

## 2020-02-06 DIAGNOSIS — Z79.899 ENCOUNTER FOR LONG-TERM (CURRENT) USE OF HIGH-RISK MEDICATION: ICD-10-CM

## 2020-02-06 DIAGNOSIS — M47.812 CERVICAL FACET JOINT SYNDROME: ICD-10-CM

## 2020-02-06 DIAGNOSIS — M70.61 GREATER TROCHANTERIC BURSITIS OF RIGHT HIP: ICD-10-CM

## 2020-02-06 DIAGNOSIS — M79.602 PAIN IN BOTH UPPER EXTREMITIES: ICD-10-CM

## 2020-02-06 DIAGNOSIS — G89.29 OTHER CHRONIC PAIN: Primary | ICD-10-CM

## 2020-02-06 PROCEDURE — 72050 X-RAY EXAM NECK SPINE 4/5VWS: CPT

## 2020-02-06 PROCEDURE — 99214 OFFICE O/P EST MOD 30 MIN: CPT | Performed by: NURSE PRACTITIONER

## 2020-02-06 RX ORDER — TRAZODONE HYDROCHLORIDE 100 MG/1
TABLET ORAL
Status: ON HOLD | COMMUNITY
Start: 2020-01-22 | End: 2020-02-10

## 2020-02-06 RX ORDER — SUVOREXANT 20 MG/1
TABLET, FILM COATED ORAL
Status: ON HOLD | COMMUNITY
Start: 2020-01-10 | End: 2020-02-10

## 2020-02-06 RX ORDER — EPINEPHRINE 0.15 MG/.3ML
0.15 INJECTION INTRAMUSCULAR
Status: ON HOLD | COMMUNITY
Start: 2020-02-05 | End: 2020-02-10

## 2020-02-06 RX ORDER — LEVETIRACETAM 500 MG/1
TABLET ORAL
Status: ON HOLD | COMMUNITY
Start: 2020-01-14 | End: 2020-02-10

## 2020-02-06 RX ORDER — OMEPRAZOLE 20 MG/1
20 CAPSULE, DELAYED RELEASE ORAL
Status: ON HOLD | COMMUNITY
Start: 2020-02-03 | End: 2020-02-10

## 2020-02-06 RX ORDER — NITROGLYCERIN 2 %
OINTMENT (GRAM) TRANSDERMAL
COMMUNITY
Start: 2020-01-27 | End: 2020-02-10 | Stop reason: HOSPADM

## 2020-02-06 RX ORDER — INSULIN GLARGINE 100 [IU]/ML
INJECTION, SOLUTION SUBCUTANEOUS
Status: ON HOLD | COMMUNITY
Start: 2020-01-20 | End: 2020-02-10

## 2020-02-06 RX ORDER — IRBESARTAN 300 MG/1
TABLET ORAL
Status: ON HOLD | COMMUNITY
Start: 2020-02-05 | End: 2020-02-10

## 2020-02-06 NOTE — DISCHARGE INSTRUCTIONS
It was our pleasure working with you and we hope you are feeling improved. Please follow-up with your regular physician if symptoms persist and return to ED if they change or are getting much worse. Please fill any prescriptions and take medications as scheduled if indicated.     Your blood pressure was elevated the emergency department today.  We kindly recommend follow-up with your primary doctor for a recheck.

## 2020-02-06 NOTE — OUTREACH NOTE
Care Plan Note      Responses   Lifestyle Goals  Routine follow-up with doctor(s), Medication management, Fewer exacerbations, Fewer ER/urgent care visits   Barriers  Side effects of medication   Self Management  Medication Adherence   Suggested Appointments  Other (See Comment) [Follow up with Dr. Dill as needed. ]   Annual Wellness Visit:   Patient Has Completed   Care Gaps Addressed  Diabetic Eye Exam, Flu Shot, Other (See Comment) [Diabetic Foot Exam]   Diabetic Eye Exam Status  Up to Date   Diabetic Eye Exam Completion at [facility] or Other  Other   Flu Shot Status  Up to Date   Flu Shot Completion at [facility] or Other  Other   Care Gap Comments  Diabetic Foot Exam completed within the past two months   Specific Disease Process Teaching  -- [Patient stopped Medrol Dose Damian. Patient taking Benedryl every 6 hours and still feels throat tighten . Reviewed returning to ED if Benedryl does not prevent throat swelling. Reviiewed half life of Medrol Dose Damian. ]   Other Patient Education/Resources   24/7 [facility] Nurse Call Line   24/7 Nurse Call Line Education Method  Send Materials   Does patient have depression diagnosis?  No   Advanced Directives:  Patient Has   Ed Visits past 12 months:  3 or more [4]   Hospitalizations past 12 months  None   Discharge destination:  Home   Medication Adherence  Medications understood [Patient continuing to take Benedryl every 6 hous as prescribed to prevent swelling. ]   Goal Progress  Making Progress Toward Goal(s)        The main concerns and/or symptoms the patient would like to address are: Spoke with patient regarding recent ED visit. Patient states her throat still feels like it is swelling. Benadryl treats this symptoms at this time. Patient continuing to take Benadryl every 6 hours. Patient to follow up with PCP if needed. Patient inquired how long the Medrol Dose Damian would remain in her system.    Education/instruction provided by Care Coordinator: Introduced  self, explained CA role and provided contact information. Reviewed follow up visit. Patient states she will schedule. Reviewed medications. Reviewed half-life of Medrol Dose Damian with patient (8 days). Reviewed Diabetic Eye Exam, Diabetic Foot Exam, and flu vaccine. All up to date. Patient appreciative of the call. Patient knows to return to ED if swelling not controled with Benadryl.     Follow Up Outreach Due: as needed    Libby Hollins RN  Ambulatory     2/6/2020, 4:50 PM

## 2020-02-06 NOTE — PROGRESS NOTES
CHIEF COMPLAINT  F/U back, bilateral hand and bilateral wrist pain- patient states that her pain has worsened since her last visit.    Subjective   Crystal Cruz is a 54 y.o. female  who presents to the office for follow-up.She has a history of chronic , wrist pain. Reports her pain is worse since last office visit.    Complains of pain in her neck, upper back, low back and hands. Today her pain is 5/10VAS. Her primary complaint today is her neck pain.  Describes her pain as continuous aching and throbbing. Pain increases with activity, stress and cold weather; pain decreases with medication, rest. AT last office visit, was given a trial of Xtampza ER 18 mg BID and Percocet 7.5/325 as well as Cymbalta 60 mg and gabapentin 600 mg TID. Denies any side effects from the regimen.  The regimen helps decrease her pain moderately.. ADL's by self.  Denies any bowel or bladder changes.     She reports she was given benadryl yesterday while at hospital. Has not taken today.     Patient was seen by Dr. Zain Randolph in for neck pain.  She was seen on 2/4/2020.  Plan to continue with pain management.  If symptoms continue x-ray consider MRI.  Suspect DDD/arthritis C5-6 or C6-7.  Also has allergy to NSAID.  Given Medrol Dosepak.    Reviewed Jenna Mei office visit 2-5-20--patient presents with an allergic reaction to prednisolone.  She stated that her hands started swelling when she started this.  She then started having throat swelling.  Also feels as if she cannot swallow.  Is shaking.  Face is red as well as her chest.  Having heart palpitations and SOA.  Diagnosis of allergic reaction and swelling of throat.  Epinephrine given.  Referred to ER.    Reviewed ED visit with Dr Jhony Griffin on 2-5-20--patient presents with difficulty swallowing and shortness of breath after unknown exposure.  Had recently started Medrol Dosepak and took second dose today for pain.  Began developing sensation of itching or  stickiness in her throat.  Was given epinephrine at urgent care center and is now having high blood pressure with headache.  History of scleroderma, fibromyalgia, Huitron's esophagus.  Also has history of anxiety and long-term benzo use and has weaned off of Xanax.  Last dose was 2 days ago.  Given Benadryl IV fluid and Pepcid IV.  Blood pressure of proved.  Continue Benadryl and Pepcid.  Discontinue Medrol Dosepak.    Patient called office on 2/4/2020.  Was asking for x-ray CT or MRI of the neck and the upper back.  I reviewed the telephone encounter.  Suggested x-ray of the neck and upper back.  I expect we would not be able to get the CT of the MRI approved without PT and x-ray.  Patient stated she would discuss at follow-up.    She completed a Right L4-S3 RFL and right GTB   on  5-31-19 performed by Dr. HAJI for management of LOW BACK AND HIP PAIN. Patient reports 70% ONGOING relief from the procedure.      ALLERGY TO NSAIDS and Toradol.    Discontinued Xanax 2-3-20. Has prolonged wean. Is also off sleep medication as well.    She completed a Right L4-S3 RFL and right GTB   on  5-31-19 performed by Dr. HAJI for management of LOW BACK AND HIP PAIN. Patient reports 70% ONGOING relief from the procedure.  Has lost 8 lbs since last office visit.  Extremity Pain    The pain is present in the neck, back, left wrist, left hand, left foot, right foot, right hand, right wrist, right fingers and left fingers. This is a chronic problem. The current episode started more than 1 year ago. There has been no history of extremity trauma. The problem occurs constantly. Progression since onset: worse from last office visit. The quality of the pain is described as aching, pounding and burning. The pain is at a severity of 5/10. The pain is moderate. Pertinent negatives include no fever or numbness (L side post CVA). The symptoms are aggravated by cold (moist/rainy weather). She has tried acetaminophen, heat, movement, NSAIDS,  OTC ointments, OTC pain meds, oral narcotics and rest (Oxycontin 30 mg Q12 hours, Oxycodone 7.5/325 4/day) for the symptoms. The treatment provided moderate relief. mitchell, systemic sclerosis   Back Pain   This is a chronic problem. The current episode started more than 1 month ago. The problem occurs constantly. Progression since onset: worse since last office visit. The pain is present in the lumbar spine. The quality of the pain is described as aching. The pain does not radiate. The pain is at a severity of 5/10. The pain is moderate. The symptoms are aggravated by bending, position, lying down, standing and twisting. Associated symptoms include headaches (occ) and weakness (left side post CVA). Pertinent negatives include no abdominal pain, bladder incontinence, bowel incontinence, chest pain, dysuria, fever or numbness (L side post CVA). She has tried analgesics, heat and bed rest (lumbar FJN/RFL--- significant long-term relief) for the symptoms.   Neck Pain    This is a chronic problem. The current episode started more than 1 month ago. The problem occurs constantly. The problem has been gradually worsening. The pain is associated with nothing. The pain is present in the left side, right side and midline. The quality of the pain is described as aching. The pain is at a severity of 5/10. The pain is moderate. The symptoms are aggravated by bending, position, stress and twisting. The pain is same all the time. Associated symptoms include headaches (occ) and weakness (left side post CVA). Pertinent negatives include no chest pain, fever or numbness (L side post CVA).      PEG Assessment   What number best describes your pain on average in the past week?5  What number best describes how, during the past week, pain has interfered with your enjoyment of life?8  What number best describes how, during the past week, pain has interfered with your general activity?  8    The following portions of the patient's history  "were reviewed and updated as appropriate: allergies, current medications, past family history, past medical history, past social history, past surgical history and problem list.    Review of Systems   Constitutional: Positive for activity change (dec) and fatigue (with flares of fybromyalgia). Negative for fever.   HENT: Negative for congestion.    Eyes: Negative for visual disturbance.   Respiratory: Positive for shortness of breath. Negative for chest tightness.    Cardiovascular: Negative for chest pain.   Gastrointestinal: Positive for constipation (occ, on linzess). Negative for abdominal pain, bowel incontinence and diarrhea.   Genitourinary: Negative for bladder incontinence, difficulty urinating and dysuria.   Musculoskeletal: Positive for arthralgias (right hip, hands, wrists) and back pain. Negative for gait problem and neck pain.   Allergic/Immunologic: Negative for immunocompromised state.   Neurological: Positive for dizziness (r/t increased BP), weakness (left side post CVA) and headaches (occ). Negative for light-headedness and numbness (L side post CVA).   Psychiatric/Behavioral: Positive for agitation and sleep disturbance. Negative for suicidal ideas. The patient is nervous/anxious.        Vitals:    02/06/20 1242   BP: 164/79  Comment: pt sts took bp meds last night   Pulse: 87   Resp: 20   Temp: 98.9 °F (37.2 °C)   SpO2: 93%   Weight: 73.3 kg (161 lb 9.6 oz)   Height: 162.6 cm (64\")   PainSc:   5   PainLoc: Hand  Comment: bilat     Objective   Physical Exam   Constitutional: She is oriented to person, place, and time. She appears well-developed and well-nourished. She is cooperative.   HENT:   Head: Normocephalic and atraumatic.   Eyes: Lids are normal.   Neck: Trachea normal. Spinous process tenderness and muscular tenderness present. Decreased range of motion present.   Cardiovascular: Normal rate.   Pulmonary/Chest: Effort normal.   Musculoskeletal:        Thoracic back: She exhibits " tenderness and spasm.        Lumbar back: She exhibits tenderness and bony tenderness (mild T12-L3 tenderness).        Right hand: She exhibits decreased range of motion and tenderness. She exhibits normal capillary refill.        Left hand: She exhibits decreased range of motion and tenderness. She exhibits normal capillary refill.   Arthritic changes bilateral hands and wrists   Neurological: She is alert and oriented to person, place, and time. Gait abnormal.   Non-focal   Skin: Skin is warm, dry and intact.   Psychiatric: She has a normal mood and affect. Her speech is normal and behavior is normal.   Nursing note and vitals reviewed.      Assessment/Plan   Crystal was seen today for hand pain.    Diagnoses and all orders for this visit:    Other chronic pain    Arthropathy of lumbar facet joint    Greater trochanteric bursitis of right hip    Pain in both upper extremities    Arthritis    Encounter for long-term (current) use of high-risk medication    Cervical facet joint syndrome  -     Case Request    Neck pain  -     XR Spine Cervical Complete 4 or 5 View; Future      --- The urine drug screen confirmation from 12-7-19 has been reviewed and the result is APPROPRIATE based on patient history and STEVEN report  --- bilateral C3-C6 MBB NO STEROIDS. Reviewed the procedure at length with the patient.  Included in the review was expectations, complications, risk and benefits.The procedure was described in detail and the risks, benefits and alternatives were discussed with the patient (including but not limited to: bleeding, infection, nerve damage, worsening of pain, inability to perform injection, paralysis, seizures, and death) who agreed to proceed.  Discussed the potential for sedation if warranted/wanted.  The procedure will plan to be performed at [facility] with fluoroscopic guidance(unless ultrasound is indicated). Questions were answered and in a way the patient could understand.   Patient verbalized understanding and wishes to proceed.  This intervention will be ordered.  Discussed with patient that all procedures are part of a multimodal plan of care and include either formal PT or a home exercise program.  Patient has no evidence of coagulopathy or current infection.  --- cervical xray for neck pain.   --- Refill Xtazmpza ER and Percocet. Patient appears stable with current regimen. No adverse effects. Regarding continuation of opioids, there is no evidence of aberrant behavior or any red flags.  The patient continues with appropriate response to opioid therapy. ADL's remain intact by self.   --- Congratulated on weight loss efforts and discontinuation of Xanax and sleep aids.  --- Follow-up 1 month or sooner if needed.       STEVEN REPORT    As part of the patient's treatment plan, I am prescribing controlled substances. The patient has been made aware of appropriate use of such medications, including potential risk of somnolence, limited ability to drive and/or work safely, and the potential for dependence or overdose. It has also bee made clear that these medications are for use by this patient only, without concomitant use of alcohol or other substances unless prescribed.     Patient has completed prescribing agreement detailing terms of continued prescribing of controlled substances, including monitoring STEVEN reports, urine drug screening, and pill counts if necessary. The patient is aware that inappropriate use will results in cessation of prescribing such medications.    STEVEN report has been reviewed and scanned into the patient's chart.    As the clinician, I personally reviewed the STEVEN from 2-5-20 while the patient was in the office today.    History and physical exam exhibit continued safe and appropriate use of controlled substances.      EMR Dragon/Transcription disclaimer:   Much of this encounter note is an electronic transcription/translation of spoken language to  printed text. The electronic translation of spoken language may permit erroneous, or at times, nonsensical words or phrases to be inadvertently transcribed; Although I have reviewed the note for such errors, some may still exist.

## 2020-02-06 NOTE — ED PROVIDER NOTES
Subjective   Pleasant 54-year-old female presents to the emergency department from urgent care where she had presented with difficulty swallowing and shortness of breath after unknown exposure.  She had recently started a Medrol Dosepak yesterday and took the second dose today for neck pain.  She began developing the sensation of itching or stickiness in her throat earlier today and progressed into getting much worse.  She was given epinephrine at the urgent care clinic and now feels like her blood pressure is high she has an associated headache, although this is not necessarily unusual for her.  No neurological deficits.  Does feel like the neck/throat swelling and dyspnea is improving.  Denies cough fever chills or other infectious symptoms.  Symptoms came on acutely today.  She has never had an issue taking steroids in the past including multiple Medrol Dosepaks.  She does not know of any possible alternative exposures.  She denies chest pain.  No leg swelling or tenderness.  History of scleroderma, fibromyalgia, Huitron's esophagus.  Also history of anxiety and long-term benzo use, recently finished weaning herself off of Xanax but describes a prolonged and appropriate weaning period.  Last dose was 2 days ago (0.25mg).          Review of Systems   Constitutional: Negative for chills and fever.   HENT: Positive for sore throat and trouble swallowing. Negative for dental problem, drooling, rhinorrhea and voice change.    Respiratory: Positive for shortness of breath. Negative for cough, choking, chest tightness, wheezing and stridor.    Cardiovascular: Negative for chest pain.   Gastrointestinal: Negative for abdominal pain.   Musculoskeletal: Negative for arthralgias and back pain.   Skin: Negative for wound.   Neurological: Negative for seizures, syncope, speech difficulty, weakness, light-headedness, numbness and headaches.   All other systems reviewed and are negative.      Past Medical History:   Diagnosis  Date   • Acid reflux    • Anemia    • Anxiety    • Atherosclerosis of aorta (CMS/HCC) 2017   • Huitron's esophagus    • Bursitis    • CAD (coronary artery disease)    • Chronic pain disorder    • CVA (cerebrovascular accident) (CMS/HCC)    • DDD (degenerative disc disease), cervical    • Diabetes mellitus (CMS/HCC)    • Fibromyalgia    • Hard to intubate    • History of transfusion    • Hypertension    • Joint pain    • Kienbock's disease    • Low back pain    • Pancreatitis    • Pulmonary fibrosis (CMS/HCC)    • Reflux gastritis    • Scleroderma (CMS/HCC)    • Seizure (CMS/HCC)        Allergies   Allergen Reactions   • Atorvastatin    • Diflucan [Fluconazole] Other (See Comments)     Severe pain   • Hydroxyzine Other (See Comments)     Lethargic and unrepsonsive   • Levofloxacin    • Nsaids Nausea Only   • Statins Itching   • Toradol [Ketorolac Tromethamine] GI Intolerance   • Victoza  [Liraglutide]    • Rosuvastatin Itching, Other (See Comments) and Rash     Joint pain       Past Surgical History:   Procedure Laterality Date   • BACK SURGERY     • CAROTID ENDARTERECTOMY Left     Neurological   • CATARACT EXTRACTION Bilateral    •  SECTION     • CHOLECYSTECTOMY      Laparoscopic   • COLONOSCOPY      Complete   • COLONOSCOPY N/A 2016    Procedure: COLONOSCOPY;  Surgeon: Gaviota Hagen MD;  Location: [facility] OR;  Service:    • ENDOSCOPY N/A 2016    Procedure: ESOPHAGOGASTRODUODENOSCOPY WITH BIOPSIES;  Surgeon: Gaviota Hagen MD;  Location: [facility] OR;  Service:    • ENDOSCOPY N/A 2018    Procedure: ESOPHAGOGASTRODUODENOSCOPY, biopsy;  Surgeon: Gaviota Hagen MD;  Location: [facility] OR;  Service: Gastroenterology   • TONSILLECTOMY AND ADENOIDECTOMY     • UPPER GASTROINTESTINAL ENDOSCOPY      Diagnostic   • WRIST SURGERY Left        Family History   Problem Relation Age of Onset   • Other Mother         Cardiac Disorder   • Migraines Mother    • Heart disease Mother    • Sudden death  Mother    • Other Father         Cardiac Disorder   • Hypertension Father    • Heart disease Father    • Cancer Father    • Hypertension Sister    • Cancer Sister    • Migraines Daughter    • Cancer Other         Uncle   • Migraines Maternal Aunt    • Stroke Maternal Grandmother    • Stroke Maternal Grandfather    • Breast cancer Neg Hx        Social History     Socioeconomic History   • Marital status:      Spouse name: Not on file   • Number of children: Not on file   • Years of education: Not on file   • Highest education level: Not on file   Tobacco Use   • Smoking status: Current Every Day Smoker     Packs/day: 0.50     Years: 40.00     Pack years: 20.00   • Smokeless tobacco: Never Used   • Tobacco comment: patient refused   Substance and Sexual Activity   • Alcohol use: No   • Drug use: No   • Sexual activity: Defer     Comment: EXERCISE - RARELY           Objective   Physical Exam   Constitutional: She is oriented to person, place, and time. She appears well-developed and well-nourished. No distress.   HENT:   Head: Normocephalic and atraumatic.   Nose: Nose normal.   Mouth/Throat: Oropharynx is clear and moist.   Eyes: Pupils are equal, round, and reactive to light. EOM are normal.   Neck: Normal range of motion. Neck supple.   Patient displaying full range of motion without hesitation or apparent discomfort.   Cardiovascular: Normal rate, regular rhythm and normal heart sounds.   Pulmonary/Chest: Effort normal and breath sounds normal. No stridor. She has no wheezes.   Abdominal: Soft. She exhibits no distension. There is no tenderness.   Musculoskeletal: Normal range of motion.   Normal 4 extremities easily without hesitation.  No obvious evidence of trauma or discomfort.   Neurological: She is alert and oriented to person, place, and time.   Last 2-12 intact grossly without droop or slurred speech.   Skin: Skin is warm and dry. No rash noted. She is not diaphoretic. No erythema.   Chronic changes  of scleroderma to the fingers and nails.   Psychiatric: She has a normal mood and affect. Her behavior is normal.   Nursing note and vitals reviewed.      Procedures           ED Course  ED Course as of Feb 05 2133   Wed Feb 05, 2020 2130 Patient received Benadryl and fluid and Pepcid here IV.  Blood pressure has improved markedly.  She now says her symptoms have resolved and feels back to normal.  Have recommended that she discontinue Medrol use.  Will give Benadryl and Pepcid to continue for several days but feel that it is reasonable to discharge her home at this time.    [JF]      ED Course User Index  [JF] Jhony Griffin MD                                               MDM  Number of Diagnoses or Management Options  Diagnosis management comments: Differential diagnosis includes but is not limited to anxiety, URI, anaphylaxis, angioedema, secondary allergic reaction, strep throat, flu, bronchitis, pneumonia, pulmonary embolism.       Amount and/or Complexity of Data Reviewed  Clinical lab tests: reviewed and ordered  Tests in the medicine section of CPT®: ordered and reviewed    Risk of Complications, Morbidity, and/or Mortality  Presenting problems: high  Diagnostic procedures: high  Management options: high        Final diagnoses:   Allergic reaction, initial encounter            Jhony Griffin MD  02/05/20 2133       Jhony Griffin MD  02/05/20 2134

## 2020-02-07 ENCOUNTER — HOSPITAL ENCOUNTER (OUTPATIENT)
Dept: ULTRASOUND IMAGING | Facility: HOSPITAL | Age: 55
End: 2020-02-07

## 2020-02-07 ENCOUNTER — HOSPITAL ENCOUNTER (OUTPATIENT)
Dept: MAMMOGRAPHY | Facility: HOSPITAL | Age: 55
Discharge: HOME | End: 2020-02-07

## 2020-02-07 DIAGNOSIS — R92.8 ABNORMAL MAMMOGRAM: ICD-10-CM

## 2020-02-07 PROCEDURE — G0279 TOMOSYNTHESIS, MAMMO: HCPCS

## 2020-02-07 PROCEDURE — 77065 DX MAMMO INCL CAD UNI: CPT

## 2020-02-07 RX ORDER — OXYCODONE AND ACETAMINOPHEN 7.5; 325 MG/1; MG/1
1 TABLET ORAL 2 TIMES DAILY PRN
Qty: 60 TABLET | Refills: 0 | Status: SHIPPED | OUTPATIENT
Start: 2020-02-07 | End: 2020-09-03

## 2020-02-10 ENCOUNTER — APPOINTMENT (OUTPATIENT)
Dept: CT IMAGING | Facility: HOSPITAL | Age: 55
End: 2020-02-10

## 2020-02-10 ENCOUNTER — HOSPITAL ENCOUNTER (OUTPATIENT)
Facility: HOSPITAL | Age: 55
Setting detail: OBSERVATION
Discharge: HOME | End: 2020-02-10
Attending: EMERGENCY MEDICINE | Admitting: HOSPITALIST

## 2020-02-10 ENCOUNTER — APPOINTMENT (OUTPATIENT)
Dept: MRI IMAGING | Facility: HOSPITAL | Age: 55
End: 2020-02-10

## 2020-02-10 ENCOUNTER — TELEPHONE (OUTPATIENT)
Dept: PAIN MEDICINE | Facility: CLINIC | Age: 55
End: 2020-02-10

## 2020-02-10 ENCOUNTER — APPOINTMENT (OUTPATIENT)
Dept: CARDIOLOGY | Facility: HOSPITAL | Age: 55
End: 2020-02-10

## 2020-02-10 VITALS
SYSTOLIC BLOOD PRESSURE: 141 MMHG | HEIGHT: 64 IN | BODY MASS INDEX: 27.66 KG/M2 | OXYGEN SATURATION: 99 % | RESPIRATION RATE: 16 BRPM | DIASTOLIC BLOOD PRESSURE: 69 MMHG | WEIGHT: 162 LBS | TEMPERATURE: 99.1 F | HEART RATE: 81 BPM

## 2020-02-10 DIAGNOSIS — M48.02 CERVICAL STENOSIS OF SPINAL CANAL: Primary | ICD-10-CM

## 2020-02-10 DIAGNOSIS — I65.23 BILATERAL CAROTID ARTERY STENOSIS: ICD-10-CM

## 2020-02-10 DIAGNOSIS — R20.0 LEFT SIDED NUMBNESS: Primary | ICD-10-CM

## 2020-02-10 PROBLEM — I77.9 CAROTID ARTERY DISEASE (HCC): Status: ACTIVE | Noted: 2019-04-10

## 2020-02-10 PROBLEM — D14.0: Status: ACTIVE | Noted: 2017-09-18

## 2020-02-10 PROBLEM — I70.8 AORTO-ILIAC ATHEROSCLEROSIS (HCC): Status: ACTIVE | Noted: 2018-07-24

## 2020-02-10 PROBLEM — M79.7 FIBROMYALGIA: Status: ACTIVE | Noted: 2018-11-27

## 2020-02-10 PROBLEM — M70.61 GREATER TROCHANTERIC BURSITIS OF RIGHT HIP: Status: RESOLVED | Noted: 2019-03-21 | Resolved: 2020-02-10

## 2020-02-10 PROBLEM — M53.3 DISORDER OF SACROILIAC JOINT: Status: RESOLVED | Noted: 2019-03-21 | Resolved: 2020-02-10

## 2020-02-10 PROBLEM — G45.9 TRANSIENT ISCHEMIC ATTACK (TIA): Status: ACTIVE | Noted: 2020-02-10

## 2020-02-10 PROBLEM — M25.551 RIGHT HIP PAIN: Status: RESOLVED | Noted: 2018-08-08 | Resolved: 2020-02-10

## 2020-02-10 PROBLEM — R06.83 SNORING: Status: RESOLVED | Noted: 2019-09-06 | Resolved: 2020-02-10

## 2020-02-10 PROBLEM — I69.30 HISTORY OF STROKE WITH CURRENT RESIDUAL EFFECTS: Status: ACTIVE | Noted: 2019-09-04

## 2020-02-10 PROBLEM — Z79.899 ENCOUNTER FOR LONG-TERM (CURRENT) USE OF HIGH-RISK MEDICATION: Status: RESOLVED | Noted: 2017-01-30 | Resolved: 2020-02-10

## 2020-02-10 PROBLEM — I70.0 AORTO-ILIAC ATHEROSCLEROSIS: Status: ACTIVE | Noted: 2018-07-24

## 2020-02-10 PROBLEM — M46.1 SACROILIITIS: Status: RESOLVED | Noted: 2019-03-21 | Resolved: 2020-02-10

## 2020-02-10 PROBLEM — R10.9 ABDOMINAL PAIN: Status: RESOLVED | Noted: 2018-06-12 | Resolved: 2020-02-10

## 2020-02-10 LAB
ALBUMIN SERPL-MCNC: 4.3 G/DL (ref 3.5–5.2)
ALBUMIN/GLOB SERPL: 1.2 G/DL
ALP SERPL-CCNC: 87 U/L (ref 39–117)
ALT SERPL W P-5'-P-CCNC: 11 U/L (ref 1–33)
ANION GAP SERPL CALCULATED.3IONS-SCNC: 8.9 MMOL/L (ref 5–15)
APTT PPP: 30 SECONDS (ref 24.3–38.1)
AST SERPL-CCNC: 13 U/L (ref 1–32)
BASOPHILS # BLD AUTO: 0.08 10*3/MM3 (ref 0–0.2)
BASOPHILS NFR BLD AUTO: 0.8 % (ref 0–1.5)
BH CV ECHO MEAS - ACS: 1.8 CM
BH CV ECHO MEAS - AI DEC SLOPE: 325 CM/SEC^2
BH CV ECHO MEAS - AI MAX PG: 86.5 MMHG
BH CV ECHO MEAS - AI MAX VEL: 465 CM/SEC
BH CV ECHO MEAS - AI P1/2T: 419.1 MSEC
BH CV ECHO MEAS - AO MAX PG (FULL): 8.4 MMHG
BH CV ECHO MEAS - AO MAX PG: 12.7 MMHG
BH CV ECHO MEAS - AO MEAN PG (FULL): 4 MMHG
BH CV ECHO MEAS - AO MEAN PG: 6 MMHG
BH CV ECHO MEAS - AO ROOT AREA (BSA CORRECTED): 1.9
BH CV ECHO MEAS - AO ROOT AREA: 9.1 CM^2
BH CV ECHO MEAS - AO ROOT DIAM: 3.4 CM
BH CV ECHO MEAS - AO V2 MAX: 178 CM/SEC
BH CV ECHO MEAS - AO V2 MEAN: 118 CM/SEC
BH CV ECHO MEAS - AO V2 VTI: 40.3 CM
BH CV ECHO MEAS - AVA(I,A): 1.9 CM^2
BH CV ECHO MEAS - AVA(I,D): 1.9 CM^2
BH CV ECHO MEAS - AVA(V,A): 1.8 CM^2
BH CV ECHO MEAS - AVA(V,D): 1.8 CM^2
BH CV ECHO MEAS - BSA(HAYCOCK): 1.8 M^2
BH CV ECHO MEAS - BSA: 1.8 M^2
BH CV ECHO MEAS - BZI_BMI: 27.8 KILOGRAMS/M^2
BH CV ECHO MEAS - BZI_METRIC_HEIGHT: 162.6 CM
BH CV ECHO MEAS - BZI_METRIC_WEIGHT: 73.5 KG
BH CV ECHO MEAS - EDV(CUBED): 93.9 ML
BH CV ECHO MEAS - EDV(MOD-SP2): 70.3 ML
BH CV ECHO MEAS - EDV(MOD-SP4): 82.1 ML
BH CV ECHO MEAS - EDV(TEICH): 94.6 ML
BH CV ECHO MEAS - EF(CUBED): 65.7 %
BH CV ECHO MEAS - EF(MOD-BP): 57 %
BH CV ECHO MEAS - EF(MOD-SP2): 56.8 %
BH CV ECHO MEAS - EF(MOD-SP4): 55.9 %
BH CV ECHO MEAS - EF(TEICH): 57.4 %
BH CV ECHO MEAS - ESV(CUBED): 32.2 ML
BH CV ECHO MEAS - ESV(MOD-SP2): 30.4 ML
BH CV ECHO MEAS - ESV(MOD-SP4): 36.2 ML
BH CV ECHO MEAS - ESV(TEICH): 40.3 ML
BH CV ECHO MEAS - FS: 30 %
BH CV ECHO MEAS - IVS/LVPW: 0.99
BH CV ECHO MEAS - IVSD: 0.9 CM
BH CV ECHO MEAS - LAT PEAK E' VEL: 9 CM/SEC
BH CV ECHO MEAS - LV DIASTOLIC VOL/BSA (35-75): 45.9 ML/M^2
BH CV ECHO MEAS - LV MASS(C)D: 135.1 GRAMS
BH CV ECHO MEAS - LV MASS(C)DI: 75.5 GRAMS/M^2
BH CV ECHO MEAS - LV MAX PG: 4.2 MMHG
BH CV ECHO MEAS - LV MEAN PG: 2 MMHG
BH CV ECHO MEAS - LV SYSTOLIC VOL/BSA (12-30): 20.2 ML/M^2
BH CV ECHO MEAS - LV V1 MAX: 103 CM/SEC
BH CV ECHO MEAS - LV V1 MEAN: 70.6 CM/SEC
BH CV ECHO MEAS - LV V1 VTI: 23.9 CM
BH CV ECHO MEAS - LVIDD: 4.5 CM
BH CV ECHO MEAS - LVIDS: 3.2 CM
BH CV ECHO MEAS - LVLD AP2: 7.5 CM
BH CV ECHO MEAS - LVLD AP4: 7.5 CM
BH CV ECHO MEAS - LVLS AP2: 6.6 CM
BH CV ECHO MEAS - LVLS AP4: 6.6 CM
BH CV ECHO MEAS - LVOT AREA (M): 3.1 CM^2
BH CV ECHO MEAS - LVOT AREA: 3.1 CM^2
BH CV ECHO MEAS - LVOT DIAM: 2 CM
BH CV ECHO MEAS - LVPWD: 0.9 CM
BH CV ECHO MEAS - MED PEAK E' VEL: 8 CM/SEC
BH CV ECHO MEAS - MV A DUR: 0.19 SEC
BH CV ECHO MEAS - MV A MAX VEL: 92.2 CM/SEC
BH CV ECHO MEAS - MV DEC SLOPE: 441 CM/SEC^2
BH CV ECHO MEAS - MV DEC TIME: 174 SEC
BH CV ECHO MEAS - MV E MAX VEL: 118 CM/SEC
BH CV ECHO MEAS - MV E/A: 1.3
BH CV ECHO MEAS - MV MAX PG: 4.5 MMHG
BH CV ECHO MEAS - MV MEAN PG: 2 MMHG
BH CV ECHO MEAS - MV P1/2T MAX VEL: 118 CM/SEC
BH CV ECHO MEAS - MV P1/2T: 78.4 MSEC
BH CV ECHO MEAS - MV V2 MAX: 106 CM/SEC
BH CV ECHO MEAS - MV V2 MEAN: 57.4 CM/SEC
BH CV ECHO MEAS - MV V2 VTI: 40.3 CM
BH CV ECHO MEAS - MVA P1/2T LCG: 1.9 CM^2
BH CV ECHO MEAS - MVA(P1/2T): 2.8 CM^2
BH CV ECHO MEAS - MVA(VTI): 1.9 CM^2
BH CV ECHO MEAS - PA ACC TIME: 0.09 SEC
BH CV ECHO MEAS - PA MAX PG (FULL): 2.8 MMHG
BH CV ECHO MEAS - PA MAX PG: 4.6 MMHG
BH CV ECHO MEAS - PA PR(ACCEL): 37.6 MMHG
BH CV ECHO MEAS - PA V2 MAX: 107 CM/SEC
BH CV ECHO MEAS - PULM A REVS DUR: 0.11 SEC
BH CV ECHO MEAS - PULM A REVS VEL: 34.3 CM/SEC
BH CV ECHO MEAS - PULM DIAS VEL: 46.2 CM/SEC
BH CV ECHO MEAS - PULM S/D: 1.1
BH CV ECHO MEAS - PULM SYS VEL: 51.9 CM/SEC
BH CV ECHO MEAS - PVA(V,A): 0.95 CM^2
BH CV ECHO MEAS - PVA(V,D): 0.95 CM^2
BH CV ECHO MEAS - QP/QS: 0.27
BH CV ECHO MEAS - RAP SYSTOLE: 15 MMHG
BH CV ECHO MEAS - RV MAX PG: 1.7 MMHG
BH CV ECHO MEAS - RV MEAN PG: 1 MMHG
BH CV ECHO MEAS - RV V1 MAX: 65.8 CM/SEC
BH CV ECHO MEAS - RV V1 MEAN: 45.6 CM/SEC
BH CV ECHO MEAS - RV V1 VTI: 13.1 CM
BH CV ECHO MEAS - RVOT AREA: 1.5 CM^2
BH CV ECHO MEAS - RVOT DIAM: 1.4 CM
BH CV ECHO MEAS - RVSP: 34 MMHG
BH CV ECHO MEAS - SI(AO): 204.6 ML/M^2
BH CV ECHO MEAS - SI(CUBED): 34.5 ML/M^2
BH CV ECHO MEAS - SI(LVOT): 42 ML/M^2
BH CV ECHO MEAS - SI(MOD-SP2): 22.3 ML/M^2
BH CV ECHO MEAS - SI(MOD-SP4): 25.7 ML/M^2
BH CV ECHO MEAS - SI(TEICH): 30.4 ML/M^2
BH CV ECHO MEAS - SV(AO): 365.9 ML
BH CV ECHO MEAS - SV(CUBED): 61.7 ML
BH CV ECHO MEAS - SV(LVOT): 75.1 ML
BH CV ECHO MEAS - SV(MOD-SP2): 39.9 ML
BH CV ECHO MEAS - SV(MOD-SP4): 45.9 ML
BH CV ECHO MEAS - SV(RVOT): 20.2 ML
BH CV ECHO MEAS - SV(TEICH): 54.3 ML
BH CV ECHO MEAS - TAPSE (>1.6): 1.5 CM
BH CV ECHO MEAS - TR MAX VEL: 217 CM/SEC
BH CV ECHO MEASUREMENTS AVERAGE E/E' RATIO: 13.88
BH CV VAS BP LEFT ARM: NORMAL MMHG
BH CV XLRA - RV BASE: 2.4 CM
BH CV XLRA - TDI S': 10 CM/SEC
BILIRUB SERPL-MCNC: 0.2 MG/DL (ref 0.2–1.2)
BILIRUB UR QL STRIP: NEGATIVE
BUN BLD-MCNC: 12 MG/DL (ref 6–20)
BUN/CREAT SERPL: 14.5 (ref 7–25)
CALCIUM SPEC-SCNC: 9.1 MG/DL (ref 8.6–10.5)
CHLORIDE SERPL-SCNC: 105 MMOL/L (ref 98–107)
CHOLEST SERPL-MCNC: 77 MG/DL (ref 0–200)
CLARITY UR: CLEAR
CO2 SERPL-SCNC: 24.1 MMOL/L (ref 22–29)
COLOR UR: YELLOW
CREAT BLD-MCNC: 0.83 MG/DL (ref 0.57–1)
CRP SERPL-MCNC: 1.14 MG/DL (ref 0–0.5)
DEPRECATED RDW RBC AUTO: 43.9 FL (ref 37–54)
EOSINOPHIL # BLD AUTO: 0.2 10*3/MM3 (ref 0–0.4)
EOSINOPHIL NFR BLD AUTO: 2 % (ref 0.3–6.2)
ERYTHROCYTE [DISTWIDTH] IN BLOOD BY AUTOMATED COUNT: 12.9 % (ref 12.3–15.4)
ERYTHROCYTE [SEDIMENTATION RATE] IN BLOOD: 9 MM/HR (ref 0–20)
GFR SERPL CREATININE-BSD FRML MDRD: 71 ML/MIN/1.73
GLOBULIN UR ELPH-MCNC: 3.6 GM/DL
GLUCOSE BLD-MCNC: 170 MG/DL (ref 65–99)
GLUCOSE BLDC GLUCOMTR-MCNC: 117 MG/DL (ref 70–130)
GLUCOSE BLDC GLUCOMTR-MCNC: 77 MG/DL (ref 70–130)
GLUCOSE BLDC GLUCOMTR-MCNC: 87 MG/DL (ref 70–130)
GLUCOSE BLDC GLUCOMTR-MCNC: 91 MG/DL (ref 70–130)
GLUCOSE UR STRIP-MCNC: NEGATIVE MG/DL
HBA1C MFR BLD: 6 % (ref 4.8–5.6)
HCT VFR BLD AUTO: 43.8 % (ref 34–46.6)
HDLC SERPL-MCNC: 35 MG/DL (ref 40–60)
HGB BLD-MCNC: 14.1 G/DL (ref 12–15.9)
HGB UR QL STRIP.AUTO: NEGATIVE
IMM GRANULOCYTES # BLD AUTO: 0.04 10*3/MM3 (ref 0–0.05)
IMM GRANULOCYTES NFR BLD AUTO: 0.4 % (ref 0–0.5)
INR PPP: 1.07 (ref 0.9–1.1)
KETONES UR QL STRIP: NEGATIVE
LDLC SERPL CALC-MCNC: 18 MG/DL (ref 0–100)
LDLC/HDLC SERPL: 0.51 {RATIO}
LEFT ATRIUM VOLUME INDEX: 17 ML/M2
LEUKOCYTE ESTERASE UR QL STRIP.AUTO: NEGATIVE
LIPASE SERPL-CCNC: 11 U/L (ref 13–60)
LV EF 2D ECHO EST: 57 %
LYMPHOCYTES # BLD AUTO: 3.26 10*3/MM3 (ref 0.7–3.1)
LYMPHOCYTES NFR BLD AUTO: 32.5 % (ref 19.6–45.3)
MAXIMAL PREDICTED HEART RATE: 164 BPM
MCH RBC QN AUTO: 30 PG (ref 26.6–33)
MCHC RBC AUTO-ENTMCNC: 32.2 G/DL (ref 31.5–35.7)
MCV RBC AUTO: 93.2 FL (ref 79–97)
MONOCYTES # BLD AUTO: 0.93 10*3/MM3 (ref 0.1–0.9)
MONOCYTES NFR BLD AUTO: 9.3 % (ref 5–12)
NEUTROPHILS # BLD AUTO: 5.53 10*3/MM3 (ref 1.7–7)
NEUTROPHILS NFR BLD AUTO: 55 % (ref 42.7–76)
NITRITE UR QL STRIP: NEGATIVE
NRBC BLD AUTO-RTO: 0 /100 WBC (ref 0–0.2)
PA ADP PRP-ACNC: 251 PRU (ref 194–418)
PH UR STRIP.AUTO: 6 [PH] (ref 4.5–8)
PLATELET # BLD AUTO: 268 10*3/MM3 (ref 140–450)
PMV BLD AUTO: 10.5 FL (ref 6–12)
POTASSIUM BLD-SCNC: 5.4 MMOL/L (ref 3.5–5.2)
PROT SERPL-MCNC: 7.9 G/DL (ref 6–8.5)
PROT UR QL STRIP: NEGATIVE
PROTHROMBIN TIME: 13.6 SECONDS (ref 12.1–15)
RBC # BLD AUTO: 4.7 10*6/MM3 (ref 3.77–5.28)
SODIUM BLD-SCNC: 138 MMOL/L (ref 136–145)
SP GR UR STRIP: 1.01 (ref 1–1.03)
STRESS TARGET HR: 139 BPM
TRIGL SERPL-MCNC: 120 MG/DL (ref 0–150)
TROPONIN T SERPL-MCNC: <0.01 NG/ML (ref 0–0.03)
TSH SERPL DL<=0.05 MIU/L-ACNC: 1.76 UIU/ML (ref 0.27–4.2)
UROBILINOGEN UR QL STRIP: NORMAL
VIT B12 BLD-MCNC: 853 PG/ML (ref 211–946)
VLDLC SERPL-MCNC: 24 MG/DL (ref 7–27)
WBC NRBC COR # BLD: 10.04 10*3/MM3 (ref 3.4–10.8)

## 2020-02-10 PROCEDURE — 80061 LIPID PANEL: CPT | Performed by: INTERNAL MEDICINE

## 2020-02-10 PROCEDURE — 97165 OT EVAL LOW COMPLEX 30 MIN: CPT

## 2020-02-10 PROCEDURE — 96372 THER/PROPH/DIAG INJ SC/IM: CPT

## 2020-02-10 PROCEDURE — 93306 TTE W/DOPPLER COMPLETE: CPT

## 2020-02-10 PROCEDURE — G0378 HOSPITAL OBSERVATION PER HR: HCPCS

## 2020-02-10 PROCEDURE — 85610 PROTHROMBIN TIME: CPT | Performed by: EMERGENCY MEDICINE

## 2020-02-10 PROCEDURE — 99283 EMERGENCY DEPT VISIT LOW MDM: CPT

## 2020-02-10 PROCEDURE — 25010000002 ENOXAPARIN PER 10 MG: Performed by: INTERNAL MEDICINE

## 2020-02-10 PROCEDURE — 99236 HOSP IP/OBS SAME DATE HI 85: CPT | Performed by: INTERNAL MEDICINE

## 2020-02-10 PROCEDURE — 97161 PT EVAL LOW COMPLEX 20 MIN: CPT

## 2020-02-10 PROCEDURE — 70551 MRI BRAIN STEM W/O DYE: CPT

## 2020-02-10 PROCEDURE — 70450 CT HEAD/BRAIN W/O DYE: CPT

## 2020-02-10 PROCEDURE — 84443 ASSAY THYROID STIM HORMONE: CPT | Performed by: INTERNAL MEDICINE

## 2020-02-10 PROCEDURE — 70498 CT ANGIOGRAPHY NECK: CPT

## 2020-02-10 PROCEDURE — 85652 RBC SED RATE AUTOMATED: CPT | Performed by: INTERNAL MEDICINE

## 2020-02-10 PROCEDURE — 86140 C-REACTIVE PROTEIN: CPT | Performed by: INTERNAL MEDICINE

## 2020-02-10 PROCEDURE — 85025 COMPLETE CBC W/AUTO DIFF WBC: CPT | Performed by: EMERGENCY MEDICINE

## 2020-02-10 PROCEDURE — 93306 TTE W/DOPPLER COMPLETE: CPT | Performed by: INTERNAL MEDICINE

## 2020-02-10 PROCEDURE — 93010 ELECTROCARDIOGRAM REPORT: CPT | Performed by: INTERNAL MEDICINE

## 2020-02-10 PROCEDURE — 80053 COMPREHEN METABOLIC PANEL: CPT | Performed by: EMERGENCY MEDICINE

## 2020-02-10 PROCEDURE — 83036 HEMOGLOBIN GLYCOSYLATED A1C: CPT | Performed by: INTERNAL MEDICINE

## 2020-02-10 PROCEDURE — 85576 BLOOD PLATELET AGGREGATION: CPT | Performed by: INTERNAL MEDICINE

## 2020-02-10 PROCEDURE — 85730 THROMBOPLASTIN TIME PARTIAL: CPT | Performed by: EMERGENCY MEDICINE

## 2020-02-10 PROCEDURE — 0 IOPAMIDOL PER 1 ML: Performed by: INTERNAL MEDICINE

## 2020-02-10 PROCEDURE — 82607 VITAMIN B-12: CPT | Performed by: INTERNAL MEDICINE

## 2020-02-10 PROCEDURE — 96374 THER/PROPH/DIAG INJ IV PUSH: CPT

## 2020-02-10 PROCEDURE — 70496 CT ANGIOGRAPHY HEAD: CPT

## 2020-02-10 PROCEDURE — 82962 GLUCOSE BLOOD TEST: CPT

## 2020-02-10 PROCEDURE — 25010000002 HYDROMORPHONE PER 4 MG

## 2020-02-10 PROCEDURE — 93005 ELECTROCARDIOGRAM TRACING: CPT | Performed by: EMERGENCY MEDICINE

## 2020-02-10 PROCEDURE — 83690 ASSAY OF LIPASE: CPT | Performed by: EMERGENCY MEDICINE

## 2020-02-10 PROCEDURE — 84484 ASSAY OF TROPONIN QUANT: CPT | Performed by: INTERNAL MEDICINE

## 2020-02-10 PROCEDURE — 99284 EMERGENCY DEPT VISIT MOD MDM: CPT | Performed by: EMERGENCY MEDICINE

## 2020-02-10 PROCEDURE — 81003 URINALYSIS AUTO W/O SCOPE: CPT | Performed by: EMERGENCY MEDICINE

## 2020-02-10 PROCEDURE — 99204 OFFICE O/P NEW MOD 45 MIN: CPT | Performed by: PSYCHIATRY & NEUROLOGY

## 2020-02-10 RX ORDER — CLOPIDOGREL BISULFATE 75 MG/1
75 TABLET ORAL DAILY
Qty: 30 TABLET | Refills: 0 | Status: SHIPPED | OUTPATIENT
Start: 2020-02-11

## 2020-02-10 RX ORDER — GABAPENTIN 300 MG/1
600 CAPSULE ORAL EVERY 8 HOURS SCHEDULED
Status: DISCONTINUED | OUTPATIENT
Start: 2020-02-10 | End: 2020-02-10 | Stop reason: HOSPADM

## 2020-02-10 RX ORDER — POTASSIUM CHLORIDE 20 MEQ/1
40 TABLET, EXTENDED RELEASE ORAL AS NEEDED
Status: DISCONTINUED | OUTPATIENT
Start: 2020-02-10 | End: 2020-02-10 | Stop reason: HOSPADM

## 2020-02-10 RX ORDER — AMOXICILLIN AND CLAVULANATE POTASSIUM 500; 125 MG/1; MG/1
1 TABLET, FILM COATED ORAL EVERY 12 HOURS SCHEDULED
Qty: 20 TABLET | Refills: 0 | Status: SHIPPED | OUTPATIENT
Start: 2020-02-10 | End: 2020-02-20

## 2020-02-10 RX ORDER — DEXTROSE MONOHYDRATE 25 G/50ML
25 INJECTION, SOLUTION INTRAVENOUS
Status: DISCONTINUED | OUTPATIENT
Start: 2020-02-10 | End: 2020-02-10 | Stop reason: HOSPADM

## 2020-02-10 RX ORDER — L.ACID,PARA/B.BIFIDUM/S.THERM 8B CELL
1 CAPSULE ORAL DAILY
Qty: 30 CAPSULE | Refills: 0 | Status: SHIPPED | OUTPATIENT
Start: 2020-02-10 | End: 2020-09-03

## 2020-02-10 RX ORDER — DIPHENHYDRAMINE HCL 12.5MG/5ML
25 LIQUID (ML) ORAL EVERY 6 HOURS PRN
Status: DISCONTINUED | OUTPATIENT
Start: 2020-02-10 | End: 2020-02-10 | Stop reason: HOSPADM

## 2020-02-10 RX ORDER — POTASSIUM CHLORIDE 7.45 MG/ML
10 INJECTION INTRAVENOUS
Status: DISCONTINUED | OUTPATIENT
Start: 2020-02-10 | End: 2020-02-10 | Stop reason: HOSPADM

## 2020-02-10 RX ORDER — CLOPIDOGREL BISULFATE 75 MG/1
75 TABLET ORAL DAILY
Status: DISCONTINUED | OUTPATIENT
Start: 2020-02-10 | End: 2020-02-10 | Stop reason: HOSPADM

## 2020-02-10 RX ORDER — METFORMIN HYDROCHLORIDE EXTENDED-RELEASE TABLETS 1000 MG/1
1000 TABLET, FILM COATED, EXTENDED RELEASE ORAL 2 TIMES DAILY WITH MEALS
Start: 2020-02-12

## 2020-02-10 RX ORDER — HYDROMORPHONE HCL 110MG/55ML
PATIENT CONTROLLED ANALGESIA SYRINGE INTRAVENOUS
Status: COMPLETED
Start: 2020-02-10 | End: 2020-02-10

## 2020-02-10 RX ORDER — SODIUM CHLORIDE 0.9 % (FLUSH) 0.9 %
10 SYRINGE (ML) INJECTION AS NEEDED
Status: DISCONTINUED | OUTPATIENT
Start: 2020-02-10 | End: 2020-02-10 | Stop reason: HOSPADM

## 2020-02-10 RX ORDER — TRAZODONE HYDROCHLORIDE 50 MG/1
100 TABLET ORAL NIGHTLY
Status: DISCONTINUED | OUTPATIENT
Start: 2020-02-10 | End: 2020-02-10

## 2020-02-10 RX ORDER — AMLODIPINE BESYLATE 10 MG/1
10 TABLET ORAL
Qty: 30 TABLET | Refills: 7 | Status: SHIPPED | OUTPATIENT
Start: 2020-02-11 | End: 2020-09-16

## 2020-02-10 RX ORDER — NICOTINE 21 MG/24HR
1 PATCH, TRANSDERMAL 24 HOURS TRANSDERMAL EVERY 24 HOURS
Qty: 28 PATCH | Refills: 0 | Status: SHIPPED | OUTPATIENT
Start: 2020-02-11 | End: 2020-09-03

## 2020-02-10 RX ORDER — SODIUM CHLORIDE 0.9 % (FLUSH) 0.9 %
10 SYRINGE (ML) INJECTION EVERY 12 HOURS SCHEDULED
Status: DISCONTINUED | OUTPATIENT
Start: 2020-02-10 | End: 2020-02-10 | Stop reason: HOSPADM

## 2020-02-10 RX ORDER — POTASSIUM CHLORIDE 1.5 G/1.77G
40 POWDER, FOR SOLUTION ORAL AS NEEDED
Status: DISCONTINUED | OUTPATIENT
Start: 2020-02-10 | End: 2020-02-10 | Stop reason: HOSPADM

## 2020-02-10 RX ORDER — OXYCODONE HYDROCHLORIDE 5 MG/1
5 TABLET ORAL EVERY 4 HOURS PRN
Status: DISCONTINUED | OUTPATIENT
Start: 2020-02-10 | End: 2020-02-10 | Stop reason: HOSPADM

## 2020-02-10 RX ORDER — BACLOFEN 10 MG/1
10 TABLET ORAL NIGHTLY
Status: DISCONTINUED | OUTPATIENT
Start: 2020-02-10 | End: 2020-02-10 | Stop reason: HOSPADM

## 2020-02-10 RX ORDER — MAGNESIUM SULFATE HEPTAHYDRATE 40 MG/ML
2 INJECTION, SOLUTION INTRAVENOUS AS NEEDED
Status: DISCONTINUED | OUTPATIENT
Start: 2020-02-10 | End: 2020-02-10 | Stop reason: HOSPADM

## 2020-02-10 RX ORDER — FAMOTIDINE 20 MG/1
20 TABLET, FILM COATED ORAL
Status: DISCONTINUED | OUTPATIENT
Start: 2020-02-10 | End: 2020-02-10

## 2020-02-10 RX ORDER — HYDROMORPHONE HCL 110MG/55ML
1 PATIENT CONTROLLED ANALGESIA SYRINGE INTRAVENOUS ONCE
Status: COMPLETED | OUTPATIENT
Start: 2020-02-10 | End: 2020-02-10

## 2020-02-10 RX ORDER — SODIUM CHLORIDE 9 MG/ML
40 INJECTION, SOLUTION INTRAVENOUS AS NEEDED
Status: DISCONTINUED | OUTPATIENT
Start: 2020-02-10 | End: 2020-02-10 | Stop reason: HOSPADM

## 2020-02-10 RX ORDER — LEVETIRACETAM 500 MG/1
500 TABLET ORAL 2 TIMES DAILY
Status: DISCONTINUED | OUTPATIENT
Start: 2020-02-10 | End: 2020-02-10 | Stop reason: HOSPADM

## 2020-02-10 RX ORDER — FAMOTIDINE 20 MG/1
20 TABLET, FILM COATED ORAL 2 TIMES DAILY
Status: DISCONTINUED | OUTPATIENT
Start: 2020-02-10 | End: 2020-02-10 | Stop reason: HOSPADM

## 2020-02-10 RX ORDER — AMLODIPINE BESYLATE 5 MG/1
10 TABLET ORAL
Status: DISCONTINUED | OUTPATIENT
Start: 2020-02-10 | End: 2020-02-10 | Stop reason: HOSPADM

## 2020-02-10 RX ORDER — MAGNESIUM SULFATE HEPTAHYDRATE 40 MG/ML
4 INJECTION, SOLUTION INTRAVENOUS AS NEEDED
Status: DISCONTINUED | OUTPATIENT
Start: 2020-02-10 | End: 2020-02-10 | Stop reason: HOSPADM

## 2020-02-10 RX ORDER — AMOXICILLIN AND CLAVULANATE POTASSIUM 500; 125 MG/1; MG/1
1 TABLET, FILM COATED ORAL EVERY 12 HOURS SCHEDULED
Status: DISCONTINUED | OUTPATIENT
Start: 2020-02-10 | End: 2020-02-10 | Stop reason: HOSPADM

## 2020-02-10 RX ORDER — ASPIRIN 325 MG
325 TABLET ORAL DAILY
Status: DISCONTINUED | OUTPATIENT
Start: 2020-02-10 | End: 2020-02-10

## 2020-02-10 RX ORDER — METFORMIN HYDROCHLORIDE 500 MG/1
1000 TABLET, EXTENDED RELEASE ORAL 2 TIMES DAILY WITH MEALS
Status: DISCONTINUED | OUTPATIENT
Start: 2020-02-10 | End: 2020-02-10

## 2020-02-10 RX ORDER — DULOXETIN HYDROCHLORIDE 60 MG/1
60 CAPSULE, DELAYED RELEASE ORAL DAILY
Status: DISCONTINUED | OUTPATIENT
Start: 2020-02-10 | End: 2020-02-10 | Stop reason: HOSPADM

## 2020-02-10 RX ORDER — METFORMIN HYDROCHLORIDE 500 MG/1
1000 TABLET, EXTENDED RELEASE ORAL 2 TIMES DAILY WITH MEALS
Status: DISCONTINUED | OUTPATIENT
Start: 2020-02-12 | End: 2020-02-10 | Stop reason: HOSPADM

## 2020-02-10 RX ORDER — BACLOFEN 10 MG/1
10 TABLET ORAL
Status: DISCONTINUED | OUTPATIENT
Start: 2020-02-10 | End: 2020-02-10

## 2020-02-10 RX ORDER — PANTOPRAZOLE SODIUM 40 MG/1
40 TABLET, DELAYED RELEASE ORAL EVERY MORNING
Status: DISCONTINUED | OUTPATIENT
Start: 2020-02-10 | End: 2020-02-10

## 2020-02-10 RX ORDER — AMITRIPTYLINE HYDROCHLORIDE 25 MG/1
50 TABLET, FILM COATED ORAL NIGHTLY
Status: DISCONTINUED | OUTPATIENT
Start: 2020-02-10 | End: 2020-02-10 | Stop reason: HOSPADM

## 2020-02-10 RX ORDER — NICOTINE POLACRILEX 4 MG
15 LOZENGE BUCCAL
Status: DISCONTINUED | OUTPATIENT
Start: 2020-02-10 | End: 2020-02-10 | Stop reason: HOSPADM

## 2020-02-10 RX ORDER — LEVETIRACETAM 500 MG/1
500 TABLET ORAL EVERY 12 HOURS SCHEDULED
Status: DISCONTINUED | OUTPATIENT
Start: 2020-02-10 | End: 2020-02-10 | Stop reason: SDUPTHER

## 2020-02-10 RX ORDER — NALOXONE HCL 0.4 MG/ML
0.4 VIAL (ML) INJECTION
Status: DISCONTINUED | OUTPATIENT
Start: 2020-02-10 | End: 2020-02-10 | Stop reason: HOSPADM

## 2020-02-10 RX ORDER — OXYCODONE AND ACETAMINOPHEN 7.5; 325 MG/1; MG/1
1 TABLET ORAL 2 TIMES DAILY PRN
Status: DISCONTINUED | OUTPATIENT
Start: 2020-02-10 | End: 2020-02-10 | Stop reason: HOSPADM

## 2020-02-10 RX ORDER — NICOTINE 21 MG/24HR
1 PATCH, TRANSDERMAL 24 HOURS TRANSDERMAL EVERY 24 HOURS
Status: DISCONTINUED | OUTPATIENT
Start: 2020-02-10 | End: 2020-02-10 | Stop reason: HOSPADM

## 2020-02-10 RX ADMIN — BACLOFEN 10 MG: 10 TABLET ORAL at 11:57

## 2020-02-10 RX ADMIN — DIPHENHYDRAMINE HYDROCHLORIDE 25 MG: 12.5 SOLUTION ORAL at 11:58

## 2020-02-10 RX ADMIN — AMLODIPINE BESYLATE 10 MG: 5 TABLET ORAL at 08:26

## 2020-02-10 RX ADMIN — GABAPENTIN 600 MG: 300 CAPSULE ORAL at 06:43

## 2020-02-10 RX ADMIN — ASPIRIN 325 MG: 325 TABLET ORAL at 08:27

## 2020-02-10 RX ADMIN — PANTOPRAZOLE SODIUM 40 MG: 40 TABLET, DELAYED RELEASE ORAL at 06:43

## 2020-02-10 RX ADMIN — DULOXETINE HYDROCHLORIDE 60 MG: 60 CAPSULE, DELAYED RELEASE ORAL at 08:26

## 2020-02-10 RX ADMIN — ENOXAPARIN SODIUM 40 MG: 40 INJECTION SUBCUTANEOUS at 06:43

## 2020-02-10 RX ADMIN — OXYCODONE HYDROCHLORIDE AND ACETAMINOPHEN 1 TABLET: 7.5; 325 TABLET ORAL at 11:57

## 2020-02-10 RX ADMIN — OXYCODONE HYDROCHLORIDE 5 MG: 5 TABLET ORAL at 06:43

## 2020-02-10 RX ADMIN — Medication 10 ML: at 09:26

## 2020-02-10 RX ADMIN — METFORMIN HYDROCHLORIDE 1000 MG: 500 TABLET, EXTENDED RELEASE ORAL at 08:45

## 2020-02-10 RX ADMIN — HYDROMORPHONE HYDROCHLORIDE 1 MG: 2 INJECTION, SOLUTION INTRAMUSCULAR; INTRAVENOUS; SUBCUTANEOUS at 04:17

## 2020-02-10 RX ADMIN — FAMOTIDINE 20 MG: 20 TABLET ORAL at 08:27

## 2020-02-10 RX ADMIN — GABAPENTIN 600 MG: 300 CAPSULE ORAL at 14:09

## 2020-02-10 RX ADMIN — CLOPIDOGREL BISULFATE 75 MG: 75 TABLET ORAL at 08:27

## 2020-02-10 RX ADMIN — Medication 600 MG: at 08:27

## 2020-02-10 RX ADMIN — Medication 1 MG: at 04:17

## 2020-02-10 RX ADMIN — LEVETIRACETAM 500 MG: 500 TABLET, FILM COATED ORAL at 08:26

## 2020-02-10 RX ADMIN — IOPAMIDOL 100 ML: 755 INJECTION, SOLUTION INTRAVENOUS at 10:30

## 2020-02-10 RX ADMIN — Medication 10 ML: at 05:33

## 2020-02-10 NOTE — PLAN OF CARE
Problem: Patient Care Overview  Goal: Plan of Care Review  Flowsheets (Taken 2/10/2020 7170)  Outcome Summary: OT evaluation complete: Patient performs supine to/from sit transfers with conditional independnece, sit to/from stand transfers with supervision, and functional mobility x 120 feet with SBA without use of an assistive device. Pt reports that her  provides signficant supprt at home when needed, and stated she has no concerns. No OT services needed at this time.

## 2020-02-10 NOTE — NURSING NOTE
Discharge Planning Assessment  STEVE Jane     Patient Name: Crystal Cruz  MRN: [id number]  Today's Date: 2/10/2020    Admit Date: 2/10/2020    Discharge Needs Assessment     Row Name 02/10/20 1543       Living Environment    Lives With  spouse    Name(s) of Who Lives With Patient  gabriella Martinez    Current Living Arrangements  home/apartment/condo    Primary Care Provided by  self    Provides Primary Care For  no one    Family Caregiver if Needed  spouse    Family Caregiver Names  Spouse- Juan    Quality of Family Relationships  supportive    Able to Return to Prior Arrangements  yes       Resource/Environmental Concerns    Resource/Environmental Concerns  none    Transportation Concerns  car, none       Transition Planning    Patient/Family Anticipates Transition to  home;home with family    Patient/Family Anticipated Services at Transition  none    Transportation Anticipated  family or friend will provide       Discharge Needs Assessment    Readmission Within the Last 30 Days  no previous admission in last 30 days    Concerns to be Addressed  no discharge needs identified    Equipment Currently Used at Home  walker, rolling;hospital bed;glucometer    Anticipated Changes Related to Illness  none    Equipment Needed After Discharge  none    Provided post acute provider list?  N/A    N/A Provider List Comment  Pt anticipates d/c home with no needs         Discharge Plan     Row Name 02/10/20 0301       Plan    Plan  d/c home with      Patient/Family in Agreement with Plan  yes    Plan Comments  Into room to visit with pt.  Facesheet verified.  Pt is typically indepented with ADLs but does not drive.  She denies any problems getting to pharmacy or paying for medications. Pt lives in a home with her .  They live in a second floor apartment and she takes the stairs with the help of her , on bad days she stays home.  Pt has a walker that she uses at times, a hospital bed and a glucometer.  Pt  denies the need for any further DME.  She did request a different glucometer that does not require finger sticks.  Encouraged pt to discuss this with her PCP.  Pt denies any HH or rehab needs.  She does have advanced directives on file here but she wants to change them.  Joanne Hilliard notified for assistance. No further needs noted at this time.  CM will continue to follow.          Destination      Coordination has not been started for this encounter.      Durable Medical Equipment      Coordination has not been started for this encounter.      Dialysis/Infusion      Coordination has not been started for this encounter.      Home Medical Care      Coordination has not been started for this encounter.      Therapy      Coordination has not been started for this encounter.      Community Resources      Coordination has not been started for this encounter.          Demographic Summary     Row Name 02/10/20 1541       General Information    Admission Type  observation    Arrived From  home    Referral Source  admission list    Reason for Consult  discharge planning    Preferred Language  English     Used During This Interaction  no        Functional Status     Row Name 02/10/20 1541       Functional Status    Usual Activity Tolerance  good    Current Activity Tolerance  good       Functional Status, IADL    Medications  independent    Meal Preparation  independent    Housekeeping  assistive equipment and person    Laundry  assistive equipment and person    Shopping  assistive equipment and person       Mental Status    General Appearance WDL  WDL       Mental Status Summary    Recent Changes in Mental Status/Cognitive Functioning  no changes        Psychosocial    No documentation.       Abuse/Neglect    No documentation.       Legal    No documentation.       Substance Abuse    No documentation.       Patient Forms    No documentation.           Adama Blair RN

## 2020-02-10 NOTE — THERAPY DISCHARGE NOTE
Acute Care - Physical Therapy Initial Eval/Discharge  STEVE Jane     Patient Name: Crystal Cruz  : 1965  MRN: [id number]  Today's Date: 2/10/2020   Onset of Illness/Injury or Date of Surgery: 02/10/20  Date of Referral to PT: 02/10/20  Referring Physician: Dr. Silva       Admit Date: 2/10/2020    Visit Dx:    ICD-10-CM ICD-9-CM   1. Left sided numbness R20.0 782.0   2. Bilateral carotid artery stenosis I65.23 433.10     433.30     Patient Active Problem List   Diagnosis   • Anxiety disorder   • Huitron's esophagus   • Chronic constipation   • Gastroesophageal reflux disease without esophagitis   • Hyperlipidemia   • Systemic sclerosis (CMS/HCC)   • Benign essential hypertension   • Chronic pain   • Seizure (CMS/HCC)   • Acquired hypothyroidism   • Arthritis   • Muscle pain   • Tobacco use   • Vitamin D deficiency   • Observed sleep apnea   • Aorto-iliac atherosclerosis (CMS/HCC)   • Carotid atherosclerosis, bilateral   • History of stroke with current residual effects   • S/P carotid endarterectomy   • Schneiderian papilloma   • Fibromyalgia   • 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)   • Transient ischemic attack (TIA)   • Left sided numbness     Past Medical History:   Diagnosis Date   • Acid reflux    • Anemia    • Anxiety    • Atherosclerosis of aorta (CMS/HCC) 2017   • Huitron's esophagus    • Bursitis    • CAD (coronary artery disease)    • Chronic pain disorder    • CVA (cerebrovascular accident) (CMS/HCC)    • DDD (degenerative disc disease), cervical    • Diabetes mellitus (CMS/HCC)    • Fibromyalgia    • Hard to intubate    • History of transfusion    • Hypertension    • Joint pain    • Kienbock's disease    • Low back pain    • Pancreatitis    • Pulmonary fibrosis (CMS/HCC) 2016   • Reflux gastritis    • Scleroderma (CMS/HCC)    • Seizure (CMS/HCC)      Past Surgical History:   Procedure Laterality Date   • BACK SURGERY     • CAROTID ENDARTERECTOMY Left      "Neurological   • CATARACT EXTRACTION Bilateral    •  SECTION     • CHOLECYSTECTOMY      Laparoscopic   • COLONOSCOPY      Complete   • COLONOSCOPY N/A 2016    Procedure: COLONOSCOPY;  Surgeon: Gaviota Hagen MD;  Location: [facility] OR;  Service:    • ENDOSCOPY N/A 2016    Procedure: ESOPHAGOGASTRODUODENOSCOPY WITH BIOPSIES;  Surgeon: Gaviota Hagen MD;  Location: [facility] OR;  Service:    • ENDOSCOPY N/A 2018    Procedure: ESOPHAGOGASTRODUODENOSCOPY, biopsy;  Surgeon: Gaviota Hagen MD;  Location: [facility] OR;  Service: Gastroenterology   • TONSILLECTOMY AND ADENOIDECTOMY     • UPPER GASTROINTESTINAL ENDOSCOPY      Diagnostic   • WRIST SURGERY Left           PT ASSESSMENT (last 12 hours)      Physical Therapy Evaluation     Row Name 02/10/20 0830          PT Evaluation Time/Intention    Subjective Information  complains of;pain  -BP     Document Type  discharge evaluation/summary  -BP     Mode of Treatment  physical therapy  -BP     Patient Effort  adequate  -BP     Symptoms Noted During/After Treatment  none  -BP     Row Name 02/10/20 0830          General Information    Patient Profile Reviewed?  yes  -BP     Onset of Illness/Injury or Date of Surgery  02/10/20  -BP     Referring Physician  Dr. Silva   -BP     Patient Observations  alert;cooperative;agree to therapy  -BP     Patient/Family Observations  Patient supine in bed with HOB elevated. Agreeable to PT evaluation.   -BP     Prior Level of Function  -- see pertinent history   -BP     Equipment Currently Used at Home  walker, rolling  -BP     Pertinent History of Current Functional Problem  Patient presents to ED due to onset of numbness in B LE's and B UE's as well as generalized weakness. Patient with history of CVA with mild residual L sided weakness. She reports chronic pain from fibromyalgia and scleroderma. She reports she has a walker at home but states \"i refuse to use it, the walls do just fine.\" She reports independence with " "mobility and ADL's. She also reports her  has been her \"caretaker for years,\" since initial CVA. Patient reports no concerns for return home.   -BP     Existing Precautions/Restrictions  fall  -BP     Risks Reviewed  patient:;LOB;increased discomfort  -BP     Benefits Reviewed  patient:;improve function;increase independence;increase strength  -BP     Barriers to Rehab  previous functional deficit  -BP     Row Name 02/10/20 0830          Relationship/Environment    Lives With  spouse  -BP     Name(s) of Who Lives With Patient  spouse is present to assist 24/7  -BP     Row Name 02/10/20 0830          Resource/Environmental Concerns    Current Living Arrangements  home/apartment/condo  -BP     Row Name 02/10/20 0830          Living Environment    Living Arrangements  apartment  -BP     Home Accessibility  stairs to enter home  -BP     Living Environment Comment  Patient lives on second floor apartement with one flight of stairs to enter with 2 handrails.   -BP     Row Name 02/10/20 0830          Cognitive Assessment/Interventions    Additional Documentation  Cognitive Assessment/Intervention (Group)  -BP     Row Name 02/10/20 0830          Cognitive Assessment/Intervention- PT/OT    Orientation Status (Cognition)  oriented x 4  -BP     Follows Commands (Cognition)  WFL  -BP     Safety Deficit (Cognitive)  judgment  -BP     Personal Safety Interventions  gait belt;nonskid shoes/slippers when out of bed  -BP     Cognitive Assessment/Intervention Comment  Patient refuses use of an AD for mobility   -BP     Row Name 02/10/20 0830          Safety Issues, Functional Mobility    Safety Issues Affecting Function (Mobility)  judgment  -BP     Row Name 02/10/20 0830          Bed Mobility Assessment/Treatment    Bed Mobility Assessment/Treatment  supine-sit;sit-supine  -BP     Supine-Sit [place] (Bed Mobility)  conditional independence  -BP     Sit-Supine [place] (Bed Mobility)  conditional independence  -BP     " "Assistive Device (Bed Mobility)  head of bed elevated  -BP     Row Name 02/10/20 0830          Transfer Assessment/Treatment    Transfer Assessment/Treatment  sit-stand transfer;stand-sit transfer  -BP     Sit-Stand [place] (Transfers)  supervision  -     Stand-Sit [place] (Transfers)  supervision  -[facility] Name 02/10/20 0830          Sit-Stand Transfer    Assistive Device (Sit-Stand Transfers)  -- no device   -     Row Name 02/10/20 0830          Stand-Sit Transfer    Assistive Device (Stand-Sit Transfers)  -- no assistive device   -[facility] Name 02/10/20 0830          Gait/Stairs Assessment/Training    [place] Level (Gait)  stand by assist  -BP     Assistive Device (Gait)  other (see comments) no device. Patient refuses   -BP     Distance in Feet (Gait)  120  -BP     Pattern (Gait)  swing-through  -BP     Deviations/Abnormal Patterns (Gait)  gait speed decreased;stride length decreased  -BP     Comment (Gait/Stairs)  Patient with several episodes of lateral deviation without loss of balance. Patient refuses uses an assistive device states \"the walls do just fine.\"   -BP     Row Name 02/10/20 0830          General ROM    GENERAL ROM COMMENTS  B LE AROM WFL   -BP     [facility] Name 02/10/20 0830          MMT (Manual Muscle Testing)    General MMT Comments  R LE gross MMT 5/5. L LE gross MMT 4+/5.   -BP     Row Name 02/10/20 0830          Sensory Assessment/Intervention    Sensory General Assessment  -- reports decreased sensation on the L LE vs the R LE   -BP     [facility] Name 02/10/20 0830          Pain Assessment    Additional Documentation  Pain Scale: Numbers Pre/Post-Treatment (Group)  -BP     [facility] Name 02/10/20 0830          Pain Scale: Numbers Pre/Post-Treatment    Pain Scale: Numbers, Pretreatment  8/10  -BP     Pain Scale: Numbers, Post-Treatment  8/10  -BP     Pain Location - Side  -- generalized   -BP     Pre/Post Treatment Pain Comment  patient reports chronic pain in neck, back and " "extremities due to fibromyalgia and scleroderma   -BP     Pain Intervention(s)  Ambulation/increased activity;Repositioned  -BP     Row Name 02/10/20 0830          Plan of Care Review    Outcome Summary  PT Evaluation Complete: Patient performs supine to/from sit transfers with conditional independnece, sit to/from stand transfers with supervision, and gait x 120 feet with SBA without use of an assistive device. Patient with several episodes of lateral deviation without loss of balance. Patient states \"I have a walker but I refuse to use it. The walls do just fiine.\" Educated patient regarding benefit of an AD, patient unreceptive and politely refuses use. Patient has no concerns for return home and will have 24/7 supervision/assist as needed upon return home. Patient functionally at baseline. No inpatient skilled PT needs at this time.   -BP     Row Name 02/10/20 0830          Physical Therapy Clinical Impression    Date of Referral to PT  02/10/20  -BP     Criteria for Skilled Interventions Met (PT Clinical Impression)  current level of function same as previous level of function  -BP     Patient/Family Concerns, Anticipated Discharge Disposition (PT)  assist as needed from spouse   -BP     Row Name 02/10/20 0830          Positioning and Restraints    Pre-Treatment Position  in bed  -BP     Post Treatment Position  bed  -BP     In Bed  supine;call light within reach;encouraged to call for assist  -BP       User Key  (r) = Recorded By, (t) = Taken By, (c) = Cosigned By    Initials Name Provider Type    Heavenly Huffman, PT Physical Therapist              PT Recommendation and Plan  Anticipated Discharge Disposition (PT): home with assist, home with 24/7 care  Therapy Frequency (PT Clinical Impression): evaluation only  Outcome Summary/Treatment Plan (PT)  Anticipated Discharge Disposition (PT): home with assist, home with 24/7 care  Patient/Family Concerns, Anticipated Discharge Disposition (PT): assist as " "needed from spouse   Plan of Care Reviewed With: patient  Outcome Summary: PT Evaluation Complete: Patient performs supine to/from sit transfers with conditional independnece, sit to/from stand transfers with supervision, and gait x 120 feet with SBA without use of an assistive device. Patient with several episodes of lateral deviation without loss of balance. Patient states \"I have a walker but I refuse to use it. The walls do just fiine.\" Educated patient regarding benefit of an AD, patient unreceptive and politely refuses use. Patient has no concerns for return home and will have 24/7 supervision/assist as needed upon return home. Patient functionally at baseline. No inpatient skilled PT needs at this time.     Outcome Measures     Row Name 02/10/20 0845             How much help from another person do you currently need...    Turning from your back to your side while in flat bed without using bedrails?  4  -BP      Moving from lying on back to sitting on the side of a flat bed without bedrails?  4  -BP      Moving to and from a bed to a chair (including a wheelchair)?  3  -BP      Standing up from a chair using your arms (e.g., wheelchair, bedside chair)?  3  -BP      Climbing 3-5 steps with a railing?  3  -BP      To walk in hospital room?  3  -BP      AM-PAC 6 Clicks Score (PT)  20  -BP         Modified [place] Scale    Pre-Stroke Modified [place] Scale  1 - No significant disability despite symptoms.  Able to carry out all usual duties and activities.  -BP      Modified [place] Scale  1 - No significant disability despite symptoms.  Able to carry out all usual duties and activities.  -BP         Functional Assessment    Outcome Measure Options  AM-PAC 6 Clicks Basic Mobility (PT);Modified [place]  -BP        User Key  (r) = Recorded By, (t) = Taken By, (c) = Cosigned By    Initials Name Provider Type    Heavenly Huffman, PT Physical Therapist           Time Calculation:   PT Charges     Row Name 02/10/20 2788 "             Time Calculation    Start Time  0830  -BP      Stop Time  0843  -BP      Time Calculation (min)  13 min  -BP      PT Received On  02/10/20  -BP        User Key  (r) = Recorded By, (t) = Taken By, (c) = Cosigned By    Initials Name Provider Type    BP Heavenly Sheehan, PT Physical Therapist        Therapy Charges for Today     Code Description Service Date Service Provider Modifiers Qty    91260349999 HC PT EVAL LOW COMPLEXITY 1 2/10/2020 Heavenly Sheehan, PT GP 1          PT G-Codes  Outcome Measure Options: AM-PAC 6 Clicks Basic Mobility (PT), Modified [place]  AM-PAC 6 Clicks Score (PT): 20  Modified [place] Scale: 1 - No significant disability despite symptoms.  Able to carry out all usual duties and activities.    PT Discharge Summary  Anticipated Discharge Disposition (PT): home with assist, home with 24/7 care  Reason for Discharge: At baseline function    Heavenly Sheehan, PT  2/10/2020

## 2020-02-10 NOTE — H&P
"      [facility] Medicine Services      Patient Name: Crystal Cruz  : 1965  MRN: [id number]  Primary Care Physician: Celeste Dill MD  Date of admission: 2/10/2020    Patient Care Team:  Celeste Dill MD as PCP - General  Celeste Dill MD as PCP - Family Medicine  Nicolasa Moyer APRN as PCP - Claims Attributed  Adam Rangel III, MD as Consulting Physician (Otolaryngology)          Subjective   History Present Illness     Chief Complaint:   Chief Complaint   Patient presents with   • Weakness - Generalized   • Anxiety       Ms. Cruz is a 54 y.o.  presents to [facility] complaining of generalized weakness and numbness of the body.  According to patient she has been on Xanax for last several years and she recently weaned her off and she has not had any for last 6 days.  Patient started feeling \" weird in her body\" on  and then last night she started feeling numbness in her both extremities and numbness over both upper extremities as well as facial numbness.  Pain was not feeling well and since she has history of stroke in the past so she came to the ER to get further evaluated.  I have interviewed the patient in detail and she does not give me any specific focal weakness or numbness of the body however mostly complaints are for generalized body complaints.  Patient also has history of stroke in the past and she has history of carotid endarterectomy done for carotid artery stenosis and she is concerned about her right-sided carotid artery stenosis and she would like to get it checked.  Patient also revealed that recently a week or so ago she had a reaction to prednisone which was prescribed to her for her arthritis and she could not tolerate it and she has been on Benadryl for that.  Patient does not have any fever/chills/nausea/vomiting/diarrhea/abdominal pain/chest pain/shortness of breath or any vision problem or any other specific complaints.  " Patient also tells me that she has weaned herself off of several medications and she is in the process of quitting smoking although she still continue to smoke as she has been before.         History of Present Illness    Review of Systems   All other systems reviewed and are negative.          Personal History     Past Medical History:   Past Medical History:   Diagnosis Date   • Acid reflux    • Anemia    • Anxiety    • Atherosclerosis of aorta (CMS/HCC) 2017   • Huitron's esophagus    • Bursitis    • CAD (coronary artery disease)    • Chronic pain disorder    • CVA (cerebrovascular accident) (CMS/HCC)    • DDD (degenerative disc disease), cervical    • Diabetes mellitus (CMS/HCC)    • Fibromyalgia    • Hard to intubate    • History of transfusion    • Hypertension    • Joint pain    • Kienbock's disease    • Low back pain    • Pancreatitis    • Pulmonary fibrosis (CMS/HCC)    • Reflux gastritis    • Scleroderma (CMS/HCC)    • Seizure (CMS/HCC)        Surgical History:      Past Surgical History:   Procedure Laterality Date   • BACK SURGERY     • CAROTID ENDARTERECTOMY Left     Neurological   • CATARACT EXTRACTION Bilateral    •  SECTION     • CHOLECYSTECTOMY      Laparoscopic   • COLONOSCOPY      Complete   • COLONOSCOPY N/A 2016    Procedure: COLONOSCOPY;  Surgeon: Gaviota Hagen MD;  Location: [facility] OR;  Service:    • ENDOSCOPY N/A 2016    Procedure: ESOPHAGOGASTRODUODENOSCOPY WITH BIOPSIES;  Surgeon: Gaviota Hagen MD;  Location: [facility] OR;  Service:    • ENDOSCOPY N/A 2018    Procedure: ESOPHAGOGASTRODUODENOSCOPY, biopsy;  Surgeon: Gaviota Hagen MD;  Location: [facility] OR;  Service: Gastroenterology   • TONSILLECTOMY AND ADENOIDECTOMY     • UPPER GASTROINTESTINAL ENDOSCOPY      Diagnostic   • WRIST SURGERY Left            Family History: family history includes Cancer in her father, sister, and another family member; Heart disease in her father and mother; Hypertension in her  father and sister; Migraines in her daughter, maternal aunt, and mother; Other in her father and mother; Stroke in her maternal grandfather and maternal grandmother; Sudden death in her mother. Otherwise pertinent FHx was reviewed and unremarkable.     Social History:  reports that she has been smoking. She has a 20.00 pack-year smoking history. She has never used smokeless tobacco. She reports that she does not drink alcohol or use drugs.      Medications:  Prior to Admission medications    Medication Sig Start Date End Date Taking? Authorizing Provider   amLODIPine (NORVASC) 2.5 MG tablet Take 2.5 mg by mouth 4 (Four) Times a Day As Needed. 9/17/19 9/16/20 Yes Meka Morales MD   aspirin 325 MG tablet Take 325 mg by mouth Daily.   Yes Meka Morales MD   calcium citrate-vitamin d (CITRACAL) 200-250 MG-UNIT tablet tablet Take 1 tablet by mouth Daily.   Yes Meka Morales MD   diphenhydrAMINE (BENADRYL) 25 MG tablet Take 1 tablet by mouth Every 6 (Six) Hours As Needed for Itching for up to 5 days. 2/5/20 2/10/20 Yes Jhony Griffin MD   DULoxetine (CYMBALTA) 60 MG capsule Take 1 capsule by mouth Daily. 11/28/16  Yes Virginia Bains APRN   Evolocumab (REPATHA SURECLICK) 140 MG/ML solution auto-injector INJECT THE CONTENTS OF 1 SYRINGE INTO THE SKIN EVERY 14 DAYS 9/9/19  Yes Meka Morales MD   famotidine (PEPCID) 20 MG tablet Take 1 tablet by mouth 2 (Two) Times a Day for 7 days. 2/5/20 2/12/20 Yes Jhony Griffin MD   gabapentin (NEURONTIN) 600 MG tablet 3 (Three) Times a Day. 8/21/19  Yes Meka Morales MD   levETIRAcetam (KEPPRA) 500 MG tablet TAKE ONE TABLET BY MOUTH TWICE A DAY 10/16/19  Yes Meka Morales MD   linaclotide (LINZESS) 145 MCG capsule capsule Take 145 mcg by mouth Daily. 12/16/19  Yes Meka Morales MD   metFORMIN ER (GLUCOPHAGE-XR) 500 MG 24 hr tablet TAKE TWO TABLETS BY MOUTH TWICE A DAY WITH MEALS 11/29/19  Yes Meka Morales MD  "  omeprazole (priLOSEC) 20 MG capsule Take 1 capsule by mouth 2 (Two) Times a Day. 10/19/18  Yes Gaviota Hagen MD   oxyCODONE ER (XTAMPZA ER) 18 MG capsule extended-release 12 hour  capsule Take 1 capsule by mouth Every 12 (Twelve) Hours. 1/16/20  Yes Reji Law MD   traZODone (DESYREL) 100 MG tablet Every Night. 11/30/16  Yes Meka Morales MD   ALPRAZolam (XANAX) 0.25 MG tablet Take 0.75 mg by mouth. 12/27/19 2/10/20 Yes Meka Morales MD   insulin glargine (LANTUS) 100 UNIT/ML injection 16 Units. 4/24/17   Meka Morales MD   Insulin Syringe 31G X 5/16\" 1 ML misc Use twice daily for shots 7/19/16   Celeste Dill MD   levETIRAcetam (KEPPRA) 500 MG tablet Take 1 tablet by mouth Every 12 (Twelve) Hours. 11/28/16   Virginia Bains, MELVI   Naloxone HCl (NARCAN) 0.4 MG/ML injection Infuse 1 mL into a venous catheter Every 5 (Five) Minutes As Needed for Opioid Reversal. 8/15/19   Reji Law MD   NITRO-BID 2 % ointment  1/27/20   Meka Morales MD   oxyCODONE-acetaminophen (PERCOCET) 7.5-325 MG per tablet Take 1 tablet by mouth 2 (Two) Times a Day As Needed for Severe Pain . 2/7/20   Reji Law MD   amitriptyline (ELAVIL) 100 MG tablet Take 100 mg by mouth Daily. 12/30/19 2/10/20  Meka Morales MD   amLODIPine (NORVASC) 2.5 MG tablet Take 2.5 mg by mouth 4 (Four) Times a Day. 11/27/19 2/10/20  Meka Morales MD   aspirin 81 MG EC tablet Take 1 tablet by mouth daily. 7/19/16 2/10/20  Celeste Dill MD   BELSOMRA 20 MG tablet  1/10/20 2/10/20  Meka Morales MD   busPIRone (BUSPAR) 10 MG tablet Take 10 mg by mouth. 9/4/19 2/10/20  Meka Morales MD   docusate sodium (COLACE) 100 MG capsule Take 100 mg by mouth. 11/14/19 2/10/20  Meka Morales MD   docusate sodium (STOOL SOFTENER) 100 MG capsule TAKE ONE CAPSULE BY MOUTH TWICE A DAY AS NEEDED FOR CONSTIPATION 10/2/19 2/10/20  Meka Morales MD   EPINEPHrine (EPIPEN JR) 0.15 " MG/0.3ML solution auto-injector injection Inject 0.15 mg into the appropriate muscle as directed by prescriber. 2/5/20 2/10/20  Meka Morales MD   eszopiclone (LUNESTA) 3 MG tablet  1/6/20 2/10/20  Meka Morales MD   furosemide (LASIX) 20 MG tablet Take 20 mg by mouth Daily As Needed. 11/1/17 2/10/20  Meka Morales MD   furosemide (LASIX) 20 MG tablet TAKE ONE TABLET BY MOUTH TWICE A DAY 12/11/19 2/10/20  Meka Morales MD   insulin glargine (LANTUS) 100 UNIT/ML injection INJECT 30 UNITS UNDER THE SKIN ONCE NIGHTLY 1/20/20 2/10/20  Meka Morales MD   irbesartan (AVAPRO) 300 MG tablet Take 300 mg by mouth Daily. 9/10/19 2/10/20  Meka Morales MD   irbesartan (AVAPRO) 300 MG tablet TAKE ONE TABLET BY MOUTH ONCE NIGHTLY 1/7/20 2/10/20  Meka Morales MD   irbesartan (AVAPRO) 300 MG tablet TAKE ONE TABLET BY MOUTH ONCE NIGHTLY 2/5/20 2/10/20  Meka Morales MD   levETIRAcetam (KEPPRA) 500 MG tablet TAKE ONE TABLET BY MOUTH TWICE A DAY 1/14/20 2/10/20  Meka Morales MD   linaclotide (LINZESS) 72 MCG capsule capsule Take 72 mcg by mouth Daily. 11/4/19 2/10/20  Meka Morales MD   metFORMIN (GLUCOPHAGE) 1000 MG tablet Take 1,000 mg by mouth 2 (Two) Times a Day With Meals.  2/10/20  Meka Morales MD   omeprazole (priLOSEC) 20 MG capsule Take 20 mg by mouth Daily. 12/11/19 2/10/20  Meka Morales MD   omeprazole (priLOSEC) 20 MG capsule Take 20 mg by mouth. 2/3/20 2/10/20  Meka Morales MD   oxyCODONE ER (XTAMPZA ER) 18 MG capsule extended-release 12 hour  capsule Take 1 capsule by mouth Every 12 (Twelve) Hours. 2/7/20 2/10/20  Reji Law MD   promethazine (PHENERGAN) 25 MG tablet take 1 tablet by mouth every 4 hours if needed for nausea 8/15/17 2/10/20  Provider, MD Meka   tiZANidine (ZANAFLEX) 2 MG tablet Take 1 tablet by mouth 2 (Two) Times a Day As Needed for Muscle Spasms. 5/22/19 2/10/20  Nicolasa Moyer,  APRN   traZODone (DESYREL) 100 MG tablet  1/22/20 2/10/20  Provider, MD Meka       Allergies:    Allergies   Allergen Reactions   • Other Anaphylaxis     All steroids. Pt took medrol dose pack 2/4/20, hands started swelling, O2 sats dropped, neck swelling, couldn't swallow, trouble breathing.    • Atorvastatin    • Diflucan [Fluconazole] Other (See Comments)     Severe pain   • Hydroxyzine Other (See Comments)     Lethargic and unrepsonsive   • Levofloxacin    • Nsaids Nausea Only   • Statins Itching   • Toradol [Ketorolac Tromethamine] GI Intolerance   • Victoza  [Liraglutide]    • Rosuvastatin Itching, Other (See Comments) and Rash     Joint pain       Objective   Objective     Vital Signs  Temp:  [97.3 °F (36.3 °C)-98.8 °F (37.1 °C)] 97.3 °F (36.3 °C)  Heart Rate:  [78-89] 78  Resp:  [17-18] 18  BP: (103-152)/(70-87) 152/70  SpO2:  [95 %-98 %] 95 %  on   ;   Device (Oxygen Therapy): room air  Body mass index is 27.93 kg/m².    Physical Exam   Constitutional: She is oriented to person, place, and time. She appears well-developed and well-nourished. No distress.   HENT:   Head: Normocephalic and atraumatic.   Nose: Nose normal.   Mouth/Throat: Oropharynx is clear and moist. No oropharyngeal exudate.   Eyes: Pupils are equal, round, and reactive to light. Conjunctivae and EOM are normal. Right eye exhibits no discharge. Left eye exhibits no discharge. No scleral icterus.   Neck: Normal range of motion. No JVD present. No tracheal deviation present. No thyromegaly present.   Cardiovascular: Normal rate, regular rhythm, normal heart sounds and intact distal pulses. Exam reveals no gallop and no friction rub.   No murmur heard.  Pulmonary/Chest: Effort normal and breath sounds normal. No stridor. No respiratory distress. She has no wheezes. She has no rales. She exhibits no tenderness.   Abdominal: Soft. Bowel sounds are normal. She exhibits no distension and no mass. There is no tenderness. There is no rebound  and no guarding. No hernia.   Musculoskeletal: Normal range of motion. She exhibits no edema, tenderness or deformity.   Lymphadenopathy:     She has no cervical adenopathy.   Neurological: She is alert and oriented to person, place, and time. No cranial nerve deficit or sensory deficit. She exhibits normal muscle tone. Coordination normal.   Patient has a carotid bruit positive on the right side of the neck and she has a scar from the left carotid endarterectomy   Skin: Skin is warm and dry. No rash noted. She is not diaphoretic. No erythema.   Psychiatric: She has a normal mood and affect. Her behavior is normal.   Nursing note and vitals reviewed.      Results Review:  I have personally reviewed most recent lab results and radiology images and interpretations and agree with findings, most notably: CT scan of brain is normal.  EKG is normal    Results from last 7 days   Lab Units 02/10/20  0321 02/10/20  0320   WBC 10*3/mm3 10.04  --    HEMOGLOBIN g/dL 14.1  --    HEMATOCRIT % 43.8  --    PLATELETS 10*3/mm3 268  --    INR   --  1.07     Results from last 7 days   Lab Units 02/10/20  0321   SODIUM mmol/L 138   POTASSIUM mmol/L 5.4*   CHLORIDE mmol/L 105   CO2 mmol/L 24.1   BUN mg/dL 12   CREATININE mg/dL 0.83   GLUCOSE mg/dL 170*   CALCIUM mg/dL 9.1   ALT (SGPT) U/L 11   AST (SGOT) U/L 13     Estimated Creatinine Clearance: 74.4 mL/min (by C-G formula based on SCr of 0.83 mg/dL).  Brief Urine Lab Results  (Last result in the past 365 days)      Color   Clarity   Blood   Leuk Est   Nitrite   Protein   CREAT   Urine HCG        02/10/20 0305 Yellow Clear Negative Negative Negative Negative               Microbiology Results (last 10 days)     ** No results found for the last 240 hours. **          ECG/EMG Results (most recent)     Procedure Component Value Units Date/Time    ECG 12 Lead [[id number]] Collected:  02/10/20 0315     Updated:  02/10/20 0318    Narrative:       HEART RATE= 76  bpm  RR Interval= 784  ms  [place]  Interval= 164  ms  P Horizontal Axis= 12  deg  P Front Axis= 47  deg  QRSD Interval= 67  ms  QT Interval= 347  ms  QRS Axis= 21  deg  T Wave Axis= 63  deg  - ABNORMAL ECG -  Sinus rhythm  Anteroseptal infarct, old  Electronically Signed By:   Date and Time of Study: 2020-02-10 03:15:44               Results for orders placed during the hospital encounter of 11/11/16   Echocardiogram 2D complete    Narrative · All left ventricular wall segments contract normally.  · Left ventricular function is normal.  · Mild aortic valve regurgitation is present.  · Mild pulmonic valve regurgitation is present.  · Left ventricular diastolic dysfunction (grade I) consistent with   impaired relaxation.  · Mild mitral valve regurgitation is present  · Calculated EF = 57.7%          Ct Head Without Contrast    Result Date: 2/10/2020  1. No clearly acute intracranial process. No evidence of acute intracranial hemorrhage. 2. Chronic encephalomalacic change within the right parietal lobe approaching the vertex. 3. Acute left maxillary sinusitis. Signer Name: Ulises Roman MD  Signed: 2/10/2020 3:55 AM  Workstation Name: [facility]  [facility]    Mammo Diagnostic Digital Tomosynthesis Left With Cad    Result Date: 2/7/2020  Indeterminate microcalcifications lower inner quadrant of the left breast. Stereotactic biopsy is recommended to exclude neoplasm.  BI-RADS 4.   Women over the age of 40 undergoing screening mammography are entered into a reminder system with target due date for the next mammogram.  This report was finalized on 2/7/2020 4:24 PM by Dr. Shawn Gan MD.          Estimated Creatinine Clearance: 74.4 mL/min (by C-G formula based on SCr of 0.83 mg/dL).    Assessment/Plan   Assessment/Plan       Active Hospital Problems    Diagnosis  POA   • **Transient ischemic attack (TIA) [G45.9]  Yes     Priority: High     Class: Acute   • Observed sleep apnea [G47.30]  Yes     Priority: Medium   • Aorto-iliac  atherosclerosis (CMS/HCC) [I70.0, I70.8]  Yes     Priority: Medium   • Seizure (CMS/HCC) [R56.9]  Yes     Priority: Medium   • Benign essential hypertension [I10]  Yes     Priority: Medium   • Hyperlipidemia [E78.5]  Yes     Priority: Medium   • Systemic sclerosis (CMS/HCC) [M34.9]  Yes     Priority: Medium   • Carotid atherosclerosis, bilateral [I65.23]  Yes     Priority: Medium   • 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) [E11.8]  Yes     Priority: Medium   • History of stroke with current residual effects [I69.30]  Not Applicable     Priority: Low   • Fibromyalgia [M79.7]  Yes     Priority: Low   • Vitamin D deficiency [E55.9]  Yes     Priority: Low   • Acquired hypothyroidism [E03.9]  Yes     Priority: Low   • Arthritis [M19.90]  Yes     Priority: Low   • Chronic pain [G89.29]  Yes     Priority: Low   • Huitron's esophagus [K22.70]  Yes     Priority: Low   • Chronic constipation [K59.09]  Yes     Priority: Low   • Gastroesophageal reflux disease without esophagitis [K21.9]  Yes     Priority: Low   • Anxiety disorder [F41.9]  Yes     Priority: Low   • S/P carotid endarterectomy [Z98.890]  Not Applicable     Priority: Low   • Tobacco use [Z72.0]  Yes     Priority: Low   • Left sided numbness [R20.0]  Yes      Resolved Hospital Problems   No resolved problems to display.     #1 generalized body weakness with numbness of the both upper and lower extremities and without any focal neuro deficit but patient has history of stroke in the past also has carotid artery stenosis and according to the ER she was complaining of left-sided numbness.  I will work her up as a TIA since she is very high risk  -MRI of the brain has been ordered  -CTA of the head and neck has been ordered  -Echocardiogram has been ordered  -Neurology consult ordered  -Plavix as antiplatelet therapy has been ordered  -Patient is allergic to statin  -We will check TSH/vitamin B12/hemoglobin A1c  -PT/OT has been ordered  -Patient  needs smoking cessation counseling      #2 diabetes mellitus type 2 on insulin with neuropathy  -We will continue patient home dose of long-acting insulin/metformin XR although it need to be on hold because of contrast  -Check Accu-Cheks and use sliding scale insulin as well as check hemoglobin A1c    #3 obstructive sleep apnea  -Patient will need sleep study as an outpatient    #4 systemic sclerosis/scleroderma  -I will check sed rate and CRP  -Patient need to follow-up with primary care provider as well as rheumatologist    #5 osteoarthritis with chronic pain  -Patient is on p.o. narcotics which will be continued    #6 hypertension  -We will continue amlodipine and I have changed the dose to 10 g p.o. Daily    #7 diabetic neuropathy patient is on Cymbalta as well as gabapentin which will be continued    #8 GERD with Huitron's esophagus  -We will continue PPI and she will need surveillance endoscopy as an outpatient        VTE Prophylaxis -   Mechanical Order History:      Ordered        02/10/20 0621  Place Sequential Compression Device  Once         02/10/20 0621  Maintain Sequential Compression Device  Continuous                 Pharmalogical Order History:     Ordered     Dose Route Frequency Stop    02/10/20 0621  enoxaparin (LOVENOX) syringe 40 mg      40 mg SC Every 24 Hours --    02/10/20 0621  enoxaparin (LOVENOX) syringe 40 mg      40 mg SC Every 24 Hours --          CODE STATUS:    Code Status and Medical Interventions:   Ordered at: 02/10/20 0622     Level Of Support Discussed With:    Patient     Code Status:    CPR     Medical Interventions (Level of Support Prior to Arrest):    Full       Admission Status:  I believe this patient meets observation criteria.      I discussed the patient's findings and my recommendations with patient.        Electronically signed by Rah Abel MD, 02/10/20, 6:22 AM.  [facility] Hospitalist Team

## 2020-02-10 NOTE — PLAN OF CARE
Discharge Planning Assessment  STEVE Jane     Patient Name: Crystal Cruz  MRN: [id number]  Today's Date: 2/10/2020    Admit Date: 2/10/2020    Discharge Needs Assessment       Row Name 02/10/20 1543       Living Environment    Lives With  spouse    Name(s) of Who Lives With Patient  gabriella Martinez    Current Living Arrangements  home/apartment/condo    Primary Care Provided by  self    Provides Primary Care For  no one    Family Caregiver if Needed  spouse    Family Caregiver Names  Spouse- Juan    Quality of Family Relationships  supportive    Able to Return to Prior Arrangements  yes       Resource/Environmental Concerns    Resource/Environmental Concerns  none    Transportation Concerns  car, none       Transition Planning    Patient/Family Anticipates Transition to  home;home with family    Patient/Family Anticipated Services at Transition  none    Transportation Anticipated  family or friend will provide       Discharge Needs Assessment    Readmission Within the Last 30 Days  no previous admission in last 30 days    Concerns to be Addressed  no discharge needs identified    Equipment Currently Used at Home  walker, rolling;hospital bed;glucometer    Anticipated Changes Related to Illness  none    Equipment Needed After Discharge  none    Provided post acute provider list?  N/A    N/A Provider List Comment  Pt anticipates d/c home with no needs             Discharge Plan       Row Name 02/10/20 8363       Plan    Plan  d/c home with      Patient/Family in Agreement with Plan  yes    Plan Comments  Into room to visit with pt.  Facesheet verified.  Pt is typically indepented with ADLs but does not drive.  She denies any problems getting to pharmacy or paying for medications. Pt lives in a home with her .  They live in a second floor apartment and she takes the stairs with the help of her , on bad days she stays home.  Pt has a walker that she uses at times, a hospital bed and a glucometer.   Pt denies the need for any further DME.  She did request a different glucometer that does not require finger sticks.  Encouraged pt to discuss this with her PCP.  Pt denies any HH or rehab needs.  She does have advanced directives on file here but she wants to change them.  Joanne Hilliard notified for assistance. No further needs noted at this time.  CM will continue to follow.              Destination        Coordination has not been started for this encounter.          Durable Medical Equipment        Coordination has not been started for this encounter.          Dialysis/Infusion        Coordination has not been started for this encounter.          Home Medical Care        Coordination has not been started for this encounter.          Therapy        Coordination has not been started for this encounter.          Community Resources        Coordination has not been started for this encounter.              Demographic Summary       Row Name 02/10/20 1541       General Information    Admission Type  observation    Arrived From  home    Referral Source  admission list    Reason for Consult  discharge planning    Preferred Language  English     Used During This Interaction  no            Functional Status       Row Name 02/10/20 1541       Functional Status    Usual Activity Tolerance  good    Current Activity Tolerance  good       Functional Status, IADL    Medications  independent    Meal Preparation  independent    Housekeeping  assistive equipment and person    Laundry  assistive equipment and person    Shopping  assistive equipment and person       Mental Status    General Appearance WDL  WDL       Mental Status Summary    Recent Changes in Mental Status/Cognitive Functioning  no changes            Psychosocial    No documentation.         Abuse/Neglect    No documentation.         Legal    No documentation.         Substance Abuse    No documentation.         Patient Forms    No documentation.              Adama Blair RN

## 2020-02-10 NOTE — DISCHARGE SUMMARY
Crystal Cruz  1965  [id number]    Hospitalists Discharge Summary    Date of Admission: 2/10/2020  Date of Discharge:  2/10/2020    History of Present Illness and Hospital Course Summary      Left sided numbness.  Worse in upper extremity and face.  Doing well now.  Admitted with TIA workup.  CTA of head and neck revealed 60% occlusion of right carotid origin and aneurysm 2mm right internal carotid artery.  Patient needs outpatient evaluation of films by a vascular surgeon.  Also noted is acute sinusitis.  I will send patient home on Augmentin 10 days, 500mg bid.  Headache and symptoms could be secondary to sinusitis.          Primary Discharge Diagnoses and Secondary Discharge Diagnoses:        Presumed TIA              Neurology consult with stroke protocol. Doubts TIA. Probably migrane HA atypical              Plavix ordered in place of asa and Pepcid in place of prilosec.     Tobacco Abuse              Smoking cessation education     DM 2              Home medication adjusted by admitting physician     BRAULIO              Body mass index is 27.93 kg/m².     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   Follow up for both the aneurysm and the stenosis of carotid artery as an outpatient with Vascular Surgeon.    Acute maxillary sinusitis/ Ordering augmentin 500 mg bid for 10 days        OA with chronic pain              Narcotic use is chronic     Essential Hypertension              Admitting physician adjusted amlodipine dose     Diabetic Neuropathy              meds from home continued     GERD with Huitron's esophagitis              PPI condinued and will need surveillance endoscopy as OP       Per Dr. Condon:  This patient has a complaint of bilateral hand numbness and left body numbness with an MRI scan that shows no acute stroke.  She does have a  headache and this is consistent with a classic migraine in combination with anxiety.  There is no evidence of an acute stroke or true TIA at this time.  The fact that she has bilateral hand numbness certainly goes against any type of carotid distribution event.  We have changed her from aspirin over to Plavix and I will change the Prilosec to Pepcid so as to not affect the absorption of the Plavix.  Again I do not believe that this is a true stroke/TIA syndrome nor is there evidence of seizure.  I will give her Elavil tonight in hopes of breaking the headache in combination with baclofen and if she is okay tomorrow she should be able to be discharged.   Lastly CTA shows a tiny aneurysm in the clinoid region that is absolutely benign and unrelated to her current presentation I would not work that up further certainly.  Thanks       PCP  Patient Care Team:  Celeste Dill MD as PCP - General  Celeste Dill MD as PCP - Family Medicine  Nicolasa Moyer APRN as PCP - Claims Attributed  Adam Rangel III, MD as Consulting Physician (Otolaryngology)    Consults:   Consults     Date and Time Order Name Status Description    2/10/2020 0622 Inpatient Neurology Consult Stroke Completed           Operations and Procedures Performed:       Xr Spine Cervical Complete 4 Or 5 View    Result Date: 2/6/2020  Narrative: CERVICAL SPINE PLAIN FILM SERIES  CLINICAL HISTORY: Neck pain.  AP, lateral, and bilateral oblique projections of the cervical spine demonstrate normal alignment down to C6 on C7. The alignment of C7 on T1 is not well evaluated on the lateral projection. The prevertebral soft tissues are unremarkable. The lateral projection is remarkable for mild posteriorly exophytic osteophytic changes at the C3-4 level. There is mild right foraminal narrowing at the C3-4 level secondary to uncinate hypertrophy. Minimal right C5-6 foraminal narrowing is noted secondary to facet hypertrophic change. Minimal left foraminal  narrowing is seen at C3-4 secondary to uncovertebral joint hypertrophy. Incidental note is made of surgical clips within the left side of the neck. If further assessment for cervical spine pathology is indicated, one could obtain axial imaging of the cervical spine for more sensitive and specific evaluation of cervical spine pathology.  This report was finalized on 2/6/2020 6:17 PM by Dr. Ahmet Weinberg M.D.      Ct Head Without Contrast    Result Date: 2/10/2020  Narrative: CT Head WO HISTORY: 54-year-old female with numbness in the face and tingling on the left side that began at 1300 hours today. TECHNIQUE: Axial unenhanced head CT. Radiation dose reduction techniques included automated exposure control or exposure modulation based on body size. Count of known CT and cardiac nuc med studies performed in previous 12 months: 0. Time of scan: 0340 hours COMPARISON: 6/14/2019 FINDINGS: No intracranial hemorrhage, mass, or infarct. No hydrocephalus or extra-axial fluid collection. There are senescent changes, including volume loss and nonspecific white matter change, but no acute abnormality is seen. Redemonstration of encephalomalacic change near the vertex posteriorly on the right involving the parietal lobe. The skull base, calvarium, and extracranial soft tissues are normal except for acute left maxillary sinusitis..     Impression: 1. No clearly acute intracranial process. No evidence of acute intracranial hemorrhage. 2. Chronic encephalomalacic change within the right parietal lobe approaching the vertex. 3. Acute left maxillary sinusitis. Signer Name: Ulises Roman MD  Signed: 2/10/2020 3:55 AM  Workstation Name: [email or web address]  [facility]    Ct Angiogram Neck    Result Date: 2/10/2020  Narrative: CTA Neck, CTA Head INDICATION: Left sided facial numbness and tingling beginning yesterday TECHNIQUE: CT angiogram of the head and neck with 100 cc of Isovue-300 IV contrast. 3-D reconstructions  were obtained and reviewed.  Evaluation for a significant carotid arterial stenosis is based on the NASCET criteria. Radiation dose reduction techniques included automated exposure control or exposure modulation based on body size. Count of known CT and cardiac nuc med studies performed in previous 12 months: 2. COMPARISON: None available. FINDINGS: CTA neck:  Extravascular structures are remarkable for emphysema and interstitial fibrosis. The right thyroid lobe and isthmus are mildly enlarged. The CT angiogram images show calcified plaque of the great vessel origins without severe great vessel origin stenosis. The left vertebral artery originates from the arch as a normal variant. The posterior circulation both vertebrals are widely patent with the right being dominant. In the carotid circulation postendarterectomy changes are seen on the left. The distal left internal carotid up through the siphon is widely patent. There is wide patency across the endarterectomy site. On the right side there is calcified plaque at the origin of the internal carotid artery. Maximum stenosis is approximately 60%. The distal cervical internal carotid up through the siphon is widely patent. CTA head:  In the intracranial circulation, there is a 2 mm aneurysm of the right internal carotid just above the clinoid process projecting posteriorly. This is located approximately 4 mm proximal to the posterior communicating artery. No other aneurysms are seen. No major branch vessel occlusions or severe intracranial stenoses. Dural venous sinuses are patent.     Impression: There is a hemodynamically significant stenosis of approximately 60% at the right internal carotid origin. 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 Signer Name: Angel Courtney MD  Signed: 2/10/2020 2:01 PM  Workstation Name: [id number]  [facility]    Mri Brain Without Contrast    Result Date: 2/10/2020  Narrative:  INDICATION:  Weakness and numbness to upper extremities. Left-sided facial numbness started on Sunday. History of stroke last one year ago. Left carotid surgery 4 years ago. TECHNIQUE: MRI of the brain without contrast. COMPARISON:  Head CT 2/10/2020 3:40 AM FINDINGS: There is no abnormally restricted diffusion. No recent infarct is suspected. There is encephalomalacia in the right posterior medial parietal lobe and to a lesser extent occipital lobe with underlying gliosis consistent with remote insult. There are multiple small foci of signal abnormality in the subcortical white matter of the medial right frontal parietal lobe. There is a focus of chronic white matter disease in the left frontal deep white matter and there are punctate areas of bilateral deep white matter signal abnormality right and left. Findings could be due to prior thromboembolic, watershed, and/or small vessel insults. There is no MRI evidence for intracranial hemorrhage. There is no extra-axial fluid collection. There is no midline shift. No Chiari I malformation. The distal left vertebral artery is hypoplastic or diseased. There is a mucous retention cyst or polyp in the left maxillary sinus. There is a small mucous retention cyst or polyp in the right maxillary sinus. There is patchy signal abnormality in the left greater the right russ which is likely due to small vessel disease. There is vague susceptibility along the left anterolateral tentorium cerebellum which corresponds to calcification on the CT scan and I suspect this is a densely calcified meningioma. On review of an MRI from 5/23/2019, there is linear enhancement in this region favoring this diagnosis without significant adjacent mass effect. There has been cataract surgery bilaterally.     Impression: 1. No evidence for recent infarct. 2. Findings consistent with multiple prior ischemic insults. See full description above. 3. Suspect a calcified plaque-like meningioma along the  left-sided tentorium cerebellum without significant mass effect.. Signer Name: Josselyn Lozada MD  Signed: 2/10/2020 12:09 PM  Workstation Name: [id number]  [facility] [place]    Mammo Diagnostic Digital Tomosynthesis Left With Cad    Result Date: 2/7/2020  Narrative: EXAM, 02/07/2020: 1. Unilateral left digital diagnostic mammogram with CAD. 2. Unilateral left digital diagnostic breast tomosynthesis.  INDICATION: Screening mammogram obtained 01/29/2020 demonstrated new linear calcifications lower inner quadrant of the left breast. Indication views were recommended for further evaluation.  TECHNIQUE: Bilateral digital screening mammogram images were obtained and reviewed with CAD. Digital breast tomosynthesis images were also reviewed.  COMPARISON: *  Screening mammogram, 01/29/2020  FINDINGS: There are scattered areas of fibroglandular density. Magnification views of the microcalcifications in the lower inner quadrant of the left breast were obtained. The calcifications are linear and branching and remain indeterminate. Correlation with stereotactic biopsy of the calcifications is recommended. The findings and recommendations were discussed with the patient at the time of the examination. The findings and recommendations were then called to Dr. Celeste Dill at 3:15 PM on 02/07/2020..      Impression: Indeterminate microcalcifications lower inner quadrant of the left breast. Stereotactic biopsy is recommended to exclude neoplasm.  BI-RADS 4.   Women over the age of 40 undergoing screening mammography are entered into a reminder system with target due date for the next mammogram.  This report was finalized on 2/7/2020 4:24 PM by Dr. Shawn Gan MD.      Ct Angiogram Head    Result Date: 2/10/2020  Narrative: CTA Neck, CTA Head INDICATION: Left sided facial numbness and tingling beginning yesterday TECHNIQUE: CT angiogram of the head and neck with 100 cc of Isovue-300 IV contrast. 3-D reconstructions were  obtained and reviewed.  Evaluation for a significant carotid arterial stenosis is based on the NASCET criteria. Radiation dose reduction techniques included automated exposure control or exposure modulation based on body size. Count of known CT and cardiac nuc med studies performed in previous 12 months: 2. COMPARISON: None available. FINDINGS: CTA neck:  Extravascular structures are remarkable for emphysema and interstitial fibrosis. The right thyroid lobe and isthmus are mildly enlarged. The CT angiogram images show calcified plaque of the great vessel origins without severe great vessel origin stenosis. The left vertebral artery originates from the arch as a normal variant. The posterior circulation both vertebrals are widely patent with the right being dominant. In the carotid circulation postendarterectomy changes are seen on the left. The distal left internal carotid up through the siphon is widely patent. There is wide patency across the endarterectomy site. On the right side there is calcified plaque at the origin of the internal carotid artery. Maximum stenosis is approximately 60%. The distal cervical internal carotid up through the siphon is widely patent. CTA head:  In the intracranial circulation, there is a 2 mm aneurysm of the right internal carotid just above the clinoid process projecting posteriorly. This is located approximately 4 mm proximal to the posterior communicating artery. No other aneurysms are seen. No major branch vessel occlusions or severe intracranial stenoses. Dural venous sinuses are patent.     Impression: There is a hemodynamically significant stenosis of approximately 60% at the right internal carotid origin. 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 Signer Name: Angel Courtney MD  Signed: 2/10/2020 2:01 PM  Workstation Name: [id number]  [facility] [place]    Mammo Screening Digital Tomosynthesis Bilateral With Cad    Result Date:  1/29/2020  Narrative: EXAM, 01/29/2020: 1. Bilateral digital screening mammogram with CAD. 2. Bilateral digital screening breast tomosynthesis.  INDICATION: Routine screening  TECHNIQUE: Bilateral digital screening mammogram images were obtained and reviewed with CAD. Digital breast tomosynthesis images were also reviewed.  COMPARISON: *  Screening mammogram, 03/08/2016  FINDINGS: There are scattered areas of fibroglandular density. Compared with 03/08/2016, there is a linear branching focus of calcification in the lower inner quadrant of the left breast which is new. Recommend magnification views for further evaluation. No mass is seen.      Impression: New linear calcifications lower inner quadrant of the left breast compared with 03/08/2016. Recommend magnification views for further evaluation.  BI-RADS CATEGORY 0: Needs additional evaluation.   Women over the age of 40 undergoing screening mammography are entered into a reminder system with target due date for the next mammogram.  This report was finalized on 1/29/2020 8:54 AM by Dr. Shawn Gan MD.        Allergies:  is allergic to other; atorvastatin; diflucan [fluconazole]; hydroxyzine; levofloxacin; nsaids; statins; toradol [ketorolac tromethamine]; victoza  [liraglutide]; and rosuvastatin.    [place]  Reviewed and noted to be on narcotics chronically.    Discharge Medications:     Discharge Medications      New Medications      Instructions Start Date   amoxicillin-clavulanate 500-125 MG per tablet  Commonly known as:  AUGMENTIN   500 mg, Oral, Every 12 Hours Scheduled      clopidogrel 75 MG tablet  Commonly known as:  PLAVIX   75 mg, Oral, Daily   Start Date:  February 11, 2020     insulin detemir 100 UNIT/ML injection  Commonly known as:  LEVEMIR  Replaces:  LANTUS 100 UNIT/ML injection   15 Units, Subcutaneous, Nightly      lactobacillus acidophilus capsule capsule   1 capsule, Oral, Daily      nicotine 21 MG/24HR patch  Commonly known as:  NICODERM  "CQ   1 patch, Transdermal, Every 24 Hours   Start Date:  February 11, 2020        Changes to Medications      Instructions Start Date   amLODIPine 10 MG tablet  Commonly known as:  NORVASC  What changed:    · medication strength  · how much to take  · when to take this  · reasons to take this   10 mg, Oral, Every 24 Hours Scheduled   Start Date:  February 11, 2020     levETIRAcetam 500 MG tablet  Commonly known as:  KEPPRA  What changed:  Another medication with the same name was removed. Continue taking this medication, and follow the directions you see here.   500 mg, Oral, Every 12 Hours Scheduled      metFORMIN 1000 MG (OSM) 24 hr tablet  Commonly known as:  FORTAMET  What changed:    · medication strength  · These instructions start on February 12, 2020. If you are unsure what to do until then, ask your doctor or other care provider.   1,000 mg, Oral, 2 Times Daily With Meals   Start Date:  February 12, 2020     nitroglycerin 2 % ointment  Commonly known as:  NITRO-BID  What changed:    · how much to take  · how to take this  · when to take this   1 inch, Transdermal, 3 Times Daily (Nitrates)         Continue These Medications      Instructions Start Date   calcium citrate-vitamin d 200-250 MG-UNIT tablet tablet  Commonly known as:  CITRACAL   1 tablet, Oral, Daily      diphenhydrAMINE 25 MG tablet  Commonly known as:  BENADRYL   25 mg, Oral, Every 6 Hours PRN      DULoxetine 60 MG capsule  Commonly known as:  CYMBALTA   60 mg, Oral, Daily      famotidine 20 MG tablet  Commonly known as:  PEPCID   20 mg, Oral, 2 Times Daily      gabapentin 600 MG tablet  Commonly known as:  NEURONTIN   3 Times Daily      Insulin Syringe 31G X 5/16\" 1 ML misc   Use twice daily for shots      linaclotide 145 MCG capsule capsule  Commonly known as:  LINZESS   145 mcg, Oral, Daily      Naloxone HCl 0.4 MG/ML injection  Commonly known as:  NARCAN   0.4 mg, Intravenous, Every 5 Minutes PRN      oxyCODONE-acetaminophen 7.5-325 MG per " tablet  Commonly known as:  PERCOCET   1 tablet, Oral, 2 Times Daily PRN      REPATHA SURECLICK 140 MG/ML solution auto-injector  Generic drug:  Evolocumab   INJECT THE CONTENTS OF 1 SYRINGE INTO THE SKIN EVERY 14 DAYS      traZODone 100 MG tablet  Commonly known as:  DESYREL   Nightly         Stop These Medications    aspirin 325 MG tablet     LANTUS 100 UNIT/ML injection  Generic drug:  insulin glargine  Replaced by:  insulin detemir 100 UNIT/ML injection     omeprazole 20 MG capsule  Commonly known as:  priLOSEC     oxyCODONE ER 18 MG capsule extended-release 12 hour  capsule  Commonly known as:  XTAMPZA ER            Last Lab Results:   Lab Results (most recent)     Procedure Component Value Units Date/Time    POC Glucose Once [[id number]]  (Normal) Collected:  02/10/20 1413    Specimen:  Blood Updated:  02/10/20 1420     Glucose 87 mg/dL     POC Glucose Once [[id number]]  (Normal) Collected:  02/10/20 1111    Specimen:  Blood Updated:  02/10/20 1123     Glucose 77 mg/dL     Vitamin B12 [[id number]]  (Normal) Collected:  02/10/20 0637    Specimen:  Blood Updated:  02/10/20 1103     Vitamin B-12 853 pg/mL     Narrative:       Results may be falsely increased if patient taking Biotin.      P2Y12 Platelet Inhibition [[id number]]  (Normal) Collected:  02/10/20 0806    Specimen:  Blood Updated:  02/10/20 1047     P2Y12 Platelet Inhibition 251 PRU     Narrative:       Test results are in P2Y12 reaction units (PRU). This measures the extent of platelet aggregation in the presence of P2Y12 inhibitor drugs such as clopidrogrel (Plavix), prasugrel (Effient), ticagrelor (Brilinta), and ticlopidine (Ticlid).   Pre-Drug normal reference range: 194 - 418 PRU   P2Y12 values <194 (low end of reference range) are specific evidence of a P2Y12 inhibitor effect   Patients who have been treated with Glycoprotein IIb/IIIa inhibitors should not be tested until platelet function has recovered. This time period is approximately 14 days  after discontinuation of abciximab (ReoPro) and up to 48 hours after discontinuation of eptifibatide (Integrillin) and tirofiban (Aggratstat).   Results should be interpreted in conjuction with other laboratory and clinical data available to the clinician.    C-reactive Protein [[id number]]  (Abnormal) Collected:  02/10/20 0639    Specimen:  Blood Updated:  02/10/20 1042     C-Reactive Protein 1.14 mg/dL     TSH [[id number]]  (Normal) Collected:  02/10/20 0321    Specimen:  Blood Updated:  02/10/20 0656     TSH 1.760 uIU/mL     Troponin [[id number]]  (Normal) Collected:  02/10/20 0321    Specimen:  Blood Updated:  02/10/20 0656     Troponin T <0.010 ng/mL     Narrative:       Troponin T Reference Range:  <= 0.03 ng/mL-   Negative for AMI  >0.03 ng/mL-     Abnormal for myocardial necrosis.  Clinicians would have to utilize clinical acumen, EKG, Troponin and serial changes to determine if it is an Acute Myocardial Infarction or myocardial injury due to an underlying chronic condition.       Results may be falsely decreased if patient taking Biotin.      Hemoglobin A1c [[id number]]  (Abnormal) Collected:  02/10/20 0321    Specimen:  Blood Updated:  02/10/20 0648     Hemoglobin A1C 6.00 %     Narrative:       Hemoglobin A1C Ranges:    Increased Risk for Diabetes  5.7% to 6.4%  Diabetes                     >= 6.5%  Diabetic Goal                < 7.0%    Lipid Panel [[id number]]  (Abnormal) Collected:  02/10/20 0321    Specimen:  Blood Updated:  02/10/20 0647     Total Cholesterol 77 mg/dL      Triglycerides 120 mg/dL      HDL Cholesterol 35 mg/dL      LDL Cholesterol  18 mg/dL      VLDL Cholesterol 24 mg/dL      LDL/HDL Ratio 0.51    Narrative:       Cholesterol Reference Ranges  (U.S. Department of Health and Human Services ATP III Classifications)    Desirable          <200 mg/dL  Borderline High    200-239 mg/dL  High Risk          >240 mg/dL      Triglyceride Reference Ranges  (U.S. Department of Health and Human Services  ATP III Classifications)    Normal           <150 mg/dL  Borderline High  150-199 mg/dL  High             200-499 mg/dL  Very High        >500 mg/dL    HDL Reference Ranges  (U.S. Department of Health and Human Services ATP III Classifcations)    Low     <40 mg/dl (major risk factor for CHD)  High    >60 mg/dl ('negative' risk factor for CHD)        LDL Reference Ranges  (U.S. Department of Health and Human Services ATP III Classifcations)    Optimal          <100 mg/dL  Near Optimal     100-129 mg/dL  Borderline High  130-159 mg/dL  High             160-189 mg/dL  Very High        >189 mg/dL    Sedimentation Rate [[id number]]  (Normal) Collected:  02/10/20 0321    Specimen:  Blood Updated:  02/10/20 0643     Sed Rate 9 mm/hr     Comprehensive Metabolic Panel [[id number]]  (Abnormal) Collected:  02/10/20 0321    Specimen:  Blood Updated:  02/10/20 0345     Glucose 170 mg/dL      BUN 12 mg/dL      Creatinine 0.83 mg/dL      Sodium 138 mmol/L      Potassium 5.4 mmol/L      Chloride 105 mmol/L      CO2 24.1 mmol/L      Calcium 9.1 mg/dL      Total Protein 7.9 g/dL      Albumin 4.30 g/dL      ALT (SGPT) 11 U/L      AST (SGOT) 13 U/L      Alkaline Phosphatase 87 U/L      Total Bilirubin 0.2 mg/dL      eGFR Non African Amer 71 mL/min/1.73      Globulin 3.6 gm/dL      A/G Ratio 1.2 g/dL      BUN/Creatinine Ratio 14.5     Anion Gap 8.9 mmol/L     Narrative:       GFR Normal >60  Chronic Kidney Disease <60  Kidney Failure <15      Lipase [[id number]]  (Abnormal) Collected:  02/10/20 0321    Specimen:  Blood Updated:  02/10/20 0343     Lipase 11 U/L     Protime-INR [[id number]]  (Normal) Collected:  02/10/20 0320    Specimen:  Blood Updated:  02/10/20 0334     Protime 13.6 Seconds      INR 1.07    Narrative:       Therapeutic Ranges for INR: 2.0-3.0 (PT 20-30)                              2.5-3.5 (PT 25-34)    aPTT [[id number]]  (Normal) Collected:  02/10/20 0320    Specimen:  Blood Updated:  02/10/20 0334     PTT 30.0 seconds      Narrative:       PTT = The equivalent PTT values for the therapeutic range of heparin levels at 0.1 to 0.7 U/ml are 53 to 110 seconds.      Urinalysis With Microscopic If Indicated (No Culture) - Urine, Clean Catch [[id number]]  (Normal) Collected:  02/10/20 0305    Specimen:  Urine, Clean Catch Updated:  02/10/20 0326     Color, UA Yellow     Appearance, UA Clear     pH, UA 6.0     Specific Gravity, UA 1.010     Glucose, UA Negative     Ketones, UA Negative     Bilirubin, UA Negative     Blood, UA Negative     Protein, UA Negative     Leuk Esterase, UA Negative     Nitrite, UA Negative     Urobilinogen, UA 0.2 E.U./dL    Narrative:       Urine microscopic not indicated.    CBC & Differential [[id number]] Collected:  02/10/20 0321    Specimen:  Blood Updated:  02/10/20 0323    Narrative:       The following orders were created for panel order CBC & Differential.  Procedure                               Abnormality         Status                     ---------                               -----------         ------                     CBC Auto Differential[[id number]]        Abnormal            Final result                 Please view results for these tests on the individual orders.    CBC Auto Differential [[id number]]  (Abnormal) Collected:  02/10/20 0321    Specimen:  Blood Updated:  02/10/20 0323     WBC 10.04 10*3/mm3      RBC 4.70 10*6/mm3      Hemoglobin 14.1 g/dL      Hematocrit 43.8 %      MCV 93.2 fL      MCH 30.0 pg      MCHC 32.2 g/dL      RDW 12.9 %      RDW-SD 43.9 fl      MPV 10.5 fL      Platelets 268 10*3/mm3      Neutrophil % 55.0 %      Lymphocyte % 32.5 %      Monocyte % 9.3 %      Eosinophil % 2.0 %      Basophil % 0.8 %      Immature Grans % 0.4 %      Neutrophils, Absolute 5.53 10*3/mm3      Lymphocytes, Absolute 3.26 10*3/mm3      Monocytes, Absolute 0.93 10*3/mm3      Eosinophils, Absolute 0.20 10*3/mm3      Basophils, Absolute 0.08 10*3/mm3      Immature Grans, Absolute 0.04 10*3/mm3      nRBC  0.0 /100 WBC         Imaging Results (Most Recent)     Procedure Component Value Units Date/Time    CT Angiogram Neck [[id number]] Collected:  02/10/20 1401     Updated:  02/10/20 1403    Narrative:       CTA Neck, CTA Head    INDICATION:   Left sided facial numbness and tingling beginning yesterday    TECHNIQUE:   CT angiogram of the head and neck with 100 cc of Isovue-300 IV contrast. 3-D reconstructions were obtained and reviewed.  Evaluation for a significant carotid arterial stenosis is based on the NASCET criteria. Radiation dose reduction techniques included  automated exposure control or exposure modulation based on body size. Count of known CT and cardiac nuc med studies performed in previous 12 months: 2.     COMPARISON:   None available.    FINDINGS:   CTA neck:  Extravascular structures are remarkable for emphysema and interstitial fibrosis. The right thyroid lobe and isthmus are mildly enlarged. The CT angiogram images show calcified plaque of the great vessel origins without severe great vessel  origin stenosis. The left vertebral artery originates from the arch as a normal variant. The posterior circulation both vertebrals are widely patent with the right being dominant. In the carotid circulation postendarterectomy changes are seen on the  left. The distal left internal carotid up through the siphon is widely patent. There is wide patency across the endarterectomy site. On the right side there is calcified plaque at the origin of the internal carotid artery. Maximum stenosis is  approximately 60%. The distal cervical internal carotid up through the siphon is widely patent.    CTA head:  In the intracranial circulation, there is a 2 mm aneurysm of the right internal carotid just above the clinoid process projecting posteriorly. This is located approximately 4 mm proximal to the posterior communicating artery. No other  aneurysms are seen. No major branch vessel occlusions or severe intracranial  stenoses. Dural venous sinuses are patent.      Impression:       There is a hemodynamically significant stenosis of approximately 60% at the right internal carotid origin. 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    Signer Name: Angel Courtney MD   Signed: 2/10/2020 2:01 PM   Workstation Name: [id number]    [facility]    CT Angiogram Head [[id number]] Collected:  02/10/20 1401     Updated:  02/10/20 1403    Narrative:       CTA Neck, CTA Head    INDICATION:   Left sided facial numbness and tingling beginning yesterday    TECHNIQUE:   CT angiogram of the head and neck with 100 cc of Isovue-300 IV contrast. 3-D reconstructions were obtained and reviewed.  Evaluation for a significant carotid arterial stenosis is based on the NASCET criteria. Radiation dose reduction techniques included  automated exposure control or exposure modulation based on body size. Count of known CT and cardiac nuc med studies performed in previous 12 months: 2.     COMPARISON:   None available.    FINDINGS:   CTA neck:  Extravascular structures are remarkable for emphysema and interstitial fibrosis. The right thyroid lobe and isthmus are mildly enlarged. The CT angiogram images show calcified plaque of the great vessel origins without severe great vessel  origin stenosis. The left vertebral artery originates from the arch as a normal variant. The posterior circulation both vertebrals are widely patent with the right being dominant. In the carotid circulation postendarterectomy changes are seen on the  left. The distal left internal carotid up through the siphon is widely patent. There is wide patency across the endarterectomy site. On the right side there is calcified plaque at the origin of the internal carotid artery. Maximum stenosis is  approximately 60%. The distal cervical internal carotid up through the siphon is widely patent.    CTA head:  In the intracranial circulation, there  is a 2 mm aneurysm of the right internal carotid just above the clinoid process projecting posteriorly. This is located approximately 4 mm proximal to the posterior communicating artery. No other  aneurysms are seen. No major branch vessel occlusions or severe intracranial stenoses. Dural venous sinuses are patent.      Impression:       There is a hemodynamically significant stenosis of approximately 60% at the right internal carotid origin. 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    Signer Name: Angel Courtney MD   Signed: 2/10/2020 2:01 PM   Workstation Name: [id number]    [facility]    MRI Brain Without Contrast [[id number]] Collected:  02/10/20 1209     Updated:  02/10/20 1211    Narrative:       INDICATION:    Weakness and numbness to upper extremities. Left-sided facial numbness started on Sunday. History of stroke last one year ago. Left carotid surgery 4 years ago.    TECHNIQUE:   MRI of the brain without contrast.    COMPARISON:    Head CT 2/10/2020 3:40 AM    FINDINGS:    There is no abnormally restricted diffusion. No recent infarct is suspected. There is encephalomalacia in the right posterior medial parietal lobe and to a lesser extent occipital lobe with underlying gliosis consistent with remote insult. There are  multiple small foci of signal abnormality in the subcortical white matter of the medial right frontal parietal lobe. There is a focus of chronic white matter disease in the left frontal deep white matter and there are punctate areas of bilateral deep  white matter signal abnormality right and left. Findings could be due to prior thromboembolic, watershed, and/or small vessel insults. There is no MRI evidence for intracranial hemorrhage. There is no extra-axial fluid collection. There is no midline  shift. No Chiari I malformation. The distal left vertebral artery is hypoplastic or diseased. There is a mucous retention cyst or polyp in  the left maxillary sinus. There is a small mucous retention cyst or polyp in the right maxillary sinus. There is  patchy signal abnormality in the left greater the right russ which is likely due to small vessel disease. There is vague susceptibility along the left anterolateral tentorium cerebellum which corresponds to calcification on the CT scan and I suspect this  is a densely calcified meningioma. On review of an MRI from 5/23/2019, there is linear enhancement in this region favoring this diagnosis without significant adjacent mass effect. There has been cataract surgery bilaterally.      Impression:         1. No evidence for recent infarct.  2. Findings consistent with multiple prior ischemic insults. See full description above.  3. Suspect a calcified plaque-like meningioma along the left-sided tentorium cerebellum without significant mass effect..    Signer Name: Josselyn Lozada MD   Signed: 2/10/2020 12:09 PM   Workstation Name: [id number]    [facility]    CT Head Without Contrast [[id number]] Collected:  02/10/20 0355     Updated:  02/10/20 0357    Narrative:       CT Head WO    HISTORY:   54-year-old female with numbness in the face and tingling on the left side that began at 1300 hours today.    TECHNIQUE:   Axial unenhanced head CT. Radiation dose reduction techniques included automated exposure control or exposure modulation based on body size. Count of known CT and cardiac nuc med studies performed in previous 12 months: 0.     Time of scan: 0340 hours    COMPARISON:   6/14/2019    FINDINGS:   No intracranial hemorrhage, mass, or infarct. No hydrocephalus or extra-axial fluid collection. There are senescent changes, including volume loss and nonspecific white matter change, but no acute abnormality is seen. Redemonstration of encephalomalacic  change near the vertex posteriorly on the right involving the parietal lobe. The skull base, calvarium, and extracranial soft tissues are  normal except for acute left maxillary sinusitis..      Impression:         1. No clearly acute intracranial process. No evidence of acute intracranial hemorrhage.  2. Chronic encephalomalacic change within the right parietal lobe approaching the vertex.  3. Acute left maxillary sinusitis.          Signer Name: Ulises Roman MD   Signed: 2/10/2020 3:55 AM   Workstation Name: [id number]    [facility]          PROCEDURES      Condition on Discharge:  Stable    Physical Exam at Discharge  Vital Signs  Temp:  [97.3 °F (36.3 °C)-99.1 °F (37.3 °C)] 99.1 °F (37.3 °C)  Heart Rate:  [73-89] 81  Resp:  [16-18] 16  BP: (103-152)/(67-87) 141/69    Physical Exam   Constitutional: She is oriented to person, place, and time. She appears well-nourished.   HENT:   Head: Atraumatic.   Eyes: EOM are normal.   Cardiovascular: Normal rate and regular rhythm.   Pulmonary/Chest: Effort normal and breath sounds normal.   Abdominal: Soft. Bowel sounds are normal.   Neurological: She is alert and oriented to person, place, and time.   Skin: Skin is warm and dry.   Psychiatric: She has a normal mood and affect. Her behavior is normal. Judgment and thought content normal.   Nursing note and vitals reviewed.        Discharge Disposition  home    Visiting Nurse:    No     Home PT/OT:  No     Home Safety Evaluation:  No     DME  None    Discharge Diet:      Dietary Orders (From admission, onward)     Start     Ordered    02/10/20 1539  Diet Regular; Cardiac, Consistent Carbohydrate  Diet Effective Now     Question Answer Comment   Diet Texture / Consistency Regular    Common Modifiers Cardiac    Common Modifiers Consistent Carbohydrate        02/10/20 1539                Activity at Discharge:  As tolerated    Pre-discharge education  Review medications  Review appointments        Follow-up Appointments  Future Appointments   Date Time Provider Department Center   3/3/2020 11:00 AM Reji Law MD [facility]  None   3/5/2020 10:40 AM Jennie Parikh, APRN [facility] None     Additional Instructions for the Follow-ups that You Need to Schedule     Discharge Follow-up with PCP   As directed       Currently Documented PCP:    Celeste Dill MD    PCP Phone Number:    [phone]     Follow Up Details:  1 week post hospitalization follow up         Discharge Follow-up with Specialty: Vascular Surgery/ She has her own surgeon Dr. Henderson; 2 Weeks   As directed      Specialty:  Vascular Surgery/ She has her own surgeon Dr. Henderson    Follow Up:  2 Weeks    Follow Up Details:  Follow up Dr. Henderson for aneurysm carotid artery and 60% obstruction of right internal carotid artery               Test Results Pending at Discharge       Inge Monique DO  02/10/20  4:36 PM    Time: 40 min (if over 30 minutes give explanation as to why it took greater than 30 minutes)  Discussed with nursing and with patient.  Reviewed the results of her tests and discussed acute sinusitis and need for antibiotics and probiotics.  Discussed need to see her vascular surgeon soon.  Aneurysm and significant carotid artery disease is present.

## 2020-02-10 NOTE — PLAN OF CARE
"  Problem: Patient Care Overview  Goal: Plan of Care Review  Outcome: Ongoing (interventions implemented as appropriate)  Flowsheets (Taken 2/10/2020 0600)  Progress: no change  Plan of Care Reviewed With: patient; spouse  Outcome Summary: Pt admitted from ER r/t c/o \"feeling weird all over all day; feels like I'm touching things with leather gloves on my hands.\"  Pt also claims all extremitites are numb.  NIH scale scored 0.  Pt is A&O x4.  Pt is anxious about going home but pleasant and cooperative.  Pt did not want to be admitted to the floor and would like to go home as soon as the MRI results are back; MRI to be done in the AM.  Pt reports with chronic pain due to diagnosis of scleroderma.  Bilateral hands/fingers noted with dark cyanotic color.  Pt is very pale in general for color.  Multiple tattoos noted.  Up ad priscilla.  Abed with call light within reach.     "

## 2020-02-10 NOTE — PROGRESS NOTES
Pt spoke with Eric today, results of XR given to pt, pt sts she would like cervical MRI, can you place order? Thank you.

## 2020-02-10 NOTE — ED PROVIDER NOTES
" EMERGENCY DEPARTMENT ENCOUNTER      Room Number: 4/04      HPI:    Chief complaint: Left-sided numbness    Location: Entire left side, but worse in upper extremity and face    Quality/Severity: Moderate    Timing/Duration: Mild symptoms started early in the morning on February 9 (approximately 20 hours ago)    Modifying Factors: None    Associated Symptoms: Subjective weakness in left upper extremity with \"shakiness\" in the left upper extremity    Narrative: Pt is a 54 y.o. female who presents complaining of left-sided numbness as noted above.  Patient does have a stated history of 4 prior cerebral vascular accidents and does have a documented history of carotid artery disease.  Prior left endarterectomy.  Patient states that she is \"overdue\" for her carotid Doppler exam.  Patient seen in our department 5 days ago for a suspected allergic reaction.  Please see that note for further details.  History of scleroderma and the patient is in pain management.      PMD: Celeste Dill MD    REVIEW OF SYSTEMS  Review of Systems   Constitutional: Negative for activity change, appetite change, fatigue and fever.   HENT: Negative for congestion.    Respiratory: Negative for cough, shortness of breath and wheezing.    Cardiovascular: Negative for chest pain, palpitations and leg swelling.   Gastrointestinal: Negative for abdominal pain, diarrhea, nausea and vomiting.   Endocrine: Negative for polydipsia.   Genitourinary: Negative for difficulty urinating, dysuria, flank pain, frequency and urgency.   Musculoskeletal: Positive for back pain (Chronic).   Skin: Negative for rash and wound.        Scleroderma   Neurological: Positive for tremors (New and mostly noted in left upper extremity) and numbness (Left-sided). Negative for dizziness, weakness and headaches.   Psychiatric/Behavioral: Negative for confusion.   All other systems reviewed and are negative.      PAST MEDICAL HISTORY  Active Ambulatory Problems     Diagnosis Date " Noted   • Acute upper respiratory infection 02/05/2016   • Anxiety disorder 02/05/2016   • Huitron's esophagus 02/05/2016   • Knee pain 02/05/2016   • Chronic constipation 02/05/2016   • Chronic maxillary sinusitis 02/05/2016   • Diabetes mellitus (CMS/HCC) 02/05/2016   • Arthropathy of lumbar facet joint 02/05/2016   • Fatigue 02/05/2016   • Gastroesophageal reflux disease without esophagitis 02/05/2016   • Hyperlipidemia 02/05/2016   • Discharge from nipple 02/05/2016   • Pneumonitis 02/05/2016   • Systemic sclerosis (CMS/[facility]) 02/05/2016   • Pain of upper extremity 02/05/2016   • Acute pancreatitis 05/03/2016   • Benign essential hypertension 05/09/2016   • Chronic pain 05/18/2016   • Intractable vomiting with nausea 11/12/2016   • Seizure (CMS/HCC) 11/15/2016   • Elevated troponin 11/15/2016   • PRES (posterior reversible encephalopathy syndrome) 11/17/2016   • Aftercare 11/17/2016   • Acquired hypothyroidism 12/07/2016   • Encounter for long-term (current) use of high-risk medication 01/30/2017   • Arthritis 10/04/2016   • Stenosis of carotid artery 07/21/2014   • Hypertension 06/25/2014   • Muscle pain 10/04/2016   • Obstruction of carotid artery 06/25/2014   • Tobacco use 06/25/2014   • Controlled type 2 diabetes mellitus without complication (CMS/HCC) 10/04/2016   • Vitamin D deficiency 02/14/2017   • Atherosclerosis of aorta (CMS/HCC) 12/19/2017   • Abdominal pain 06/12/2018   • Right hip pain 08/08/2018   • Huitron's esophagus without dysplasia 09/10/2018   • Sacroiliitis (CMS/HCC) 03/21/2019   • Disorder of sacroiliac joint 03/21/2019   • Greater trochanteric bursitis of right hip 03/21/2019   • Observed sleep apnea 09/06/2019   • Snoring 09/06/2019     Resolved Ambulatory Problems     Diagnosis Date Noted   • Tympanites 02/05/2016   • Chronic pain syndrome 02/05/2016   • 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   • Candidiasis of vagina 02/05/2016   • Chronic pain disorder 02/16/2016   •  Kienbock disease, adult 2016     Past Medical History:   Diagnosis Date   • Acid reflux    • Anemia    • Anxiety    • Bursitis    • CAD (coronary artery disease)    • CVA (cerebrovascular accident) (CMS/HCC)    • DDD (degenerative disc disease), cervical    • Fibromyalgia    • Hard to intubate    • History of transfusion    • Joint pain    • Kienbock's disease    • Low back pain    • Pancreatitis    • Pulmonary fibrosis (CMS/HCC) 2016   • Reflux gastritis    • Scleroderma (CMS/HCC)        PAST SURGICAL HISTORY  Past Surgical History:   Procedure Laterality Date   • BACK SURGERY     • CAROTID ENDARTERECTOMY Left     Neurological   • CATARACT EXTRACTION Bilateral    •  SECTION     • CHOLECYSTECTOMY      Laparoscopic   • COLONOSCOPY      Complete   • COLONOSCOPY N/A 2016    Procedure: COLONOSCOPY;  Surgeon: Gaviota Hagen MD;  Location: [facility] OR;  Service:    • ENDOSCOPY N/A 2016    Procedure: ESOPHAGOGASTRODUODENOSCOPY WITH BIOPSIES;  Surgeon: Gaviota Hagen MD;  Location: [facility] OR;  Service:    • ENDOSCOPY N/A 2018    Procedure: ESOPHAGOGASTRODUODENOSCOPY, biopsy;  Surgeon: Gaviota Hagen MD;  Location: [facility] OR;  Service: Gastroenterology   • TONSILLECTOMY AND ADENOIDECTOMY     • UPPER GASTROINTESTINAL ENDOSCOPY      Diagnostic   • WRIST SURGERY Left        FAMILY HISTORY  Family History   Problem Relation Age of Onset   • Other Mother         Cardiac Disorder   • Migraines Mother    • Heart disease Mother    • Sudden death Mother    • Other Father         Cardiac Disorder   • Hypertension Father    • Heart disease Father    • Cancer Father    • Hypertension Sister    • Cancer Sister    • Migraines Daughter    • Cancer Other         Uncle   • Migraines Maternal Aunt    • Stroke Maternal Grandmother    • Stroke Maternal Grandfather    • Breast cancer Neg Hx        SOCIAL HISTORY  Social History     Socioeconomic History   • Marital status:      Spouse name: Not on file   •  Number of children: Not on file   • Years of education: Not on file   • Highest education level: Not on file   Tobacco Use   • Smoking status: Current Every Day Smoker     Packs/day: 0.50     Years: 40.00     Pack years: 20.00   • Smokeless tobacco: Never Used   • Tobacco comment: patient refused   Substance and Sexual Activity   • Alcohol use: No   • Drug use: No   • Sexual activity: Defer     Comment: EXERCISE - RARELY       ALLERGIES  Other; Atorvastatin; Diflucan [fluconazole]; Hydroxyzine; Levofloxacin; Nsaids; Statins; Toradol [ketorolac tromethamine]; Victoza  [liraglutide]; and Rosuvastatin    PHYSICAL EXAM  ED Triage Vitals [02/10/20 0243]   Temp Heart Rate Resp BP SpO2   -- 89 17 103/87 98 %      Temp src Heart Rate Source Patient Position BP Location FiO2 (%)   -- Monitor Lying Right arm --       Physical Exam   Constitutional: She is oriented to person, place, and time.   The patient is an obese, 54-year-old, white female no acute distress.  Patient smells strongly of cigarette smoke.   HENT:   Head: Normocephalic and atraumatic.   Eyes: Conjunctivae and EOM are normal.   Anisocoria present with the right pupil 1 mm larger than the left.  Pupils equally reactive   Neck: Normal range of motion. Neck supple.   Old surgical scars on the left neck consistent with a previous endarterectomy.  3+ carotid bruit on the right and 2+ carotid bruit on the left   Cardiovascular: Normal rate, regular rhythm and normal heart sounds.   Pulmonary/Chest: Effort normal and breath sounds normal.   Occasional, deep, loose cough present.   Abdominal: Soft. Bowel sounds are normal. There is no tenderness.   Musculoskeletal: Normal range of motion. She exhibits no edema.   Neurological: She is alert and oriented to person, place, and time. No cranial nerve deficit. Coordination normal.   Skin: Skin is warm and dry.   Chronic skin and nail changes consistent with the patient's diagnosis of scleroderma.  Significant nicotine  staining of the fingers   Psychiatric: Judgment normal.   Mildly anxious       LAB RESULTS  Results for orders placed or performed during the hospital encounter of 02/10/20   Comprehensive Metabolic Panel   Result Value Ref Range    Glucose 170 (H) 65 - 99 mg/dL    BUN 12 6 - 20 mg/dL    Creatinine 0.83 0.57 - 1.00 mg/dL    Sodium 138 136 - 145 mmol/L    Potassium 5.4 (H) 3.5 - 5.2 mmol/L    Chloride 105 98 - 107 mmol/L    CO2 24.1 22.0 - 29.0 mmol/L    Calcium 9.1 8.6 - 10.5 mg/dL    Total Protein 7.9 6.0 - 8.5 g/dL    Albumin 4.30 3.50 - 5.20 g/dL    ALT (SGPT) 11 1 - 33 U/L    AST (SGOT) 13 1 - 32 U/L    Alkaline Phosphatase 87 39 - 117 U/L    Total Bilirubin 0.2 0.2 - 1.2 mg/dL    eGFR Non African Amer 71 >60 mL/min/1.73    Globulin 3.6 gm/dL    A/G Ratio 1.2 g/dL    BUN/Creatinine Ratio 14.5 7.0 - 25.0    Anion Gap 8.9 5.0 - 15.0 mmol/L   Protime-INR   Result Value Ref Range    Protime 13.6 12.1 - 15.0 Seconds    INR 1.07 0.90 - 1.10   aPTT   Result Value Ref Range    PTT 30.0 24.3 - 38.1 seconds   Lipase   Result Value Ref Range    Lipase 11 (L) 13 - 60 U/L   Urinalysis With Microscopic If Indicated (No Culture) - Urine, Clean Catch   Result Value Ref Range    Color, UA Yellow Yellow, Straw    Appearance, UA Clear Clear    pH, UA 6.0 4.5 - 8.0    Specific Gravity, UA 1.010 1.003 - 1.030    Glucose, UA Negative Negative    Ketones, UA Negative Negative    Bilirubin, UA Negative Negative    Blood, UA Negative Negative    Protein, UA Negative Negative    Leuk Esterase, UA Negative Negative    Nitrite, UA Negative Negative    Urobilinogen, UA 0.2 E.U./dL 0.2 - 1.0 E.U./dL   CBC Auto Differential   Result Value Ref Range    WBC 10.04 3.40 - 10.80 10*3/mm3    RBC 4.70 3.77 - 5.28 10*6/mm3    Hemoglobin 14.1 12.0 - 15.9 g/dL    Hematocrit 43.8 34.0 - 46.6 %    MCV 93.2 79.0 - 97.0 fL    MCH 30.0 26.6 - 33.0 pg    MCHC 32.2 31.5 - 35.7 g/dL    RDW 12.9 12.3 - 15.4 %    RDW-SD 43.9 37.0 - 54.0 fl    MPV 10.5 6.0 - 12.0  fL    Platelets 268 140 - 450 10*3/mm3    Neutrophil % 55.0 42.7 - 76.0 %    Lymphocyte % 32.5 19.6 - 45.3 %    Monocyte % 9.3 5.0 - 12.0 %    Eosinophil % 2.0 0.3 - 6.2 %    Basophil % 0.8 0.0 - 1.5 %    Immature Grans % 0.4 0.0 - 0.5 %    Neutrophils, Absolute 5.53 1.70 - 7.00 10*3/mm3    Lymphocytes, Absolute 3.26 (H) 0.70 - 3.10 10*3/mm3    Monocytes, Absolute 0.93 (H) 0.10 - 0.90 10*3/mm3    Eosinophils, Absolute 0.20 0.00 - 0.40 10*3/mm3    Basophils, Absolute 0.08 0.00 - 0.20 10*3/mm3    Immature Grans, Absolute 0.04 0.00 - 0.05 10*3/mm3    nRBC 0.0 0.0 - 0.2 /100 WBC         I ordered the above labs and reviewed the results    RADIOLOGY  Xr Spine Cervical Complete 4 Or 5 View    Result Date: 2/6/2020  Narrative: CERVICAL SPINE PLAIN FILM SERIES  CLINICAL HISTORY: Neck pain.  AP, lateral, and bilateral oblique projections of the cervical spine demonstrate normal alignment down to C6 on C7. The alignment of C7 on T1 is not well evaluated on the lateral projection. The prevertebral soft tissues are unremarkable. The lateral projection is remarkable for mild posteriorly exophytic osteophytic changes at the C3-4 level. There is mild right foraminal narrowing at the C3-4 level secondary to uncinate hypertrophy. Minimal right C5-6 foraminal narrowing is noted secondary to facet hypertrophic change. Minimal left foraminal narrowing is seen at C3-4 secondary to uncovertebral joint hypertrophy. Incidental note is made of surgical clips within the left side of the neck. If further assessment for cervical spine pathology is indicated, one could obtain axial imaging of the cervical spine for more sensitive and specific evaluation of cervical spine pathology.  This report was finalized on 2/6/2020 6:17 PM by Dr. Ahmet Weinberg M.D.      Ct Head Without Contrast    Result Date: 2/10/2020  Narrative: CT Head WO HISTORY: 54-year-old female with numbness in the face and tingling on the left side that began at 1300 hours today.  TECHNIQUE: Axial unenhanced head CT. Radiation dose reduction techniques included automated exposure control or exposure modulation based on body size. Count of known CT and cardiac nuc med studies performed in previous 12 months: 0. Time of scan: 0340 hours COMPARISON: 6/14/2019 FINDINGS: No intracranial hemorrhage, mass, or infarct. No hydrocephalus or extra-axial fluid collection. There are senescent changes, including volume loss and nonspecific white matter change, but no acute abnormality is seen. Redemonstration of encephalomalacic change near the vertex posteriorly on the right involving the parietal lobe. The skull base, calvarium, and extracranial soft tissues are normal except for acute left maxillary sinusitis..     Impression: 1. No clearly acute intracranial process. No evidence of acute intracranial hemorrhage. 2. Chronic encephalomalacic change within the right parietal lobe approaching the vertex. 3. Acute left maxillary sinusitis. Signer Name: Ulises Roman MD  Signed: 2/10/2020 3:55 AM  Workstation Name: [email or web address]  [facility]    Mammo Diagnostic Digital Tomosynthesis Left With Cad    Result Date: 2/7/2020  Narrative: EXAM, 02/07/2020: 1. Unilateral left digital diagnostic mammogram with CAD. 2. Unilateral left digital diagnostic breast tomosynthesis.  INDICATION: Screening mammogram obtained 01/29/2020 demonstrated new linear calcifications lower inner quadrant of the left breast. Indication views were recommended for further evaluation.  TECHNIQUE: Bilateral digital screening mammogram images were obtained and reviewed with CAD. Digital breast tomosynthesis images were also reviewed.  COMPARISON: *  Screening mammogram, 01/29/2020  FINDINGS: There are scattered areas of fibroglandular density. Magnification views of the microcalcifications in the lower inner quadrant of the left breast were obtained. The calcifications are linear and branching and remain indeterminate.  Correlation with stereotactic biopsy of the calcifications is recommended. The findings and recommendations were discussed with the patient at the time of the examination. The findings and recommendations were then called to Dr. Celeste Dill at 3:15 PM on 02/07/2020..      Impression: Indeterminate microcalcifications lower inner quadrant of the left breast. Stereotactic biopsy is recommended to exclude neoplasm.  BI-RADS 4.   Women over the age of 40 undergoing screening mammography are entered into a reminder system with target due date for the next mammogram.  This report was finalized on 2/7/2020 4:24 PM by Dr. Shawn Gan MD.      Mammo Screening Digital Tomosynthesis Bilateral With Cad    Result Date: 1/29/2020  Narrative: EXAM, 01/29/2020: 1. Bilateral digital screening mammogram with CAD. 2. Bilateral digital screening breast tomosynthesis.  INDICATION: Routine screening  TECHNIQUE: Bilateral digital screening mammogram images were obtained and reviewed with CAD. Digital breast tomosynthesis images were also reviewed.  COMPARISON: *  Screening mammogram, 03/08/2016  FINDINGS: There are scattered areas of fibroglandular density. Compared with 03/08/2016, there is a linear branching focus of calcification in the lower inner quadrant of the left breast which is new. Recommend magnification views for further evaluation. No mass is seen.      Impression: New linear calcifications lower inner quadrant of the left breast compared with 03/08/2016. Recommend magnification views for further evaluation.  BI-RADS CATEGORY 0: Needs additional evaluation.   Women over the age of 40 undergoing screening mammography are entered into a reminder system with target due date for the next mammogram.  This report was finalized on 1/29/2020 8:54 AM by Dr. Shawn Gan MD.        I ordered the above radiologic testing and reviewed the results    PROCEDURES  Procedures      PROGRESS AND CONSULTS  ED Course as of Feb 10 0419   Mon  Feb 10, 2020   0304 Old records reviewed and the patient did have a documented small, CVA in May 2019.  Last carotid Doppler exam was in April 2018 which did show 50-69% stenosis in both carotid arteries.    [ML]   0326 TPA not considered as symptoms started 20 hours ago.  EKG was contemporaneously reviewed at 0319 hrs. and showed a normal sinus rhythm with a rate of 76 bpm.  Anterior Q waves present suggestive of an old myocardial infarction.  ST segments and T waves unremarkable.  No ectopy.  EKG is unchanged from previous tracing dated June 20, 2019.    [ML]   0413 Case and findings discussed with the on-call hospitalist, Dr. Abel, who agreed that the patient's presentation warranted admission for further evaluation/treatment.  Patient was agreeable to admission.    [ML]      ED Course User Index  [ML] Estrada Guerrero MD           MEDICAL DECISION MAKING  Results were reviewed/discussed with the patient and they were also made aware of online access. Pt also made aware that some labs, such as cultures, will not be resulted during ER visit and follow up with PMD is necessary.     MDM       DIAGNOSIS  Final diagnoses:   Bilateral carotid artery stenosis   Left sided numbness       Latest Documented Vital Signs:  As of 4:19 AM  BP- 103/87 HR- 89 Temp- 98.8 °F (37.1 °C) (Oral) O2 sat- 98%    DISPOSITION  Patient admitted to [facility]       Medication List      No changes were made to your prescriptions during this visit.              Estrada Guerrreo MD  02/10/20 0419

## 2020-02-10 NOTE — CONSULTS
"  Patient Identification:  NAME:  Crystal Cruz  Age:  54 y.o.   Sex:  female   :  1965   MRN:  [id number]       Chief complaint: My hands both got numb, reason for consult numbness left body and both hands    History of present illness: This patient is a 54-year-old right-handed white female with a history of anxiety coronary artery disease \"4 strokes\" diabetes fibromyalgia who comes to the hospital as she states she got numbness in both hands and arms it later seemed to settle more on the left side but even now she states both hands are still numb and do not feel normal and that is no she does have an associated symptom of headache whole head location dull in quality and duration several days.  It is not clear if she got numbness followed by headache and states she always has a headache.  The MRI by my independent eyeball review shows the old left occipital stroke and encephalomalacia  but there is no evidence of an acute stroke.      Past medical history:  Past Medical History:   Diagnosis Date   • Acid reflux    • Anemia    • Anxiety    • Atherosclerosis of aorta (CMS/HCC) 2017   • Huitron's esophagus    • Bursitis    • CAD (coronary artery disease)    • Chronic pain disorder    • CVA (cerebrovascular accident) (CMS/HCC)    • DDD (degenerative disc disease), cervical    • Diabetes mellitus (CMS/HCC)    • Fibromyalgia    • Hard to intubate    • History of transfusion    • Hypertension    • Joint pain    • Kienbock's disease    • Low back pain    • Pancreatitis    • Pulmonary fibrosis (CMS/HCC)    • Reflux gastritis    • Scleroderma (CMS/HCC)    • Seizure (CMS/HCC)        Past surgical history:  Past Surgical History:   Procedure Laterality Date   • BACK SURGERY     • CAROTID ENDARTERECTOMY Left     Neurological   • CATARACT EXTRACTION Bilateral    •  SECTION     • CHOLECYSTECTOMY      Laparoscopic   • COLONOSCOPY      Complete   • COLONOSCOPY N/A 2016    Procedure: COLONOSCOPY; "  Surgeon: Gaviota Hagen MD;  Location: [facility] OR;  Service:    • ENDOSCOPY N/A 7/11/2016    Procedure: ESOPHAGOGASTRODUODENOSCOPY WITH BIOPSIES;  Surgeon: Gaviota Hagen MD;  Location:  [facility] OR;  Service:    • ENDOSCOPY N/A 9/28/2018    Procedure: ESOPHAGOGASTRODUODENOSCOPY, biopsy;  Surgeon: Gaviota Hagen MD;  Location: [facility] OR;  Service: Gastroenterology   • TONSILLECTOMY AND ADENOIDECTOMY     • UPPER GASTROINTESTINAL ENDOSCOPY      Diagnostic   • WRIST SURGERY Left        Allergies:  Other; Atorvastatin; Diflucan [fluconazole]; Hydroxyzine; Levofloxacin; Nsaids; Statins; Toradol [ketorolac tromethamine]; Victoza  [liraglutide]; and Rosuvastatin    Home medications:  Medications Prior to Admission   Medication Sig Dispense Refill Last Dose   • amLODIPine (NORVASC) 2.5 MG tablet Take 2.5 mg by mouth 4 (Four) Times a Day As Needed.   2/9/2020 at 2000   • aspirin 325 MG tablet Take 325 mg by mouth Daily.   2/9/2020 at 2000   • calcium citrate-vitamin d (CITRACAL) 200-250 MG-UNIT tablet tablet Take 1 tablet by mouth Daily.   2/9/2020 at 2000   • diphenhydrAMINE (BENADRYL) 25 MG tablet Take 1 tablet by mouth Every 6 (Six) Hours As Needed for Itching for up to 5 days. 30 tablet 0 2/9/2020 at 2000   • DULoxetine (CYMBALTA) 60 MG capsule Take 1 capsule by mouth Daily. 30 capsule 0 2/9/2020 at 0800   • Evolocumab (REPATHA SURECLICK) 140 MG/ML solution auto-injector INJECT THE CONTENTS OF 1 SYRINGE INTO THE SKIN EVERY 14 DAYS   2/1/2020 at Unknown time   • famotidine (PEPCID) 20 MG tablet Take 1 tablet by mouth 2 (Two) Times a Day for 7 days. 14 tablet 0 2/9/2020 at 2000   • gabapentin (NEURONTIN) 600 MG tablet 3 (Three) Times a Day.   2/9/2020 at 2000   • levETIRAcetam (KEPPRA) 500 MG tablet TAKE ONE TABLET BY MOUTH TWICE A DAY   2/9/2020 at 2000   • linaclotide (LINZESS) 145 MCG capsule capsule Take 145 mcg by mouth Daily.   2/9/2020 at 0800   • metFORMIN ER (GLUCOPHAGE-XR) 500 MG 24 hr tablet TAKE TWO TABLETS BY  "MOUTH TWICE A DAY WITH MEALS   2/9/2020 at 2000   • omeprazole (priLOSEC) 20 MG capsule Take 1 capsule by mouth 2 (Two) Times a Day. 180 capsule 3 2/9/2020 at 2000   • oxyCODONE ER (XTAMPZA ER) 18 MG capsule extended-release 12 hour  capsule Take 1 capsule by mouth Every 12 (Twelve) Hours. 46 capsule 0 2/8/2020 at 2000   • traZODone (DESYREL) 100 MG tablet Every Night.   2/8/2020 at 2000   • insulin glargine (LANTUS) 100 UNIT/ML injection 16 Units.   Taking   • Insulin Syringe 31G X 5/16\" 1 ML misc Use twice daily for shots 100 each 3 Taking   • levETIRAcetam (KEPPRA) 500 MG tablet Take 1 tablet by mouth Every 12 (Twelve) Hours. 60 tablet 0 Not Taking   • Naloxone HCl (NARCAN) 0.4 MG/ML injection Infuse 1 mL into a venous catheter Every 5 (Five) Minutes As Needed for Opioid Reversal. 1 mL 1 Taking   • NITRO-BID 2 % ointment    Taking   • oxyCODONE-acetaminophen (PERCOCET) 7.5-325 MG per tablet Take 1 tablet by mouth 2 (Two) Times a Day As Needed for Severe Pain . 60 tablet 0         Hospital medications:    amLODIPine 10 mg Oral Q24H   aspirin 325 mg Oral Daily   baclofen 10 mg Oral Daily With Breakfast & Dinner   Calcium Carbonate 600 mg Oral BID With Meals   clopidogrel 75 mg Oral Daily   DULoxetine 60 mg Oral Daily   enoxaparin 40 mg Subcutaneous Q24H   famotidine 20 mg Oral BID   gabapentin 600 mg Oral Q8H   insulin aspart 0-14 Units Subcutaneous 4x Daily AC & at Bedtime   insulin detemir 15 Units Subcutaneous Nightly   levETIRAcetam 500 mg Oral BID   metFORMIN ER 1,000 mg Oral BID With Meals   nicotine 1 patch Transdermal Q24H   nitroglycerin 1 inch Topical TID - Nitrates   pantoprazole 40 mg Oral QAM   sodium chloride 10 mL Intravenous Q12H   traZODone 100 mg Oral Nightly        dextrose  •  dextrose  •  diphenhydrAMINE  •  glucagon (human recombinant)  •  magnesium sulfate **OR** magnesium sulfate **OR** magnesium sulfate  •  naloxone  •  oxyCODONE  •  oxyCODONE-acetaminophen  •  potassium & sodium phosphates " **OR** potassium & sodium phosphates  •  potassium chloride **OR** potassium chloride **OR** potassium chloride  •  [COMPLETED] Insert peripheral IV **AND** sodium chloride  •  sodium chloride  •  sodium chloride    Family history:  Family History   Problem Relation Age of Onset   • Other Mother         Cardiac Disorder   • Migraines Mother    • Heart disease Mother    • Sudden death Mother    • Other Father         Cardiac Disorder   • Hypertension Father    • Heart disease Father    • Cancer Father    • Hypertension Sister    • Cancer Sister    • Migraines Daughter    • Cancer Other         Uncle   • Migraines Maternal Aunt    • Stroke Maternal Grandmother    • Stroke Maternal Grandfather    • Breast cancer Neg Hx        Social history:  Social History     Tobacco Use   • Smoking status: Current Every Day Smoker     Packs/day: 0.50     Years: 40.00     Pack years: 20.00   • Smokeless tobacco: Never Used   • Tobacco comment: patient refused   Substance Use Topics   • Alcohol use: No   • Drug use: No       Review of systems:    She states she has a headache now and gets headaches all the time she has numbness in both hands currently and more on the left side that she states is new no double vision she does have a headache whole head location no eyes ears nose throat skin bone joint  lymphatic hematologic oncologic complaints no chest pain abdominal pain bowel bladder incontinence fever chills or rash she tells me she has had 4 strokes in the past    Objective:  Vitals Ranges:   Temp:  [97.3 °F (36.3 °C)-99.1 °F (37.3 °C)] 99.1 °F (37.3 °C)  Heart Rate:  [73-89] 73  Resp:  [17-18] 18  BP: (103-152)/(67-87) 135/67      Physical Exam:  Awake alert oriented x3 in no distress she looks significantly older than her stated age fund of knowledge fair attention span concentration good recent remote memory fair language function normal well-developed well-nourished in no distress but again looks about 20 years older than  her stated age pupils 2 constricting to 1 normal disc retinas visual fields extraocular movements full without nystagmus nasolabial folds palate tongue symmetrical normal hearing facial sensation head turning shoulder shrug motor 5 out of 5 x 4 extremities not a great effort no pronator drift some distal atrophy bradykinesia minimal rigidity no resting tremor reflexes trace throughout symmetrical toes downgoing bilaterally sensation normal light touch face both arms both legs coordination normal in the upper extremity Station and gait was not tested for fear I would let her pass out or fall heart is regular without murmur neck supple without bruits extremities no clubbing cyanosis edema visual acuity again is completely normal with no field cut    Results review:   I reviewed the patient's new clinical results.    Data review:  Lab Results (last 24 hours)     Procedure Component Value Units Date/Time    POC Glucose Once [[id number]]  (Normal) Collected:  02/10/20 1111    Specimen:  Blood Updated:  02/10/20 1123     Glucose 77 mg/dL     Vitamin B12 [[id number]]  (Normal) Collected:  02/10/20 0637    Specimen:  Blood Updated:  02/10/20 1103     Vitamin B-12 853 pg/mL     Narrative:       Results may be falsely increased if patient taking Biotin.      P2Y12 Platelet Inhibition [[id number]]  (Normal) Collected:  02/10/20 0806    Specimen:  Blood Updated:  02/10/20 1047     P2Y12 Platelet Inhibition 251 PRU     Narrative:       Test results are in P2Y12 reaction units (PRU). This measures the extent of platelet aggregation in the presence of P2Y12 inhibitor drugs such as clopidrogrel (Plavix), prasugrel (Effient), ticagrelor (Brilinta), and ticlopidine (Ticlid).   Pre-Drug normal reference range: 194 - 418 PRU   P2Y12 values <194 (low end of reference range) are specific evidence of a P2Y12 inhibitor effect   Patients who have been treated with Glycoprotein IIb/IIIa inhibitors should not be tested until platelet function  has recovered. This time period is approximately 14 days after discontinuation of abciximab (ReoPro) and up to 48 hours after discontinuation of eptifibatide (Integrillin) and tirofiban (Aggratstat).   Results should be interpreted in conjuction with other laboratory and clinical data available to the clinician.    C-reactive Protein [[id number]]  (Abnormal) Collected:  02/10/20 0639    Specimen:  Blood Updated:  02/10/20 1042     C-Reactive Protein 1.14 mg/dL     POC Glucose Once [[id number]]  (Normal) Collected:  02/10/20 0800    Specimen:  Blood Updated:  02/10/20 0806     Glucose 117 mg/dL     TSH [[id number]]  (Normal) Collected:  02/10/20 0321    Specimen:  Blood Updated:  02/10/20 0656     TSH 1.760 uIU/mL     Troponin [[id number]]  (Normal) Collected:  02/10/20 0321    Specimen:  Blood Updated:  02/10/20 0656     Troponin T <0.010 ng/mL     Narrative:       Troponin T Reference Range:  <= 0.03 ng/mL-   Negative for AMI  >0.03 ng/mL-     Abnormal for myocardial necrosis.  Clinicians would have to utilize clinical acumen, EKG, Troponin and serial changes to determine if it is an Acute Myocardial Infarction or myocardial injury due to an underlying chronic condition.       Results may be falsely decreased if patient taking Biotin.      Hemoglobin A1c [[id number]]  (Abnormal) Collected:  02/10/20 0321    Specimen:  Blood Updated:  02/10/20 0648     Hemoglobin A1C 6.00 %     Narrative:       Hemoglobin A1C Ranges:    Increased Risk for Diabetes  5.7% to 6.4%  Diabetes                     >= 6.5%  Diabetic Goal                < 7.0%    Lipid Panel [[id number]]  (Abnormal) Collected:  02/10/20 0321    Specimen:  Blood Updated:  02/10/20 0647     Total Cholesterol 77 mg/dL      Triglycerides 120 mg/dL      HDL Cholesterol 35 mg/dL      LDL Cholesterol  18 mg/dL      VLDL Cholesterol 24 mg/dL      LDL/HDL Ratio 0.51    Narrative:       Cholesterol Reference Ranges  (U.S. Department of Health and Human Services ATP III  Classifications)    Desirable          <200 mg/dL  Borderline High    200-239 mg/dL  High Risk          >240 mg/dL      Triglyceride Reference Ranges  (U.S. Department of Health and Human Services ATP III Classifications)    Normal           <150 mg/dL  Borderline High  150-199 mg/dL  High             200-499 mg/dL  Very High        >500 mg/dL    HDL Reference Ranges  (U.S. Department of Health and Human Services ATP III Classifcations)    Low     <40 mg/dl (major risk factor for CHD)  High    >60 mg/dl ('negative' risk factor for CHD)        LDL Reference Ranges  (U.S. Department of Health and Human Services ATP III Classifcations)    Optimal          <100 mg/dL  Near Optimal     100-129 mg/dL  Borderline High  130-159 mg/dL  High             160-189 mg/dL  Very High        >189 mg/dL    Sedimentation Rate [[id number]]  (Normal) Collected:  02/10/20 0321    Specimen:  Blood Updated:  02/10/20 0643     Sed Rate 9 mm/hr     Comprehensive Metabolic Panel [[id number]]  (Abnormal) Collected:  02/10/20 0321    Specimen:  Blood Updated:  02/10/20 0345     Glucose 170 mg/dL      BUN 12 mg/dL      Creatinine 0.83 mg/dL      Sodium 138 mmol/L      Potassium 5.4 mmol/L      Chloride 105 mmol/L      CO2 24.1 mmol/L      Calcium 9.1 mg/dL      Total Protein 7.9 g/dL      Albumin 4.30 g/dL      ALT (SGPT) 11 U/L      AST (SGOT) 13 U/L      Alkaline Phosphatase 87 U/L      Total Bilirubin 0.2 mg/dL      eGFR Non African Amer 71 mL/min/1.73      Globulin 3.6 gm/dL      A/G Ratio 1.2 g/dL      BUN/Creatinine Ratio 14.5     Anion Gap 8.9 mmol/L     Narrative:       GFR Normal >60  Chronic Kidney Disease <60  Kidney Failure <15      Lipase [[id number]]  (Abnormal) Collected:  02/10/20 0321    Specimen:  Blood Updated:  02/10/20 0343     Lipase 11 U/L     Protime-INR [[id number]]  (Normal) Collected:  02/10/20 0320    Specimen:  Blood Updated:  02/10/20 0334     Protime 13.6 Seconds      INR 1.07    Narrative:       Therapeutic Ranges for  INR: 2.0-3.0 (PT 20-30)                              2.5-3.5 (PT 25-34)    aPTT [[id number]]  (Normal) Collected:  02/10/20 0320    Specimen:  Blood Updated:  02/10/20 0334     PTT 30.0 seconds     Narrative:       PTT = The equivalent PTT values for the therapeutic range of heparin levels at 0.1 to 0.7 U/ml are 53 to 110 seconds.      Urinalysis With Microscopic If Indicated (No Culture) - Urine, Clean Catch [[id number]]  (Normal) Collected:  02/10/20 0305    Specimen:  Urine, Clean Catch Updated:  02/10/20 0326     Color, UA Yellow     Appearance, UA Clear     pH, UA 6.0     Specific Gravity, UA 1.010     Glucose, UA Negative     Ketones, UA Negative     Bilirubin, UA Negative     Blood, UA Negative     Protein, UA Negative     Leuk Esterase, UA Negative     Nitrite, UA Negative     Urobilinogen, UA 0.2 E.U./dL    Narrative:       Urine microscopic not indicated.    CBC & Differential [[id number]] Collected:  02/10/20 0321    Specimen:  Blood Updated:  02/10/20 0323    Narrative:       The following orders were created for panel order CBC & Differential.  Procedure                               Abnormality         Status                     ---------                               -----------         ------                     CBC Auto Differential[[id number]]        Abnormal            Final result                 Please view results for these tests on the individual orders.    CBC Auto Differential [[id number]]  (Abnormal) Collected:  02/10/20 0321    Specimen:  Blood Updated:  02/10/20 0323     WBC 10.04 10*3/mm3      RBC 4.70 10*6/mm3      Hemoglobin 14.1 g/dL      Hematocrit 43.8 %      MCV 93.2 fL      MCH 30.0 pg      MCHC 32.2 g/dL      RDW 12.9 %      RDW-SD 43.9 fl      MPV 10.5 fL      Platelets 268 10*3/mm3      Neutrophil % 55.0 %      Lymphocyte % 32.5 %      Monocyte % 9.3 %      Eosinophil % 2.0 %      Basophil % 0.8 %      Immature Grans % 0.4 %      Neutrophils, Absolute 5.53 10*3/mm3      Lymphocytes,  Absolute 3.26 10*3/mm3      Monocytes, Absolute 0.93 10*3/mm3      Eosinophils, Absolute 0.20 10*3/mm3      Basophils, Absolute 0.08 10*3/mm3      Immature Grans, Absolute 0.04 10*3/mm3      nRBC 0.0 /100 WBC            Imaging:  Imaging Results (Last 24 Hours)     Procedure Component Value Units Date/Time    CT Angiogram Neck [[id number]] Collected:  02/10/20 1401     Updated:  02/10/20 1403    Narrative:       CTA Neck, CTA Head    INDICATION:   Left sided facial numbness and tingling beginning yesterday    TECHNIQUE:   CT angiogram of the head and neck with 100 cc of Isovue-300 IV contrast. 3-D reconstructions were obtained and reviewed.  Evaluation for a significant carotid arterial stenosis is based on the NASCET criteria. Radiation dose reduction techniques included  automated exposure control or exposure modulation based on body size. Count of known CT and cardiac nuc med studies performed in previous 12 months: 2.     COMPARISON:   None available.    FINDINGS:   CTA neck:  Extravascular structures are remarkable for emphysema and interstitial fibrosis. The right thyroid lobe and isthmus are mildly enlarged. The CT angiogram images show calcified plaque of the great vessel origins without severe great vessel  origin stenosis. The left vertebral artery originates from the arch as a normal variant. The posterior circulation both vertebrals are widely patent with the right being dominant. In the carotid circulation postendarterectomy changes are seen on the  left. The distal left internal carotid up through the siphon is widely patent. There is wide patency across the endarterectomy site. On the right side there is calcified plaque at the origin of the internal carotid artery. Maximum stenosis is  approximately 60%. The distal cervical internal carotid up through the siphon is widely patent.    CTA head:  In the intracranial circulation, there is a 2 mm aneurysm of the right internal carotid just above the  clinoid process projecting posteriorly. This is located approximately 4 mm proximal to the posterior communicating artery. No other  aneurysms are seen. No major branch vessel occlusions or severe intracranial stenoses. Dural venous sinuses are patent.      Impression:       There is a hemodynamically significant stenosis of approximately 60% at the right internal carotid origin. 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    Signer Name: Angel Courtney MD   Signed: 2/10/2020 2:01 PM   Workstation Name: [id number]    [facility]    CT Angiogram Head [[id number]] Collected:  02/10/20 1401     Updated:  02/10/20 1403    Narrative:       CTA Neck, CTA Head    INDICATION:   Left sided facial numbness and tingling beginning yesterday    TECHNIQUE:   CT angiogram of the head and neck with 100 cc of Isovue-300 IV contrast. 3-D reconstructions were obtained and reviewed.  Evaluation for a significant carotid arterial stenosis is based on the NASCET criteria. Radiation dose reduction techniques included  automated exposure control or exposure modulation based on body size. Count of known CT and cardiac nuc med studies performed in previous 12 months: 2.     COMPARISON:   None available.    FINDINGS:   CTA neck:  Extravascular structures are remarkable for emphysema and interstitial fibrosis. The right thyroid lobe and isthmus are mildly enlarged. The CT angiogram images show calcified plaque of the great vessel origins without severe great vessel  origin stenosis. The left vertebral artery originates from the arch as a normal variant. The posterior circulation both vertebrals are widely patent with the right being dominant. In the carotid circulation postendarterectomy changes are seen on the  left. The distal left internal carotid up through the siphon is widely patent. There is wide patency across the endarterectomy site. On the right side there is calcified plaque at the  origin of the internal carotid artery. Maximum stenosis is  approximately 60%. The distal cervical internal carotid up through the siphon is widely patent.    CTA head:  In the intracranial circulation, there is a 2 mm aneurysm of the right internal carotid just above the clinoid process projecting posteriorly. This is located approximately 4 mm proximal to the posterior communicating artery. No other  aneurysms are seen. No major branch vessel occlusions or severe intracranial stenoses. Dural venous sinuses are patent.      Impression:       There is a hemodynamically significant stenosis of approximately 60% at the right internal carotid origin. 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    Signer Name: Angel Courtney MD   Signed: 2/10/2020 2:01 PM   Workstation Name: [id number]    [facility]    MRI Brain Without Contrast [[id number]] Collected:  02/10/20 1209     Updated:  02/10/20 1211    Narrative:       INDICATION:    Weakness and numbness to upper extremities. Left-sided facial numbness started on Sunday. History of stroke last one year ago. Left carotid surgery 4 years ago.    TECHNIQUE:   MRI of the brain without contrast.    COMPARISON:    Head CT 2/10/2020 3:40 AM    FINDINGS:    There is no abnormally restricted diffusion. No recent infarct is suspected. There is encephalomalacia in the right posterior medial parietal lobe and to a lesser extent occipital lobe with underlying gliosis consistent with remote insult. There are  multiple small foci of signal abnormality in the subcortical white matter of the medial right frontal parietal lobe. There is a focus of chronic white matter disease in the left frontal deep white matter and there are punctate areas of bilateral deep  white matter signal abnormality right and left. Findings could be due to prior thromboembolic, watershed, and/or small vessel insults. There is no MRI evidence for intracranial  hemorrhage. There is no extra-axial fluid collection. There is no midline  shift. No Chiari I malformation. The distal left vertebral artery is hypoplastic or diseased. There is a mucous retention cyst or polyp in the left maxillary sinus. There is a small mucous retention cyst or polyp in the right maxillary sinus. There is  patchy signal abnormality in the left greater the right russ which is likely due to small vessel disease. There is vague susceptibility along the left anterolateral tentorium cerebellum which corresponds to calcification on the CT scan and I suspect this  is a densely calcified meningioma. On review of an MRI from 5/23/2019, there is linear enhancement in this region favoring this diagnosis without significant adjacent mass effect. There has been cataract surgery bilaterally.      Impression:         1. No evidence for recent infarct.  2. Findings consistent with multiple prior ischemic insults. See full description above.  3. Suspect a calcified plaque-like meningioma along the left-sided tentorium cerebellum without significant mass effect..    Signer Name: Josselyn Lozada MD   Signed: 2/10/2020 12:09 PM   Workstation Name: [id number]    [facility]    CT Head Without Contrast [[id number]] Collected:  02/10/20 0355     Updated:  02/10/20 0357    Narrative:       CT Head WO    HISTORY:   54-year-old female with numbness in the face and tingling on the left side that began at 1300 hours today.    TECHNIQUE:   Axial unenhanced head CT. Radiation dose reduction techniques included automated exposure control or exposure modulation based on body size. Count of known CT and cardiac nuc med studies performed in previous 12 months: 0.     Time of scan: 0340 hours    COMPARISON:   6/14/2019    FINDINGS:   No intracranial hemorrhage, mass, or infarct. No hydrocephalus or extra-axial fluid collection. There are senescent changes, including volume loss and nonspecific white matter change,  but no acute abnormality is seen. Redemonstration of encephalomalacic  change near the vertex posteriorly on the right involving the parietal lobe. The skull base, calvarium, and extracranial soft tissues are normal except for acute left maxillary sinusitis..      Impression:         1. No clearly acute intracranial process. No evidence of acute intracranial hemorrhage.  2. Chronic encephalomalacic change within the right parietal lobe approaching the vertex.  3. Acute left maxillary sinusitis.          Signer Name: Ulises Roman MD   Signed: 2/10/2020 3:55 AM   Workstation Name: [facility] DevelopmentPark [facility]    [facility]             Assessment and Plan:     This patient has a complaint of bilateral hand numbness and left body numbness with an MRI scan that shows no acute stroke.  She does have a headache and this is consistent with a classic migraine in combination with anxiety.  There is no evidence of an acute stroke or true TIA at this time.  The fact that she has bilateral hand numbness certainly goes against any type of carotid distribution event.  We have changed her from aspirin over to Plavix and I will change the Prilosec to Pepcid so as to not affect the absorption of the Plavix.  Again I do not believe that this is a true stroke/TIA syndrome nor is there evidence of seizure.  I will give her Elavil tonight in hopes of breaking the headache in combination with baclofen and if she is okay tomorrow she should be able to be discharged.   Lastly CTA shows a tiny aneurysm in the clinoid region that is absolutely benign and unrelated to her current presentation I would not work that up further certainly.  Thanks      Harshil Condon MD  02/10/20  2:11 PM

## 2020-02-10 NOTE — ED NOTES
"The pt arrived to the ED ambulatory. Accompanied by her spouse. The pt c/o numbness in her face and all four of her limbs. The pt states \"I have felt funny all over, all day. I googled it and it said I needed to come in. I was here recently for a life threatening allergic reaction. My throat closed up\" The pt is appears anxious but in NAD.      Monika Medina, RN  02/10/20 0353    "

## 2020-02-10 NOTE — THERAPY DISCHARGE NOTE
Acute Care - Occupational Therapy Initial Eval/Discharge  STEVE Jane     Patient Name: Crystal Cruz  : 1965  MRN: [id number]  Today's Date: 2/10/2020  Onset of Illness/Injury or Date of Surgery: 02/10/20  Date of Referral to OT: 02/10/20  Referring Physician: Dr. Silva       Admit Date: 2/10/2020       ICD-10-CM ICD-9-CM   1. Left sided numbness R20.0 782.0   2. Bilateral carotid artery stenosis I65.23 433.10     433.30     Patient Active Problem List   Diagnosis   • Anxiety disorder   • Huitron's esophagus   • Chronic constipation   • Gastroesophageal reflux disease without esophagitis   • Hyperlipidemia   • Systemic sclerosis (CMS/HCC)   • Benign essential hypertension   • Chronic pain   • Seizure (CMS/HCC)   • Acquired hypothyroidism   • Arthritis   • Muscle pain   • Tobacco use   • Vitamin D deficiency   • Observed sleep apnea   • Aorto-iliac atherosclerosis (CMS/HCC)   • Carotid atherosclerosis, bilateral   • History of stroke with current residual effects   • S/P carotid endarterectomy   • Schneiderian papilloma   • Fibromyalgia   • 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)   • Transient ischemic attack (TIA)   • Left sided numbness     Past Medical History:   Diagnosis Date   • Acid reflux    • Anemia    • Anxiety    • Atherosclerosis of aorta (CMS/HCC) 2017   • Huitron's esophagus    • Bursitis    • CAD (coronary artery disease)    • Chronic pain disorder    • CVA (cerebrovascular accident) (CMS/HCC)    • DDD (degenerative disc disease), cervical    • Diabetes mellitus (CMS/HCC)    • Fibromyalgia    • Hard to intubate    • History of transfusion    • Hypertension    • Joint pain    • Kienbock's disease    • Low back pain    • Pancreatitis    • Pulmonary fibrosis (CMS/HCC) 2016   • Reflux gastritis    • Scleroderma (CMS/HCC)    • Seizure (CMS/HCC)      Past Surgical History:   Procedure Laterality Date   • BACK SURGERY     • CAROTID ENDARTERECTOMY Left      Neurological   • CATARACT EXTRACTION Bilateral    •  SECTION     • CHOLECYSTECTOMY      Laparoscopic   • COLONOSCOPY      Complete   • COLONOSCOPY N/A 2016    Procedure: COLONOSCOPY;  Surgeon: Gaviota Hagen MD;  Location: [facility] OR;  Service:    • ENDOSCOPY N/A 2016    Procedure: ESOPHAGOGASTRODUODENOSCOPY WITH BIOPSIES;  Surgeon: Gaviota Hagen MD;  Location:  [facility] OR;  Service:    • ENDOSCOPY N/A 2018    Procedure: ESOPHAGOGASTRODUODENOSCOPY, biopsy;  Surgeon: Gaviota Hagen MD;  Location: [facility] OR;  Service: Gastroenterology   • TONSILLECTOMY AND ADENOIDECTOMY     • UPPER GASTROINTESTINAL ENDOSCOPY      Diagnostic   • WRIST SURGERY Left           OT ASSESSMENT FLOWSHEET (last 12 hours)      Occupational Therapy Evaluation     Row Name 02/10/20 0845                   OT Evaluation Time/Intention    Subjective Information  complains of;pain  -SD (r) GC (t) SD (c)        Document Type  discharge evaluation/summary  -SD (r) GC (t) SD (c)        Mode of Treatment  occupational therapy  -SD (r) GC (t) SD (c)        Patient Effort  adequate  -SD (r) GC (t) SD (c)        Symptoms Noted During/After Treatment  none  -SD (r) GC (t) SD (c)           General Information    Patient Profile Reviewed?  yes  -SD (r) GC (t) SD (c)        Onset of Illness/Injury or Date of Surgery  02/10/20  -SD (r) GC (t) SD (c)        Referring Physician  Dr. Silva   -SD (r) GC (t) SD (c)        Patient Observations  alert;cooperative;agree to therapy  -SD (r) GC (t) SD (c)        Patient/Family Observations  Pt supine in bed, agreed to therapy  -SD (r) GC (t) SD (c)        Prior Level of Function  min assist:;ADL's  -SD (r) GC (t) SD (c)        Equipment Currently Used at Home  walker, rolling  -SD (r) GC (t) SD (c)        Pertinent History of Current Functional Problem  Patient presents to ED due to onset of numbness in B LE's and B UE's as well as generalized weakness. Patient with history of CVA with mild residual  L sided weakness. She reports chronic pain from fibromyalgia and scleroderma. Due to medical history pt reports her  provides min A for adls at home.  -SD (r) GC (t) SD (c)        Existing Precautions/Restrictions  fall  -SD (r) GC (t) SD (c)        Risks Reviewed  patient:;LOB;increased discomfort  -SD (r) GC (t) SD (c)        Benefits Reviewed  patient:;improve function;increase independence;increase strength  -SD (r) GC (t) SD (c)        Barriers to Rehab  previous functional deficit  -SD (r) GC (t) SD (c)           Relationship/Environment    Primary Source of Support/Comfort  spouse  -SD (r) GC (t) SD (c)        Lives With  spouse  -SD (r) GC (t) SD (c)        Family Caregiver if Needed  spouse  -SD (r) GC (t) SD (c)           Resource/Environmental Concerns    Current Living Arrangements  home/apartment/condo  -SD (r) GC (t) SD (c)           Cognitive Assessment/Intervention- PT/OT    Orientation Status (Cognition)  oriented x 4  -SD (r) GC (t) SD (c)        Follows Commands (Cognition)  WFL  -SD (r) GC (t) SD (c)        Safety Deficit (Cognitive)  judgment  -SD (r) GC (t) SD (c)        Personal Safety Interventions  gait belt;nonskid shoes/slippers when out of bed  -SD (r) GC (t) SD (c)        Cognitive Assessment/Intervention Comment  Patient refuses use of an AD for mobility   -SD (r) GC (t) SD (c)           Safety Issues, Functional Mobility    Safety Issues Affecting Function (Mobility)  judgment  -SD (r) GC (t) SD (c)        Comment, Safety Issues/Impairments (Mobility)  Pt refuses to use AD  -SD (r) GC (t) SD (c)           Bed Mobility Assessment/Treatment    Bed Mobility Assessment/Treatment  supine-sit;sit-supine  -SD (r) GC (t) SD (c)        Supine-Sit [place] (Bed Mobility)  conditional independence  -SD (r) GC (t) SD (c)        Sit-Supine [place] (Bed Mobility)  conditional independence  -SD (r) GC (t) SD (c)        Assistive Device (Bed Mobility)  head of bed elevated  -SD (r) GC  "(t) SD (c)           Functional Mobility    Functional Mobility- Ind. Level  standby assist  -SD (r) GC (t) SD (c)        Functional Mobility- Device  other (see comments) no device  -SD (r) GC (t) SD (c)        Functional Mobility-Distance (Feet)  120  -SD (r) GC (t) SD (c)        Functional Mobility- Safety Issues  other (see comments) refusal of AD  -SD (r) GC (t) SD (c)        Functional Mobility- Comment  Pt refuses to use AD  -SD (r) GC (t) SD (c)           Transfer Assessment/Treatment    Transfer Assessment/Treatment  sit-stand transfer;stand-sit transfer  -SD (r) GC (t) SD (c)           Sit-Stand Transfer    Sit-Stand [place] (Transfers)  supervision  -SD (r) GC (t) SD (c)        Assistive Device (Sit-Stand Transfers)  -- no device   -SD (r) GC (t) SD (c)           Stand-Sit Transfer    Stand-Sit [place] (Transfers)  supervision  -SD (r) GC (t) SD (c)        Assistive Device (Stand-Sit Transfers)  -- no assistive device   -SD (r) GC (t) SD (c)           BADL Safety/Performance    Skilled BADL Treatment/Intervention  BADL process/adaptation training  -SD (r) GC (t) SD (c)           General ROM    GENERAL ROM COMMENTS  BUE ROM WFL  -SD (r) GC (t) SD (c)           MMT (Manual Muscle Testing)    General MMT Comments  BUE MMT WFL  -SD (r) GC (t) SD (c)           Sensory Assessment/Intervention    Sensory General Assessment  other (see comments) c/o \"weird senstion\"BUE's (left > R)  -SD           Positioning and Restraints    Pre-Treatment Position  in bed  -SD (r) GC (t) SD (c)        Post Treatment Position  bed  -SD (r) GC (t) SD (c)        In Bed  supine;call light within reach;encouraged to call for assist  -SD (r) GC (t) SD (c)           Pain Scale: Numbers Pre/Post-Treatment    Pain Scale: Numbers, Pretreatment  8/10  -SD (r) GC (t) SD (c)        Pain Scale: Numbers, Post-Treatment  8/10  -SD (r) GC (t) SD (c)        Pain Location - Side  -- generalized   -SD (r) GC (t) SD (c)        Pre/Post " Treatment Pain Comment  Pt reports chronic pain 8/10  -SD (r) GC (t) SD (c)        Pain Intervention(s)  Ambulation/increased activity;Repositioned  -SD (r) GC (t) SD (c)           Respiratory WDL    Respiratory WDL  --  -SD (r) GC (t) SD (c)        Nailbeds  --  -SD (r) GC (t) SD (c)           Skin WDL    Skin WDL  --  -SD (r) GC (t) SD (c)        Skin Color/Characteristics  --  -SD (r) GC (t) SD (c)        Skin Temperature  --  -SD (r) GC (t) SD (c)        Skin Moisture  --  -SD (r) GC (t) SD (c)        Skin Elasticity  --  -SD (r) GC (t) SD (c)        Skin Integrity  --  -SD (r) GC (t) SD (c)           Coping    Observed Emotional State  --  -SD (r) GC (t) SD (c)        Verbalized Emotional State  --  -SD (r) GC (t) SD (c)           Plan of Care Review    Plan of Care Reviewed With  patient  -SD (r) GC (t) SD (c)        Outcome Summary  OT evaluation complete: Patient performs supine to/from sit transfers with conditional independnece, sit to/from stand transfers with supervision, and functional mobility x 120 feet with SBA without use of an assistive device. Pt reports that her  provides signficant supprt at home when needed, and stated she has no concerns. No OT services needed at this time.  -SD (r) GC (t) SD (c)           Clinical Impression (OT)    Date of Referral to OT  02/10/20  -SD (r) GC (t) SD (c)        OT Diagnosis  decreased adl function  -SD (r) GC (t) SD (c)        Functional Level at Time of Evaluation (OT Eval)  Patient performs supine to/from sit transfers with conditional independnece, sit to/from stand transfers with supervision, and functional mobility x 120 feet with SBA without use of an assistive device. Pt was able to don her socks independently while sitting on EOB. Pt reports she is at her baseline status for adl activity and does not have concerns for discharge to home.  -SD        Criteria for Skilled Therapeutic Interventions Met (OT Eval)  no  -SD (r) GC (t) SD (c)         Therapy Frequency (OT Eval)  evaluation only  -SD (r) GC (t) SD (c)        Care Plan Review (OT)  evaluation/treatment results reviewed;care plan/treatment goals reviewed;risks/benefits reviewed;current/potential barriers reviewed;patient/other agree to care plan pt in agreement with discharge from OT   -SD           Living Environment    Home Accessibility  stairs to enter home  -SD (r) GC (t) SD (c)          User Key  (r) = Recorded By, (t) = Taken By, (c) = Cosigned By    Initials Name Effective Dates    SD Cesar Porter, OTR 06/22/16 -     GC Chon Lay, OT Student 01/08/20 -               OT Recommendation and Plan  Outcome Summary/Treatment Plan (OT)  Anticipated Discharge Disposition (OT): home with assist  Therapy Frequency (OT Eval): evaluation only  Plan of Care Review  Plan of Care Reviewed With: patient  Plan of Care Reviewed With: patient  Outcome Summary: OT evaluation complete: Patient performs supine to/from sit transfers with conditional independnece, sit to/from stand transfers with supervision, and functional mobility x 120 feet with SBA without use of an assistive device. Pt reports that her  provides signficant supprt at home when needed, and stated she has no concerns. No OT services needed at this time.         Outcome Measures     Row Name 02/10/20 0845 02/10/20 0830          How much help from another person do you currently need...    Turning from your back to your side while in flat bed without using bedrails?  --  4  -BP     Moving from lying on back to sitting on the side of a flat bed without bedrails?  --  4  -BP     Moving to and from a bed to a chair (including a wheelchair)?  --  3  -BP     Standing up from a chair using your arms (e.g., wheelchair, bedside chair)?  --  3  -BP     Climbing 3-5 steps with a railing?  --  3  -BP     To walk in hospital room?  --  3  -BP     AM-PAC 6 Clicks Score (PT)  --  20  -BP        How much help from another is currently needed...     Putting on and taking off regular lower body clothing?  4  -SD (r) GC (t) SD (c)  --     Bathing (including washing, rinsing, and drying)  4  -SD (r) GC (t) SD (c)  --     Toileting (which includes using toilet bed pan or urinal)  4  -SD (r) GC (t) SD (c)  --     Putting on and taking off regular upper body clothing  4  -SD (r) GC (t) SD (c)  --     Taking care of personal grooming (such as brushing teeth)  4  -SD (r) GC (t) SD (c)  --     Eating meals  4  -SD (r) GC (t) SD (c)  --     AM-PAC 6 Clicks Score (OT)  24  -SD (r) GC (t)  --        Modified Breckin[place] Scale    Pre-Stroke Modified Breckin[place] Scale  1 - No significant disability despite symptoms.  Able to carry out all usual duties and activities.  -SD  1 - No significant disability despite symptoms.  Able to carry out all usual duties and activities.  -BP     Modified Tiffany Scale  1 - No significant disability despite symptoms.  Able to carry out all usual duties and activities.  -SD  1 - No significant disability despite symptoms.  Able to carry out all usual duties and activities.  -BP        Functional Assessment    Outcome Measure Options  AM-PAC 6 Clicks Daily Activity (OT)  -SD (r) GC (t) SD (c)  AM-PAC 6 Clicks Basic Mobility (PT);Modified Breckinridge  -BP       User Key  (r) = Recorded By, (t) = Taken By, (c) = Cosigned By    Initials Name Provider Type    SD Cesar Porter OTR Occupational Therapist    BP Heavenly Sheehan, PT Physical Therapist    Chon Heath, OT Student OT Student          Time Calculation:   Time Calculation- OT     Row Name 02/10/20 1215             Time Calculation- OT    OT Start Time  0830  -SD (r) GC (t) SD (c)      OT Stop Time  0845  -SD (r) GC (t) SD (c)      OT Time Calculation (min)  15 min  -SD (r) GC (t)        User Key  (r) = Recorded By, (t) = Taken By, (c) = Cosigned By    Initials Name Provider Type    Cesar Landaverde OTR Occupational Therapist    Chon Heath, OT Student OT Student        Therapy  Suggested Charges     Code   Minutes Charges    None           Therapy Charges for Today     Code Description Service Date Service Provider Modifiers Qty    61289853214 HC OT EVAL LOW COMPLEXITY 1 2/10/2020 Chon Lay, OT Student GO 1               OT Discharge Summary  Anticipated Discharge Disposition (OT): home with assist  Reason for Discharge: At baseline function  Outcomes Achieved: Other(No need for OT services at this time)  Discharge Destination: Home with assist    Chon Lay OT Student  2/10/2020

## 2020-02-10 NOTE — PROGRESS NOTES
Based on xray, I think we could get away with ordering a cervical MRI. Does the patient want me to try to order this?   let me know. Thanks. BB

## 2020-02-10 NOTE — DISCHARGE INSTR - APPOINTMENTS
Please call and schedule an appointment with Dr. Dill at [phone]. Please schedule this appointment for one week from today.    Please call and schedule an appointment with Dr. Henderson at [phone]. Please schedule this appointment for two weeks from today.

## 2020-02-10 NOTE — TELEPHONE ENCOUNTER
I spoke with Ms. Cruz today and she states that her pharmacy is out of her Xtampza until Wed. I spoke with Charly at [facility] pharmacy and he verified that the medication won't be in until Wed. Ms. Cruz is currently in the hospital but states that she is getting out of the hospital today and ran out of medication yesterday. She wanted to know if you could switch her to something else. I called [facility] and [facility] in [place] and neither pharmacy has the Xtampza in stock.

## 2020-02-11 ENCOUNTER — TRANSCRIBE ORDERS (OUTPATIENT)
Dept: ADMINISTRATIVE | Facility: HOSPITAL | Age: 55
End: 2020-02-11

## 2020-02-11 ENCOUNTER — READMISSION MANAGEMENT (OUTPATIENT)
Dept: CALL CENTER | Facility: HOSPITAL | Age: 55
End: 2020-02-11

## 2020-02-11 DIAGNOSIS — R92.1 CALCIFICATION OF BREAST: Primary | ICD-10-CM

## 2020-02-11 NOTE — OUTREACH NOTE
Prep Survey      Responses   Facility patient discharged from?  [facility]   Is LACE score < 7 ?  No   Is patient eligible?  Yes   Discharge diagnosis  TIA   Does the patient have one of the following disease processes/diagnoses(primary or secondary)?  Stroke (TIA)   Does the patient have Home health ordered?  No   Is there a DME ordered?  No   Comments regarding appointments  SEE AVS   Medication alerts for this patient  PLAVIX, KEPPRA   Prep survey completed?  Yes          Sherri Matthews RN

## 2020-02-11 NOTE — NURSING NOTE
Case Management Discharge Note      Final Note: Discharged home.    Provided post acute provider list?: N/A    Destination      No service has been selected for the patient.      Durable Medical Equipment      No service has been selected for the patient.      Dialysis/Infusion      No service has been selected for the patient.      Home Medical Care      No service has been selected for the patient.      Therapy      No service has been selected for the patient.      Community Resources      No service has been selected for the patient.             Final Discharge Disposition Code: 01 - home or self-care

## 2020-02-13 ENCOUNTER — APPOINTMENT (OUTPATIENT)
Dept: MAMMOGRAPHY | Facility: HOSPITAL | Age: 55
End: 2020-02-13

## 2020-02-13 ENCOUNTER — APPOINTMENT (OUTPATIENT)
Dept: ULTRASOUND IMAGING | Facility: HOSPITAL | Age: 55
End: 2020-02-13

## 2020-02-13 ENCOUNTER — READMISSION MANAGEMENT (OUTPATIENT)
Dept: CALL CENTER | Facility: HOSPITAL | Age: 55
End: 2020-02-13

## 2020-02-13 NOTE — OUTREACH NOTE
Stroke Week 1 Survey      Responses   Facility patient discharged from?  [facility]   Does the patient have one of the following disease processes/diagnoses(primary or secondary)?  Stroke (TIA)   Is there a successful TCM telephone encounter documented?  No   Week 1 attempt successful?  Yes   Call start time  1122   Call end time  1125   Discharge diagnosis  TIA   Is patient permission given to speak with other caregiver?  Yes   List who call center can speak with  Radha Arreagas reviewed with patient/caregiver?  Yes   Is the patient having any side effects they believe may be caused by any medication additions or changes?  No   Does the patient have all medications ordered at discharge?  Yes   Is the patient taking all medications as directed (includes completed medication regime)?  Yes   Does the patient have a primary care provider?   Yes   Does the patient have an appointment with their PCP within 7 days of discharge?  Yes   Has the patient kept scheduled appointments due by today?  Yes   Psychosocial issues?  No   Does the patient require any assistance with activities of daily living such as eating, bathing, dressing, walking, etc.?  No   Does the patient have any residual symptoms from stroke/TIA?  Yes   Residual symptoms comments  little numbness in her hand, but is improving    Does the patient understand the diet ordered at discharge?  Yes   Did the patient receive a copy of their discharge instructions?  Yes   Nursing interventions  Reviewed instructions with patient   What is the patient's perception of their health status since discharge?  Improving   Nursing interventions  Nurse provided patient education   Is the patient able to teach back FAST for Stroke?  Yes   Is the patient/caregiver able to teach back the risk factors for a stroke?  Smoking, Diabetes, Sleep apnea, High blood pressure-goal below 120/80, Carotid or other artery disease   Is the patient/caregiver able to teach back signs and  symptoms related to disease process for when to call PCP?  Yes   Is the patient/caregiver able to teach back signs and symptoms related to disease process for when to call 911?  Yes   Is the patient/caregiver able to teach back the hierarchy of who to call/visit for symptoms/problems? PCP, Specialist, Home health nurse, Urgent Care, ED, 911  Yes   Week 1 call completed?  Yes          Laura López RN

## 2020-02-17 ENCOUNTER — HOSPITAL ENCOUNTER (OUTPATIENT)
Dept: MRI IMAGING | Facility: HOSPITAL | Age: 55
Discharge: HOME | End: 2020-02-17
Admitting: NURSE PRACTITIONER

## 2020-02-17 DIAGNOSIS — M48.02 CERVICAL STENOSIS OF SPINAL CANAL: ICD-10-CM

## 2020-02-17 PROCEDURE — 72141 MRI NECK SPINE W/O DYE: CPT

## 2020-02-18 NOTE — PROGRESS NOTES
MRI shows nothing acute. There is some disc bulging in higher level of cervical spine. COuld consider cervical epidural but I don't believe she wants any more injections with steroids. Justa. CANDIDA

## 2020-02-18 NOTE — PROGRESS NOTES
Relayed results to pt today. Pt sts she doesn't want injections with steroids but is requesting a RF or another procedure with just lidocaine no steroids. Please advise.

## 2020-02-20 ENCOUNTER — READMISSION MANAGEMENT (OUTPATIENT)
Dept: CALL CENTER | Facility: HOSPITAL | Age: 55
End: 2020-02-20

## 2020-02-20 NOTE — OUTREACH NOTE
Stroke Week 2 Survey      Responses   Facility patient discharged from?  [facility]   Does the patient have one of the following disease processes/diagnoses(primary or secondary)?  Stroke (TIA)   Week 2 attempt successful?  Yes   Call start time  1343   Call end time  1344   Discharge diagnosis  TIA   Has the patient kept scheduled appointments due by today?  Yes   Does the patient require any assistance with activities of daily living such as eating, bathing, dressing, walking, etc.?  No   What is the patient's perception of their health status since discharge?  Improving   Nursing interventions  Nurse provided patient education   Is the patient able to teach back FAST for Stroke?  Yes   Is the patient/caregiver able to teach back the hierarchy of who to call/visit for symptoms/problems? PCP, Specialist, Home health nurse, Urgent Care, ED, 911  Yes   Additional teach back comments  Patient says she is doing well, no issues or concerns since discharge.   Week 2 call completed?  Yes          Marybel Dailey RN

## 2020-02-21 ENCOUNTER — TELEPHONE (OUTPATIENT)
Dept: PAIN MEDICINE | Facility: CLINIC | Age: 55
End: 2020-02-21

## 2020-02-21 NOTE — TELEPHONE ENCOUNTER
No. We will NOT refill her early.  It is VERY concerning that she has overtaken both her percocet and her Xtampza.  Her STEVEN verifies that she received the full prescription of both her Xtampza and Percocet.  Her  having surgery is NOT a reason to overtake her prescribed opioids. I discussed this with Nicolasa who sees this patient frequently.  This patient has been Narcan'd in the emergency room in the past year or so. A stressful life event is never a good reason to overtake prescribed opioids.  Nicolasa and I both recommend making this patient intervention only and sending in a [place] prescription.

## 2020-02-21 NOTE — TELEPHONE ENCOUNTER
Pt called in today stating her  is having open heart surgery today at [facility]. Pt sts she has been under a tremendous amount of stress regarding her 's cardiac history that she has been taking 1-2 more tabs of her percocet and her xtampza over the past 2 weeks, and would like to know if there's anything else Dr. Law would prescribe to her. I immediately told pt she shouldn't be overtaking her meds, and to take them as prescribed. Pt std she was out of her xtampza x a week due to pharmacy being out of medication. xtampza was escribed on 2/7/20, pt didn't fill until 2/12/20. I ran updated ashok which shows pt filled percocet on 2/8/20. Pt has history of allergies to steroids. I told pt I would send a msg to Dr. Law asking for another medication, but she would have to make her xtampza and percocet last until her f/u appt with Jennie on 3/5/20. Pt verbalized understanding. Pt also has MEDIAL BRANCH BLOCK--bilateral Cervical 3- cervical6 NO STEROIDS on 3/3/20. Please advise. Thank you.

## 2020-02-24 ENCOUNTER — HOSPITAL ENCOUNTER (OUTPATIENT)
Dept: MAMMOGRAPHY | Facility: HOSPITAL | Age: 55
End: 2020-02-24

## 2020-02-25 ENCOUNTER — APPOINTMENT (OUTPATIENT)
Dept: MAMMOGRAPHY | Facility: HOSPITAL | Age: 55
End: 2020-02-25

## 2020-02-25 NOTE — TELEPHONE ENCOUNTER
Spoke to pt and explained to her that she would be receiving a letter that [facility] would no longer be prescribing her narcotics any longer due to her overtaking her opioids. Pt argued with me that she needed them and told me she would be contacting her pcp to find another provider. I explained to pt we could still see her, we would just no longer be prescribing narcotics. Pt said she would call her pcp and we disconnected call

## 2020-02-27 ENCOUNTER — READMISSION MANAGEMENT (OUTPATIENT)
Dept: CALL CENTER | Facility: HOSPITAL | Age: 55
End: 2020-02-27

## 2020-02-27 NOTE — OUTREACH NOTE
Stroke Week 3 Survey      Responses   Facility patient discharged from?  [facility]   Does the patient have one of the following disease processes/diagnoses(primary or secondary)?  Stroke (TIA)   Week 3 attempt successful?  Yes   Call start time  1545   Call end time  1549   Meds reviewed with patient/caregiver?  Yes   Is the patient having any side effects they believe may be caused by any medication additions or changes?  No   Does the patient have all medications ordered at discharge?  Yes   Is the patient taking all medications as directed (includes completed medication regime)?  Yes   Does the patient have a primary care provider?   Yes   Has the patient kept scheduled appointments due by today?  Yes   Has home health visited the patient within 72 hours of discharge?  N/A   Psychosocial issues?  No   Does the patient require any assistance with activities of daily living such as eating, bathing, dressing, walking, etc.?  No   Does the patient have any residual symptoms from stroke/TIA?  Yes   Does the patient understand the diet ordered at discharge?  Yes   Did the patient receive a copy of their discharge instructions?  Yes   What is the patient's perception of their health status since discharge?  Improving   Is the patient able to teach back FAST for Stroke?  Yes   Is the patient/caregiver able to teach back the risk factors for a stroke?  High blood pressure-goal below 120/80, Diabetes, Sleep apnea, Carotid or other artery disease, Smoking   Is the patient/caregiver able to teach back signs and symptoms related to disease process for when to call PCP?  Yes   Is the patient/caregiver able to teach back signs and symptoms related to disease process for when to call 911?  Yes   Is the patient/caregiver able to teach back the hierarchy of who to call/visit for symptoms/problems? PCP, Specialist, Home health nurse, Urgent Care, ED, 911  Yes   Week 3 call completed?  Yes   Wrap up additional comments  States has  had increased stress due to  had emergency CABG last week. Denies any new stroke s/s.          Sandra Barnes RN

## 2020-03-05 ENCOUNTER — READMISSION MANAGEMENT (OUTPATIENT)
Dept: CALL CENTER | Facility: HOSPITAL | Age: 55
End: 2020-03-05

## 2020-03-05 NOTE — OUTREACH NOTE
Stroke Week 4 Survey      Responses   [facility] patient discharged from?  [facility]   Does the patient have one of the following disease processes/diagnoses(primary or secondary)?  Stroke (TIA)   Week 4 attempt successful?  No          Laurie Weiner RN

## 2020-05-19 RX ORDER — GABAPENTIN 300 MG/1
CAPSULE ORAL
Qty: 18 CAPSULE | Refills: 0 | Status: SHIPPED | OUTPATIENT
Start: 2020-05-19 | End: 2020-09-03

## 2020-05-19 RX ORDER — GABAPENTIN 600 MG/1
TABLET ORAL
Qty: 270 TABLET | Refills: 0 | OUTPATIENT
Start: 2020-05-19

## 2020-05-19 NOTE — TELEPHONE ENCOUNTER
The external claims data indicates that she has seen Dr. Kyler Dyson (assoc of Dr. Riley Kulkarni).  I am inclined to let this one go

## 2020-05-26 ENCOUNTER — HOSPITAL ENCOUNTER (OUTPATIENT)
Dept: MAMMOGRAPHY | Facility: HOSPITAL | Age: 55
Discharge: HOME | End: 2020-05-26

## 2020-05-26 DIAGNOSIS — R92.1 CALCIFICATION OF BREAST: ICD-10-CM

## 2020-07-15 ENCOUNTER — TRANSCRIBE ORDERS (OUTPATIENT)
Dept: ADMINISTRATIVE | Facility: HOSPITAL | Age: 55
End: 2020-07-15

## 2020-07-15 DIAGNOSIS — I65.23 BILATERAL CAROTID ARTERY STENOSIS: Primary | ICD-10-CM

## 2020-07-24 ENCOUNTER — TRANSCRIBE ORDERS (OUTPATIENT)
Dept: ADMINISTRATIVE | Facility: HOSPITAL | Age: 55
End: 2020-07-24

## 2020-07-24 DIAGNOSIS — J32.9 CHRONIC SINUSITIS, UNSPECIFIED LOCATION: Primary | ICD-10-CM

## 2020-07-29 ENCOUNTER — APPOINTMENT (OUTPATIENT)
Dept: CT IMAGING | Facility: HOSPITAL | Age: 55
End: 2020-07-29

## 2020-08-10 ENCOUNTER — HOSPITAL ENCOUNTER (OUTPATIENT)
Dept: CT IMAGING | Facility: HOSPITAL | Age: 55
Discharge: HOME | End: 2020-08-10

## 2020-08-10 ENCOUNTER — HOSPITAL ENCOUNTER (OUTPATIENT)
Dept: ULTRASOUND IMAGING | Facility: HOSPITAL | Age: 55
Discharge: HOME | End: 2020-08-10
Admitting: SURGERY

## 2020-08-10 DIAGNOSIS — J32.9 CHRONIC SINUSITIS, UNSPECIFIED LOCATION: ICD-10-CM

## 2020-08-10 DIAGNOSIS — I65.23 BILATERAL CAROTID ARTERY STENOSIS: ICD-10-CM

## 2020-08-10 PROCEDURE — 70486 CT MAXILLOFACIAL W/O DYE: CPT

## 2020-08-10 PROCEDURE — 93880 EXTRACRANIAL BILAT STUDY: CPT

## 2020-09-03 ENCOUNTER — HOSPITAL ENCOUNTER (EMERGENCY)
Facility: HOSPITAL | Age: 55
Discharge: HOME | End: 2020-09-03
Attending: EMERGENCY MEDICINE | Admitting: EMERGENCY MEDICINE

## 2020-09-03 ENCOUNTER — APPOINTMENT (OUTPATIENT)
Dept: CT IMAGING | Facility: HOSPITAL | Age: 55
End: 2020-09-03

## 2020-09-03 VITALS
HEART RATE: 59 BPM | HEIGHT: 64 IN | WEIGHT: 160 LBS | SYSTOLIC BLOOD PRESSURE: 141 MMHG | TEMPERATURE: 98.1 F | RESPIRATION RATE: 16 BRPM | DIASTOLIC BLOOD PRESSURE: 74 MMHG | OXYGEN SATURATION: 87 % | BODY MASS INDEX: 27.31 KG/M2

## 2020-09-03 DIAGNOSIS — R55 SYNCOPE AND COLLAPSE: Primary | ICD-10-CM

## 2020-09-03 DIAGNOSIS — S00.83XA FACIAL CONTUSION, INITIAL ENCOUNTER: ICD-10-CM

## 2020-09-03 LAB
ALBUMIN SERPL-MCNC: 3.7 G/DL (ref 3.5–5.2)
ALBUMIN/GLOB SERPL: 1.1 G/DL
ALP SERPL-CCNC: 68 U/L (ref 39–117)
ALT SERPL W P-5'-P-CCNC: 12 U/L (ref 1–33)
ANION GAP SERPL CALCULATED.3IONS-SCNC: 12.4 MMOL/L (ref 5–15)
AST SERPL-CCNC: 15 U/L (ref 1–32)
BASOPHILS # BLD AUTO: 0.06 10*3/MM3 (ref 0–0.2)
BASOPHILS NFR BLD AUTO: 0.7 % (ref 0–1.5)
BILIRUB SERPL-MCNC: 0.2 MG/DL (ref 0–1.2)
BUN SERPL-MCNC: 8 MG/DL (ref 6–20)
BUN/CREAT SERPL: 10.4 (ref 7–25)
CALCIUM SPEC-SCNC: 9.5 MG/DL (ref 8.6–10.5)
CHLORIDE SERPL-SCNC: 101 MMOL/L (ref 98–107)
CO2 SERPL-SCNC: 23.6 MMOL/L (ref 22–29)
CREAT SERPL-MCNC: 0.77 MG/DL (ref 0.57–1)
DEPRECATED RDW RBC AUTO: 44.1 FL (ref 37–54)
EOSINOPHIL # BLD AUTO: 0.34 10*3/MM3 (ref 0–0.4)
EOSINOPHIL NFR BLD AUTO: 4 % (ref 0.3–6.2)
ERYTHROCYTE [DISTWIDTH] IN BLOOD BY AUTOMATED COUNT: 12.2 % (ref 12.3–15.4)
GFR SERPL CREATININE-BSD FRML MDRD: 78 ML/MIN/1.73
GLOBULIN UR ELPH-MCNC: 3.4 GM/DL
GLUCOSE SERPL-MCNC: 186 MG/DL (ref 65–99)
HCT VFR BLD AUTO: 41.3 % (ref 34–46.6)
HGB BLD-MCNC: 13.1 G/DL (ref 12–15.9)
IMM GRANULOCYTES # BLD AUTO: 0.02 10*3/MM3 (ref 0–0.05)
IMM GRANULOCYTES NFR BLD AUTO: 0.2 % (ref 0–0.5)
LYMPHOCYTES # BLD AUTO: 3.21 10*3/MM3 (ref 0.7–3.1)
LYMPHOCYTES NFR BLD AUTO: 37.5 % (ref 19.6–45.3)
MCH RBC QN AUTO: 31 PG (ref 26.6–33)
MCHC RBC AUTO-ENTMCNC: 31.7 G/DL (ref 31.5–35.7)
MCV RBC AUTO: 97.9 FL (ref 79–97)
MONOCYTES # BLD AUTO: 0.82 10*3/MM3 (ref 0.1–0.9)
MONOCYTES NFR BLD AUTO: 9.6 % (ref 5–12)
NEUTROPHILS NFR BLD AUTO: 4.12 10*3/MM3 (ref 1.7–7)
NEUTROPHILS NFR BLD AUTO: 48 % (ref 42.7–76)
PLATELET # BLD AUTO: 217 10*3/MM3 (ref 140–450)
PMV BLD AUTO: 10.3 FL (ref 6–12)
POTASSIUM SERPL-SCNC: 4.6 MMOL/L (ref 3.5–5.2)
PROT SERPL-MCNC: 7.1 G/DL (ref 6–8.5)
RBC # BLD AUTO: 4.22 10*6/MM3 (ref 3.77–5.28)
SODIUM SERPL-SCNC: 137 MMOL/L (ref 136–145)
TROPONIN T SERPL-MCNC: <0.01 NG/ML (ref 0–0.03)
WBC # BLD AUTO: 8.57 10*3/MM3 (ref 3.4–10.8)

## 2020-09-03 PROCEDURE — 99284 EMERGENCY DEPT VISIT MOD MDM: CPT

## 2020-09-03 PROCEDURE — 84484 ASSAY OF TROPONIN QUANT: CPT | Performed by: EMERGENCY MEDICINE

## 2020-09-03 PROCEDURE — 80053 COMPREHEN METABOLIC PANEL: CPT | Performed by: EMERGENCY MEDICINE

## 2020-09-03 PROCEDURE — 93010 ELECTROCARDIOGRAM REPORT: CPT | Performed by: INTERNAL MEDICINE

## 2020-09-03 PROCEDURE — 25010000002 HYDROMORPHONE PER 4 MG: Performed by: EMERGENCY MEDICINE

## 2020-09-03 PROCEDURE — 70486 CT MAXILLOFACIAL W/O DYE: CPT

## 2020-09-03 PROCEDURE — 96374 THER/PROPH/DIAG INJ IV PUSH: CPT

## 2020-09-03 PROCEDURE — 96376 TX/PRO/DX INJ SAME DRUG ADON: CPT

## 2020-09-03 PROCEDURE — 99282 EMERGENCY DEPT VISIT SF MDM: CPT | Performed by: EMERGENCY MEDICINE

## 2020-09-03 PROCEDURE — 85025 COMPLETE CBC W/AUTO DIFF WBC: CPT | Performed by: EMERGENCY MEDICINE

## 2020-09-03 PROCEDURE — 93005 ELECTROCARDIOGRAM TRACING: CPT | Performed by: EMERGENCY MEDICINE

## 2020-09-03 PROCEDURE — 70450 CT HEAD/BRAIN W/O DYE: CPT

## 2020-09-03 RX ORDER — HYDROMORPHONE HCL 110MG/55ML
0.5 PATIENT CONTROLLED ANALGESIA SYRINGE INTRAVENOUS ONCE
Status: COMPLETED | OUTPATIENT
Start: 2020-09-03 | End: 2020-09-03

## 2020-09-03 RX ORDER — INSULIN GLARGINE 100 [IU]/ML
20 INJECTION, SOLUTION SUBCUTANEOUS DAILY
COMMUNITY

## 2020-09-03 RX ORDER — OXYCODONE AND ACETAMINOPHEN 10; 325 MG/1; MG/1
1 TABLET ORAL EVERY 6 HOURS PRN
COMMUNITY
End: 2021-07-29

## 2020-09-03 RX ORDER — HYDROMORPHONE HCL 110MG/55ML
1 PATIENT CONTROLLED ANALGESIA SYRINGE INTRAVENOUS ONCE
Status: COMPLETED | OUTPATIENT
Start: 2020-09-03 | End: 2020-09-03

## 2020-09-03 RX ORDER — SODIUM CHLORIDE 0.9 % (FLUSH) 0.9 %
10 SYRINGE (ML) INJECTION AS NEEDED
Status: DISCONTINUED | OUTPATIENT
Start: 2020-09-03 | End: 2020-09-03 | Stop reason: HOSPADM

## 2020-09-03 RX ADMIN — HYDROMORPHONE HYDROCHLORIDE 0.5 MG: 2 INJECTION, SOLUTION INTRAMUSCULAR; INTRAVENOUS; SUBCUTANEOUS at 10:08

## 2020-09-03 RX ADMIN — HYDROMORPHONE HYDROCHLORIDE 1 MG: 2 INJECTION, SOLUTION INTRAMUSCULAR; INTRAVENOUS; SUBCUTANEOUS at 10:50

## 2020-09-03 NOTE — ED PROVIDER NOTES
EMERGENCY DEPARTMENT ENCOUNTER      Room Number: 2/02      HPI:    Chief complaint: Syncope    Location: Episode occurred at home    Quality/Severity: Moderate    Timing/Duration: Episode occurred last evening and patient thinks that she was only out for a few seconds    Modifying Factors: None     Associated Symptoms: Facial and forehead bruising    Narrative: Pt is a 54 y.o. female who presents as noted above.  Patient relates that while sitting last evening she suddenly became very dizzy/lightheaded and ended up on the floor.  Patient does not remember falling or hitting the floor.  After regaining consciousness the patient was able to get herself up off the floor.  Patient contacted her primary care provider this morning and ED evaluation was recommended.  Patient states that she has had seizures in the past and is unsure if she had a seizure last evening.  Patient does have a very extensive medical history and she does have known right carotid artery stenosis.  Currently denies any neurologic symptoms.      PMD: Celeste Dill MD    REVIEW OF SYSTEMS  Review of Systems   Constitutional: Negative for activity change, appetite change, fatigue and fever.   HENT: Negative for congestion.    Respiratory: Negative for cough, shortness of breath and wheezing.    Cardiovascular: Negative for chest pain, palpitations and leg swelling.   Gastrointestinal: Negative for abdominal pain, diarrhea, nausea and vomiting.   Endocrine: Negative for polydipsia.   Genitourinary: Negative for difficulty urinating, dysuria, flank pain, frequency and urgency.   Musculoskeletal: Positive for arthralgias and back pain.   Skin: Negative for rash.        History of scleroderma   Neurological: Positive for syncope (Apparent episode last evening without previous episodes of syncope.). Negative for dizziness, weakness and headaches.   Psychiatric/Behavioral: Negative for confusion. The patient is nervous/anxious.    All other systems  reviewed and are negative.      PAST MEDICAL HISTORY  Active Ambulatory Problems     Diagnosis Date Noted   • Anxiety disorder 02/05/2016   • Huitron's esophagus 02/05/2016   • Chronic constipation 02/05/2016   • Gastroesophageal reflux disease without esophagitis 02/05/2016   • Hyperlipidemia 02/05/2016   • Systemic sclerosis (CMS/[facility]) 02/05/2016   • Benign essential hypertension 05/09/2016   • Chronic pain 05/18/2016   • Seizure (CMS/[facility]) 11/15/2016   • Acquired hypothyroidism 12/07/2016   • Arthritis 10/04/2016   • Muscle pain 10/04/2016   • Tobacco use 06/25/2014   • Vitamin D deficiency 02/14/2017   • Observed sleep apnea 09/06/2019   • Aorto-iliac atherosclerosis (CMS/[facility]) 07/24/2018   • Carotid atherosclerosis, bilateral 06/25/2014   • History of stroke with current residual effects 09/04/2019   • S/P carotid endarterectomy 07/21/2014   • 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   • Fibromyalgia 11/27/2018   • Controlled diabetes mellitus type 2 with complications (CMS/[facility]) 06/25/2014   • Transient ischemic attack (TIA) 02/10/2020   • Left sided numbness 02/10/2020     Resolved Ambulatory Problems     Diagnosis Date Noted   • Acute upper respiratory infection 02/05/2016   • Tympanites 02/05/2016   • Knee pain 02/05/2016   • Chronic pain syndrome 02/05/2016   • Chronic maxillary sinusitis 02/05/2016   • Arthropathy of lumbar facet joint 02/05/2016   • Fatigue 02/05/2016   • 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   • Discharge from nipple 02/05/2016   • Pneumonitis 02/05/2016   • Pain of upper extremity 02/05/2016   • Candidiasis of vagina 02/05/2016   • Chronic pain disorder 02/16/2016   • Kienbock disease, 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   • Acute pancreatitis 05/03/2016   • Intractable vomiting with nausea 11/12/2016   • Elevated troponin 11/15/2016   • PRES (posterior reversible encephalopathy syndrome) 11/17/2016   • Aftercare 11/17/2016   • Encounter for long-term (current) use of high-risk medication 01/30/2017    • Abdominal pain 2018   • Right hip pain 2018   • Sacroiliitis (CMS/[facility]) 2019   • Disorder of sacroiliac joint 2019   • Greater trochanteric bursitis of right hip 2019   • Snoring 2019   • Seizure (CMS/[facility]) 2016   • Scleroderma (CMS/[facility]) 2016     Past Medical History:   Diagnosis Date   • Acid reflux    • Anemia    • Anxiety    • Atherosclerosis of aorta (CMS/[facility]) 2017   • Bursitis    • CAD (coronary artery disease)    • CVA (cerebrovascular accident) (CMS/[facility])    • DDD (degenerative disc disease), cervical    • Diabetes mellitus (CMS/[facility])    • Hard to intubate    • History of transfusion    • Hypertension    • Joint pain    • Kienbock's disease    • Low back pain    • Pancreatitis    • Pulmonary fibrosis (CMS/[facility])    • Reflux gastritis        PAST SURGICAL HISTORY  Past Surgical History:   Procedure Laterality Date   • BACK SURGERY     • CAROTID ENDARTERECTOMY Left     Neurological   • CATARACT EXTRACTION Bilateral    •  SECTION     • CHOLECYSTECTOMY      Laparoscopic   • COLONOSCOPY      Complete   • COLONOSCOPY N/A 2016    Procedure: COLONOSCOPY;  Surgeon: Gaviota Hagen MD;  Location: [facility] OR;  Service:    • ENDOSCOPY N/A 2016    Procedure: ESOPHAGOGASTRODUODENOSCOPY WITH BIOPSIES;  Surgeon: Gaviota Hagen MD;  Location: [facility] OR;  Service:    • ENDOSCOPY N/A 2018    Procedure: ESOPHAGOGASTRODUODENOSCOPY, biopsy;  Surgeon: Gaviota Hagen MD;  Location: [facility] OR;  Service: Gastroenterology   • TONSILLECTOMY AND ADENOIDECTOMY     • UPPER GASTROINTESTINAL ENDOSCOPY      Diagnostic   • WRIST SURGERY Left        FAMILY HISTORY  Family History   Problem Relation Age of Onset   • Other Mother         Cardiac Disorder   • Migraines Mother    • Heart disease Mother    • Sudden death Mother    • Other Father         Cardiac Disorder   • Hypertension Father    • Heart disease Father    • Cancer Father    • Hypertension Sister    •  Cancer Sister    • Migraines Daughter    • Cancer Other         Uncle   • Migraines Maternal Aunt    • Stroke Maternal Grandmother    • Stroke Maternal Grandfather    • Breast cancer Neg Hx        SOCIAL HISTORY  Social History     Socioeconomic History   • Marital status:      Spouse name: Not on file   • Number of children: Not on file   • Years of education: Not on file   • Highest education level: Not on file   Tobacco Use   • Smoking status: Current Every Day Smoker     Packs/day: 0.50     Years: 40.00     Pack years: 20.00   • Smokeless tobacco: Never Used   • Tobacco comment: patient refused   Substance and Sexual Activity   • Alcohol use: No   • Drug use: No   • Sexual activity: Defer     Comment: EXERCISE - RARELY       ALLERGIES  Other; Atorvastatin; Diflucan [fluconazole]; Hydroxyzine; Levofloxacin; Nsaids; Statins; Toradol [ketorolac tromethamine]; Victoza  [liraglutide]; and Rosuvastatin    PHYSICAL EXAM  ED Triage Vitals [09/03/20 0933]   Temp Heart Rate Resp BP SpO2   98.1 °F (36.7 °C) 72 16 124/68 99 %      Temp src Heart Rate Source Patient Position BP Location FiO2 (%)   Oral Monitor Lying Right arm --       Physical Exam   Constitutional: She is oriented to person, place, and time.   The patient is an unhealthy appearing, 54-year-old, female no acute distress.   HENT:   Head: Normocephalic.   Minor fresh ecchymosis noted on the left forehead and left lateral infraorbital areas.  Minimal swelling, but the areas are tender to palpation.   Eyes: Pupils are equal, round, and reactive to light. Conjunctivae and EOM are normal.   Neck: Normal range of motion. Neck supple.   2+ carotid bruit on the left and 3+ carotid bruit on the right.  No posterior cervical pain.   Cardiovascular: Normal rate, regular rhythm and normal heart sounds.   Pulmonary/Chest: Effort normal and breath sounds normal.   Abdominal: Soft. There is no tenderness.   Neurological: She is alert and oriented to person, place,  and time. No cranial nerve deficit.   Skin: Skin is warm and dry.   Psychiatric: Affect and judgment normal.       LAB RESULTS  Results for orders placed or performed during the hospital encounter of 09/03/20   Comprehensive Metabolic Panel   Result Value Ref Range    Glucose 186 (H) 65 - 99 mg/dL    BUN 8 6 - 20 mg/dL    Creatinine 0.77 0.57 - 1.00 mg/dL    Sodium 137 136 - 145 mmol/L    Potassium 4.6 3.5 - 5.2 mmol/L    Chloride 101 98 - 107 mmol/L    CO2 23.6 22.0 - 29.0 mmol/L    Calcium 9.5 8.6 - 10.5 mg/dL    Total Protein 7.1 6.0 - 8.5 g/dL    Albumin 3.70 3.50 - 5.20 g/dL    ALT (SGPT) 12 1 - 33 U/L    AST (SGOT) 15 1 - 32 U/L    Alkaline Phosphatase 68 39 - 117 U/L    Total Bilirubin 0.2 0.0 - 1.2 mg/dL    eGFR Non African Amer 78 >60 mL/min/1.73    Globulin 3.4 gm/dL    A/G Ratio 1.1 g/dL    BUN/Creatinine Ratio 10.4 7.0 - 25.0    Anion Gap 12.4 5.0 - 15.0 mmol/L   Troponin   Result Value Ref Range    Troponin T <0.010 0.000 - 0.030 ng/mL   CBC Auto Differential   Result Value Ref Range    WBC 8.57 3.40 - 10.80 10*3/mm3    RBC 4.22 3.77 - 5.28 10*6/mm3    Hemoglobin 13.1 12.0 - 15.9 g/dL    Hematocrit 41.3 34.0 - 46.6 %    MCV 97.9 (H) 79.0 - 97.0 fL    MCH 31.0 26.6 - 33.0 pg    MCHC 31.7 31.5 - 35.7 g/dL    RDW 12.2 (L) 12.3 - 15.4 %    RDW-SD 44.1 37.0 - 54.0 fl    MPV 10.3 6.0 - 12.0 fL    Platelets 217 140 - 450 10*3/mm3    Neutrophil % 48.0 42.7 - 76.0 %    Lymphocyte % 37.5 19.6 - 45.3 %    Monocyte % 9.6 5.0 - 12.0 %    Eosinophil % 4.0 0.3 - 6.2 %    Basophil % 0.7 0.0 - 1.5 %    Immature Grans % 0.2 0.0 - 0.5 %    Neutrophils, Absolute 4.12 1.70 - 7.00 10*3/mm3    Lymphocytes, Absolute 3.21 (H) 0.70 - 3.10 10*3/mm3    Monocytes, Absolute 0.82 0.10 - 0.90 10*3/mm3    Eosinophils, Absolute 0.34 0.00 - 0.40 10*3/mm3    Basophils, Absolute 0.06 0.00 - 0.20 10*3/mm3    Immature Grans, Absolute 0.02 0.00 - 0.05 10*3/mm3         I ordered the above labs and reviewed the results    RADIOLOGY  Ct Head  Without Contrast    Result Date: 9/3/2020  Narrative: CT Head WO, CT Max Facial Area WO HISTORY: Fall last night, syncopal episode, bruising and swelling around left orbit TECHNIQUE: Routine noncontrast head CT. Noncontrast CT of the face with sagittal and coronal reconstructions. Radiation dose reduction techniques included automated exposure control or exposure modulation based on body size. A total of 4 nuclear medicine studies and CT exams have been performed in the last 12 months. COMPARISON: MRI brain 2/10/2020 as well as CT head 2/10/2020 FINDINGS: BRAIN: No acute intracranial hemorrhage, or abnormal extra-axial fluid collection. Plaque-like calcification along the left tentorium is unchanged, possibly a small meningioma heavily calcified. No midline shift or focal mass effect. Mild global volume loss with ex vacuo dilatation of the ventricles. Areas of encephalomalacia near the vertex posteriorly and extending to the level the right parietal lobe consistent with remote infarct. The gray-white matter differentiation is preserved. There are scattered ill-defined and patchy hypoattenuating foci within the bilateral cerebral and deep white matter which are nonspecific but most commonly associated with chronic microvascular ischemic change. FACE: Mild soft tissue swelling involving the scalp overlying the left orbit. No underlying displaced fracture or dislocation. Visualized paranasal sinuses are predominantly clear. Prior sinonasal surgeries. Visualized mastoid air cells are clear. No acute osseous abnormality. Inverted and unerupted left maxillary ridge tooth projecting into the left maxillary antrum.     Impression: 1.  No acute intracranial abnormality. 2.  No acute displaced fracture or dislocation involving the facial bony structures. Small amount soft tissue swelling overlying the left orbit. Signer Name: JOSHUA DIAZ MD  Signed: 9/3/2020 10:52 AM  Workstation Name: [id number]  [facility] Carotid Bilateral    Result Date: 8/11/2020  Narrative: CAROTID DOPPLER ULTRASOUND EXAMINATION, 08/10/2020:  HISTORY: 54-year-old female with history of carotid stenosis. Left carotid endarterectomy in 2014.  TECHNIQUE: Carotid ultrasound examination was performed using gray scale, spectral Doppler and color flow Doppler ultrasound imaging. Carotid flow was assessed using criteria based on NASCET methodology.  COMPARISON: *  Carotid Doppler ultrasound, 04/20/2018. *  CTA neck, 02/10/2020.  FINDINGS: RIGHT: The images show dense calcific plaque in the right carotid bulb and scattered elsewhere in the carotid bifurcation. Doppler evaluation shows elevated peak systolic flow velocity in the proximal right ICA with spectral broadening and turbulence. Peak systolic velocity in the proximal right ICA measures up to 2.56 m/s indicating hemodynamically significant stenosis of greater than 70% but less than near occlusion using NASCET criteria. This correlates with the previous CTA study showing 69% stenosis of the proximal right ICA. Right vertebral artery is patent with normal antegrade flow.  LEFT: The images show postoperative changes of carotid endarterectomy. Doppler evaluation shows normal flow velocities and normal Doppler waveforms within the common and internal carotid artery. There is no evidence of hemodynamically significant carotid stenosis measuring greater than 50% using NASCET criteria. Note is made of dense atheromatous plaque at the ECA with PSV measuring up to 5.1 m/s indicating severe stenosis. Left vertebral artery is patent with normal antegrade flow.      Impression: 1.  Proximal right ICA stenosis measuring greater than 70% but less than near occlusion. Similar findings on the previous CTA study. 2.  Left carotid endarterectomy with no evidence of recurrent ICA stenosis. 3.  High-grade stenosis at the origin of the left ECA. 4.  Both vertebral arteries are patent with normal  antegrade flow.  This report was finalized on 8/11/2020 7:19 AM by Dr. William Rivas MD.      Ct Facial Bones Without Contrast    Result Date: 9/3/2020  Narrative: CT Head WO, CT Max Facial Area WO HISTORY: Fall last night, syncopal episode, bruising and swelling around left orbit TECHNIQUE: Routine noncontrast head CT. Noncontrast CT of the face with sagittal and coronal reconstructions. Radiation dose reduction techniques included automated exposure control or exposure modulation based on body size. A total of 4 nuclear medicine studies and CT exams have been performed in the last 12 months. COMPARISON: MRI brain 2/10/2020 as well as CT head 2/10/2020 FINDINGS: BRAIN: No acute intracranial hemorrhage, or abnormal extra-axial fluid collection. Plaque-like calcification along the left tentorium is unchanged, possibly a small meningioma heavily calcified. No midline shift or focal mass effect. Mild global volume loss with ex vacuo dilatation of the ventricles. Areas of encephalomalacia near the vertex posteriorly and extending to the level the right parietal lobe consistent with remote infarct. The gray-white matter differentiation is preserved. There are scattered ill-defined and patchy hypoattenuating foci within the bilateral cerebral and deep white matter which are nonspecific but most commonly associated with chronic microvascular ischemic change. FACE: Mild soft tissue swelling involving the scalp overlying the left orbit. No underlying displaced fracture or dislocation. Visualized paranasal sinuses are predominantly clear. Prior sinonasal surgeries. Visualized mastoid air cells are clear. No acute osseous abnormality. Inverted and unerupted left maxillary ridge tooth projecting into the left maxillary antrum.     Impression: 1.  No acute intracranial abnormality. 2.  No acute displaced fracture or dislocation involving the facial bony structures. Small amount soft tissue swelling overlying the left orbit.  Signer Name: JOSHUA DIAZ MD  Signed: 9/3/2020 10:52 AM  Workstation Name: [id number]  [facility]    Ct Sinus Without Contrast    Result Date: 8/10/2020  Narrative: CT Sinuses WO INDICATION: Chronic sinusitis for several months TECHNIQUE: CT of the paranasal sinuses without IV contrast. Coronal and sagittal reconstructions were obtained.  Radiation dose reduction techniques included automated exposure control or exposure modulation based on body size. Count of known CT and cardiac nuc med studies performed in previous 12 months: 0. COMPARISON:  None available. FINDINGS: . There are large openings between the nasal passages and the maxillary sinuses. Correlation with any prior surgery is recommended. Turbinates are normal. The ostiomeatal complexes are patent. There is very minimal mucosal thickening in the inferior left maxillary sinus. There is an upper jaw tooth on the left at his upside down with the roots directed toward the mouth and the crown of the tooth residing in the lower maxillary sinus.. Rest of the sinuses are clear. Nasal septum is midline.     Impression: The most posterior tooth in the right mandible is inverted so that the roots are directed downward toward the root of the adjacent tooth and the crown is directed upward so it extends into the lower portion of the maxillary sinus. It is covered by a thin layer of soft tissue. There are large openings between the maxillary sinuses and nasal passages. Correlation with any prior surgery is recommended. Otherwise study is negative Signer Name: Dawson Knight MD  Signed: 8/10/2020 8:23 PM  Workstation Name: [id number]  [facility]      I ordered the above radiologic testing and reviewed the results    PROCEDURES  Procedures      PROGRESS AND CONSULTS  ED Course as of Sep 03 1202   Thu Sep 03, 2020   1018 EKG tracing was contemporaneously reviewed at 0959 hrs. and showed a normal sinus rhythm with a rate of 71 bpm.   Anterior Q waves noted and suggestive of an old myocardial infarction.  ST segments and T waves unremarkable.  No ectopy.  Today's tracing was compared to one dated February 10, 2020 and today's tracing is unchanged from prior.    [ML]   1045 Patient returned from CT and complained to the nurse of continued face and head pain after initial 0.5 mg of Dilaudid given.    [ML]   1155 CT results noted and discussed with patient.  Patient remained normal sinus rhythm in the emergency department and no obvious reasons for the patient's syncope.  Patient concerned that her current problem may be related to her carotid artery and it was explained to her that carotid artery stenosis would be an unlikely cause of syncope.  However, the patient was strongly advised to follow-up with her vascular surgeon concerning her documented carotid stenosis.    [ML]      ED Course User Index  [ML] Estrada Guerrero MD           MEDICAL DECISION MAKING  Results were reviewed/discussed with the patient and they were also made aware of online access. Pt also made aware that some labs, such as cultures, will not be resulted during ER visit and follow up with PMD is necessary.     MDM       DIAGNOSIS  Final diagnoses:   Syncope and collapse   Facial contusion, initial encounter       Latest Documented Vital Signs:  As of 12:01  BP- 141/74 HR- 59 Temp- 98.1 °F (36.7 °C) (Oral) O2 sat- (!) 87%    DISPOSITION  Discharged in good condition       Medication List      No changes were made to your prescriptions during this visit.       Follow-up Information     Schedule an appointment as soon as possible for a visit  with Celeste Dill MD.    Specialty:  Family Medicine  Contact information:  [address] [place] [id number] [phone]                      Estrada Guerrero MD  09/03/20 9529

## 2020-09-03 NOTE — ED NOTES
"Pt stated the dilaudid \"didn't even touch\" her headache and asked RN to ask MD for more pain medicine.  She said she has been on pain medicine most of there life \"because of the scleraderma\".  Informed Dr Guerrero, new orders received       Ainsley Morejon, RN  09/03/20 [id number]    "

## 2020-09-16 ENCOUNTER — HOSPITAL ENCOUNTER (EMERGENCY)
Facility: HOSPITAL | Age: 55
Discharge: HOME | End: 2020-09-16
Attending: EMERGENCY MEDICINE | Admitting: EMERGENCY MEDICINE

## 2020-09-16 VITALS
DIASTOLIC BLOOD PRESSURE: 84 MMHG | WEIGHT: 160 LBS | HEART RATE: 85 BPM | HEIGHT: 64 IN | TEMPERATURE: 98.1 F | RESPIRATION RATE: 18 BRPM | BODY MASS INDEX: 27.31 KG/M2 | SYSTOLIC BLOOD PRESSURE: 183 MMHG | OXYGEN SATURATION: 95 %

## 2020-09-16 DIAGNOSIS — M54.2 NECK PAIN ON RIGHT SIDE: Primary | ICD-10-CM

## 2020-09-16 LAB
ANION GAP SERPL CALCULATED.3IONS-SCNC: 14.3 MMOL/L (ref 5–15)
BACTERIA UR QL AUTO: ABNORMAL /HPF
BASOPHILS # BLD AUTO: 0.07 10*3/MM3 (ref 0–0.2)
BASOPHILS NFR BLD AUTO: 0.6 % (ref 0–1.5)
BILIRUB UR QL STRIP: NEGATIVE
BUN SERPL-MCNC: 13 MG/DL (ref 6–20)
BUN/CREAT SERPL: 17.3 (ref 7–25)
CALCIUM SPEC-SCNC: 9.3 MG/DL (ref 8.6–10.5)
CHLORIDE SERPL-SCNC: 98 MMOL/L (ref 98–107)
CLARITY UR: CLEAR
CO2 SERPL-SCNC: 23.7 MMOL/L (ref 22–29)
COLOR UR: YELLOW
CREAT SERPL-MCNC: 0.75 MG/DL (ref 0.57–1)
DEPRECATED RDW RBC AUTO: 44.2 FL (ref 37–54)
EOSINOPHIL # BLD AUTO: 0.28 10*3/MM3 (ref 0–0.4)
EOSINOPHIL NFR BLD AUTO: 2.4 % (ref 0.3–6.2)
ERYTHROCYTE [DISTWIDTH] IN BLOOD BY AUTOMATED COUNT: 12.5 % (ref 12.3–15.4)
GFR SERPL CREATININE-BSD FRML MDRD: 80 ML/MIN/1.73
GLUCOSE SERPL-MCNC: 95 MG/DL (ref 65–99)
GLUCOSE UR STRIP-MCNC: NEGATIVE MG/DL
HCT VFR BLD AUTO: 45 % (ref 34–46.6)
HGB BLD-MCNC: 13.9 G/DL (ref 12–15.9)
HGB UR QL STRIP.AUTO: ABNORMAL
HYALINE CASTS UR QL AUTO: ABNORMAL /LPF
IMM GRANULOCYTES # BLD AUTO: 0.07 10*3/MM3 (ref 0–0.05)
IMM GRANULOCYTES NFR BLD AUTO: 0.6 % (ref 0–0.5)
KETONES UR QL STRIP: NEGATIVE
LEUKOCYTE ESTERASE UR QL STRIP.AUTO: NEGATIVE
LYMPHOCYTES # BLD AUTO: 3.23 10*3/MM3 (ref 0.7–3.1)
LYMPHOCYTES NFR BLD AUTO: 27.6 % (ref 19.6–45.3)
MCH RBC QN AUTO: 29.8 PG (ref 26.6–33)
MCHC RBC AUTO-ENTMCNC: 30.9 G/DL (ref 31.5–35.7)
MCV RBC AUTO: 96.4 FL (ref 79–97)
MONOCYTES # BLD AUTO: 0.9 10*3/MM3 (ref 0.1–0.9)
MONOCYTES NFR BLD AUTO: 7.7 % (ref 5–12)
NEUTROPHILS NFR BLD AUTO: 61.1 % (ref 42.7–76)
NEUTROPHILS NFR BLD AUTO: 7.14 10*3/MM3 (ref 1.7–7)
NITRITE UR QL STRIP: NEGATIVE
NRBC BLD AUTO-RTO: 0 /100 WBC (ref 0–0.2)
PH UR STRIP.AUTO: 7 [PH] (ref 4.5–8)
PLATELET # BLD AUTO: 308 10*3/MM3 (ref 140–450)
PMV BLD AUTO: 9.7 FL (ref 6–12)
POTASSIUM SERPL-SCNC: 4.7 MMOL/L (ref 3.5–5.2)
PROT UR QL STRIP: NEGATIVE
RBC # BLD AUTO: 4.67 10*6/MM3 (ref 3.77–5.28)
RBC # UR: ABNORMAL /HPF
REF LAB TEST METHOD: ABNORMAL
SODIUM SERPL-SCNC: 136 MMOL/L (ref 136–145)
SP GR UR STRIP: 1.01 (ref 1–1.03)
SQUAMOUS #/AREA URNS HPF: ABNORMAL /HPF
UROBILINOGEN UR QL STRIP: ABNORMAL
WBC # BLD AUTO: 11.69 10*3/MM3 (ref 3.4–10.8)
WBC UR QL AUTO: ABNORMAL /HPF

## 2020-09-16 PROCEDURE — 96374 THER/PROPH/DIAG INJ IV PUSH: CPT

## 2020-09-16 PROCEDURE — 99283 EMERGENCY DEPT VISIT LOW MDM: CPT

## 2020-09-16 PROCEDURE — 81001 URINALYSIS AUTO W/SCOPE: CPT | Performed by: EMERGENCY MEDICINE

## 2020-09-16 PROCEDURE — 80048 BASIC METABOLIC PNL TOTAL CA: CPT | Performed by: EMERGENCY MEDICINE

## 2020-09-16 PROCEDURE — 85025 COMPLETE CBC W/AUTO DIFF WBC: CPT | Performed by: EMERGENCY MEDICINE

## 2020-09-16 PROCEDURE — 99284 EMERGENCY DEPT VISIT MOD MDM: CPT | Performed by: EMERGENCY MEDICINE

## 2020-09-16 PROCEDURE — 25010000002 FENTANYL CITRATE (PF) 100 MCG/2ML SOLUTION: Performed by: EMERGENCY MEDICINE

## 2020-09-16 PROCEDURE — 93010 ELECTROCARDIOGRAM REPORT: CPT | Performed by: INTERNAL MEDICINE

## 2020-09-16 PROCEDURE — 93005 ELECTROCARDIOGRAM TRACING: CPT | Performed by: EMERGENCY MEDICINE

## 2020-09-16 RX ORDER — FENTANYL CITRATE 50 UG/ML
25 INJECTION, SOLUTION INTRAMUSCULAR; INTRAVENOUS ONCE
Status: COMPLETED | OUTPATIENT
Start: 2020-09-16 | End: 2020-09-16

## 2020-09-16 RX ORDER — KETOROLAC TROMETHAMINE 30 MG/ML
INJECTION, SOLUTION INTRAMUSCULAR; INTRAVENOUS
Status: DISCONTINUED
Start: 2020-09-16 | End: 2020-09-16 | Stop reason: HOSPADM

## 2020-09-16 RX ORDER — KETOROLAC TROMETHAMINE 30 MG/ML
30 INJECTION, SOLUTION INTRAMUSCULAR; INTRAVENOUS ONCE AS NEEDED
Status: DISCONTINUED | OUTPATIENT
Start: 2020-09-16 | End: 2020-09-16 | Stop reason: HOSPADM

## 2020-09-16 RX ORDER — LIDOCAINE 50 MG/G
1 PATCH TOPICAL
Status: DISCONTINUED | OUTPATIENT
Start: 2020-09-16 | End: 2020-09-16 | Stop reason: HOSPADM

## 2020-09-16 RX ORDER — HYDROMORPHONE HCL 110MG/55ML
1 PATIENT CONTROLLED ANALGESIA SYRINGE INTRAVENOUS ONCE
Status: DISCONTINUED | OUTPATIENT
Start: 2020-09-16 | End: 2020-09-16

## 2020-09-16 RX ADMIN — SODIUM CHLORIDE, POTASSIUM CHLORIDE, SODIUM LACTATE AND CALCIUM CHLORIDE 1000 ML: 600; 310; 30; 20 INJECTION, SOLUTION INTRAVENOUS at 03:40

## 2020-09-16 RX ADMIN — FENTANYL CITRATE 25 MCG: 50 INJECTION, SOLUTION INTRAMUSCULAR; INTRAVENOUS at 04:20

## 2020-09-16 NOTE — ED NOTES
"Pt upset about not receiving narcotics. Now stating that if she does not get IV pain medication that she wishes to be discharged so she can \"just take my medication that I have at home\". MD notified.      Rosette Dailey RN  09/16/20 5203    "

## 2020-09-16 NOTE — ED NOTES
"Pt refusing toradol and lidocaine patches. States we can give her narcotics to \"just take the pain away\". When told we are cautious to give narcotics due to possibility of it increasing her symptoms of dizziness pt states, \"oh you dont need to worry about that\". Pt educated on goals for pain reduction for this stay and that we will very unlikely be able to take her pain away completely as she deals with chronic pain at baseline and is in pain management. Pt has percocet 10 at home and last took that before she went to bed. She states she is allergic to \"all NSAIDS\" but \"I can take narcotics instead\". Pt pt cooperative and agrees to plan for ice pack and IV fluids for now. Dr Myers updated. No additional orders given.      Rosette Dailey, RN  09/16/20 4489    "

## 2020-09-16 NOTE — ED NOTES
Pt presents via [facility] with c/o complications after RF injection to neck on Wednesday in doctors office. Pt has chronic body wide pain, especially in neck, back and arthritis pain in joints. She states she had an RF injection for the first time on Wednesday in her neck for bulging discs; she has had previous RF injections in her back but says this is the first time this procedure was done on this location. She states she now has worsening pain instead of decreasing pain as well as dizziness. Pt states the dizziness is new however then states she has been dizzy since beginning of the month and was in fact here on 9/3 for dizziness and syncope. She was dc'd after this visit and told to f/u. Pt is ambulatory with a steady gait, and is able to ambulate to the restroom without assistance. She states the pain shoots down her neck on the right side and into her R shoulder. Pt is a poor historian by choice - states to look at the computer for her history and does not offer many details without continuous encouragement.  She is A&Ox4, no falls tonight.      Rosette Dailey RN  09/16/20 0317       Rosette Dailey RN  09/16/20 0317

## 2020-09-16 NOTE — ED PROVIDER NOTES
EMERGENCY DEPARTMENT ENCOUNTER      Room Number: 5/05      HPI:    Chief complaint: Pain    Location: Neck    Quality/Severity: Severe    Timing/Duration: Chronic, constant    Modifying Factors: Touch    Associated Symptoms: None    Narrative: Pt is a 54 y.o. female with extensive medical history and pain management history presents complaining of worsening neck pain after having a radiofrequency ablation on this past Thursday.  She says she has had chronic neck problems and pain and she underwent RF ablation Thursday and that she has had worsening pain since then.  She says it hurts to touch lightly.  She denies having any fevers or chills, she also denies any relief of the pain with her home Percocet tens which she has taken long-term.  Patient also mentions that she feels dizzy and that she felt like she was going to pass out when she stood up earlier today but upon resting for a minute she felt better.  When questioned about her dizziness she says she has had this many times in the past.  She reports that the main reason she came here tonight is because of the pain.      PMD: Celeste Dill MD    REVIEW OF SYSTEMS  Review of Systems   Constitutional: Negative for activity change, appetite change, chills and fever.   Respiratory: Negative for chest tightness and shortness of breath.    Cardiovascular: Negative for chest pain and leg swelling.   Gastrointestinal: Negative for abdominal pain, nausea and vomiting.   Genitourinary: Negative for difficulty urinating and dysuria.   Musculoskeletal: Positive for neck pain. Negative for arthralgias, joint swelling and neck stiffness.   Skin: Negative for color change and rash.   Neurological: Positive for dizziness. Negative for speech difficulty and weakness.   Hematological: Negative for adenopathy. Does not bruise/bleed easily.   Psychiatric/Behavioral: Negative for agitation and confusion.         PAST MEDICAL HISTORY  Active Ambulatory Problems     Diagnosis Date  Noted   • Anxiety disorder 02/05/2016   • Huitron's esophagus 02/05/2016   • Chronic constipation 02/05/2016   • Gastroesophageal reflux disease without esophagitis 02/05/2016   • Hyperlipidemia 02/05/2016   • Systemic sclerosis (CMS/[facility]) 02/05/2016   • Benign essential hypertension 05/09/2016   • Chronic pain 05/18/2016   • Seizure (CMS/[facility]) 11/15/2016   • Acquired hypothyroidism 12/07/2016   • Arthritis 10/04/2016   • Muscle pain 10/04/2016   • Tobacco use 06/25/2014   • Vitamin D deficiency 02/14/2017   • Observed sleep apnea 09/06/2019   • Aorto-iliac atherosclerosis (CMS/[facility]) 07/24/2018   • Carotid atherosclerosis, bilateral 06/25/2014   • History of stroke with current residual effects 09/04/2019   • S/P carotid endarterectomy 07/21/2014   • 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   • Fibromyalgia 11/27/2018   • Controlled diabetes mellitus type 2 with complications (CMS/[facility]) 06/25/2014   • Transient ischemic attack (TIA) 02/10/2020   • Left sided numbness 02/10/2020     Resolved Ambulatory Problems     Diagnosis Date Noted   • Acute upper respiratory infection 02/05/2016   • Tympanites 02/05/2016   • Knee pain 02/05/2016   • Chronic pain syndrome 02/05/2016   • Chronic maxillary sinusitis 02/05/2016   • Arthropathy of lumbar facet joint 02/05/2016   • Fatigue 02/05/2016   • 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   • Discharge from nipple 02/05/2016   • Pneumonitis 02/05/2016   • Pain of upper extremity 02/05/2016   • Candidiasis of vagina 02/05/2016   • Chronic pain disorder 02/16/2016   • Kienbock disease, 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   • Acute pancreatitis 05/03/2016   • Intractable vomiting with nausea 11/12/2016   • Elevated troponin 11/15/2016   • PRES (posterior reversible encephalopathy syndrome) 11/17/2016   • Aftercare 11/17/2016   • Encounter for long-term (current) use of high-risk medication 01/30/2017   • Abdominal pain 06/12/2018   • Right hip pain 08/08/2018   • Sacroiliitis (CMS/[facility]) 03/21/2019    • Disorder of sacroiliac joint 2019   • Greater trochanteric bursitis of right hip 2019   • Snoring 2019   • Seizure (CMS/[facility]) 2016   • Scleroderma (CMS/[facility]) 2016     Past Medical History:   Diagnosis Date   • Acid reflux    • Anemia    • Anxiety    • Atherosclerosis of aorta (CMS/[facility]) 2017   • Bursitis    • CAD (coronary artery disease)    • CVA (cerebrovascular accident) (CMS/[facility])    • DDD (degenerative disc disease), cervical    • Diabetes mellitus (CMS/[facility])    • Hard to intubate    • History of transfusion    • Hypertension    • Joint pain    • Kienbock's disease    • Low back pain    • Pancreatitis    • Pulmonary fibrosis (CMS/[facility])    • Reflux gastritis        PAST SURGICAL HISTORY  Past Surgical History:   Procedure Laterality Date   • BACK SURGERY     • CAROTID ENDARTERECTOMY Left     Neurological   • CATARACT EXTRACTION Bilateral    •  SECTION     • CHOLECYSTECTOMY      Laparoscopic   • COLONOSCOPY      Complete   • COLONOSCOPY N/A 2016    Procedure: COLONOSCOPY;  Surgeon: Gaviota Hagen MD;  Location: [facility] OR;  Service:    • ENDOSCOPY N/A 2016    Procedure: ESOPHAGOGASTRODUODENOSCOPY WITH BIOPSIES;  Surgeon: Gaviota Hagen MD;  Location: [facility] OR;  Service:    • ENDOSCOPY N/A 2018    Procedure: ESOPHAGOGASTRODUODENOSCOPY, biopsy;  Surgeon: Gaviota Hagen MD;  Location: [facility] OR;  Service: Gastroenterology   • TONSILLECTOMY AND ADENOIDECTOMY     • UPPER GASTROINTESTINAL ENDOSCOPY      Diagnostic   • WRIST SURGERY Left        FAMILY HISTORY  Family History   Problem Relation Age of Onset   • Other Mother         Cardiac Disorder   • Migraines Mother    • Heart disease Mother    • Sudden death Mother    • Other Father         Cardiac Disorder   • Hypertension Father    • Heart disease Father    • Cancer Father    • Hypertension Sister    • Cancer Sister    • Migraines Daughter    • Cancer Other         Uncle   • Migraines Maternal Aunt    •  "Stroke Maternal Grandmother    • Stroke Maternal Grandfather    • Breast cancer Neg Hx        SOCIAL HISTORY  Social History     Socioeconomic History   • Marital status:      Spouse name: Not on file   • Number of children: Not on file   • Years of education: Not on file   • Highest education level: Not on file   Tobacco Use   • Smoking status: Current Every Day Smoker     Packs/day: 0.50     Years: 40.00     Pack years: 20.00   • Smokeless tobacco: Never Used   • Tobacco comment: patient refused   Substance and Sexual Activity   • Alcohol use: No   • Drug use: No   • Sexual activity: Defer     Comment: EXERCISE - RARELY       ALLERGIES  Medrol [methylprednisolone], Other, Atorvastatin, Diflucan [fluconazole], Hydroxyzine, Levofloxacin, Nsaids, Statins, Toradol [ketorolac tromethamine], Victoza  [liraglutide], and Rosuvastatin      Current Facility-Administered Medications:   •  ketorolac (TORADOL) injection 30 mg, 30 mg, Intramuscular, Once PRN, Angel Painting MD  •  lidocaine (LIDODERM) 5 % 1 patch, 1 patch, Transdermal, Q24H, Angel Painting MD    Current Outpatient Medications:   •  amLODIPine (NORVASC) 10 MG tablet, Take 1 tablet by mouth Daily for 218 doses., Disp: 30 tablet, Rfl: 7  •  clopidogrel (PLAVIX) 75 MG tablet, Take 1 tablet by mouth Daily., Disp: 30 tablet, Rfl: 0  •  DULoxetine (CYMBALTA) 60 MG capsule, Take 1 capsule by mouth Daily., Disp: 30 capsule, Rfl: 0  •  Evolocumab (REPATHA SURECLICK) 140 MG/ML solution auto-injector, INJECT THE CONTENTS OF 1 SYRINGE INTO THE SKIN EVERY 14 DAYS, Disp: , Rfl:   •  gabapentin (NEURONTIN) 600 MG tablet, 3 (Three) Times a Day., Disp: , Rfl:   •  insulin glargine (LANTUS) 100 UNIT/ML injection, Inject 20 Units under the skin into the appropriate area as directed Daily., Disp: , Rfl:   •  Insulin Syringe 31G X 5/16\" 1 ML misc, Use twice daily for shots, Disp: 100 each, Rfl: 3  •  levETIRAcetam (KEPPRA) 500 MG tablet, Take 1 tablet by mouth Every 12 " "(Twelve) Hours., Disp: 60 tablet, Rfl: 0  •  metFORMIN ER (FORTAMET) 1000 MG (OSM) 24 hr tablet, Take 1 tablet by mouth 2 (Two) Times a Day With Meals., Disp: , Rfl:   •  Naloxone HCl (NARCAN) 0.4 MG/ML injection, Infuse 1 mL into a venous catheter Every 5 (Five) Minutes As Needed for Opioid Reversal., Disp: 1 mL, Rfl: 1  •  NON FORMULARY, \"trizanidine, muscle relaxer for my back\", Disp: , Rfl:   •  oxyCODONE-acetaminophen (PERCOCET)  MG per tablet, Take 1 tablet by mouth Every 6 (Six) Hours As Needed for Moderate Pain ., Disp: , Rfl:   •  traZODone (DESYREL) 100 MG tablet, Every Night., Disp: , Rfl:     PHYSICAL EXAM  ED Triage Vitals [09/16/20 0306]   Temp Heart Rate Resp BP SpO2   98.1 °F (36.7 °C) 84 16 (!) 196/77 95 %      Temp src Heart Rate Source Patient Position BP Location FiO2 (%)   Oral -- -- -- --       Physical Exam  INITIAL VITAL SIGNS: Reviewed by me.  Pulse ox normal  GENERAL: Alert and interactive. No acute distress.  HEAD: Head is normocephalic.  EYES: EOMI. PERRL. No scleral icterus. No conjunctival injection.  ENT: Moist mucous membranes.   NECK: Supple. Full range of motion.  Bilateral carotid bruits right side more than left, tender to light touch of the skin on the right side of the neck, 3 puncture wounds that she says are from her RF ablation are clean dry without surrounding erythema induration or warmth  RESPIRATORY: No tachypnea. Clear breath sounds bilaterally. No wheezing. No rales. No rhonchi.  CV: Regular rate and rhythm. No murmurs. No rubs or gallops.  BACK:  No obvious deformity.  EXTREMITIES: No deformity. No clubbing or cyanosis. No edema.   SKIN: Warm and dry. No diaphoresis. No obvious rashes.   NEUROLOGIC: Alert and oriented. Face is symmetric. Speech is normal. Moves all extremities equally. Motor and sensory distally intact.  Patient has a normal gait and walks without any difficulty whatsoever.  EKG           EKG time/Interp time: 0334/0337  Rhythm/Rate: Sinus rhythm " with a rate of 73  P waves and TX: Normal P waves normal TX interval  QRS, axis: Normal QRS duration and normal axis  ST and T waves: No ST elevations or depressions  Today's ECG is unchanged from September 3    Independently interpreted by me contemporaneously with treatment      LAB RESULTS    I ordered the above labs and reviewed the results    RADIOLOGY    I ordered the above radiologic testing and reviewed the results    PROCEDURES  Procedures      PROGRESS AND CONSULTS   0 345: Patient declines Toradol saying she is allergic to it and she also declined a lidocaine patch for her neck saying she is allergic to it though she cannot say what happens with the lidocaine patch.  The nurse informed her that since she says she is dizzy we are hesitant to give her narcotics especially since she already takes narcotics at home as well as anti-convulsants and sleeping medication.        MEDICAL DECISION MAKING      MDM   Older than stated age appearing female presents to the emergency room  complaining of neck pain since having her RF ablation last week. She is not having any fevers or chills and she says that the neck pain is to light touch.  She reports that her home narcotic regimen for her chronic pain does not help her neck.  She reports that she was seen here couple weeks ago for a syncopal episode but she has not had any issues with syncope since then and she has not fallen down because of her her reported dizziness.  Patient ambulated to and from bathroom on her own without any difficulty whatsoever.  On my exam she is hypertensive but her other vital signs are within normal limits.  I do not think any of her symptoms are related to her hypertension as she denies headache chest pain, shortness of breath, abdominal pain or confusion.  The rest her exam is notable for no evidence of soft tissue infection at the site of her RF ablations as well as her reported pain to light touch of the neck skin.  I will check CBC  BMP and urinalysis.  I suspect that the patient's pain is due to being on chronic pain medication and having some tissue irritation secondary to the ablation.  Her dizziness is poorly described and she walks without any difficulty whatsoever, she also has a normal neurological exam so I am not concerned that her complaint is related to any type of CVA..  I will give her some IV fluids while waiting for her labs.    Her labs are grossly normal, there is a white blood cell count of 11 K which is just barely above normal though the lack of erythema, warmth and induration at the site of the RF ablation convinces me there is no local infection there and that is not the cause of the white blood cell count being elevated.  She says her dizziness feels better after only 200 cc fluids.  While the rest of the fluids are being given I will give her a small dose of fentanyl, 25 mics, which should not be enough to negatively effect this long-term pain medicine patient.  Patient ambulates here without any difficulty whatsoever and I feel it is safe for discharge home since I do not think her pain is related to infection, and I do not believe she is a fall risk based on my interview and observation of her steady gait.   DIAGNOSIS  Final diagnoses:   Neck pain on right side         DISPOSITION  Home      Discussed pertinent labs and imaging findings with the patient/family.  Patient/Family voiced understanding of need to follow-up for recheck, further testing as needed.  Return to the emergency Department warnings were given.         Medication List      No changes were made to your prescriptions during this visit.             Follow-up Information     Call in 1 day to follow up.    Why: To schedule follow-up appointment  Contact information:  Your pain management physician                   Dictated utilizing Dragon dictation     Angel Painting MD  09/16/20 5855

## 2020-09-16 NOTE — ED NOTES
Completed [facility] #[id number]      Sarath Gaviria  09/16/20 2009

## 2020-09-16 NOTE — DISCHARGE INSTRUCTIONS
Please follow-up with your pain management physician.  Return to the emergency room if you have episodes of passing out, chest pain, shortness of breath or for any other concerns.  Please also take your high blood pressure medications as prescribed.

## 2020-10-02 ENCOUNTER — TRANSCRIBE ORDERS (OUTPATIENT)
Dept: ADMINISTRATIVE | Facility: HOSPITAL | Age: 55
End: 2020-10-02

## 2020-10-02 DIAGNOSIS — I65.23 BILATERAL CAROTID ARTERY STENOSIS: Primary | ICD-10-CM

## 2020-10-06 ENCOUNTER — HOSPITAL ENCOUNTER (OUTPATIENT)
Dept: CT IMAGING | Facility: HOSPITAL | Age: 55
Discharge: HOME | End: 2020-10-06
Admitting: NURSE PRACTITIONER

## 2020-10-06 DIAGNOSIS — I65.23 BILATERAL CAROTID ARTERY STENOSIS: ICD-10-CM

## 2020-10-06 PROCEDURE — 0 IOPAMIDOL PER 1 ML: Performed by: NURSE PRACTITIONER

## 2020-10-06 PROCEDURE — 70498 CT ANGIOGRAPHY NECK: CPT

## 2020-10-06 RX ADMIN — IOPAMIDOL 100 ML: 755 INJECTION, SOLUTION INTRAVENOUS at 08:45

## 2020-11-20 ENCOUNTER — TRANSCRIBE ORDERS (OUTPATIENT)
Dept: ADMINISTRATIVE | Facility: HOSPITAL | Age: 55
End: 2020-11-20

## 2020-11-20 DIAGNOSIS — Z98.890 STATUS POST CAROTID ENDARTERECTOMY: Primary | ICD-10-CM

## 2021-02-03 ENCOUNTER — TRANSCRIBE ORDERS (OUTPATIENT)
Dept: ADMINISTRATIVE | Facility: HOSPITAL | Age: 56
End: 2021-02-03

## 2021-02-03 DIAGNOSIS — Z12.31 ENCOUNTER FOR SCREENING MAMMOGRAM FOR MALIGNANT NEOPLASM OF BREAST: ICD-10-CM

## 2021-02-03 DIAGNOSIS — R92.1 BREAST CALCIFICATIONS: ICD-10-CM

## 2021-02-03 DIAGNOSIS — R92.8 ABNORMAL MAMMOGRAM OF LEFT BREAST: Primary | ICD-10-CM

## 2021-03-04 ENCOUNTER — HOSPITAL ENCOUNTER (OUTPATIENT)
Dept: ULTRASOUND IMAGING | Facility: HOSPITAL | Age: 56
End: 2021-03-04

## 2021-03-04 ENCOUNTER — HOSPITAL ENCOUNTER (OUTPATIENT)
Dept: MAMMOGRAPHY | Facility: HOSPITAL | Age: 56
Discharge: HOME | End: 2021-03-04
Admitting: FAMILY MEDICINE

## 2021-03-04 DIAGNOSIS — R92.1 BREAST CALCIFICATIONS: ICD-10-CM

## 2021-03-04 DIAGNOSIS — Z12.31 ENCOUNTER FOR SCREENING MAMMOGRAM FOR MALIGNANT NEOPLASM OF BREAST: ICD-10-CM

## 2021-03-04 PROCEDURE — G0279 TOMOSYNTHESIS, MAMMO: HCPCS

## 2021-03-04 PROCEDURE — 77066 DX MAMMO INCL CAD BI: CPT

## 2021-05-20 ENCOUNTER — TRANSCRIBE ORDERS (OUTPATIENT)
Dept: ADMINISTRATIVE | Facility: HOSPITAL | Age: 56
End: 2021-05-20

## 2021-05-20 DIAGNOSIS — M50.30 DEGENERATION OF CERVICAL INTERVERTEBRAL DISC: Primary | ICD-10-CM

## 2021-06-14 ENCOUNTER — HOSPITAL ENCOUNTER (OUTPATIENT)
Dept: MRI IMAGING | Facility: HOSPITAL | Age: 56
End: 2021-06-14

## 2021-07-01 ENCOUNTER — HOSPITAL ENCOUNTER (OUTPATIENT)
Dept: MRI IMAGING | Facility: HOSPITAL | Age: 56
Discharge: HOME | End: 2021-07-01
Admitting: PHYSICIAN ASSISTANT

## 2021-07-01 DIAGNOSIS — M50.30 DEGENERATION OF CERVICAL INTERVERTEBRAL DISC: ICD-10-CM

## 2021-07-01 PROCEDURE — 72141 MRI NECK SPINE W/O DYE: CPT

## 2021-07-08 ENCOUNTER — TRANSCRIBE ORDERS (OUTPATIENT)
Dept: ADMINISTRATIVE | Facility: HOSPITAL | Age: 56
End: 2021-07-08

## 2021-07-08 ENCOUNTER — HOSPITAL ENCOUNTER (OUTPATIENT)
Dept: CARDIOLOGY | Facility: HOSPITAL | Age: 56
Discharge: HOME | End: 2021-07-08
Admitting: PAIN MEDICINE

## 2021-07-08 DIAGNOSIS — M54.2 CERVICALGIA: ICD-10-CM

## 2021-07-08 DIAGNOSIS — M47.811 SPONDYLOSIS OF OCCIPITO-ATLANTO-AXIAL REGION WITHOUT MYELOPATHY OR RADICULOPATHY: ICD-10-CM

## 2021-07-08 DIAGNOSIS — M62.830 BACK MUSCLE SPASM: ICD-10-CM

## 2021-07-08 DIAGNOSIS — M62.830 BACK MUSCLE SPASM: Primary | ICD-10-CM

## 2021-07-08 PROCEDURE — 93005 ELECTROCARDIOGRAM TRACING: CPT | Performed by: PAIN MEDICINE

## 2021-07-08 PROCEDURE — 93010 ELECTROCARDIOGRAM REPORT: CPT | Performed by: INTERNAL MEDICINE

## 2021-07-09 LAB — QT INTERVAL: 365 MS

## 2021-07-29 ENCOUNTER — HOSPITAL ENCOUNTER (EMERGENCY)
Facility: HOSPITAL | Age: 56
Discharge: LEFT BEFORE DISCHARGE | End: 2021-07-29

## 2021-07-29 VITALS
SYSTOLIC BLOOD PRESSURE: 171 MMHG | DIASTOLIC BLOOD PRESSURE: 70 MMHG | RESPIRATION RATE: 18 BRPM | OXYGEN SATURATION: 90 % | WEIGHT: 173.56 LBS | BODY MASS INDEX: 25.71 KG/M2 | HEIGHT: 69 IN | HEART RATE: 93 BPM | TEMPERATURE: 98.1 F

## 2021-07-29 PROCEDURE — 99211 OFF/OP EST MAY X REQ PHY/QHP: CPT

## 2021-07-29 RX ORDER — METHADONE HYDROCHLORIDE 10 MG/1
10 TABLET ORAL 3 TIMES DAILY
COMMUNITY

## 2021-08-24 ENCOUNTER — APPOINTMENT (OUTPATIENT)
Dept: CT IMAGING | Facility: HOSPITAL | Age: 56
End: 2021-08-24

## 2021-08-24 ENCOUNTER — TRANSCRIBE ORDERS (OUTPATIENT)
Dept: ADMINISTRATIVE | Facility: HOSPITAL | Age: 56
End: 2021-08-24

## 2021-08-24 DIAGNOSIS — R14.0 ABDOMINAL DISTENSION: ICD-10-CM

## 2021-08-24 DIAGNOSIS — R10.84 GENERALIZED ABDOMINAL PAIN: Primary | ICD-10-CM

## 2021-08-24 DIAGNOSIS — Z87.19 HISTORY OF ILEUS: ICD-10-CM

## 2021-08-25 ENCOUNTER — HOSPITAL ENCOUNTER (OUTPATIENT)
Dept: CT IMAGING | Facility: HOSPITAL | Age: 56
Discharge: HOME | End: 2021-08-25
Admitting: FAMILY MEDICINE

## 2021-08-25 DIAGNOSIS — Z87.19 HISTORY OF ILEUS: ICD-10-CM

## 2021-08-25 DIAGNOSIS — R10.84 GENERALIZED ABDOMINAL PAIN: ICD-10-CM

## 2021-08-25 DIAGNOSIS — R14.0 ABDOMINAL DISTENSION: ICD-10-CM

## 2021-08-25 PROCEDURE — 0 IOPAMIDOL PER 1 ML: Performed by: FAMILY MEDICINE

## 2021-08-25 PROCEDURE — 74177 CT ABD & PELVIS W/CONTRAST: CPT

## 2021-08-25 RX ADMIN — IOPAMIDOL 100 ML: 755 INJECTION, SOLUTION INTRAVENOUS at 14:15

## 2021-09-16 NOTE — PLAN OF CARE
"  Problem: Patient Care Overview  Goal: Plan of Care Review  Flowsheets  Taken 2/10/2020 1052  Plan of Care Reviewed With: patient  Taken 2/10/2020 0830  Outcome Summary: PT Evaluation Complete: Patient performs supine to/from sit transfers with conditional independnece, sit to/from stand transfers with supervision, and gait x 120 feet with SBA without use of an assistive device. Patient with several episodes of lateral deviation without loss of balance. Patient states \"I have a walker but I refuse to use it. The walls do just fiine.\" Educated patient regarding benefit of an AD, patient unreceptive and politely refuses use. Patient has no concerns for return home and will have 24/7 supervision/assist as needed upon return home. Patient functionally at baseline. No inpatient skilled PT needs at this time.      " [FreeTextEntry1] : Cardiac wise stable.  Blood pressure is well controlled.  Looks like she had vertigo which has resolved.\par \par Recently she saw urologist Dr. Tanner for follow-up of kidney stones.  She has history of lithotripsy as well as open nephrolithotomy and a stag horn calculus was removed.  She has history of frequent UTIs. She has free of symptoms for some time.

## 2022-03-07 ENCOUNTER — TELEPHONE (OUTPATIENT)
Dept: GASTROENTEROLOGY | Facility: CLINIC | Age: 57
End: 2022-03-07

## 2022-03-07 ENCOUNTER — OFFICE VISIT (OUTPATIENT)
Dept: GASTROENTEROLOGY | Facility: CLINIC | Age: 57
End: 2022-03-07

## 2022-03-07 VITALS
BODY MASS INDEX: 26.07 KG/M2 | DIASTOLIC BLOOD PRESSURE: 78 MMHG | SYSTOLIC BLOOD PRESSURE: 130 MMHG | WEIGHT: 176 LBS | HEIGHT: 69 IN

## 2022-03-07 DIAGNOSIS — Z79.4 CONTROLLED TYPE 2 DIABETES MELLITUS WITH COMPLICATION, WITH LONG-TERM CURRENT USE OF INSULIN: ICD-10-CM

## 2022-03-07 DIAGNOSIS — K22.70 BARRETT'S ESOPHAGUS WITHOUT DYSPLASIA: Primary | ICD-10-CM

## 2022-03-07 DIAGNOSIS — K59.03 DRUG-INDUCED CONSTIPATION: ICD-10-CM

## 2022-03-07 DIAGNOSIS — M34.9 SYSTEMIC SCLEROSIS: ICD-10-CM

## 2022-03-07 DIAGNOSIS — E11.8 CONTROLLED TYPE 2 DIABETES MELLITUS WITH COMPLICATION, WITH LONG-TERM CURRENT USE OF INSULIN: ICD-10-CM

## 2022-03-07 PROCEDURE — 99204 OFFICE O/P NEW MOD 45 MIN: CPT | Performed by: INTERNAL MEDICINE

## 2022-03-07 RX ORDER — PANTOPRAZOLE SODIUM 40 MG/1
1 TABLET, DELAYED RELEASE ORAL DAILY
COMMUNITY
Start: 2021-11-29 | End: 2022-03-07 | Stop reason: SDUPTHER

## 2022-03-07 RX ORDER — AZELASTINE 1 MG/ML
2 SPRAY, METERED NASAL DAILY PRN
COMMUNITY
Start: 2021-12-29

## 2022-03-07 RX ORDER — PANTOPRAZOLE SODIUM 40 MG/1
40 TABLET, DELAYED RELEASE ORAL DAILY
Qty: 90 TABLET | Refills: 3 | Status: SHIPPED | OUTPATIENT
Start: 2022-03-07

## 2022-03-07 RX ORDER — QUETIAPINE FUMARATE 100 MG/1
100 TABLET, FILM COATED ORAL NIGHTLY
COMMUNITY
Start: 2022-02-25

## 2022-03-07 RX ORDER — ALBUTEROL SULFATE 90 UG/1
1 AEROSOL, METERED RESPIRATORY (INHALATION) AS NEEDED
COMMUNITY
Start: 2021-12-29

## 2022-03-07 RX ORDER — ONDANSETRON 4 MG/1
4 TABLET, FILM COATED ORAL EVERY 8 HOURS PRN
COMMUNITY
Start: 2022-02-02

## 2022-03-07 RX ORDER — IRBESARTAN 300 MG/1
300 TABLET ORAL DAILY
COMMUNITY
Start: 2022-02-28

## 2022-03-07 RX ORDER — POLYETHYLENE GLYCOL 3350 17 G/17G
17 POWDER, FOR SOLUTION ORAL DAILY
COMMUNITY
Start: 2021-10-15

## 2022-03-07 RX ORDER — GUAIFENESIN 600 MG/1
TABLET, EXTENDED RELEASE ORAL
COMMUNITY
Start: 2022-02-07 | End: 2022-03-11

## 2022-03-07 RX ORDER — TIZANIDINE HYDROCHLORIDE 6 MG/1
6 CAPSULE, GELATIN COATED ORAL 3 TIMES DAILY
COMMUNITY
Start: 2022-03-03

## 2022-03-07 RX ORDER — ESZOPICLONE 3 MG/1
3 TABLET, FILM COATED ORAL NIGHTLY
COMMUNITY
Start: 2021-12-05

## 2022-03-07 NOTE — TELEPHONE ENCOUNTER
Pt seen today samples of movantik given per [facility] request  Linzess was tried and failed  OTC miralax, suppositories, Stool Softners, ducolax tried and failed   Probiotics   Tried and failed diet modification as well       Movantik 25mg lot [id number] EXP  4 boxes.

## 2022-03-07 NOTE — PROGRESS NOTES
PATIENT INFORMATION  Crystal Cruz       - 1965    CHIEF COMPLAINT  Chief Complaint   Patient presents with   • Martinez's esophagus       HISTORY OF PRESENT ILLNESS  Here for Martinez's and only breakthrough is wekly ofr less but aslo OIC and takes her middle of the road dose for Linzess daily bit can go over a week w/o a BM then has explosive days of 2-3 BMs    Her DM is II and 25 years and now insulin reqiring    Stateds PSS as well but no meds at this time    Ha 3/3 risk factors for gastroparesis so reviewed the diet for that as well          REVIEWED PERTINENT RESULTS/ LABS  Lab Results   Component Value Date    CASEREPORT  2018     Surgical Pathology Report                         Case: [id number]                                  Authorizing Provider:  Gaviota Hagen MD         Collected:           2018 11:07 AM          Ordering Location:     [facility]   Received:            2018 12:26 PM                                 OR                                                                           Pathologist:           Martin Thomas MD                                                     Specimens:   1) - Gastric, Body, Gastric biopsy                                                                  2) - Esophagus, Distal, Distal esophagus biopsy at 38cm                                             3) - Esophagus, Distal, Distal esophagus at 36cm                                                    4) - Esophagus, Distal, Distal esophagus at 34cm                                           FINALDX  2018     1.  GASTRIC BIOPSY:    BENIGN GASTRIC MUCOSA WITH NO EVIDENCE OF ACTIVE GASTRITIS NOR NEOPLASM    IDENTIFIED.    NO METAPLASIA OR DYSPLASIA CHANGE IS IDENTIFIED.    NEGATIVE FOR HELICOBACTER.     2.  DISTAL ESOPHAGEAL BIOPSY, 38 CM:   BENIGN GASTRIC MUCOSA WITH DIFFUSE SMALL INTESTINAL METAPLASIA CONSISTENT    WITH MARTINEZ'S ESOPHAGUS.   MILD CHRONIC  INFLAMMATION.   BENIGN SQUAMOUS MUCOSA WITH MINIMAL ACUTE INFLAMMATION.   NO EVIDENCE OF ULCERATION NOR DYSPLASIA CHANGE IS IDENTIFIED.     3.  DISTAL ESOPHAGEAL BIOPSY, 36 CM:   BENIGN GASTRIC MUCOSA WITH DIFFUSE SMALL INTESTINAL METAPLASIA CONSISTENT    WITH MARTINEZ'S ESOPHAGUS.   MILD CHRONIC INFLAMMATION.   BENIGN SQUAMOUS MUCOSA WITH MINIMAL ACUTE INFLAMMATION.   NO EVIDENCE OF ULCERATION NOR DYSPLASIA CHANGE IS IDENTIFIED.     4.  DISTAL ESOPHAGEAL BIOPSY, 34 CM:    BENIGN GASTRIC MUCOSA WITH DIFFUSE SMALL INTESTINAL METAPLASIA CONSISTENT    WITH MARTINEZ'S ESOPHAGUS.   MILD CHRONIC INFLAMMATION.   BENIGN SQUAMOUS MUCOSA WITH MINIMAL ACUTE INFLAMMATION.   NO EVIDENCE OF ULCERATION NOR DYSPLASIA CHANGE IS IDENTIFIED.     DMA/brb         Lab Results   Component Value Date    HGB 13.5 02/22/2022    MCV 96.1 02/22/2022     02/22/2022    ALT 16 02/22/2022    AST 17 02/22/2022    HGBA1C 6.7 (H) 02/22/2022    INR 0.9 09/14/2021    TRIG 125 09/02/2020    FERRITIN 36.1 09/14/2021      No results found.    REVIEW OF SYSTEMS  Review of Systems   Constitutional: Negative for activity change, chills, fever and unexpected weight change.   HENT: Negative for congestion.    Eyes: Negative for visual disturbance.   Respiratory: Negative for shortness of breath.    Cardiovascular: Negative for chest pain and palpitations.   Gastrointestinal: Positive for abdominal distention, abdominal pain, constipation, diarrhea, nausea and vomiting. Negative for blood in stool.   Endocrine: Negative for cold intolerance and heat intolerance.   Genitourinary: Negative for hematuria.   Musculoskeletal: Negative for gait problem.   Skin: Negative for color change.   Allergic/Immunologic: Negative for immunocompromised state.   Neurological: Negative for weakness and light-headedness.   Hematological: Negative for adenopathy.   Psychiatric/Behavioral: Negative for sleep disturbance. The patient is not nervous/anxious.          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]    • Vitamin D deficiency [E55.9]    • Acquired hypothyroidism [E03.9]    • Seizure (HCC) [R56.9]    • Arthritis [M19.90]    • Muscle pain [M79.10]    • Chronic pain [G89.29]    • Benign essential hypertension [I10]    • Anxiety disorder [F41.9]    • Huitron's esophagus [K22.70]    • Chronic constipation [K59.09]    • Gastroesophageal reflux disease without esophagitis [K21.9]    • Hyperlipidemia [E78.5]    • Systemic sclerosis (HCC) [M34.9]    • S/P carotid endarterectomy [Z98.890]    • Tobacco use [Z72.0]    • Carotid atherosclerosis, bilateral [I65.23]    • Controlled diabetes mellitus type 2 with complications (HCC) [E11.8]          PAST MEDICAL HISTORY  Past Medical History:   Diagnosis Date   • Acid reflux    • Anemia    • Anxiety    • Atherosclerosis of aorta (HCC) 2017   • Huitron's esophagus    • Bursitis    • CAD (coronary artery disease)    • Chronic pain disorder    • CVA (cerebrovascular accident) (HCC)    • DDD (degenerative disc disease), cervical    • Diabetes mellitus (HCC)    • Fibromyalgia    • Hard to intubate    • History of transfusion    • Hyperlipidemia    • Hypertension    • Joint pain    • Kienbock's disease    • Kienbock's disease    • Low back pain    • Pancreatitis    • Pulmonary fibrosis (HCC) 2016   • Reflux gastritis    • Scleroderma (HCC)    • Seizure (HCC)          SURGICAL HISTORY  Past Surgical History:   Procedure Laterality Date   • ADENOIDECTOMY     • BACK SURGERY     • CAROTID ENDARTERECTOMY Left     Neurological   • CATARACT EXTRACTION Bilateral    •  SECTION     • CHOLECYSTECTOMY      Laparoscopic   •  COLONOSCOPY      Complete   • COLONOSCOPY N/A 7/11/2016    Procedure: COLONOSCOPY;  Surgeon: Gaviota Hagen MD;  Location: [facility] OR;  Service:    • ENDOSCOPY N/A 7/11/2016    Procedure: ESOPHAGOGASTRODUODENOSCOPY WITH BIOPSIES;  Surgeon: Gaviota Hagen MD;  Location: [facility] OR;  Service:    • ENDOSCOPY N/A 9/28/2018    Procedure: ESOPHAGOGASTRODUODENOSCOPY, biopsy;  Surgeon: Gaviota Hagen MD;  Location: [facility] OR;  Service: Gastroenterology   • TONSILLECTOMY     • TONSILLECTOMY AND ADENOIDECTOMY     • UPPER GASTROINTESTINAL ENDOSCOPY      Diagnostic   • WRIST SURGERY Left          FAMILY HISTORY  Family History   Problem Relation Age of Onset   • Other Mother         Cardiac Disorder   • Migraines Mother    • Heart disease Mother    • Sudden death Mother    • Other Father         Cardiac Disorder   • Hypertension Father    • Heart disease Father    • Cancer Father    • Hypertension Sister    • Cancer Sister    • Migraines Daughter    • Cancer Other         Uncle   • Migraines Maternal Aunt    • Stroke Maternal Grandmother    • Stroke Maternal Grandfather    • Breast cancer Neg Hx          SOCIAL HISTORY  Social History     Occupational History   • Not on file   Tobacco Use   • Smoking status: Current Every Day Smoker     Packs/day: 0.50     Years: 40.00     Pack years: 20.00   • Smokeless tobacco: Never Used   • Tobacco comment: patient refused   Vaping Use   • Vaping Use: Never used   Substance and Sexual Activity   • Alcohol use: No   • Drug use: No   • Sexual activity: Defer     Comment: EXERCISE - RARELY         CURRENT MEDICATIONS    Current Outpatient Medications:   •  albuterol sulfate  (90 Base) MCG/ACT inhaler, , Disp: , Rfl:   •  azelastine (ASTELIN) 0.1 % nasal spray, , Disp: , Rfl:   •  clopidogrel (PLAVIX) 75 MG tablet, Take 1 tablet by mouth Daily., Disp: 30 tablet, Rfl: 0  •  DULoxetine (CYMBALTA) 60 MG capsule, Take 1 capsule by mouth Daily., Disp: 30 capsule, Rfl: 0  •  eszopiclone  "(LUNESTA) 3 MG tablet, , Disp: , Rfl:   •  Evolocumab (REPATHA) solution auto-injector SureClick injection, INJECT THE CONTENTS OF 1 SYRINGE INTO THE SKIN EVERY 14 DAYS, Disp: , Rfl:   •  gabapentin (NEURONTIN) 600 MG tablet, 3 (Three) Times a Day., Disp: , Rfl:   •  insulin glargine (LANTUS) 100 UNIT/ML injection, Inject 20 Units under the skin into the appropriate area as directed Daily., Disp: , Rfl:   •  Insulin Syringe 31G X 5/16\" 1 ML misc, Use twice daily for shots, Disp: 100 each, Rfl: 3  •  irbesartan (AVAPRO) 300 MG tablet, , Disp: , Rfl:   •  levETIRAcetam (KEPPRA) 500 MG tablet, Take 1 tablet by mouth Every 12 (Twelve) Hours., Disp: 60 tablet, Rfl: 0  •  metFORMIN ER (FORTAMET) 1000 MG (OSM) 24 hr tablet, Take 1 tablet by mouth 2 (Two) Times a Day With Meals., Disp: , Rfl:   •  methadone (DOLOPHINE) 10 MG tablet, Take 10 mg by mouth 3 (Three) Times a Day 10 mg in morning 20mg at afternoon and 10mg at bedtime ,, Disp: , Rfl:   •  Mucus Relief 600 MG 12 hr tablet, , Disp: , Rfl:   •  Naloxone HCl (NARCAN) 0.4 MG/ML injection, Infuse 1 mL into a venous catheter Every 5 (Five) Minutes As Needed for Opioid Reversal., Disp: 1 mL, Rfl: 1  •  nystatin (MYCOSTATIN) 100,000 unit/mL suspension, Take 200,000 Units by mouth 4 (Four) Times a Day., Disp: , Rfl:   •  ondansetron (ZOFRAN) 4 MG tablet, , Disp: , Rfl:   •  pantoprazole (PROTONIX) 40 MG EC tablet, Take 1 tablet by mouth Daily., Disp: , Rfl:   •  polyethylene glycol (MIRALAX) 17 GM/SCOOP powder, Take 17 g by mouth Daily., Disp: , Rfl:   •  QUEtiapine (SEROquel) 100 MG tablet, , Disp: , Rfl:   •  TiZANidine (ZANAFLEX) 6 MG capsule, , Disp: , Rfl:   •  traZODone (DESYREL) 100 MG tablet, Every Night., Disp: , Rfl:   •  Naloxegol Oxalate (MOVANTIK) 25 MG tablet, Take 1 tablet by mouth Every Morning., Disp: 30 tablet, Rfl: 11    ALLERGIES  Medrol [methylprednisolone], Other, Atorvastatin, Diflucan [fluconazole], Hydroxyzine, Levofloxacin, Nsaids, Statins, Toradol " "[ketorolac tromethamine], Victoza  [liraglutide], and Rosuvastatin    VITALS  Vitals:    03/07/22 1418   BP: 130/78   BP Location: Left arm   Patient Position: Sitting   Cuff Size: Large Adult   Weight: 79.8 kg (176 lb)   Height: 176 cm (69.3\")       PHYSICAL EXAM  Debilities/Disabilities Identified: None  Emotional Behavior: Appropriate  Wt Readings from Last 3 Encounters:   03/07/22 79.8 kg (176 lb)   07/29/21 78.7 kg (173 lb 9 oz)   07/01/21 78 kg (172 lb)     Ht Readings from Last 1 Encounters:   03/07/22 176 cm (69.3\")     Body mass index is 25.77 kg/m².  Physical Exam  Constitutional:       Appearance: She is well-developed. She is not diaphoretic.   Eyes:      General: No scleral icterus.     Conjunctiva/sclera: Conjunctivae normal.      Pupils: Pupils are equal, round, and reactive to light.   Neck:      Thyroid: No thyromegaly.   Cardiovascular:      Rate and Rhythm: Normal rate and regular rhythm.      Heart sounds: Normal heart sounds. No murmur heard.    No gallop.   Pulmonary:      Effort: Pulmonary effort is normal.      Breath sounds: Normal breath sounds. No wheezing or rales.   Abdominal:      General: Bowel sounds are normal. There is no distension or abdominal bruit.      Palpations: Abdomen is soft. There is no shifting dullness, fluid wave or mass.      Tenderness: There is abdominal tenderness in the right lower quadrant. There is no guarding. Negative signs include Tanner's sign.      Hernia: There is no hernia in the ventral area.   Musculoskeletal:         General: Normal range of motion.      Cervical back: Normal range of motion and neck supple.   Lymphadenopathy:      Cervical: No cervical adenopathy.   Skin:     General: Skin is warm and dry.      Findings: No erythema or rash.   Neurological:      Mental Status: She is alert and oriented to person, place, and time.         CLINICAL DATA REVIEWED   reviewed previous lab results and integrated with today's visit, reviewed notes from other " physicians and/or last GI encounter, reviewed previous endoscopy results and available photos, reviewed surgical pathology results from previous biopsies    ASSESSMENT  Diagnoses and all orders for this visit:    Huitron's esophagus without dysplasia  -     Case Request; Standing  -     COVID PRE-OP / PRE-PROCEDURE SCREENING ORDER (NO ISOLATION) - Swab, Nasopharynx; Future  -     Case Request    Drug-induced constipation    Controlled type 2 diabetes mellitus with complication, with long-term current use of insulin (HCC)    Systemic sclerosis (HCC)    Other orders  -     albuterol sulfate  (90 Base) MCG/ACT inhaler  -     azelastine (ASTELIN) 0.1 % nasal spray  -     eszopiclone (LUNESTA) 3 MG tablet  -     Mucus Relief 600 MG 12 hr tablet  -     irbesartan (AVAPRO) 300 MG tablet  -     nystatin (MYCOSTATIN) 100,000 unit/mL suspension; Take 200,000 Units by mouth 4 (Four) Times a Day.  -     ondansetron (ZOFRAN) 4 MG tablet  -     pantoprazole (PROTONIX) 40 MG EC tablet; Take 1 tablet by mouth Daily.  -     polyethylene glycol (MIRALAX) 17 GM/SCOOP powder; Take 17 g by mouth Daily.  -     QUEtiapine (SEROquel) 100 MG tablet  -     TiZANidine (ZANAFLEX) 6 MG capsule  -     Follow Anesthesia Guidelines / Protocol; Future  -     Naloxegol Oxalate (MOVANTIK) 25 MG tablet; Take 1 tablet by mouth Every Morning.          PLAN  Increase Linzess and add Movantic and schedule her EGD for Huitron's  Return in about 4 months (around 7/7/2022).    I have discussed the above plan with the patient.  They verbalize understanding and are in agreement with the plan.  They have been advised to contact the office for any questions, concerns, or changes related to their health.

## 2022-03-07 NOTE — H&P (VIEW-ONLY)
PATIENT INFORMATION  Crystal Cruz       - 1965    CHIEF COMPLAINT  Chief Complaint   Patient presents with   • Martinez's esophagus       HISTORY OF PRESENT ILLNESS  Here for Martinez's and only breakthrough is wekly ofr less but aslo OIC and takes her middle of the road dose for Linzess daily bit can go over a week w/o a BM then has explosive days of 2-3 BMs    Her DM is II and 25 years and now insulin reqiring    Stateds PSS as well but no meds at this time    Ha 3/3 risk factors for gastroparesis so reviewed the diet for that as well          REVIEWED PERTINENT RESULTS/ LABS  Lab Results   Component Value Date    CASEREPORT  2018     Surgical Pathology Report                         Case: [id number]                                  Authorizing Provider:  Gaviota Hagen MD         Collected:           2018 11:07 AM          Ordering Location:     [facility]   Received:            2018 12:26 PM                                 OR                                                                           Pathologist:           Martin Thomas MD                                                     Specimens:   1) - Gastric, Body, Gastric biopsy                                                                  2) - Esophagus, Distal, Distal esophagus biopsy at 38cm                                             3) - Esophagus, Distal, Distal esophagus at 36cm                                                    4) - Esophagus, Distal, Distal esophagus at 34cm                                           FINALDX  2018     1.  GASTRIC BIOPSY:    BENIGN GASTRIC MUCOSA WITH NO EVIDENCE OF ACTIVE GASTRITIS NOR NEOPLASM    IDENTIFIED.    NO METAPLASIA OR DYSPLASIA CHANGE IS IDENTIFIED.    NEGATIVE FOR HELICOBACTER.     2.  DISTAL ESOPHAGEAL BIOPSY, 38 CM:   BENIGN GASTRIC MUCOSA WITH DIFFUSE SMALL INTESTINAL METAPLASIA CONSISTENT    WITH MARTINEZ'S ESOPHAGUS.   MILD CHRONIC  INFLAMMATION.   BENIGN SQUAMOUS MUCOSA WITH MINIMAL ACUTE INFLAMMATION.   NO EVIDENCE OF ULCERATION NOR DYSPLASIA CHANGE IS IDENTIFIED.     3.  DISTAL ESOPHAGEAL BIOPSY, 36 CM:   BENIGN GASTRIC MUCOSA WITH DIFFUSE SMALL INTESTINAL METAPLASIA CONSISTENT    WITH MARTINEZ'S ESOPHAGUS.   MILD CHRONIC INFLAMMATION.   BENIGN SQUAMOUS MUCOSA WITH MINIMAL ACUTE INFLAMMATION.   NO EVIDENCE OF ULCERATION NOR DYSPLASIA CHANGE IS IDENTIFIED.     4.  DISTAL ESOPHAGEAL BIOPSY, 34 CM:    BENIGN GASTRIC MUCOSA WITH DIFFUSE SMALL INTESTINAL METAPLASIA CONSISTENT    WITH MARTINEZ'S ESOPHAGUS.   MILD CHRONIC INFLAMMATION.   BENIGN SQUAMOUS MUCOSA WITH MINIMAL ACUTE INFLAMMATION.   NO EVIDENCE OF ULCERATION NOR DYSPLASIA CHANGE IS IDENTIFIED.     DMA/brb         Lab Results   Component Value Date    HGB 13.5 02/22/2022    MCV 96.1 02/22/2022     02/22/2022    ALT 16 02/22/2022    AST 17 02/22/2022    HGBA1C 6.7 (H) 02/22/2022    INR 0.9 09/14/2021    TRIG 125 09/02/2020    FERRITIN 36.1 09/14/2021      No results found.    REVIEW OF SYSTEMS  Review of Systems   Constitutional: Negative for activity change, chills, fever and unexpected weight change.   HENT: Negative for congestion.    Eyes: Negative for visual disturbance.   Respiratory: Negative for shortness of breath.    Cardiovascular: Negative for chest pain and palpitations.   Gastrointestinal: Positive for abdominal distention, abdominal pain, constipation, diarrhea, nausea and vomiting. Negative for blood in stool.   Endocrine: Negative for cold intolerance and heat intolerance.   Genitourinary: Negative for hematuria.   Musculoskeletal: Negative for gait problem.   Skin: Negative for color change.   Allergic/Immunologic: Negative for immunocompromised state.   Neurological: Negative for weakness and light-headedness.   Hematological: Negative for adenopathy.   Psychiatric/Behavioral: Negative for sleep disturbance. The patient is not nervous/anxious.          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]    • Vitamin D deficiency [E55.9]    • Acquired hypothyroidism [E03.9]    • Seizure (HCC) [R56.9]    • Arthritis [M19.90]    • Muscle pain [M79.10]    • Chronic pain [G89.29]    • Benign essential hypertension [I10]    • Anxiety disorder [F41.9]    • Huitron's esophagus [K22.70]    • Chronic constipation [K59.09]    • Gastroesophageal reflux disease without esophagitis [K21.9]    • Hyperlipidemia [E78.5]    • Systemic sclerosis (HCC) [M34.9]    • S/P carotid endarterectomy [Z98.890]    • Tobacco use [Z72.0]    • Carotid atherosclerosis, bilateral [I65.23]    • Controlled diabetes mellitus type 2 with complications (HCC) [E11.8]          PAST MEDICAL HISTORY  Past Medical History:   Diagnosis Date   • Acid reflux    • Anemia    • Anxiety    • Atherosclerosis of aorta (HCC) 2017   • Huitron's esophagus    • Bursitis    • CAD (coronary artery disease)    • Chronic pain disorder    • CVA (cerebrovascular accident) (HCC)    • DDD (degenerative disc disease), cervical    • Diabetes mellitus (HCC)    • Fibromyalgia    • Hard to intubate    • History of transfusion    • Hyperlipidemia    • Hypertension    • Joint pain    • Kienbock's disease    • Kienbock's disease    • Low back pain    • Pancreatitis    • Pulmonary fibrosis (HCC) 2016   • Reflux gastritis    • Scleroderma (HCC)    • Seizure (HCC)          SURGICAL HISTORY  Past Surgical History:   Procedure Laterality Date   • ADENOIDECTOMY     • BACK SURGERY     • CAROTID ENDARTERECTOMY Left     Neurological   • CATARACT EXTRACTION Bilateral    •  SECTION     • CHOLECYSTECTOMY      Laparoscopic   •  COLONOSCOPY      Complete   • COLONOSCOPY N/A 7/11/2016    Procedure: COLONOSCOPY;  Surgeon: Gaviota Hagen MD;  Location: [facility] OR;  Service:    • ENDOSCOPY N/A 7/11/2016    Procedure: ESOPHAGOGASTRODUODENOSCOPY WITH BIOPSIES;  Surgeon: Gaviota Hagen MD;  Location: [facility] OR;  Service:    • ENDOSCOPY N/A 9/28/2018    Procedure: ESOPHAGOGASTRODUODENOSCOPY, biopsy;  Surgeon: Gaviota Hagen MD;  Location: [facility] OR;  Service: Gastroenterology   • TONSILLECTOMY     • TONSILLECTOMY AND ADENOIDECTOMY     • UPPER GASTROINTESTINAL ENDOSCOPY      Diagnostic   • WRIST SURGERY Left          FAMILY HISTORY  Family History   Problem Relation Age of Onset   • Other Mother         Cardiac Disorder   • Migraines Mother    • Heart disease Mother    • Sudden death Mother    • Other Father         Cardiac Disorder   • Hypertension Father    • Heart disease Father    • Cancer Father    • Hypertension Sister    • Cancer Sister    • Migraines Daughter    • Cancer Other         Uncle   • Migraines Maternal Aunt    • Stroke Maternal Grandmother    • Stroke Maternal Grandfather    • Breast cancer Neg Hx          SOCIAL HISTORY  Social History     Occupational History   • Not on file   Tobacco Use   • Smoking status: Current Every Day Smoker     Packs/day: 0.50     Years: 40.00     Pack years: 20.00   • Smokeless tobacco: Never Used   • Tobacco comment: patient refused   Vaping Use   • Vaping Use: Never used   Substance and Sexual Activity   • Alcohol use: No   • Drug use: No   • Sexual activity: Defer     Comment: EXERCISE - RARELY         CURRENT MEDICATIONS    Current Outpatient Medications:   •  albuterol sulfate  (90 Base) MCG/ACT inhaler, , Disp: , Rfl:   •  azelastine (ASTELIN) 0.1 % nasal spray, , Disp: , Rfl:   •  clopidogrel (PLAVIX) 75 MG tablet, Take 1 tablet by mouth Daily., Disp: 30 tablet, Rfl: 0  •  DULoxetine (CYMBALTA) 60 MG capsule, Take 1 capsule by mouth Daily., Disp: 30 capsule, Rfl: 0  •  eszopiclone  "(LUNESTA) 3 MG tablet, , Disp: , Rfl:   •  Evolocumab (REPATHA) solution auto-injector SureClick injection, INJECT THE CONTENTS OF 1 SYRINGE INTO THE SKIN EVERY 14 DAYS, Disp: , Rfl:   •  gabapentin (NEURONTIN) 600 MG tablet, 3 (Three) Times a Day., Disp: , Rfl:   •  insulin glargine (LANTUS) 100 UNIT/ML injection, Inject 20 Units under the skin into the appropriate area as directed Daily., Disp: , Rfl:   •  Insulin Syringe 31G X 5/16\" 1 ML misc, Use twice daily for shots, Disp: 100 each, Rfl: 3  •  irbesartan (AVAPRO) 300 MG tablet, , Disp: , Rfl:   •  levETIRAcetam (KEPPRA) 500 MG tablet, Take 1 tablet by mouth Every 12 (Twelve) Hours., Disp: 60 tablet, Rfl: 0  •  metFORMIN ER (FORTAMET) 1000 MG (OSM) 24 hr tablet, Take 1 tablet by mouth 2 (Two) Times a Day With Meals., Disp: , Rfl:   •  methadone (DOLOPHINE) 10 MG tablet, Take 10 mg by mouth 3 (Three) Times a Day 10 mg in morning 20mg at afternoon and 10mg at bedtime ,, Disp: , Rfl:   •  Mucus Relief 600 MG 12 hr tablet, , Disp: , Rfl:   •  Naloxone HCl (NARCAN) 0.4 MG/ML injection, Infuse 1 mL into a venous catheter Every 5 (Five) Minutes As Needed for Opioid Reversal., Disp: 1 mL, Rfl: 1  •  nystatin (MYCOSTATIN) 100,000 unit/mL suspension, Take 200,000 Units by mouth 4 (Four) Times a Day., Disp: , Rfl:   •  ondansetron (ZOFRAN) 4 MG tablet, , Disp: , Rfl:   •  pantoprazole (PROTONIX) 40 MG EC tablet, Take 1 tablet by mouth Daily., Disp: , Rfl:   •  polyethylene glycol (MIRALAX) 17 GM/SCOOP powder, Take 17 g by mouth Daily., Disp: , Rfl:   •  QUEtiapine (SEROquel) 100 MG tablet, , Disp: , Rfl:   •  TiZANidine (ZANAFLEX) 6 MG capsule, , Disp: , Rfl:   •  traZODone (DESYREL) 100 MG tablet, Every Night., Disp: , Rfl:   •  Naloxegol Oxalate (MOVANTIK) 25 MG tablet, Take 1 tablet by mouth Every Morning., Disp: 30 tablet, Rfl: 11    ALLERGIES  Medrol [methylprednisolone], Other, Atorvastatin, Diflucan [fluconazole], Hydroxyzine, Levofloxacin, Nsaids, Statins, Toradol " "[ketorolac tromethamine], Victoza  [liraglutide], and Rosuvastatin    VITALS  Vitals:    03/07/22 1418   BP: 130/78   BP Location: Left arm   Patient Position: Sitting   Cuff Size: Large Adult   Weight: 79.8 kg (176 lb)   Height: 176 cm (69.3\")       PHYSICAL EXAM  Debilities/Disabilities Identified: None  Emotional Behavior: Appropriate  Wt Readings from Last 3 Encounters:   03/07/22 79.8 kg (176 lb)   07/29/21 78.7 kg (173 lb 9 oz)   07/01/21 78 kg (172 lb)     Ht Readings from Last 1 Encounters:   03/07/22 176 cm (69.3\")     Body mass index is 25.77 kg/m².  Physical Exam  Constitutional:       Appearance: She is well-developed. She is not diaphoretic.   Eyes:      General: No scleral icterus.     Conjunctiva/sclera: Conjunctivae normal.      Pupils: Pupils are equal, round, and reactive to light.   Neck:      Thyroid: No thyromegaly.   Cardiovascular:      Rate and Rhythm: Normal rate and regular rhythm.      Heart sounds: Normal heart sounds. No murmur heard.    No gallop.   Pulmonary:      Effort: Pulmonary effort is normal.      Breath sounds: Normal breath sounds. No wheezing or rales.   Abdominal:      General: Bowel sounds are normal. There is no distension or abdominal bruit.      Palpations: Abdomen is soft. There is no shifting dullness, fluid wave or mass.      Tenderness: There is abdominal tenderness in the right lower quadrant. There is no guarding. Negative signs include Tanner's sign.      Hernia: There is no hernia in the ventral area.   Musculoskeletal:         General: Normal range of motion.      Cervical back: Normal range of motion and neck supple.   Lymphadenopathy:      Cervical: No cervical adenopathy.   Skin:     General: Skin is warm and dry.      Findings: No erythema or rash.   Neurological:      Mental Status: She is alert and oriented to person, place, and time.         CLINICAL DATA REVIEWED   reviewed previous lab results and integrated with today's visit, reviewed notes from other " physicians and/or last GI encounter, reviewed previous endoscopy results and available photos, reviewed surgical pathology results from previous biopsies    ASSESSMENT  Diagnoses and all orders for this visit:    Huitron's esophagus without dysplasia  -     Case Request; Standing  -     COVID PRE-OP / PRE-PROCEDURE SCREENING ORDER (NO ISOLATION) - Swab, Nasopharynx; Future  -     Case Request    Drug-induced constipation    Controlled type 2 diabetes mellitus with complication, with long-term current use of insulin (HCC)    Systemic sclerosis (HCC)    Other orders  -     albuterol sulfate  (90 Base) MCG/ACT inhaler  -     azelastine (ASTELIN) 0.1 % nasal spray  -     eszopiclone (LUNESTA) 3 MG tablet  -     Mucus Relief 600 MG 12 hr tablet  -     irbesartan (AVAPRO) 300 MG tablet  -     nystatin (MYCOSTATIN) 100,000 unit/mL suspension; Take 200,000 Units by mouth 4 (Four) Times a Day.  -     ondansetron (ZOFRAN) 4 MG tablet  -     pantoprazole (PROTONIX) 40 MG EC tablet; Take 1 tablet by mouth Daily.  -     polyethylene glycol (MIRALAX) 17 GM/SCOOP powder; Take 17 g by mouth Daily.  -     QUEtiapine (SEROquel) 100 MG tablet  -     TiZANidine (ZANAFLEX) 6 MG capsule  -     Follow Anesthesia Guidelines / Protocol; Future  -     Naloxegol Oxalate (MOVANTIK) 25 MG tablet; Take 1 tablet by mouth Every Morning.          PLAN  Increase Linzess and add Movantic and schedule her EGD for Huitron's  Return in about 4 months (around 7/7/2022).    I have discussed the above plan with the patient.  They verbalize understanding and are in agreement with the plan.  They have been advised to contact the office for any questions, concerns, or changes related to their health.

## 2022-03-08 ENCOUNTER — PATIENT ROUNDING (BHMG ONLY) (OUTPATIENT)
Dept: GASTROENTEROLOGY | Facility: CLINIC | Age: 57
End: 2022-03-08

## 2022-03-08 ENCOUNTER — TELEPHONE (OUTPATIENT)
Dept: GASTROENTEROLOGY | Facility: CLINIC | Age: 57
End: 2022-03-08

## 2022-03-08 NOTE — TELEPHONE ENCOUNTER
CALLED DR. CLEOPATRA MCINTYRE OFFICE.  SPOKE WITH FRANSISCA.  EXPLAINED EGD ON 03/14/2022.  CAN SHE HOLD PLAVIX PRIOR TO PROCEDURE AND HOW MANY DAYS.  SHE TOOK MESSAGE.  WILL HAVE SOMEONE TO CALL BACK.

## 2022-03-08 NOTE — PROGRESS NOTES
March 8, 2022    Hello, may I speak with Crystal Cruz?    My name is Moon Silver      I am  with [facility]  [address]  [place] [address] [phone].    Before we get started may I verify your date of birth? 1965    I am calling to officially welcome you to our practice and ask about your recent visit. Is this a good time to talk? yes    Tell me about your visit with us. What things went well?  She was very pleased and really enjoyed Dr. España. Said he very funny and loved him.       We're always looking for ways to make our patients' experiences even better. Do you have recommendations on ways we may improve?  no    Overall were you satisfied with your first visit to our practice? yes       I appreciate you taking the time to speak with me today. Is there anything else I can do for you? Yes. Pt had a question regarding a medication. I routed a telephone message to Dr. España regarding this.      Thank you, and have a great day.

## 2022-03-08 NOTE — TELEPHONE ENCOUNTER
Called patient to follow up on her visit yesterday. She had a question regarding the Movantik she was prescribed. She was reading the literature that came with it and she saw that it can cause opiate withdrawal. She is concerned because she has had a history of grand mal seizures related to withdrawal in the past. Please call patient to discuss.

## 2022-03-08 NOTE — TELEPHONE ENCOUNTER
----- Message from Megan Caceres RN sent at 3/7/2022  3:48 PM EST -----  Regarding: Need EGD  Pt seen in office today will need to be schedule EGD at [facility] soon

## 2022-03-08 NOTE — TELEPHONE ENCOUNTER
SPOKE WITH PATIENT.  SCHEDULED AT [place] 03/14/2022 AT 8:45AM - ARRIVE 7:30AM.  GAVE INSTRUCTIONS OVER THE PHONE.    COVID TEST 03/11/2022 AT 10:10AM.  NEED TO SELF QUARANTINE UNTIL AFTER PROCEDURE.  SHE UNDERSTANDS.

## 2022-03-10 NOTE — TELEPHONE ENCOUNTER
Per Telephone encounter: Called patient to follow up on her visit yesterday. She had a question regarding the Movantik she was prescribed. She was reading the literature that came with it and she saw that it can cause opiate withdrawal. She is concerned because she has had a history of grand mal seizures related to withdrawal in the past. Please call patient to discuss.    Pt sent my chart message :   Hunter España,  I have taken both of the meds that you prescribed, I am not sure if I had a reaction or not, I did have a very painful stomach within several minutes of taking it. So in that I am curious if that is the norm because a little bit later I did have a small bowel movement but the pain made me scratch my head.  Thank you so much for your time and help  Crystal WHITE. yes I did enjoy my visit with you and will see you on Monday.      LOV: 03/07/22: Drug Induced Constipation; Barretts : 3/3 risk gastroparesis  PLAN  Increase Linzess and add Movantic and schedule her EGD for Huitron's    EGD scheduled 14th.

## 2022-03-11 ENCOUNTER — LAB (OUTPATIENT)
Dept: LAB | Facility: HOSPITAL | Age: 57
End: 2022-03-11

## 2022-03-11 ENCOUNTER — ANESTHESIA EVENT (OUTPATIENT)
Dept: PERIOP | Facility: HOSPITAL | Age: 57
End: 2022-03-11

## 2022-03-11 ENCOUNTER — APPOINTMENT (OUTPATIENT)
Dept: LAB | Facility: HOSPITAL | Age: 57
End: 2022-03-11

## 2022-03-11 DIAGNOSIS — K22.70 BARRETT'S ESOPHAGUS WITHOUT DYSPLASIA: ICD-10-CM

## 2022-03-11 LAB — SARS-COV-2 RNA PNL SPEC NAA+PROBE: NOT DETECTED

## 2022-03-11 PROCEDURE — C9803 HOPD COVID-19 SPEC COLLECT: HCPCS

## 2022-03-11 PROCEDURE — 87635 SARS-COV-2 COVID-19 AMP PRB: CPT | Performed by: INTERNAL MEDICINE

## 2022-03-14 ENCOUNTER — ANESTHESIA (OUTPATIENT)
Dept: PERIOP | Facility: HOSPITAL | Age: 57
End: 2022-03-14

## 2022-03-14 ENCOUNTER — HOSPITAL ENCOUNTER (OUTPATIENT)
Facility: HOSPITAL | Age: 57
Setting detail: HOSPITAL OUTPATIENT SURGERY
Discharge: HOME | End: 2022-03-14
Attending: INTERNAL MEDICINE | Admitting: INTERNAL MEDICINE

## 2022-03-14 VITALS
OXYGEN SATURATION: 92 % | TEMPERATURE: 98.6 F | HEIGHT: 64 IN | DIASTOLIC BLOOD PRESSURE: 63 MMHG | WEIGHT: 172.4 LBS | HEART RATE: 80 BPM | RESPIRATION RATE: 15 BRPM | BODY MASS INDEX: 29.43 KG/M2 | SYSTOLIC BLOOD PRESSURE: 145 MMHG

## 2022-03-14 DIAGNOSIS — K22.70 BARRETT'S ESOPHAGUS WITHOUT DYSPLASIA: ICD-10-CM

## 2022-03-14 LAB — GLUCOSE BLDC GLUCOMTR-MCNC: 86 MG/DL (ref 70–130)

## 2022-03-14 PROCEDURE — 43239 EGD BIOPSY SINGLE/MULTIPLE: CPT | Performed by: INTERNAL MEDICINE

## 2022-03-14 PROCEDURE — 88305 TISSUE EXAM BY PATHOLOGIST: CPT | Performed by: INTERNAL MEDICINE

## 2022-03-14 PROCEDURE — 25010000002 PROPOFOL 10 MG/ML EMULSION: Performed by: NURSE ANESTHETIST, CERTIFIED REGISTERED

## 2022-03-14 PROCEDURE — 82962 GLUCOSE BLOOD TEST: CPT

## 2022-03-14 RX ORDER — MAGNESIUM HYDROXIDE 1200 MG/15ML
LIQUID ORAL AS NEEDED
Status: DISCONTINUED | OUTPATIENT
Start: 2022-03-14 | End: 2022-03-14 | Stop reason: HOSPADM

## 2022-03-14 RX ORDER — LIDOCAINE HYDROCHLORIDE 20 MG/ML
INJECTION, SOLUTION INFILTRATION; PERINEURAL AS NEEDED
Status: DISCONTINUED | OUTPATIENT
Start: 2022-03-14 | End: 2022-03-14 | Stop reason: SURG

## 2022-03-14 RX ORDER — SODIUM CHLORIDE 0.9 % (FLUSH) 0.9 %
10 SYRINGE (ML) INJECTION EVERY 12 HOURS SCHEDULED
Status: DISCONTINUED | OUTPATIENT
Start: 2022-03-14 | End: 2022-03-14 | Stop reason: HOSPADM

## 2022-03-14 RX ORDER — SODIUM CHLORIDE 0.9 % (FLUSH) 0.9 %
10 SYRINGE (ML) INJECTION AS NEEDED
Status: DISCONTINUED | OUTPATIENT
Start: 2022-03-14 | End: 2022-03-14 | Stop reason: HOSPADM

## 2022-03-14 RX ORDER — PROPOFOL 10 MG/ML
VIAL (ML) INTRAVENOUS AS NEEDED
Status: DISCONTINUED | OUTPATIENT
Start: 2022-03-14 | End: 2022-03-14 | Stop reason: SURG

## 2022-03-14 RX ORDER — SODIUM CHLORIDE, SODIUM LACTATE, POTASSIUM CHLORIDE, CALCIUM CHLORIDE 600; 310; 30; 20 MG/100ML; MG/100ML; MG/100ML; MG/100ML
9 INJECTION, SOLUTION INTRAVENOUS CONTINUOUS
Status: DISCONTINUED | OUTPATIENT
Start: 2022-03-14 | End: 2022-03-14 | Stop reason: HOSPADM

## 2022-03-14 RX ORDER — SODIUM CHLORIDE 9 MG/ML
40 INJECTION, SOLUTION INTRAVENOUS AS NEEDED
Status: DISCONTINUED | OUTPATIENT
Start: 2022-03-14 | End: 2022-03-14 | Stop reason: HOSPADM

## 2022-03-14 RX ORDER — ONDANSETRON 2 MG/ML
4 INJECTION INTRAMUSCULAR; INTRAVENOUS ONCE AS NEEDED
Status: DISCONTINUED | OUTPATIENT
Start: 2022-03-14 | End: 2022-03-14 | Stop reason: HOSPADM

## 2022-03-14 RX ORDER — LIDOCAINE HYDROCHLORIDE 10 MG/ML
0.5 INJECTION, SOLUTION EPIDURAL; INFILTRATION; INTRACAUDAL; PERINEURAL ONCE AS NEEDED
Status: COMPLETED | OUTPATIENT
Start: 2022-03-14 | End: 2022-03-14

## 2022-03-14 RX ORDER — SODIUM CHLORIDE, SODIUM LACTATE, POTASSIUM CHLORIDE, CALCIUM CHLORIDE 600; 310; 30; 20 MG/100ML; MG/100ML; MG/100ML; MG/100ML
100 INJECTION, SOLUTION INTRAVENOUS CONTINUOUS
Status: DISCONTINUED | OUTPATIENT
Start: 2022-03-14 | End: 2022-03-14 | Stop reason: HOSPADM

## 2022-03-14 RX ADMIN — PROPOFOL 40 MG: 10 INJECTION, EMULSION INTRAVENOUS at 09:02

## 2022-03-14 RX ADMIN — LIDOCAINE HYDROCHLORIDE 100 MG: 20 INJECTION, SOLUTION INFILTRATION; PERINEURAL at 08:51

## 2022-03-14 RX ADMIN — PROPOFOL 40 MG: 10 INJECTION, EMULSION INTRAVENOUS at 08:53

## 2022-03-14 RX ADMIN — SODIUM CHLORIDE, POTASSIUM CHLORIDE, SODIUM LACTATE AND CALCIUM CHLORIDE 9 ML/HR: 600; 310; 30; 20 INJECTION, SOLUTION INTRAVENOUS at 08:00

## 2022-03-14 RX ADMIN — PROPOFOL 40 MG: 10 INJECTION, EMULSION INTRAVENOUS at 08:56

## 2022-03-14 RX ADMIN — PROPOFOL 40 MG: 10 INJECTION, EMULSION INTRAVENOUS at 09:06

## 2022-03-14 RX ADMIN — PROPOFOL 40 MG: 10 INJECTION, EMULSION INTRAVENOUS at 08:59

## 2022-03-14 RX ADMIN — PROPOFOL 40 MG: 10 INJECTION, EMULSION INTRAVENOUS at 08:51

## 2022-03-14 RX ADMIN — LIDOCAINE HYDROCHLORIDE 0.5 ML: 10 INJECTION, SOLUTION EPIDURAL; INFILTRATION; INTRACAUDAL; PERINEURAL at 08:00

## 2022-03-14 NOTE — ANESTHESIA PREPROCEDURE EVALUATION
Anesthesia Evaluation     Patient summary reviewed and Nursing notes reviewed   history of anesthetic complications: difficult airway  NPO Solid Status: N/A  NPO Liquid Status: N/A           Airway   Mallampati: II  TM distance: >3 FB  Difficult intubation highly probable  Dental - normal exam     Pulmonary - normal exam    breath sounds clear to auscultation  (+) a smoker (one half pack per day) Current Abstained day of surgery,   Sleep apnea: occ snores.  Cardiovascular - normal exam    PT is on anticoagulation therapy  Rhythm: regular  Rate: normal    (+) hypertension well controlled, hyperlipidemia,       Neuro/Psych  (+) seizures (2016 due to xanax withdrawal, on Keppra) well controlled, CVA (stroke X5, some short term memory loss), dizziness/light headedness (occ light headedness, occ passing out-while sitting ), psychiatric history Anxiety,    GI/Hepatic/Renal/Endo    (+)  GERD,  diabetes mellitus (FBS 86) type 2 well controlled using insulin,     Musculoskeletal     (+) back pain, chronic pain, neck pain, neck stiffness,       ROS comment: On gabapentin, methodone  Abdominal  - normal exam   Substance History - negative use     OB/GYN          Other   autoimmune disease (Keinbocks disease) scleroderma,      ROS/Med Hx Other: Plavix last 3 days ago                  Anesthesia Plan    ASA 3     MAC     intravenous induction     Anesthetic plan, all risks, benefits, and alternatives have been provided, discussed and informed consent has been obtained with: patient.        CODE STATUS:

## 2022-03-14 NOTE — INTERVAL H&P NOTE
"Vital Signs  /73 (BP Location: Left arm, Patient Position: Lying)   Pulse 102   Temp 99.6 °F (37.6 °C) (Oral)   Resp 14   Ht 162.6 cm (64\")   Wt 78.2 kg (172 lb 6.4 oz)   LMP  (LMP Unknown)   SpO2 93%   BMI 29.59 kg/m²     H&P reviewed. The patient was examined and there are no changes to the H&P.        "

## 2022-03-14 NOTE — ANESTHESIA POSTPROCEDURE EVALUATION
Patient: Crystal Cruz    Procedure Summary     Date: 03/14/22 Room / Location: [facility] ENDOSCOPY 1 /  [facility]    Anesthesia Start: 0848 Anesthesia Stop: 0912    Procedure: ESOPHAGOGASTRODUODENOSCOPY WITH BIOPSY (N/A Esophagus) Diagnosis:       Huitron's esophagus without dysplasia      Gastritis      (Huitron's esophagus without dysplasia [K22.70])    Surgeons: Haim España MD Provider: Saira Rice CRNA    Anesthesia Type: MAC ASA Status: 3          Anesthesia Type: MAC    Vitals  Vitals Value Taken Time   /77 03/14/22 0914   Temp 98.6 °F (37 °C) 03/14/22 0914   Pulse 85 03/14/22 0914   Resp 15 03/14/22 0914   SpO2 92 % 03/14/22 0914           Post Anesthesia Care and Evaluation    Patient location during evaluation: PHASE II  Patient participation: complete - patient participated  Level of consciousness: awake and alert  Pain score: 0  Pain management: satisfactory to patient  Airway patency: patent  Anesthetic complications: No anesthetic complications  PONV Status: none  Cardiovascular status: acceptable  Respiratory status: acceptable  Hydration status: acceptable

## 2022-03-14 NOTE — BRIEF OP NOTE
ESOPHAGOGASTRODUODENOSCOPY WITH BIOPSY  Progress Note    Crystal Cruz  3/14/2022    Pre-op Diagnosis:   Huitron's esophagus without dysplasia [K22.70]       Post-Op Diagnosis Codes:     * Huitron's esophagus without dysplasia [K22.70]     * Gastritis [K29.70]    Procedure/CPT® Codes:        Procedure(s):  ESOPHAGOGASTRODUODENOSCOPY WITH BIOPSY    Surgeon(s):  Haim España MD    Anesthesia: Monitored Anesthesia Care    Staff:   Circulator: Marie Mobley RN  Scrub Person: Joanne Gonzalez         Estimated Blood Loss: none    Urine Voided: * No values recorded between 3/14/2022  8:47 AM and 3/14/2022  9:08 AM *    Specimens:                Specimens     ID Source Type Tests Collected By Collected At Frozen?    A Gastric, Body Tissue · TISSUE PATHOLOGY EXAM   Haim España MD 3/14/22 0902     Description: gastric bx    This specimen was not marked as sent.    B Esophagus, Distal Tissue · TISSUE PATHOLOGY EXAM   Haim España MD 3/14/22 0905     Description: distal esophagus @ 36 cm    This specimen was not marked as sent.    C Esophagus, Distal Tissue · TISSUE PATHOLOGY EXAM   Haim España MD 3/14/22 0906     Description: distal esophagus @34 cm    This specimen was not marked as sent.                Drains: * No LDAs found *    Findings: Normal Duodenum  Mild Gastritis-Biopsy  Huitron's Esophagus-Biopsy 36, 34 cm      Complications: None          Haim España MD     Date: 3/14/2022  Time: 09:11 EDT

## 2022-03-15 LAB
LAB AP CASE REPORT: NORMAL
PATH REPORT.FINAL DX SPEC: NORMAL
PATH REPORT.GROSS SPEC: NORMAL

## 2023-04-24 NOTE — PROGRESS NOTES
CHIEF COMPLAINT  Back pain has increased since last visit. She states that the RF has made her pain increase since she had it done.     Subjective   Crystal Cruz is a 52 y.o. female  who presents to the office for follow-up of procedure.  She completed a Bilateral L1-4 Lumbar Medial Branch RADIOFREQUENCY   on  2/20/2018 performed by Dr. Law for management of chronic low back pain. Patient reports minimal relief from the procedure.  However, originally, she reports significant relief until last week. Since then she has felt increased pain and tightness.  Her pain is a little higher than where it was previously.    Complains of pain in her back and upper extremities. Today her pain is 8/10VAS. Describes the pain as continuous and reports her back pain is slightly worse since last office visit.   Continues with OxyContin 30 mg BID and Percocet 7.5/325 3-4/day, Cymbalta 60 mg and gabapentin 600 mg TID. Denies any side effects from the regimen.   The regimen helps decrease her pain by 50%. ADL's by self.    Extremity Pain    The pain is present in the neck, back, left wrist, left hand, left foot, right foot, right hand, right wrist, right fingers and left fingers. This is a chronic problem. The current episode started more than 1 year ago. There has been no history of extremity trauma. The problem occurs constantly. Progression since onset: unchanged since last office visit. The quality of the pain is described as aching, pounding and burning. The pain is at a severity of 3/10. The pain is moderate. Associated symptoms include numbness. Pertinent negatives include no fever. The symptoms are aggravated by cold (moist/rainy weather). She has tried acetaminophen, heat, movement, NSAIDS, OTC ointments, OTC pain meds, oral narcotics and rest (Oxycontin 30 mg Q12 hours, Oxycodone 7.5/325 4/day) for the symptoms. The treatment provided moderate relief. mitchell, systemic sclerosis   Back Pain   This is a chronic  "problem. The current episode started 1 to 4 weeks ago. The problem occurs constantly. Progression since onset: per patient- worse since last office visit. The pain is present in the lumbar spine. The quality of the pain is described as aching. The pain does not radiate. The pain is at a severity of 8/10 (ranges from 3-6/10VAS). The pain is severe. The symptoms are aggravated by bending, position, lying down, standing and twisting. Associated symptoms include numbness. Pertinent negatives include no bladder incontinence, bowel incontinence, chest pain, dysuria, fever, headaches or weakness. She has tried analgesics, heat and bed rest (lumbar FJN/RFL--- significant long-term relief) for the symptoms.     The following portions of the patient's history were reviewed and updated as appropriate: allergies, current medications, past family history, past medical history, past social history, past surgical history and problem list.    Review of Systems   Constitutional: Negative for chills and fever.   Respiratory: Negative for shortness of breath.    Cardiovascular: Negative for chest pain.   Gastrointestinal: Positive for nausea. Negative for bowel incontinence, constipation, diarrhea and vomiting.   Genitourinary: Negative for bladder incontinence, difficulty urinating, dyspareunia and dysuria.   Musculoskeletal: Positive for back pain.   Neurological: Positive for numbness. Negative for dizziness, weakness, light-headedness and headaches.   Psychiatric/Behavioral: Positive for sleep disturbance. Negative for confusion, hallucinations, self-injury and suicidal ideas. The patient is nervous/anxious.        Vitals:    03/12/18 1230   BP: 149/75   Pulse: 76   Resp: 18   Temp: 98.1 °F (36.7 °C)   SpO2: 96%   Weight: 80.7 kg (178 lb)   Height: 162.6 cm (64\")   PainSc:   8   PainLoc: Back     Objective   Physical Exam   Constitutional: She is oriented to person, place, and time. She appears well-developed and well-nourished. " She is cooperative.   HENT:   Head: Normocephalic and atraumatic.   Eyes: Lids are normal.   Neck: Trachea normal.   Cardiovascular: Normal rate.    Pulmonary/Chest: Effort normal.   Musculoskeletal:        Lumbar back: She exhibits tenderness and bony tenderness (minimal tenderness to palpation of bilateral L3-L5, moderate of T12-L3 tenderness).        Right hand: She exhibits decreased range of motion and tenderness. She exhibits normal capillary refill.        Left hand: She exhibits decreased range of motion and tenderness. She exhibits normal capillary refill.   Arthritic changes bilateral hands and wrists  Guarded of BUE's   Neurological: She is alert and oriented to person, place, and time. Gait normal.   Reflex Scores:       Patellar reflexes are 1+ on the right side and 1+ on the left side.  Skin: Skin is warm, dry and intact.   Sclerous appearance is unchanged   Psychiatric: She has a normal mood and affect. Her speech is normal and behavior is normal. Cognition and memory are normal.   Nursing note and vitals reviewed.      Assessment/Plan   Crystal was seen today for back pain.    Diagnoses and all orders for this visit:    Other chronic pain    Arthropathy of lumbar facet joint    Kienbock disease, adult    Pain in both upper extremities    Encounter for long-term (current) use of high-risk medication    Other orders  -     OxyCODONE HCl ER (oxyCONTIN) 30 MG tablet extended-release 12 hour; Take 1 tablet by mouth Every 12 (Twelve) Hours.  -     oxyCODONE-acetaminophen (PERCOCET) 7.5-325 MG per tablet; Take 1 tablet by mouth Every 6 (Six) Hours As Needed for Severe Pain .  -     MethylPREDNISolone (MEDROL, DONELL,) 4 MG tablet; Take as directed on package instructions.      --- The urine drug screen confirmation from 10-18-17 has been reviewed and the result is appropriate based on patient history and STEVEN report  --- refill OxyContin and Percocet. Patient appears stable with current regimen. No adverse  effects. Regarding continuation of opioids, there is no evidence of aberrant behavior or any red flags.  The patient continues with appropriate response to opioid therapy. ADL's remain intact by self.   --- MDP. Discussed medication with the patient.  Included in this discussion was the potential for side effects and adverse events.  Patient verbalized understanding and wished to proceed.  Prescription will be sent to pharmacy.  --- If no improvement, consider updating imaging, or consider it may be above level of previous RFL.  --- Follow-up 1 month or sooner if needed.       STEVEN REPORT    As part of the patient's treatment plan, I am prescribing controlled substances. The patient has been made aware of appropriate use of such medications, including potential risk of somnolence, limited ability to drive and/or work safely, and the potential for dependence or overdose. It has also bee made clear that these medications are for use by this patient only, without concomitant use of alcohol or other substances unless prescribed.     Patient has completed prescribing agreement detailing terms of continued prescribing of controlled substances, including monitoring STEVEN reports, urine drug screening, and pill counts if necessary. The patient is aware that inappropriate use will results in cessation of prescribing such medications.    STEVEN report has been reviewed and scanned into the patient's chart.    Date of last STEVEN : 3-8-18    History and physical exam exhibit continued safe and appropriate use of controlled substances.       EMR Dragon/Transcription disclaimer:   Much of this encounter note is an electronic transcription/translation of spoken language to printed text. The electronic translation of spoken language may permit erroneous, or at times, nonsensical words or phrases to be inadvertently transcribed; Although I have reviewed the note for such errors, some may still exist.       PAST MEDICAL HISTORY:  No pertinent past medical history

## 2023-11-06 NOTE — TELEPHONE ENCOUNTER
"Mary  this rx is written in a way that it will not go erx    I have never seen this \"wrapper\"   what are you wanting ordered ?   " Patient called and stated that you discussed her being prescribed a third medication to help her get through the winter. I spoke to the patient and informed her that you changed her oxycontin to Xtampza and that there will be no further changes in medication until her next visit after the trial of the xtampza.

## (undated) DEVICE — SYR LL 3CC

## (undated) DEVICE — SUCTION CANISTER, 3000CC,SAFELINER: Brand: DEROYAL

## (undated) DEVICE — FRCP BX RADJAW4 NDL 2.8 240CM LG OG BX40

## (undated) DEVICE — GLV SURG SENSICARE MICRO PF LF 6 STRL

## (undated) DEVICE — SAFELINER SUCTION CANISTER 1000CC: Brand: DEROYAL

## (undated) DEVICE — ADAPT CLN BIOGUARD AIR/H2O DISP

## (undated) DEVICE — SPNG GZ WOVN 4X4IN 12PLY 10/BX STRL

## (undated) DEVICE — GOWN ISOL W/THUMB UNIV BLU BX/15

## (undated) DEVICE — MASK,FACE,FLUID RES,SHLD,FOGFREE,TIES: Brand: MEDLINE

## (undated) DEVICE — GLV SURG SENSICARE PI MIC PF SZ7.5 LF STRL

## (undated) DEVICE — Device: Brand: DEFENDO AIR/WATER/SUCTION AND BIOPSY VALVE

## (undated) DEVICE — ENDO. PORT CONNECTOR W/VALVE FOR OLYMPUS® SCOPES: Brand: ERBE

## (undated) DEVICE — KT ORCA ORCAPOD DISP STRL

## (undated) DEVICE — THE BITE BLOCK MAXI, LATEX FREE STRAP IS USED TO PROTECT THE ENDOSCOPE INSERTION TUBE FROM BEING BITTEN BY THE PATIENT.

## (undated) DEVICE — Device

## (undated) DEVICE — JACKT LAB F/R KNIT CUFF/COLR XLG BLU

## (undated) DEVICE — BW-412T DISP COMBO CLEANING BRUSH: Brand: SINGLE USE COMBINATION CLEANING BRUSH

## (undated) DEVICE — VIAL FORMALIN CAP 10P 40ML

## (undated) DEVICE — JACKT LAB KNIT COLR LG BLU